# Patient Record
Sex: MALE | Race: WHITE | NOT HISPANIC OR LATINO | Employment: UNEMPLOYED | URBAN - METROPOLITAN AREA
[De-identification: names, ages, dates, MRNs, and addresses within clinical notes are randomized per-mention and may not be internally consistent; named-entity substitution may affect disease eponyms.]

---

## 2017-12-13 ENCOUNTER — HOSPITAL ENCOUNTER (EMERGENCY)
Facility: MEDICAL CENTER | Age: 37
End: 2017-12-14
Attending: EMERGENCY MEDICINE
Payer: COMMERCIAL

## 2017-12-13 DIAGNOSIS — F10.920 ALCOHOLIC INTOXICATION WITHOUT COMPLICATION (HCC): ICD-10-CM

## 2017-12-13 DIAGNOSIS — F43.21 SITUATIONAL DEPRESSION: ICD-10-CM

## 2017-12-13 LAB — POC BREATHALIZER: 0.24 PERCENT (ref 0–0.01)

## 2017-12-13 PROCEDURE — 99284 EMERGENCY DEPT VISIT MOD MDM: CPT

## 2017-12-13 PROCEDURE — 82962 GLUCOSE BLOOD TEST: CPT

## 2017-12-13 PROCEDURE — 302970 POC BREATHALIZER

## 2017-12-13 PROCEDURE — 302970 POC BREATHALIZER: Performed by: EMERGENCY MEDICINE

## 2017-12-13 ASSESSMENT — LIFESTYLE VARIABLES
PAROXYSMAL SWEATS: NO SWEAT VISIBLE
AUDITORY DISTURBANCES: NOT PRESENT
VISUAL DISTURBANCES: NOT PRESENT
ANXIETY: NO ANXIETY (AT EASE)
TREMOR: NO TREMOR
ORIENTATION AND CLOUDING OF SENSORIUM: ORIENTED AND CAN DO SERIAL ADDITIONS
AGITATION: *
NAUSEA AND VOMITING: NO NAUSEA AND NO VOMITING
TOTAL SCORE: 2
HEADACHE, FULLNESS IN HEAD: NOT PRESENT

## 2017-12-13 ASSESSMENT — PAIN SCALES - GENERAL: PAINLEVEL_OUTOF10: 0

## 2017-12-14 VITALS
OXYGEN SATURATION: 95 % | SYSTOLIC BLOOD PRESSURE: 140 MMHG | DIASTOLIC BLOOD PRESSURE: 80 MMHG | BODY MASS INDEX: 31.52 KG/M2 | RESPIRATION RATE: 18 BRPM | WEIGHT: 253.53 LBS | TEMPERATURE: 98 F | HEIGHT: 75 IN | HEART RATE: 92 BPM

## 2017-12-14 LAB
GLUCOSE BLD-MCNC: 82 MG/DL (ref 65–99)
POC BREATHALIZER: 0.08 PERCENT (ref 0–0.01)

## 2017-12-14 PROCEDURE — 302970 POC BREATHALIZER: Performed by: EMERGENCY MEDICINE

## 2017-12-14 ASSESSMENT — PAIN SCALES - GENERAL: PAINLEVEL_OUTOF10: 0

## 2017-12-14 NOTE — ED NOTES
Restraints reduced to two point hard restraints.  Pt is cooperative at this time.  Pt given water, allowed urinal use

## 2017-12-14 NOTE — ED NOTES
Brannon Sharma  37 y.o.  Chief Complaint   Patient presents with   • Alcohol Intoxication     Pt combative, slurring speech, pt reportedly drank a gallon of vodka   • Suicidal Ideation     Pt threatened to drink himself to death     Pt bib ems with LE for above stated complaint.  Pt arriving in four point restraints, verbally threatening EMS and security staff.  EMS states noncompliance with following directions, legal hold placed by [employer].  Pt placed on vs monitor, YAMIL on arrival is 0.24

## 2017-12-14 NOTE — ED PROVIDER NOTES
"ED Provider Note    Scribed for Javed Muniz M.D. by Steven Tarango. 12/13/2017, 11:15 PM.    Primary care provider: Juhi Kerr D.O.  Means of arrival: Ambulance  History obtained from: Patient  History limited by: None    CHIEF COMPLAINT  Chief Complaint   Patient presents with   • Alcohol Intoxication     Pt combative, slurring speech, pt reportedly drank a gallon of vodka   • Suicidal Ideation     Pt threatened to drink himself to death     HPI  Brannon Sharma is a 37 y.o. male who presents to the Emergency Department due to alcohol intoxication and suicidal ideation prior to arrival. The patient states he drank a couple liters of vodka which he endorses drinking on a daily basis. [employer] was called because he threatened to drink himself to death. He became combative and was restrained prior to arrival. Per patient, he said he just wanted someone to talk to and agrees that he is depressed. He \"wanted a way out and a different scenario\". However, he repeatedly denies SI. Denies drug use. Patient is currently living with his girlfriend.    REVIEW OF SYSTEMS  See HPI for further details. All other systems are negative.    C.    PAST MEDICAL HISTORY   has a past medical history of Alcohol-induced psychosis (CMS-HCC); Anxiety; Depression; Paranoid schizophrenia (CMS-HCC); Psychiatric disorder; and Schizophrenia, paranoid (CMS-HCC).    SURGICAL HISTORY   has a past surgical history that includes other.    SOCIAL HISTORY  Social History   Substance Use Topics   • Smoking status: Current Every Day Smoker     Packs/day: 1.00     Years: 12.00     Types: Cigarettes   • Alcohol use Yes      Comment: /3-4 drinks daily      History   Drug Use No     FAMILY HISTORY  None noted    CURRENT MEDICATIONS  Reviewed.  See Encounter Summary.     ALLERGIES  Allergies   Allergen Reactions   • Ceclor [Cefaclor]    • Cephalosporins    • Pcn [Penicillins]      PHYSICAL EXAM  VITAL SIGNS: /77   Pulse 88   Temp 36.7 °C " "(98 °F)   Resp 18   Ht 1.905 m (6' 3\")   Wt 115 kg (253 lb 8.5 oz)   SpO2 98%   BMI 31.69 kg/m²   Constitutional: No apparent distress, intoxicated.  HENT: No signs of trauma, Bilateral external ears normal, Nose normal.   Eyes: Pupils are equal and reactive, Conjunctiva normal, Non-icteric.   Neck: Normal range of motion, No tenderness, Supple, No stridor.   Lymphatic: No lymphadenopathy noted.   Cardiovascular: Regular rate and rhythm, no murmurs.   Thorax & Lungs: Normal breath sounds, No respiratory distress, No wheezing, No chest tenderness.   Abdomen: Bowel sounds normal, Soft, No tenderness, No masses, No pulsatile masses. No peritoneal signs.  Skin: Warm, Dry, No erythema, No rash.   Back: No bony tenderness, No CVA tenderness.   Extremities: Intact distal pulses, No edema, No tenderness, No cyanosis  Musculoskeletal: Good range of motion in all major joints. No tenderness to palpation or major deformities noted.   Neurologic: Normal motor function, Normal sensory function, No focal deficits noted.   Psychiatric: Slurred speech in 4-point restraints. Verbally cooperative at this point. Denies SI or HI.    DIAGNOSTIC STUDIES / PROCEDURES     LABS  Results for orders placed or performed during the hospital encounter of 12/13/17   POC BREATHALIZER   Result Value Ref Range    POC Breathalizer 0.24 (A) 0.00 - 0.01 Percent   ACCU-CHEK GLUCOSE   Result Value Ref Range    Glucose - Accu-Ck 82 65 - 99 mg/dL   POC BREATHALIZER   Result Value Ref Range    POC Breathalizer 0.079 (A) 0.00 - 0.01 Percent     All labs were reviewed by me.    COURSE & MEDICAL DECISION MAKING  Pertinent Labs & Imaging studies reviewed. (See chart for details)    Differential diagnoses include but are not limited to: Alcohol Intoxication,     11:15 PM - Patient seen and examined at bedside. Ordered POC Breathalizer to evaluate his symptoms.    11:55 PM - Ordered Accu-Check Glucose.    12:59 AM - Patient seen at bedside. He states he has " "had 1 suicidal attempt before and went to [facility]. However, he is more conversational and states he did not want to kill himself tonight but he called the hotline and purely wanted to talk to someone else. Patient is on 2 SSRI and 1 SNRI. 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 He takes 2-4mg of Clonazepam daily and Lithium.    Decision Making:  This is a 37 y.o. year old male who presents with alcohol intoxication situational depression. Patient admits that he did call the suicidal hotline to \"talk to somebody \". He does did have a disagreement with a significant other today which had him increase his alcohol intake tonight. He admits he typically drinks 1-2 L of vodka per day. This is a similar amount this evening. Notes patient is clinically sober do not believe that he has any threat to himself or others. He recurrently asked for benzodiazepines and then had a discussion with me at the bedside about how he is \"addicted to benzos\". He notes that if she does end up being admitted to local psychiatric facility he will \"fake withdrawal to get more benzos.\" Again this time I will declining POORNIMA Ramirez do not believe that he has any threat to himself or others. Situational depression with alcohol medication. He understands he should decrease his alcohol intake and seek outpatient help for his depression.    The patient will return for new or worsening symptoms and is stable at the time of discharge.    DISPOSITION:  Patient will be discharged home in stable condition.    FOLLOW UP:  Juhi Kerr D.O.  [id number]  [place] [address]  [phone]    Schedule an appointment as soon as possible for a visit in 1 day      FINAL IMPRESSION  1. Alcoholic intoxication without complication (CMS-HCC)    2. Situational depression          I, Steven Tarango (Scribe), am scribing for, and in the presence of, Javed Muniz, " M.D..    Electronically signed by: Steven Tarango (Scribe), 12/13/2017    IJaved M.D. personally performed the services described in this documentation, as scribed by Steven Tarango in my presence, and it is both accurate and complete.    The note accurately reflects work and decisions made by me.  Javed Muniz  12/14/2017  2:19 AM

## 2017-12-14 NOTE — DISCHARGE INSTRUCTIONS
Alcohol Intoxication  Alcohol intoxication occurs when you drink enough alcohol that it affects your ability to function. It can be mild or very severe. Drinking a lot of alcohol in a short time is called binge drinking. This can be very harmful. Drinking alcohol can also be more dangerous if you are taking medicines or other drugs. Some of the effects caused by alcohol may include:  · Loss of coordination.  · Changes in mood and behavior.  · Unclear thinking.  · Trouble talking (slurred speech).  · Throwing up (vomiting).  · Confusion.  · Slowed breathing.  · Twitching and shaking (seizures).  · Loss of consciousness.  HOME CARE  · Do not drive after drinking alcohol.  · Drink enough water and fluids to keep your pee (urine) clear or pale yellow. Avoid caffeine.  · Only take medicine as told by your doctor.  GET HELP IF:  · You throw up (vomit) many times.  · You do not feel better after a few days.  · You frequently have alcohol intoxication. Your doctor can help decide if you should see a substance use treatment counselor.  GET HELP RIGHT AWAY IF:  · You become shaky when you stop drinking.  · You have twitching and shaking.  · You throw up blood. It may look bright red or like coffee grounds.  · You notice blood in your poop (bowel movements).  · You become lightheaded or pass out (faint).  MAKE SURE YOU:   · Understand these instructions.  · Will watch your condition.  · Will get help right away if you are not doing well or get worse.     This information is not intended to replace advice given to you by your health care provider. Make sure you discuss any questions you have with your health care provider.     Document Released: 06/05/2009 Document Revised: 08/20/2014 Document Reviewed: 05/23/2014  [employer] Interactive Patient Education ©2016 [employer]    Depression, Adult  Depression is feeling sad, low, down in the dumps, blue, gloomy, or empty. In general, there are two kinds of depression:  · Normal  sadness or grief. This can happen after something upsetting. It often goes away on its own within 2 weeks. After losing a loved one (bereavement), normal sadness and grief may last longer than two weeks. It usually gets better with time.  · Clinical depression. This kind lasts longer than normal sadness or grief. It keeps you from doing the things you normally do in life. It is often hard to function at home, work, or at school. It may affect your relationships with others. Treatment is often needed.  GET HELP RIGHT AWAY IF:  · You have thoughts about hurting yourself or others.  · You lose touch with reality (psychotic symptoms). You may:  ¨ See or hear things that are not real.  ¨ Have untrue beliefs about your life or people around you.  · Your medicine is giving you problems.  MAKE SURE YOU:  · Understand these instructions.  · Will watch your condition.  · Will get help right away if you are not doing well or get worse.     This information is not intended to replace advice given to you by your health care provider. Make sure you discuss any questions you have with your health care provider.     Document Released: 01/20/2012 Document Revised: 01/08/2016 Document Reviewed: 04/18/2013  Elsevier Interactive Patient Education ©2016 [employer] Inc.

## 2017-12-14 NOTE — ED NOTES
Pt released from legal hold by ERP.  Close obs discontinued.  Pt speaking full sentences, able to ambulate with a steady gait to the lobby, is a/ox4 at this time.  Pt has discharge instructions in hand.  Pt understands need to stop drinking.  Pt to seek ride home from roommate.  Pt verbalizes understanding of dc instructions provided

## 2018-01-19 ENCOUNTER — HOSPITAL ENCOUNTER (EMERGENCY)
Facility: MEDICAL CENTER | Age: 38
End: 2018-01-20
Attending: EMERGENCY MEDICINE
Payer: COMMERCIAL

## 2018-01-19 ENCOUNTER — APPOINTMENT (OUTPATIENT)
Dept: RADIOLOGY | Facility: MEDICAL CENTER | Age: 38
End: 2018-01-19
Attending: EMERGENCY MEDICINE
Payer: COMMERCIAL

## 2018-01-19 DIAGNOSIS — M54.9 PAIN, UPPER BACK: ICD-10-CM

## 2018-01-19 PROCEDURE — 99284 EMERGENCY DEPT VISIT MOD MDM: CPT

## 2018-01-19 RX ORDER — LORAZEPAM 2 MG/ML
1 INJECTION INTRAMUSCULAR ONCE
Status: COMPLETED | OUTPATIENT
Start: 2018-01-20 | End: 2018-01-20

## 2018-01-19 RX ORDER — MORPHINE SULFATE 4 MG/ML
4 INJECTION, SOLUTION INTRAMUSCULAR; INTRAVENOUS ONCE
Status: COMPLETED | OUTPATIENT
Start: 2018-01-20 | End: 2018-01-20

## 2018-01-19 NOTE — LETTER
"  FORM C-4:  EMPLOYEE’S CLAIM FOR COMPENSATION/ REPORT OF INITIAL TREATMENT  EMPLOYEE’S CLAIM - PROVIDE ALL INFORMATION REQUESTED   First Name  Brannon Last Name  Edith Birthdate             Age  1980 37 y.o. Sex  male Claim Number   Home Employee Address  [address]  [place]                                     Zip  [address] Height  1.905 m (6' 3\") Weight  111 kg (244 lb 11.4 oz) [facility]     Mailing Employee Address                           [address]   [place]               Zip  [address] Telephone  [phone] (home)  Primary Language Spoken  ENGLISH   Insurer  [employer] Third [employer]   JEROD URRUTIA Employee's Occupation (Job Title) When Injury or Occupational Disease Occurred  [place]   Employer's Name  [employer] Telephone   [phone]   Employer Address  [address] [facility] [place]  [address]   Date of Injury  1/19/2018       Hour of Injury  10:00 PM Date Employer Notified  1/19/2018 Last Day of Work after Injury or Occupational Disease  1/19/2018 Supervisor to Whom Injury Reported  [employer]   Address or Location of Accident (if applicable)  [[address]]   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 Be specific and answer in detail. Use additional sheet if necessary)  I was making sandwhiches, I had to sneeze I went to step away from the food line so   I didn't sneeze on the food. The floor was wet, I slipped and fell   If you believe that you have an occupational disease, when did you first have knowledge of the disability and it relationship to your employment?  n/a Witnesses to the Accident  the whole food line, camera     Nature of Injury or Occupational Disease  Strain  Part(s) of Body Injured or Affected  Upper Back Area (Thoracic Area), N/A, N/A    I certify that the above is true and " correct to the best of my knowledge and that I have provided this information in order to obtain the benefits of [place]  I hereby authorize any physician, chiropractor, surgeon, practitioner, or other person, any hospital, including [facility] or [facility], any medical service organization, any insurance company, or other institution or organization to release to each other, any medical or other information, including benefits paid or payable, pertinent to this injury or disease, except information relative to diagnosis, treatment and/or counseling for AIDS, psychological conditions, alcohol or controlled substances, for which I must give specific authorization.  A Photostat of this authorization shall be as valid as the original.   Date01/24/2018 [facility] Employee’s Signature   THIS REPORT MUST BE COMPLETED AND MAILED WITHIN 3 WORKING DAYS OF TREATMENT   Place  [facility], EMERGENCY DEPT  Name of Facility   [facility]   Date  1/19/2018 Diagnosis  (M54.9) Pain, upper back Is there evidence the injured employee was under the influence of alcohol and/or another controlled substance at the time of accident?   Hour  10:41 AM Description of Injury or Disease  Pain, upper back No   Treatment  Rest and analgesics  Have you advised the patient to remain off work five days or more?         No   X-Ray Findings  Negative   If Yes   From Date    To Date      From information given by the employee, together with medical evidence, can you directly connect this injury or occupational disease as job incurred?  Yes If No, is the employee capable of: Full Duty  No Modified Duty  Yes   Is additional medical care by a physician indicated?  Yes If Modified Duty, Specify any Limitations / Restrictions  No lifting for one week or until cleared  "by occupational health     Do you know of any previous injury or disease contributing to this condition or occupational disease?  No   Date  1/23/2018 Print Doctor’s Name  Rubén Castro S ROSALES certify the employer’s copy of this form was mailed on:   Address  [address] [place] [id number] [phone] Insurer’s Use Only   [facility]  [address]    Provider’s Tax ID Number  [id number] Telephone  Dept: [phone]    Doctor’s Signature  e-CARINE Jordan M.D. Degree   MD    Original - TREATING PHYSICIAN OR CHIROPRACTOR   Pg 2-Insurer/TPA   Pg 3-Employer   Pg 4-Employee                                                                                                  Form C-4 (rev01/03)     BRIEF DESCRIPTION OF RIGHTS AND BENEFITS  (Pursuant to [place] 616C.050)    Notice of Injury or Occupational Disease (Incident Report Form C-1): If an injury or occupational disease (OD) arises out of and in the course of employment, you must provide written notice to your employer as soon as practicable, but no later than 7 days after the accident or OD. Your employer shall maintain a sufficient supply of the required forms.    Claim for Compensation (Form C-4): If medical treatment is sought, the form C-4 is available at the place of initial treatment. A completed \"Claim for Compensation\" (Form C-4) must be filed within 90 days after an accident or OD. The treating physician or chiropractor must, within 3 working days after treatment, complete and mail to the employer, the employer's insurer and third-party , the Claim for Compensation.    Medical Treatment: If you require medical treatment for your on-the-job injury or OD, you may be required to select a physician or chiropractor from a list provided by your workers’ compensation insurer, if it has contracted with an Organization for Managed Care (MCO) or Preferred Provider Organization (PPO) or providers of health care. If your employer has " not entered into a contract with an MCO or PPO, you may select a physician or chiropractor from the Panel of Physicians and Chiropractors. Any medical costs related to your industrial injury or OD will be paid by your insurer.    Temporary Total Disability (TTD): If your doctor has certified that you are unable to work for a period of at least 5 consecutive days, or 5 cumulative days in a 20-day period, or places restrictions on you that your employer does not accommodate, you may be entitled to TTD compensation.    Temporary Partial Disability (TPD): If the wage you receive upon reemployment is less than the compensation for TTD to which you are entitled, the insurer may be required to pay you TPD compensation to make up the difference. TPD can only be paid for a maximum of 24 months.    Permanent Partial Disability (PPD): When your medical condition is stable and there is an indication of a PPD as a result of your injury or OD, within 30 days, your insurer must arrange for an evaluation by a rating physician or chiropractor to determine the degree of your PPD. The amount of your PPD award depends on the date of injury, the results of the PPD evaluation and your age and wage.    Permanent Total Disability (PTD): If you are medically certified by a treating physician or chiropractor as permanently and totally disabled and have been granted a PTD status by your insurer, you are entitled to receive monthly benefits not to exceed 66 2/3% of your average monthly wage. The amount of your PTD payments is subject to reduction if you previously received a PPD award.    Vocational Rehabilitation Services: You may be eligible for vocational rehabilitation services if you are unable to return to the job due to a permanent physical impairment or permanent restrictions as a result of your injury or occupational disease.    Transportation and Per Rancho Reimbursement: You may be eligible for travel expenses and per rancho associated  with medical treatment.  Reopening: You may be able to reopen your claim if your condition worsens after claim closure.    Appeal Process: If you disagree with a written determination issued by the insurer or the insurer does not respond to your request, you may appeal to the Department of Administration, , by following the instructions contained in your determination letter. You must appeal the determination within 70 days from the date of the determination letter at [address], [address], [place], [place] [address], or [address], [address], [place], [place] [address]. If you disagree with the  decision, you may appeal to the Department of Administration, . You must file your appeal within 30 days from the date of the  decision letter at [address], [address], [place], [place] [address], or [address], [facility] [address], [place], [place] [address]. If you disagree with a decision of an , you may file a petition for judicial review with the District Court. You must do so within 30 days of the Appeal Officer’s decision. You may be represented by an  at your own expense or you may contact the [facility] for possible representation.    [place]  for Injured Workers (NAIW): If you disagree with a  decision, you may request that NAIW represent you without charge at an  Hearing. For information regarding denial of benefits, you may contact the [facility] at: [address], [address], [place] [address], [phone], or [address], [facility] [address], [place] [address], [phone]    To File a Complaint with the Division: If you wish to file a complaint with the  of the Division of Industrial Relations (DIR), please contact the Workers’ Compensation Section, [address], [address], [place], [place] [address], telephone [phone], or [address], [address], [place], [place] [address], telephone [phone].    For assistance with Workers’ Compensation Issues: you may contact the Office of the Governor Consumer Health Assistance, [address] [place] [address], [address], [place], [place] [address], Toll Free 1-108.651.2472, Web site: http://[email or web address].[email or web address]., E-mail [email or web address].                                                                                                                                                                                __________________________________________________________________                                    ____01/24/2018_____________            Employee Name / Signature                                                                                                                            Date                                       D-2 (rev. 10/07)

## 2018-01-20 ENCOUNTER — HOSPITAL ENCOUNTER (EMERGENCY)
Dept: RADIOLOGY | Facility: MEDICAL CENTER | Age: 38
End: 2018-01-20
Attending: EMERGENCY MEDICINE
Payer: COMMERCIAL

## 2018-01-20 VITALS
RESPIRATION RATE: 18 BRPM | TEMPERATURE: 98.9 F | OXYGEN SATURATION: 94 % | HEART RATE: 72 BPM | BODY MASS INDEX: 30.43 KG/M2 | HEIGHT: 75 IN | WEIGHT: 244.71 LBS | DIASTOLIC BLOOD PRESSURE: 95 MMHG | SYSTOLIC BLOOD PRESSURE: 162 MMHG

## 2018-01-20 PROCEDURE — 96374 THER/PROPH/DIAG INJ IV PUSH: CPT

## 2018-01-20 PROCEDURE — 96375 TX/PRO/DX INJ NEW DRUG ADDON: CPT

## 2018-01-20 PROCEDURE — 700111 HCHG RX REV CODE 636 W/ 250 OVERRIDE (IP): Performed by: EMERGENCY MEDICINE

## 2018-01-20 PROCEDURE — 72131 CT LUMBAR SPINE W/O DYE: CPT

## 2018-01-20 PROCEDURE — 70450 CT HEAD/BRAIN W/O DYE: CPT

## 2018-01-20 PROCEDURE — 72128 CT CHEST SPINE W/O DYE: CPT

## 2018-01-20 PROCEDURE — 72125 CT NECK SPINE W/O DYE: CPT

## 2018-01-20 PROCEDURE — 36415 COLL VENOUS BLD VENIPUNCTURE: CPT

## 2018-01-20 RX ADMIN — LORAZEPAM 1 MG: 2 INJECTION INTRAMUSCULAR; INTRAVENOUS at 00:02

## 2018-01-20 RX ADMIN — MORPHINE SULFATE 4 MG: 4 INJECTION INTRAVENOUS at 00:02

## 2018-01-20 NOTE — ED PROVIDER NOTES
ED Provider Note    CHIEF COMPLAINT  Chief Complaint   Patient presents with   • Back Pain       HPI  Brannon Sharma is a 37 y.o. male who presents with a chief complaint of neck and back pain. The patient reports that he was in his normal, baseline state of health until earlier this evening when he was in a food line and had dyspnea, he stepped quickly away from the food but stepped on the area of the floor that was wet. This caused him to slip and he then fell on his neck and back. He did hit his head but did not lose consciousness. He had immediate pain in his neck and back. He denies any numbness or tingling in his arms or legs. He is not on anticoagulation. Do not take any medication prior to arrival. He reports a history of significant anxiety and has recently been on Klonopin but tapered off approximately 3 weeks ago. He reports high anxiety here today. No fevers or chills. No vision changes. No nausea or vomiting. No chest pain or shortness of breath. No extremity pain. No abdominal pain.    REVIEW OF SYSTEMS  See HPI for further details. Neck pain. Back pain. All other systems are negative.     PAST MEDICAL HISTORY   has a past medical history of Alcohol-induced psychosis (CMS-HCC); Anxiety; Depression; Paranoid schizophrenia (CMS-[facility]); Psychiatric disorder; and Schizophrenia, paranoid (CMS-[facility]).    SOCIAL HISTORY  Social History     Social History Main Topics   • Smoking status: Current Every Day Smoker     Packs/day: 1.00     Years: 12.00     Types: Cigarettes   • Smokeless tobacco: Not on file   • Alcohol use Yes      Comment: /3-4 drinks daily   • Drug use: No   • Sexual activity: Not on file       SURGICAL HISTORY   has a past surgical history that includes other.    CURRENT MEDICATIONS  Home Medications    **Home medications have not yet been reviewed for this encounter**         ALLERGIES  Allergies   Allergen Reactions   • Ceclor [Cefaclor]    • Cephalosporins    • Pcn [Penicillins]   "      PHYSICAL EXAM  VITAL SIGNS: BP (!) 162/95   Pulse 82   Temp 37.2 °C (98.9 °F)   Resp 18   Ht 1.905 m (6' 3\")   Wt 111 kg (244 lb 11.4 oz)   SpO2 94%   BMI 30.59 kg/m²    Pulse ox interpretation: I interpret this pulse ox as normal.  Constitutional: Alert in mild distress. Anxious.  HENT: No signs of trauma, Bilateral external ears normal, Nose normal. No periorbital ecchymoses. No postauricular bruising.  Eyes: Pupils are equal and reactive, Conjunctiva normal, Non-icteric.   Neck: C-collar in place, tenderness to palpation, No stridor.   Lymphatic: No lymphadenopathy noted.   Cardiovascular: Regular rate and rhythm, no murmurs.   Thorax & Lungs: Normal breath sounds, No respiratory distress, No wheezing, No chest tenderness.   Abdomen: Bowel sounds normal, Soft, No tenderness, No masses, No pulsatile masses. No peritoneal signs.  Skin: Warm, Dry, No erythema, No rash.   Back: Diffuse cervical, thoracic, and lumbar spine tenderness to palpation. No CVA tenderness.   Extremities: Intact distal pulses, No edema, No tenderness, No cyanosis.  Musculoskeletal: Good range of motion in all major joints. No tenderness to palpation or major deformities noted.   Neurologic: Alert , Normal motor function, Normal sensory function, No focal deficits noted.   Psychiatric: Affect normal, Judgment normal, Mood normal.       DIAGNOSTIC STUDIES / PROCEDURES    EKG      LABS      RADIOLOGY  CT head  CT cervical spine  CT thoracic spine  CT lumbar spine      COURSE & MEDICAL DECISION MAKING  Pertinent Labs & Imaging studies reviewed. (See chart for details)  This is a 37-year-old male who presents with the chief complaint of diffuse neck and back pain after mechanical ground-level fall earlier today. Differential diagnosis includes, but is not limited to, ICH, fracture, sprain, dislocation, contusion. Patient arrives afebrile with normal vital signs. He appears anxious and in mild distress secondary to his pain. His " physical exam reveals diffuse cervical, thoracic, and lumbar spine tenderness to palpation. There is no extremity weakness or numbness. No complaints of paresthesias. The patient was given 1 dose of Ativan as well as morphine for pain and anxiety control. He was sent to the CT scanner for CT scan of his head, cervical, thoracic, and lumbar spine. On reevaluation, the patient feels significantly improved. His CTs are without acute abnormality.    The patient's cervical collar was removed. He was noted to hop off the bed without difficulty reporting his symptoms were completely resolved. He was also noted to ambulate without difficulty. He was subsequently discharged in improved condition with strict return precautions, instructions to take Tylenol and ibuprofen as directed on the bottle, instructions to use ice or heat as is helpful for his pain, and instructions to follow up with his primary care physician on Monday for recheck. He was instructed to return to the ER with any new or worsening symptoms. The patient understands and will comply.    FINAL IMPRESSION  1. Mechanical ground-level fall  2. Diffuse back pain        Electronically signed by: Rubén Castro, 1/20/2018 12:09 AM

## 2018-01-20 NOTE — DISCHARGE INSTRUCTIONS
You were seen in the ER for neck and back pain after a ground-level fall. CT scans did not reveal an acute fracture and you do not require further workup, consultation, or admission to the hospital. I would like you to take Tylenol and ibuprofen as directed on the bottle for pain control. Additionally, you can use ice or heat as are helpful. Please follow-up with your primary care physician on Monday for recheck. If you develop new or worsening symptoms return to the ER.    Back Pain, Adult  Back pain is very common. The pain often gets better over time. The cause of back pain is usually not dangerous. Most people can learn to manage their back pain on their own.   HOME CARE   Watch your back pain for any changes. The following actions may help to lessen any pain you are feeling:  · Stay active. Start with short walks on flat ground if you can. Try to walk farther each day.  · Exercise regularly as told by your doctor. Exercise helps your back heal faster. It also helps avoid future injury by keeping your muscles strong and flexible.  · Do not sit, drive, or  one place for more than 30 minutes.  · Do not stay in bed. Resting more than 1-2 days can slow down your recovery.  · Be careful when you bend or lift an object. Use good form when lifting:  ¨ Bend at your knees.  ¨ Keep the object close to your body.  ¨ Do not twist.  · Sleep on a firm mattress. Lie on your side, and bend your knees. If you lie on your back, put a pillow under your knees.  · Take medicines only as told by your doctor.  · Put ice on the injured area.  ¨ Put ice in a plastic bag.  ¨ Place a towel between your skin and the bag.  ¨ Leave the ice on for 20 minutes, 2-3 times a day for the first 2-3 days. After that, you can switch between ice and heat packs.  · Avoid feeling anxious or stressed. Find good ways to deal with stress, such as exercise.  · Maintain a healthy weight. Extra weight puts stress on your back.  GET HELP IF:   · You  have pain that does not go away with rest or medicine.  · You have worsening pain that goes down into your legs or buttocks.  · You have pain that does not get better in one week.  · You have pain at night.  · You lose weight.  · You have a fever or chills.  GET HELP RIGHT AWAY IF:   · You cannot control when you poop (bowel movement) or pee (urinate).  · Your arms or legs feel weak.  · Your arms or legs lose feeling (numbness).  · You feel sick to your stomach (nauseous) or throw up (vomit).  · You have belly (abdominal) pain.  · You feel like you may pass out (faint).     This information is not intended to replace advice given to you by your health care provider. Make sure you discuss any questions you have with your health care provider.     Document Released: 06/05/2009 Document Revised: 01/08/2016 Document Reviewed: 04/21/2015  [employer] Interactive Patient Education ©2016 [employer]

## 2018-01-26 ENCOUNTER — OCCUPATIONAL MEDICINE (OUTPATIENT)
Dept: OCCUPATIONAL MEDICINE | Facility: CLINIC | Age: 38
End: 2018-01-26
Payer: COMMERCIAL

## 2018-01-26 VITALS
HEART RATE: 109 BPM | OXYGEN SATURATION: 98 % | WEIGHT: 245 LBS | RESPIRATION RATE: 16 BRPM | BODY MASS INDEX: 30.46 KG/M2 | HEIGHT: 75 IN

## 2018-01-26 DIAGNOSIS — S20.229D CONTUSION OF BACK, UNSPECIFIED LATERALITY, SUBSEQUENT ENCOUNTER: Primary | ICD-10-CM

## 2018-01-26 DIAGNOSIS — Z02.1 PRE-EMPLOYMENT DRUG SCREENING: ICD-10-CM

## 2018-01-26 LAB
AMP AMPHETAMINE: NORMAL
BAR BARBITURATES: NORMAL
BREATH ALCOHOL COMMENT: NORMAL
BZO BENZODIAZEPINES: NORMAL
COC COCAINE: NORMAL
INT CON NEG: NORMAL
INT CON POS: NORMAL
MDMA ECSTASY: NORMAL
MET METHAMPHETAMINES: NORMAL
MTD METHADONE: NORMAL
OPI OPIATES: NORMAL
OXY OXYCODONE: NORMAL
PCP PHENCYCLIDINE: NORMAL
POC BREATHALIZER: 0 PERCENT (ref 0–0.01)
POC URINE DRUG SCREEN OCDRS: NORMAL
THC: NORMAL

## 2018-01-26 PROCEDURE — 80305 DRUG TEST PRSMV DIR OPT OBS: CPT | Performed by: PREVENTIVE MEDICINE

## 2018-01-26 PROCEDURE — 82075 ASSAY OF BREATH ETHANOL: CPT | Performed by: PREVENTIVE MEDICINE

## 2018-01-26 PROCEDURE — 99203 OFFICE O/P NEW LOW 30 MIN: CPT | Performed by: PREVENTIVE MEDICINE

## 2018-01-26 NOTE — PROGRESS NOTES
Subjective:      Brannon Sharma is a 37 y.o. male who presents with Follow-Up ( DOI 1/19/2018 - Back - better - room 3)      Date of injury 1/19/2018. Mechanism of injury-slipped on wet floor and fell on back. 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 He was seen in the emergency department 6 days ago where CT scans of the head, C-spine, T-spine, and L-spine were unremarkable except for mild degenerative changes in the cervical and thoracic areas. Currently, he complains of constant bilateral thoracolumbar pain which is worse with prolonged standing and lifting. No lower extremity or upper extremity symptoms are disclosed. No urinary symptoms. He saw his personal physician yesterday who prescribed ibuprofen and Flexeril.     HPI    ROS  Comprehensive medical history form reviewed. Pertinent positives and negatives included in HPI.    PFSH: reviewed in Epic    PMH:  has a past medical history of Alcohol-induced psychosis (CMS-HCC); Anxiety; Depression; Paranoid schizophrenia (CMS-HCC); Psychiatric disorder; and Schizophrenia, paranoid (CMS-HCC).  MEDS:   Current Outpatient Prescriptions:   •  alprazolam (XANAX) 0.5 MG TABS, Take 0.5 mg by mouth 3 times a day., Disp: , Rfl:   •  olanzapine (ZYPREXA) 5 MG TABS, Take 1 Tab by mouth every evening., Disp: 20 Tab, Rfl: 11  •  omeprazole (PRILOSEC) 20 MG delayed-release capsule, Take 20 mg by mouth every day., Disp: , Rfl:   •  lisinopril (PRINIVIL) 10 MG TABS, Take 10 mg by mouth every day., Disp: , Rfl:   ALLERGIES:   Allergies   Allergen Reactions   • Ceclor [Cefaclor]    • Cephalosporins    • Pcn [Penicillins]      SURGHX:   Past Surgical History:   Procedure Laterality Date   • OTHER      deviated septum     SOCHX:  reports that he has been smoking Cigarettes.  He has a 12.00 pack-year smoking history. He does not have any smokeless tobacco history on file. He reports that he drinks alcohol. He reports that he does not use drugs.  Work  "Status:  for Chart well  FH: No pertinent hereditary disorders.        Objective:     Pulse (!) 109   Resp 16   Ht 1.905 m (6' 3\")   Wt 111.1 kg (245 lb)   SpO2 98%   BMI 30.62 kg/m²      Physical Exam    Appearance: Well-developed, well-nourished.   Mental Status: Mood and Affect normal. Pleasant. Cooperative. Appropriate.   ENT: Oropharynx clear. Moist mucous membranes. Hearing normal.   Eyes: Pupils reactive. Conjunctiva normal. No scleral icterus.   Neck: Trachea Midline. No thyromegaly. No masses.  Cardiovascular: Normal rate. Regular rhythm. Normal heart sounds.   Chest: Effort normal. Breath sounds clear.   Skin: Skin is warm and dry. No rash.   Musculoskeletal: Normal posture. Normal gait. Back exam shows mild tenderness in the thoracic spinal and paraspinal area. No abrasion or ecchymosis is noted. Moderate pain with motion.       Assessment/Plan:     1. Contusion of back, unspecified laterality, subsequent encounter  New to Occupational Health from emergency department urgent care  Condition minimally improved  Ibuprofen and Flexeril have been prescribed  Will add physical therapy  Restricted activity  Recheck in 10 days      "

## 2018-01-26 NOTE — LETTER
[address],   [address] [place] [address]  Phone:  [phone] - Fax:  [phone]   Occupational Health [facility] Progress Report and Disability Certification  Date of Service: 1/26/2018   No Show:  No  Date / Time of Next Visit: 2/8/2018   Claim Information   Patient Name: Brannon Sharma  Claim Number:     Employer:    Date of Injury: 1/19/2018     Insurer / TPA: Holly Rolle  ID / SSN:     Occupation: warehouse  Diagnosis: The encounter diagnosis was Contusion of back, unspecified laterality, subsequent encounter.    Medical Information   Related to Industrial Injury? Yes    Subjective Complaints:  Date of injury 1/19/2018. Mechanism of injury-slipped on wet floor and fell on back. 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 He was seen in the emergency department 6 days ago where CT scans of the head, C-spine, T-spine, and L-spine were unremarkable except for mild degenerative changes in the cervical and thoracic areas. Currently, he complains of constant bilateral thoracolumbar pain which is worse with prolonged standing and lifting. No lower extremity or upper extremity symptoms are disclosed. No urinary symptoms. He saw his personal physician yesterday who prescribed ibuprofen and Flexeril.   Objective Findings: Appearance: Well-developed, well-nourished.   Mental Status: Mood and Affect normal. Pleasant. Cooperative. Appropriate.   ENT: Oropharynx clear. Moist mucous membranes. Hearing normal.   Eyes: Pupils reactive. Conjunctiva normal. No scleral icterus.   Neck: Trachea Midline. No thyromegaly. No masses.  Cardiovascular: Normal rate. Regular rhythm. Normal heart sounds.   Chest: Effort normal. Breath sounds clear.   Skin: Skin is warm and dry. No rash.   Musculoskeletal: Normal posture. Normal gait. Back exam shows mild tenderness in the thoracic spinal and paraspinal area. No abrasion or ecchymosis is noted.  Moderate pain with motion.   Pre-Existing Condition(s):     Assessment:   Condition Improved    Status: Additional Care Required  Permanent Disability:No    Plan: MedicationMedication (NOT at Work)PT    Diagnostics:      Comments:       Disability Information   Status: Released to Restricted Duty    From:  1/26/2018  Through: 2/8/2018 Restrictions are:     Physical Restrictions   Sitting:    Standing:    Stooping:    Bending:  < or = to 1 hr/day   Squatting:    Walking:    Climbing:    Pushing:      Pulling:    Other:    Reaching Above Shoulder (L):   Reaching Above Shoulder (R):       Reaching Below Shoulder (L):    Reaching Below Shoulder (R):      Not to exceed Weight Limits   Carrying(hrs):   Weight Limit(lb):   Lifting(hrs):   Weight  Limit(lb): < or = to 10 pounds   Comments:      Repetitive Actions   Hands: i.e. Fine Manipulations from Grasping:     Feet: i.e. Operating Foot Controls:     Driving / Operate Machinery:     Physician Name: Juancho Flowers M.D. Physician Signature: JUANCHO Barrientos M.D. e-Signature: Dr. Tam Orona, Medical Director   Clinic Name / Location: [address],   [address] [place] [address] Clinic Phone Number: Dept: [phone]   Appointment Time: 2:10 Pm Visit Start Time: 2:08 PM   Check-In Time:  2:03 Pm Visit Discharge Time:     Original-Treating Physician or Chiropractor    Page 2-Insurer/TPA    Page 3-Employer    Page 4-Employee

## 2018-01-29 ENCOUNTER — HOSPITAL ENCOUNTER (EMERGENCY)
Facility: MEDICAL CENTER | Age: 38
End: 2018-01-30
Attending: EMERGENCY MEDICINE
Payer: MEDICAID

## 2018-01-29 DIAGNOSIS — F10.920 ALCOHOLIC INTOXICATION WITHOUT COMPLICATION (HCC): ICD-10-CM

## 2018-01-29 DIAGNOSIS — R46.89 AGGRESSIVE BEHAVIOR: ICD-10-CM

## 2018-01-29 DIAGNOSIS — F10.90 ALCOHOL USE DISORDER: ICD-10-CM

## 2018-01-29 LAB — ETHANOL BLD-MCNC: 0.31 G/DL

## 2018-01-29 PROCEDURE — 80307 DRUG TEST PRSMV CHEM ANLYZR: CPT

## 2018-01-29 PROCEDURE — 99285 EMERGENCY DEPT VISIT HI MDM: CPT

## 2018-01-29 PROCEDURE — 36415 COLL VENOUS BLD VENIPUNCTURE: CPT

## 2018-01-30 ENCOUNTER — HOSPITAL ENCOUNTER (EMERGENCY)
Facility: MEDICAL CENTER | Age: 38
End: 2018-01-31
Attending: EMERGENCY MEDICINE
Payer: MEDICAID

## 2018-01-30 VITALS
DIASTOLIC BLOOD PRESSURE: 89 MMHG | WEIGHT: 240 LBS | BODY MASS INDEX: 29.84 KG/M2 | HEIGHT: 75 IN | HEART RATE: 99 BPM | RESPIRATION RATE: 18 BRPM | SYSTOLIC BLOOD PRESSURE: 146 MMHG | TEMPERATURE: 98.6 F | OXYGEN SATURATION: 92 %

## 2018-01-30 DIAGNOSIS — R45.851 SUICIDAL IDEATION: ICD-10-CM

## 2018-01-30 DIAGNOSIS — F10.920 ALCOHOLIC INTOXICATION WITHOUT COMPLICATION (HCC): ICD-10-CM

## 2018-01-30 LAB
AMPHET UR QL SCN: NEGATIVE
AMPHET UR QL SCN: NEGATIVE
BARBITURATES UR QL SCN: NEGATIVE
BARBITURATES UR QL SCN: NEGATIVE
BENZODIAZ UR QL SCN: NEGATIVE
BENZODIAZ UR QL SCN: POSITIVE
BZE UR QL SCN: NEGATIVE
BZE UR QL SCN: NEGATIVE
CANNABINOIDS UR QL SCN: NEGATIVE
CANNABINOIDS UR QL SCN: NEGATIVE
METHADONE UR QL SCN: NEGATIVE
METHADONE UR QL SCN: NEGATIVE
OPIATES UR QL SCN: NEGATIVE
OPIATES UR QL SCN: NEGATIVE
OXYCODONE UR QL SCN: NEGATIVE
OXYCODONE UR QL SCN: NEGATIVE
PCP UR QL SCN: NEGATIVE
PCP UR QL SCN: NEGATIVE
POC BREATHALIZER: 0.06 PERCENT (ref 0–0.01)
POC BREATHALIZER: 0.11 PERCENT (ref 0–0.01)
POC BREATHALIZER: 0.14 PERCENT (ref 0–0.01)
POC BREATHALIZER: 0.16 PERCENT (ref 0–0.01)
POC BREATHALIZER: 0.17 PERCENT (ref 0–0.01)
POC BREATHALIZER: 0.18 PERCENT (ref 0–0.01)
PROPOXYPH UR QL SCN: NEGATIVE
PROPOXYPH UR QL SCN: NEGATIVE

## 2018-01-30 PROCEDURE — 700111 HCHG RX REV CODE 636 W/ 250 OVERRIDE (IP): Performed by: EMERGENCY MEDICINE

## 2018-01-30 PROCEDURE — 302970 POC BREATHALIZER: Performed by: EMERGENCY MEDICINE

## 2018-01-30 PROCEDURE — 700102 HCHG RX REV CODE 250 W/ 637 OVERRIDE(OP): Performed by: EMERGENCY MEDICINE

## 2018-01-30 PROCEDURE — 80307 DRUG TEST PRSMV CHEM ANLYZR: CPT

## 2018-01-30 PROCEDURE — 99285 EMERGENCY DEPT VISIT HI MDM: CPT

## 2018-01-30 PROCEDURE — A9270 NON-COVERED ITEM OR SERVICE: HCPCS | Performed by: EMERGENCY MEDICINE

## 2018-01-30 PROCEDURE — 96374 THER/PROPH/DIAG INJ IV PUSH: CPT

## 2018-01-30 RX ORDER — ONDANSETRON 2 MG/ML
4 INJECTION INTRAMUSCULAR; INTRAVENOUS ONCE
Status: COMPLETED | OUTPATIENT
Start: 2018-01-30 | End: 2018-01-30

## 2018-01-30 RX ORDER — IBUPROFEN 600 MG/1
600 TABLET ORAL ONCE
Status: DISCONTINUED | OUTPATIENT
Start: 2018-01-30 | End: 2018-01-30 | Stop reason: HOSPADM

## 2018-01-30 RX ORDER — LORAZEPAM 1 MG/1
1 TABLET ORAL ONCE
Status: COMPLETED | OUTPATIENT
Start: 2018-01-30 | End: 2018-01-30

## 2018-01-30 RX ADMIN — LORAZEPAM 1 MG: 1 TABLET ORAL at 05:45

## 2018-01-30 RX ADMIN — ONDANSETRON 4 MG: 2 INJECTION INTRAMUSCULAR; INTRAVENOUS at 00:48

## 2018-01-30 RX ADMIN — LORAZEPAM 1 MG: 1 TABLET ORAL at 03:00

## 2018-01-30 ASSESSMENT — PAIN SCALES - GENERAL
PAINLEVEL_OUTOF10: 0
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
## 2018-01-30 NOTE — ED NOTES
Pt discharged to home. Verbalized understanding of all instructions, paperwork in hand, patient ambulatory out of ED with steady gait.

## 2018-01-30 NOTE — ED PROVIDER NOTES
ED Provider Note    Scribed for Selvin Gallegos M.D. by Steven Tarango. 1/29/2018, 10:24 PM.    Primary care provider: Pcp Pt States None  Means of arrival: Ambulance  History obtained from: Nurse  History limited by: Alcohol Intoxication    CHIEF COMPLAINT  Chief Complaint   Patient presents with   • Psych Eval     HPI  Brannon Sharma is a 37 y.o. male who presents to the Emergency Department for a psychiatric evaluation. Per nurses note, the patient called [employer] for SI today the evening because he was dealing with personal issues. He also ingested a large amount of alcohol prior to arrival. EMS gave him 200mg Ketamine and 2mg Versed because he was combative on scene and arrived here in soft wrist restraints.    Further HPI limited secondary to alcohol intoxication.    REVIEW OF SYSTEMS  Pertinent positives include alcohol intoxication.    Further ROS limited secondary to alcohol intoxication. C.    PAST MEDICAL HISTORY   has a past medical history of Alcohol-induced psychosis (CMS-HCC); Anxiety; Depression; Paranoid schizophrenia (CMS-HCC); Psychiatric disorder; and Schizophrenia, paranoid (CMS-HCC).    SURGICAL HISTORY   has a past surgical history that includes other.    SOCIAL HISTORY  Social History   Substance Use Topics   • Smoking status: Current Every Day Smoker     Packs/day: 1.00     Years: 12.00     Types: Cigarettes   • Smokeless tobacco: Never Used   • Alcohol use Yes      Comment: /3-4 drinks daily      History   Drug Use No     FAMILY HISTORY  History reviewed. No pertinent family history.    CURRENT MEDICATIONS  Home Medications     Reviewed by Magda Moses R.N. (Registered Nurse) on 01/29/18 at 2211  Med List Status: <None>   Medication Last Dose Status   alprazolam (XANAX) 0.5 MG TABS 1/12/2015 Active   lisinopril (PRINIVIL) 10 MG TABS 1/12/2015 Active   olanzapine (ZYPREXA) 5 MG TABS 1/12/2015 Active   omeprazole (PRILOSEC) 20 MG delayed-release capsule 1/12/2015 Active           "    ALLERGIES  Allergies   Allergen Reactions   • Ceclor [Cefaclor]    • Cephalosporins    • Pcn [Penicillins]      PHYSICAL EXAM  VITAL SIGNS: BP (!) 162/116   Pulse (!) 124   Temp 37 °C (98.6 °F)   Resp (!) 24   Ht 1.905 m (6' 3\")   Wt 108.9 kg (240 lb)   BMI 30.00 kg/m²     Constitutional: Well developed, Smells of alcohol.  HENT: Normocephalic, Atraumatic, Protecting airway.  Eyes: Conjunctiva normal, No discharge.   Cardiovascular: Tachycardic heart rate, Normal rhythm, No murmurs, equal pulses.   Pulmonary: Normal breath sounds, No respiratory distress, No wheezing, No rales, No rhonchi.  Chest: No chest wall tenderness or deformity.   Abdomen: Soft, No tenderness.  Musculoskeletal: No major deformities noted, No tenderness.   Skin: Warm, Dry, No erythema, No rash.   Neurologic: Normal motor function,  No focal deficits noted.   Psychiatric: Intoxicated. Unable to answer questions.    LABS  Results for orders placed or performed during the hospital encounter of 01/29/18   Blood Alcohol   Result Value Ref Range    Diagnostic Alcohol 0.31 (H) 0.00 g/dL   URINE DRUG SCREEN (TRIAGE)   Result Value Ref Range    Amphetamines Urine Negative Negative    Barbiturates Negative Negative    Benzodiazepines Positive (A) Negative    Cocaine Metabolite Negative Negative    Methadone Negative Negative    Opiates Negative Negative    Oxycodone Negative Negative    Phencyclidine -Pcp Negative Negative    Propoxyphene Negative Negative    Cannabinoid Metab Negative Negative   POC BREATHALIZER   Result Value Ref Range    POC Breathalizer 0.171 (A) 0.00 - 0.01 Percent     All labs reviewed by me.    COURSE & MEDICAL DECISION MAKING  Pertinent Labs & Imaging studies reviewed. (See chart for details)    Reviewed old medical records that showed the patient has a history of Paranoia, Schizophrenia, Alcohol-induced psychosis, Depression, Anxiety. He was seen here on the 20th for back pain.    10:24 PM - Patient seen and examined " at bedside. Ordered Urine Drug Screen, Blood Alcohol to evaluate his symptoms. The differential diagnoses include but are not limited to: Suicidal Ideation, Alcohol intoxication, Agitation    12:16 AM - Nurse informed me the patient woke up and wants Klonopin.    12:25 AM - Patient seen at bedside and is still requesting Klonopin. He states he was in an industrial accident 1 week ago and fell on his back so he's currently in pain. Patient denies SI and says he just called the police because he was sad since his girlfriend recently left him. Right now, he doesn't want to hurt himself and just wants to go home. He will be removed from soft restraints.    Medical Decision Making: Patient presents clinically intoxicated at this point in time we are waiting for the patient to be clinically sober for evaluation to see if he is still suicidal or if this was simply because he was intoxicated.    DISPOSITION:  Patient will be transferred to an oncoming [facility] partner for further observation pending evaluation by [facility].    FINAL IMPRESSION  1. Alcoholic intoxication without complication (CMS-HCC)    2. Aggressive behavior    3. Suicidal ideation          Steven CABA (Hernánibpalak), am scribing for, and in the presence of, Selvin Gallegos M.D.    Electronically signed by: Steven Tarango (Paul), 1/29/2018    Selvin CABA M.D. personally performed the services described in this documentation, as scribed by Steven Tarango in my presence, and it is both accurate and complete.    The note accurately reflects work and decisions made by me.  Selvin Gallegos  1/30/2018  3:47 AM

## 2018-01-30 NOTE — ED NOTES
"Pt anxiety increasing, \" I don't want to be here, need to get out of here, you might have another issue with me\".  Pt educated on behavior, elopement, and threats..  Verbalized understanding..  MD abel notified of increasing anxiety, order po ativan.. Pt medicated per order.   "

## 2018-01-30 NOTE — ED NOTES
"Pt became a little anxious. Pt sat up in bed. Security at bedside x3. Pt repositioned in bed. Restraints remain in place and secure. Strong radial pulses noted. Pt states he is \"anxious\" and would like to talk to the doctor and \"needs some clonopin\". Erp notified and states he will come to talk with pt.    "

## 2018-01-30 NOTE — ED TRIAGE NOTES
BIBA with EMS and [employer] for Si. Per friends, pt was making threats to end his life, take pills and said final goodbyes to friends. [employer] signed legal for Si. Pt denies SI or Hi. Endorses he is not drunk, smell of etoh present. Placed in 4 points per EMS for not being cooperative and aggressive with EMS.

## 2018-01-30 NOTE — DISCHARGE PLANNING
[facility]  Crisis/Safety Plan    Name:  Brannon Sharma  MRN:  [id number]  Date:  2018    (Pt refused to fill out, so I created a plan for him-C.L.)  Warning signs that a crisis may be developing for me or I may be at risk:  1) Getting violent  2) Ending up in ER   3) Ending up in restraints    Coping strategies I can use on my own (relaxation, physical activity, etc):  1) Reading  2) Physical activity that releases stress: sports, yoga, juan alberto chi  3) Support groups for alcoholics    Ways I can make my environment safe:  1) Try to quit drinking  2) No guns  3) No other weapons    Things I want to tell myself when I feel a crisis developin) I deserve better  2) I am worth living for  3) I am strong enough to beat this    People I can contact for support or distraction (and their phone numbers):  1) Friends  2) Family  3)    If I’m not able to reach my support people, or the above strategies don’t help, I can contact the following professionals, agencies, or hotlines:  1) Crisis Call Center ():  [phone] -OR- [phone]  2) Crisis Text Line ():  Text START to 680717  3)   4)     Mercy Fisher R.N.

## 2018-01-30 NOTE — DISCHARGE INSTRUCTIONS
Alcohol Problems  Most adults who drink alcohol drink in moderation (not a lot) are at low risk for developing problems related to their drinking. However, all drinkers, including low-risk drinkers, should know about the health risks connected with drinking alcohol.  RECOMMENDATIONS FOR LOW-RISK DRINKING   Drink in moderation. Moderate drinking is defined as follows:   · Men - no more than 2 drinks per day.  · Nonpregnant women - no more than 1 drink per day.  · Over age 65 - no more than 1 drink per day.  A standard drink is 12 grams of pure alcohol, which is equal to a 12 ounce bottle of beer or wine cooler, a 5 ounce glass of wine, or 1.5 ounces of distilled spirits (such as whiskey, medardo, vodka, or rum).   ABSTAIN FROM (DO NOT DRINK) ALCOHOL:  · When pregnant or considering pregnancy.  · When taking a medication that interacts with alcohol.  · If you are alcohol dependent.  · A medical condition that prohibits drinking alcohol (such as ulcer, liver disease, or heart disease).  DISCUSS WITH YOUR CAREGIVER:  · If you are at risk for coronary heart disease, discuss the potential benefits and risks of alcohol use: Light to moderate drinking is associated with lower rates of coronary heart disease in certain populations (for example, men over age 45 and postmenopausal women). Infrequent or nondrinkers are advised not to begin light to moderate drinking to reduce the risk of coronary heart disease so as to avoid creating an alcohol-related problem. Similar protective effects can likely be gained through proper diet and exercise.  · Women and the elderly have smaller amounts of body water than men. As a result women and the elderly achieve a higher blood alcohol concentration after drinking the same amount of alcohol.  · Exposing a fetus to alcohol can cause a broad range of birth defects referred to as Fetal Alcohol Syndrome (FAS) or Alcohol-Related Birth Defects (ARBD). Although FAS/ARBD is connected with excessive  alcohol consumption during pregnancy, studies also have reported neurobehavioral problems in infants born to mothers reporting drinking an average of 1 drink per day during pregnancy.  · Heavier drinking (the consumption of more than 4 drinks per occasion by men and more than 3 drinks per occasion by women) impairs learning (cognitive) and psychomotor functions and increases the risk of alcohol-related problems, including accidents and injuries.  CAGE QUESTIONS:   · Have you ever felt that you should Cut down on your drinking?  · Have people Annoyed you by criticizing your drinking?  · Have you ever felt bad or Guilty about your drinking?  · Have you ever had a drink first thing in the morning to steady your nerves or get rid of a hangover (Eye opener)?  If you answered positively to any of these questions: You may be at risk for alcohol-related problems if alcohol consumption is:   · Men: Greater than 14 drinks per week or more than 4 drinks per occasion.  · Women: Greater than 7 drinks per week or more than 3 drinks per occasion.  Do you or your family have a medical history of alcohol-related problems, such as:  · Blackouts.  · Sexual dysfunction.  · Depression.  · Trauma.  · Liver dysfunction.  · Sleep disorders.  · Hypertension.  · Chronic abdominal pain.  · Has your drinking ever caused you problems, such as problems with your family, problems with your work (or school) performance, or accidents/injuries?  · Do you have a compulsion to drink or a preoccupation with drinking?  · Do you have poor control or are you unable to stop drinking once you have started?  · Do you have to drink to avoid withdrawal symptoms?  · Do you have problems with withdrawal such as tremors, nausea, sweats, or mood disturbances?  · Does it take more alcohol than in the past to get you high?  · Do you feel a strong urge to drink?  · Do you change your plans so that you can have a drink?  · Do you ever drink in the morning to relieve  the shakes or a hangover?  If you have answered a number of the previous questions positively, it may be time for you to talk to your caregivers, family, and friends and see if they think you have a problem. Alcoholism is a chemical dependency that keeps getting worse and will eventually destroy your health and relationships. Many alcoholics end up dead, impoverished, or in snf. This is often the end result of all chemical dependency.  · Do not be discouraged if you are not ready to take action immediately.  · Decisions to change behavior often involve up and down desires to change and feeling like you cannot decide.  · Try to think more seriously about your drinking behavior.  · Think of the reasons to quit.  WHERE TO GO FOR ADDITIONAL INFORMATION   · The National [place] on Alcohol Abuse and Alcoholism (NIAAA)  www.niaaa.nih.gov  · National [place] on Alcoholism and Drug Dependence (NCADD)  www.ncadd.org  · American Society of Addiction Medicine (ASAM)  www.asam.org   Document Released: 12/18/2006 Document Revised: 03/11/2013 Document Reviewed: 08/05/2009  ExitCare® Patient Information ©2014 [employer].

## 2018-01-30 NOTE — ED NOTES
Report and care of pt received. Pt pacing in room, asking to be breathalyzed again, pt aware that he had the test at 0630 and he was a 0.14, he is aware that he will not be ready to test again for several hours.

## 2018-01-30 NOTE — ED NOTES
[facility] went in to talk to pt, will not be assessed till next shift.  Pt explained that he is on a legal hold.

## 2018-01-30 NOTE — ED NOTES
"Pt expressing to tech desire to drink, and leave.  Educated on process and behavior.  Pt has had increased anxiety, states when YAMIL drops below .10 goes into \"DT's\".. Pt VSS, denies shakes, hallucinations at this time.   Pt yamil from breath is 0.171.  "

## 2018-01-30 NOTE — ED NOTES
"Pt is more awake and talkative at this time. Pt will say things like \"hey come here; can you call my dad; I am scared; where am I\".   "

## 2018-01-30 NOTE — ED PROVIDER NOTES
"ED Provider Note    9:03 AM life [facility] to see patient    9:23 AM patient evaluated at the present time. He has been seen by the alert team. He is up ambulating in the department. Cooperative. He denies suicidal or homicidal ideations. He states that when he drinks, he \"blurts out things\". He has numerous resources in the community and voices the idea, that he knows where to get help when he needs to. At this time, patient cleared for discharge.  "

## 2018-01-30 NOTE — ED NOTES
Pt had c/o anxiety md called obtained order for ativan po, effective at this time.  Pt requesting food, warm meal provided.

## 2018-01-30 NOTE — ED NOTES
Pt states to call his father Roldan at [phone]. Pt also states he needs to call his . Pt requests restraints removed. CMS is intact.

## 2018-01-31 VITALS
RESPIRATION RATE: 18 BRPM | BODY MASS INDEX: 33.57 KG/M2 | OXYGEN SATURATION: 98 % | SYSTOLIC BLOOD PRESSURE: 125 MMHG | HEIGHT: 75 IN | HEART RATE: 87 BPM | WEIGHT: 270 LBS | DIASTOLIC BLOOD PRESSURE: 84 MMHG | TEMPERATURE: 98.1 F

## 2018-01-31 LAB — POC BREATHALIZER: 0.06 PERCENT (ref 0–0.01)

## 2018-01-31 PROCEDURE — 302970 POC BREATHALIZER: Performed by: EMERGENCY MEDICINE

## 2018-01-31 NOTE — ED NOTES
Pt ambulated to restroom.  One bag of belongings marked and secured by security and placed in secure location.

## 2018-01-31 NOTE — ED NOTES
Pt asking to make phone call. Pt informed that he can make phone call at 2000 as long as he follows policy with no outbursts. Pt verbalizes understanding and returned to sit on bed. Sitter in place.

## 2018-01-31 NOTE — ED PROVIDER NOTES
ED Provider Note    Scribed for Amy Romero M.D. by Jose Manuel Siddiqi. 1/30/2018  4:09 PM    Means of arrival: law Enforcement  History obtained from: EMS, Patient  History limited by: Alcohol intoxication.       CHIEF COMPLAINT  Chief Complaint   Patient presents with   • Psych Eval   • Alcohol Intoxication   • Nasal Laceration     HPI  Brannon Sharma is a 37 y.o. male who presents to the Emergency Department by Law Enforcement and EMS for alcohol intoxication and suicidal ideation. Law Enforcement was called by patient's friend for concern for patient's suicidal ideation. Friend reports he has had prior suicidal attempts in the past. He was placed on a hold and transferred to the ED by Law Enforcement. Friends were also concerned because patient was recently discharged from this ED this morning for similar complaints. The patient currently denies any suicidal ideation or homicidal ideation. He denies current complaints of pain or other medical complaints. He states he fell a couple days ago, sustaining an abrasion to his face.   Patient continues to repeat he wants to be discharged and is not suicidal.     Further history of present illness limited secondary to alcohol intoxication.     REVIEW OF SYSTEMS  Pertinent positive include alcohol intoxication, facial abrasion. Pertinent negative include suicidal ideation. All other systems reviewed and are negative.          PAST MEDICAL HISTORY   has a past medical history of Alcohol-induced psychosis (CMS-HCC); Anxiety; Depression; Paranoid schizophrenia (CMS-HCC); Psychiatric disorder; and Schizophrenia, paranoid (CMS-HCC).    SOCIAL HISTORY  Social History     Social History Main Topics   • Smoking status: Current Every Day Smoker     Packs/day: 1.00     Years: 12.00     Types: Cigarettes   • Smokeless tobacco: Never Used   • Alcohol use Yes      Comment: /3-4 drinks daily   • Drug use: No     SURGICAL HISTORY   has a past surgical history that includes  "other.    CURRENT MEDICATIONS  Home Medications    **Home medications have not yet been reviewed for this encounter**       ALLERGIES  Allergies   Allergen Reactions   • Ceclor [Cefaclor]    • Cephalosporins    • Pcn [Penicillins]      PHYSICAL EXAM   VITAL SIGNS: /100   Pulse (!) 118   Temp 37.3 °C (99.1 °F)   Resp 20   Ht 1.905 m (6' 3\")   Wt 122.5 kg (270 lb)   BMI 33.75 kg/m²    Constitutional: Alert in no apparent distress.  HENT: Superficial abrasion over nasal bridge. Normocephalic. Bilateral external ears normal. Nose normal.  Moist mucous membranes. Oropharynx clear.  Eyes: Pupils are equal and reactive. Conjunctiva are injected bilaterally.   Neck: Supple, full range of motion  Heart: Regular rate and rhythm.  No murmurs.    Lungs: No respiratory distress, normal work of breathing. Lungs clear to auscultation bilaterally.  Abdomen Soft, no distention.  No tenderness to palpation.  Musculoskeletal: Atraumatic. No obvious deformities noted.  No lower extremity edema.  Skin: Warm, Dry.  No erythema, No rash.   Neurologic: Slurred speech, clinically intoxicated. Alert and oriented x3. Moving all extremities spontaneously without focal deficits.   Psychiatric: Intoxicated, currently denies suicidal or homicidal ideation.     DIAGNOSTIC STUDIES    LABS  Personally reviewed by me  Labs Reviewed   POC BREATHALIZER - Abnormal; Notable for the following:        Result Value    POC Breathalizer 0.180 (*)     All other components within normal limits   POC BREATHALIZER - Abnormal; Notable for the following:     POC Breathalizer 0.163 (*)     All other components within normal limits   POC BREATHALIZER - Abnormal; Notable for the following:     POC Breathalizer 0.112 (*)     All other components within normal limits   POC BREATHALIZER - Abnormal; Notable for the following:     POC Breathalizer 0.061 (*)     All other components within normal limits   URINE DRUG SCREEN        ED COURSE  Vitals:    01/30/18 " "1543 01/30/18 1544 01/30/18 1819   BP:  150/100 132/89   Pulse:  (!) 118 92   Resp:  20 20   Temp:  37.3 °C (99.1 °F) 36.7 °C (98.1 °F)   SpO2:   97%   Weight: 122.5 kg (270 lb)     Height: 1.905 m (6' 3\")           Medications administered:  Medications - No data to display      Old records personally reviewed:  Patient was seen here and discharged earlier this morning for similar complaints. He was not deemed to be suicidal or needed a legal hold at time of discharge this morning. Patient has also been seen here multiple times in the past as well for suicidal ideation and alcohol intoxication. He has never required transfer to inpatient facility.    4:09 PM Patient seen and examined at bedside. The patient was brought for concern for suicidal ideation. Currently he is denies any suicidal ideation repeatedly.       MEDICAL DECISION MAKING  Patient with history of paranoid schizophrenia and depression along with alcohol abuse who presents with suicidal ideation while intoxicated. He was discharged this morning from the ER with similar complaints. Patient is mildly tachycardic on arrival with otherwise normal vital signs. He has no current medical complaints at this time. Exam demonstrates a small abrasion to the nasal bridge however no concern for intracranial hemorrhage, facial fracture or C-spine fracture. No other evidence of trauma on exam. Patient monitored in the department until he was verified to be sober.    1:17 AM - Upon reassessment, patient is resting comfortably with normal vital signs.  He is clinically sober and much more cooperative. I was able to have a long conversation with the patient where he stated that this is \"just but he does\" he says he's actually been very happy recently he has a girlfriend a car at a job and demonstrates forward thinking. He does not have access to firearms. He continues to deny suicidal or homicidal ideation and is not meet criteria for legal hold at this time. It was " therefore discontinued by myself. Patient was evaluated by life skills who agreed with my evaluation.  Patient understands plan of care and strict return precautions for new or changing symptoms.     .    IMPRESSION  (F10.920) Alcoholic intoxication without complication (CMS-HCC)  (R45.851) Suicidal ideation     Disposition - Discharge home    Results, diagnoses, and treatment options were discussed with the patient and/or family. Patient verbalized understanding of plan of care.    New Prescriptions    No medications on file            IJose Manuel (Scribe), am scribing for, and in the presence of, Amy Romero M.D..    Electronically signed by: Jose Manuel Siddiqi (Scribe), 1/30/2018    IAmy M.D. personally performed the services described in this documentation, as scribed by Jose Manuel Siddiqi in my presence, and it is both accurate and complete.    The note accurately reflects work and decisions made by me.  Amy Romero  1/31/2018  1:18 AM

## 2018-01-31 NOTE — CONSULTS
"Patient in standing in room.  Bright affect.  Denies suicidal ideation.  Stated, \"I am not suicidal; I am a drunk.\"  Stated he needs help for his ETOH abuse and will address the issue; however, he must first make sure he goes to work.  \"I have to keep my job.  That is the most important thing right now.  I'll call Jose Antonio from [facility] after work.  She said I can probably get a bed there, if not, she said she can get me into [facility] for sure.\"  Patient adamantly denies suicidal ideation.  Stated he loves his daughter too much to ever do anything to hurt her.  This writer offered to provide resources or help with the admission process; however, patient declined, stating, \"thank you but its already all set up.  I just gotta get to work then get a hold of Jose Antonio.\"  Patient educated on risks associated with alcohol detox and patient verbalized understanding.  For further information about past psychiatric history and Current living situation/Social support, see consult done earlier today by Mercy Fisher R.N.    "

## 2018-01-31 NOTE — ED NOTES
Pt calm and resting in gurney with eyes closed.  Called security to change pt to two point restraints.

## 2018-01-31 NOTE — ED NOTES
Pt refusing to take off shorts and shoes.  Pt informed of policy and demanding to talk with ERP proir to taking off shorts and shoes.  ERP informed and will be in to see pt shortly.

## 2018-01-31 NOTE — DISCHARGE INSTRUCTIONS
Alcohol Problems  Most adults who drink alcohol drink in moderation (not a lot) are at low risk for developing problems related to their drinking. However, all drinkers, including low-risk drinkers, should know about the health risks connected with drinking alcohol.  RECOMMENDATIONS FOR LOW-RISK DRINKING   Drink in moderation. Moderate drinking is defined as follows:   · Men - no more than 2 drinks per day.  · Nonpregnant women - no more than 1 drink per day.  · Over age 65 - no more than 1 drink per day.  A standard drink is 12 grams of pure alcohol, which is equal to a 12 ounce bottle of beer or wine cooler, a 5 ounce glass of wine, or 1.5 ounces of distilled spirits (such as whiskey, medardo, vodka, or rum).   ABSTAIN FROM (DO NOT DRINK) ALCOHOL:  · When pregnant or considering pregnancy.  · When taking a medication that interacts with alcohol.  · If you are alcohol dependent.  · A medical condition that prohibits drinking alcohol (such as ulcer, liver disease, or heart disease).  DISCUSS WITH YOUR CAREGIVER:  · If you are at risk for coronary heart disease, discuss the potential benefits and risks of alcohol use: Light to moderate drinking is associated with lower rates of coronary heart disease in certain populations (for example, men over age 45 and postmenopausal women). Infrequent or nondrinkers are advised not to begin light to moderate drinking to reduce the risk of coronary heart disease so as to avoid creating an alcohol-related problem. Similar protective effects can likely be gained through proper diet and exercise.  · Women and the elderly have smaller amounts of body water than men. As a result women and the elderly achieve a higher blood alcohol concentration after drinking the same amount of alcohol.  · Exposing a fetus to alcohol can cause a broad range of birth defects referred to as Fetal Alcohol Syndrome (FAS) or Alcohol-Related Birth Defects (ARBD). Although FAS/ARBD is connected with excessive  alcohol consumption during pregnancy, studies also have reported neurobehavioral problems in infants born to mothers reporting drinking an average of 1 drink per day during pregnancy.  · Heavier drinking (the consumption of more than 4 drinks per occasion by men and more than 3 drinks per occasion by women) impairs learning (cognitive) and psychomotor functions and increases the risk of alcohol-related problems, including accidents and injuries.  CAGE QUESTIONS:   · Have you ever felt that you should Cut down on your drinking?  · Have people Annoyed you by criticizing your drinking?  · Have you ever felt bad or Guilty about your drinking?  · Have you ever had a drink first thing in the morning to steady your nerves or get rid of a hangover (Eye opener)?  If you answered positively to any of these questions: You may be at risk for alcohol-related problems if alcohol consumption is:   · Men: Greater than 14 drinks per week or more than 4 drinks per occasion.  · Women: Greater than 7 drinks per week or more than 3 drinks per occasion.  Do you or your family have a medical history of alcohol-related problems, such as:  · Blackouts.  · Sexual dysfunction.  · Depression.  · Trauma.  · Liver dysfunction.  · Sleep disorders.  · Hypertension.  · Chronic abdominal pain.  · Has your drinking ever caused you problems, such as problems with your family, problems with your work (or school) performance, or accidents/injuries?  · Do you have a compulsion to drink or a preoccupation with drinking?  · Do you have poor control or are you unable to stop drinking once you have started?  · Do you have to drink to avoid withdrawal symptoms?  · Do you have problems with withdrawal such as tremors, nausea, sweats, or mood disturbances?  · Does it take more alcohol than in the past to get you high?  · Do you feel a strong urge to drink?  · Do you change your plans so that you can have a drink?  · Do you ever drink in the morning to relieve  the shakes or a hangover?  If you have answered a number of the previous questions positively, it may be time for you to talk to your caregivers, family, and friends and see if they think you have a problem. Alcoholism is a chemical dependency that keeps getting worse and will eventually destroy your health and relationships. Many alcoholics end up dead, impoverished, or in group home. This is often the end result of all chemical dependency.  · Do not be discouraged if you are not ready to take action immediately.  · Decisions to change behavior often involve up and down desires to change and feeling like you cannot decide.  · Try to think more seriously about your drinking behavior.  · Think of the reasons to quit.  WHERE TO GO FOR ADDITIONAL INFORMATION   · The National [place] on Alcohol Abuse and Alcoholism (NIAAA)  www.niaaa.nih.gov  · National [place] on Alcoholism and Drug Dependence (NCADD)  www.ncadd.org  · American Society of Addiction Medicine (ASAM)  www.asam.org   Document Released: 12/18/2006 Document Revised: 03/11/2013 Document Reviewed: 08/05/2009  ExitCare® Patient Information ©2014 [employer].    Suicidal Feelings: How to Help Yourself  Suicide is the taking of one's own life. If you feel as though life is getting too tough to handle and are thinking about suicide, get help right away. To get help:  · Call your local emergency services (911 in the [place]  · Call a suicide hotline to speak with a trained counselor who understands how you are feeling. The following is a list of suicide hotlines in the [place]. For a list of hotlines in [place], visit www.suicide.org/hotlines/international/jgrwzr-ehvkerf-ntharipy.html.  ¨  1-648-974-TALK (1-943.960.9546).  ¨  6-750-DCWEUVT (1-108.864.4411).  ¨  1-455.689.3139. This is a hotline for Amharic speakers.  ¨  9-057-038-4TTY (1-848.754.6721). This is a hotline for TTY users.  ¨  2-090-8-U-KUSH (1-710.657.6173). This is a hotline for lesbian, robbins,  bisexual, transgender, or questioning youth.  · Contact a crisis center or a local suicide prevention center. To find a crisis center or suicide prevention center:  ¨ Call your local hospital, clinic, community service organization, mental health center, social service provider, or health department. Ask for assistance in connecting to a crisis center.  ¨ Visit www.suicidepreventionlifeline.org/getinvolved/ for a list of crisis centers in the [place], or visit www.suicideprevention.ca/kobrirez-zsimc-pwbjubb/find-a-crisis-centre for a list of centers in [place].  · Visit the following websites:  ¨  National Suicide Prevention Lifeline: www.suicidepreventionlifeline.org  ¨  Hopeline: www.Taplet.ESKY  ¨  American Foundation for Suicide Prevention: www.afsp.org  ¨  The [place] Project (for lesbian, robbins, bisexual, transgender, or questioning youth): www.thetrevorproject.org  HOW CAN I HELP MYSELF FEEL BETTER?  · Promise yourself that you will not do anything drastic when you have suicidal feelings. Remember, there is hope. Many people have gotten through suicidal thoughts and feelings, and you will, too. You may have gotten through them before, and this proves that you can get through them again.  · Let family, friends, teachers, or counselors know how you are feeling. Try not to isolate yourself from those who care about you. Remember, they will want to help you. Talk with someone every day, even if you do not feel sociable. Face-to-face conversation is best.  · Call a mental health professional and see one regularly.  · Visit your primary health care provider every year.  · Eat a well-balanced diet, and space your meals so you eat regularly.  · Get plenty of rest.  · Avoid alcohol and drugs, and remove them from your home. They will only make you feel worse.  · If you are thinking of taking a lot of medicine, give your medicine to someone who can give it to you one day at a time. If you are on  antidepressants and are concerned you will overdose, let your health care provider know so he or she can give you safer medicines. Ask your mental health professional about the possible side effects of any medicines you are taking.  · Remove weapons, poisons, knives, and anything else that could harm you from your home.  · Try to stick to routines. Follow a schedule every day. Put self-care on your schedule.  · Make a list of realistic goals, and cross them off when you achieve them. Accomplishments give a sense of worth.  · Wait until you are feeling better before doing the things you find difficult or unpleasant.  · Exercise if you are able. You will feel better if you exercise for even a half hour each day.  · Go out in the sun or into nature. This will help you recover from depression faster. If you have a favorite place to walk, go there.  · Do the things that have always given you pleasure. Play your favorite music, read a good book, paint a picture, play your favorite instrument, or do anything else that takes your mind off your depression if it is safe to do.  · Keep your living space well lit.  · When you are feeling well, write yourself a letter about tips and support that you can read when you are not feeling well.  · Remember that life's difficulties can be sorted out with help. Conditions can be treated. You can work on thoughts and strategies that serve you well.     This information is not intended to replace advice given to you by your health care provider. Make sure you discuss any questions you have with your health care provider.     Document Released: 06/23/2004 Document Revised: 01/08/2016 Document Reviewed: 04/14/2015  Elsevier Interactive Patient Education ©2016 Elsevier Inc.

## 2018-01-31 NOTE — ED NOTES
Pt resting in bed asking to be breathalyzed again so he can leave. Pt informed of orders. Pt verbalizes understanding.

## 2018-01-31 NOTE — ED NOTES
Pt continues to be calm and cooperative at this time.  Pt informed of appropriate behavior and verbalized understanding.  Security informed and at bedside to remove pt restraints.  Box lunch and water provided.

## 2018-02-02 ENCOUNTER — OCCUPATIONAL MEDICINE (OUTPATIENT)
Dept: OCCUPATIONAL MEDICINE | Facility: CLINIC | Age: 38
End: 2018-02-02
Payer: COMMERCIAL

## 2018-02-02 VITALS
WEIGHT: 245 LBS | SYSTOLIC BLOOD PRESSURE: 130 MMHG | DIASTOLIC BLOOD PRESSURE: 98 MMHG | OXYGEN SATURATION: 98 % | BODY MASS INDEX: 30.46 KG/M2 | HEIGHT: 75 IN | TEMPERATURE: 97.7 F | RESPIRATION RATE: 14 BRPM | HEART RATE: 91 BPM

## 2018-02-02 DIAGNOSIS — S20.229D CONTUSION OF BACK, UNSPECIFIED LATERALITY, SUBSEQUENT ENCOUNTER: ICD-10-CM

## 2018-02-02 PROCEDURE — 99212 OFFICE O/P EST SF 10 MIN: CPT | Performed by: PREVENTIVE MEDICINE

## 2018-02-02 RX ORDER — CLONIDINE HYDROCHLORIDE 0.1 MG/1
0.1 TABLET ORAL 2 TIMES DAILY
COMMUNITY
End: 2019-02-25

## 2018-02-02 ASSESSMENT — PAIN SCALES - GENERAL: PAINLEVEL: 2=MINIMAL-SLIGHT

## 2018-02-02 NOTE — PROGRESS NOTES
"Subjective:      Brannon Sharma is a 37 y.o. male who presents with Follow-Up (WC DOI 1/26/18 mid [facility] feeling better room 2)      Date of injury 1/19/2018. Mechanism of injury-slipped on wet floor and fell on back. 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 Today, he reports he is much improved with minimal 2/10 pain. He would like to resume regular work.     HPI    ROS  PFSH:  WORK STATUS: Restricted activity  PMH:  has a past medical history of Alcohol-induced psychosis (CMS-HCC); Anxiety; Depression; Paranoid schizophrenia (CMS-HCC); Psychiatric disorder; and Schizophrenia, paranoid (CMS-HCC).  MEDS:   Current Outpatient Prescriptions:   •  cloNIDine (CATAPRES) 0.1 MG Tab, Take 0.1 mg by mouth 2 times a day., Disp: , Rfl:   •  omeprazole (PRILOSEC) 20 MG delayed-release capsule, Take 20 mg by mouth every day., Disp: , Rfl:   •  lisinopril (PRINIVIL) 10 MG TABS, Take 10 mg by mouth every day., Disp: , Rfl:   •  alprazolam (XANAX) 0.5 MG TABS, Take 0.5 mg by mouth 3 times a day., Disp: , Rfl:   •  olanzapine (ZYPREXA) 5 MG TABS, Take 1 Tab by mouth every evening., Disp: 20 Tab, Rfl: 11       Objective:     /98   Pulse 91   Temp 36.5 °C (97.7 °F)   Resp 14   Ht 1.905 m (6' 3\")   Wt 111.1 kg (245 lb)   SpO2 98%   BMI 30.62 kg/m²      Physical Exam    Appearance: Well-developed, well-nourished.   Mental Status: Mood and Affect normal. Pleasant. Cooperative. Appropriate.   Musculoskeletal: Back exam shows good range of motion without pain behavior. Normal posture. Normal gait.         Assessment/Plan:     1. Contusion of back, unspecified laterality, subsequent encounter  Condition improved  Regular work  Release from care      "

## 2018-02-02 NOTE — LETTER
[address],   [address] [place] [address]  Phone:  [phone] - Fax:  [phone]   [facility] Progress Report and Disability Certification  Date of Service: 2/2/2018   No Show:  No  Date / Time of Next Visit:  Discharged    Claim Information   Patient Name: Brannon Sharma  Claim Number:     Employer:   [employer]  Date of Injury: 1/19/2018     Insurer / TPA: Holly Rolle ID / SSN:     Occupation: [employer]  Diagnosis: The encounter diagnosis was Contusion of back, unspecified laterality, subsequent encounter.    Medical Information   Related to Industrial Injury? Yes    Subjective Complaints:  Date of injury 1/19/2018. Mechanism of injury-slipped on wet floor and fell on back. 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 Today, he reports he is much improved with minimal 2/10 pain. He would like to resume regular work.   Objective Findings: Appearance: Well-developed, well-nourished.   Mental Status: Mood and Affect normal. Pleasant. Cooperative. Appropriate.   Musculoskeletal: Back exam shows good range of motion without pain behavior. Normal posture. Normal gait.     Pre-Existing Condition(s):     Assessment:   Condition Improved    Status: Discharged /  MMI  Permanent Disability:No    Plan:      Diagnostics:      Comments:       Disability Information   Status: Released to Full Duty    From:  2/2/2018  Through:   Restrictions are:     Physical Restrictions   Sitting:    Standing:    Stooping:    Bending:      Squatting:    Walking:    Climbing:    Pushing:      Pulling:    Other:    Reaching Above Shoulder (L):   Reaching Above Shoulder (R):       Reaching Below Shoulder (L):    Reaching Below Shoulder (R):      Not to exceed Weight Limits   Carrying(hrs):   Weight Limit(lb):   Lifting(hrs):   Weight  Limit(lb):     Comments:      Repetitive Actions   Hands: i.e. Fine Manipulations from  Grasping:     Feet: i.e. Operating Foot Controls:     Driving / Operate Machinery:     Physician Name: Juancho Flowers M.D. Physician Signature: JUANCHO Barrientos M.D. e-Signature: Dr. Tam Orona, Medical Director   Clinic Name / Location: [address],   [address]  [place] [address] Clinic Phone Number: Dept: [phone]   Appointment Time: 11:30 Am Visit Start Time: 11:39 AM   Check-In Time:  11:34 Am Visit Discharge Time:  12:25PM    Original-Treating Physician or Chiropractor    Page 2-Insurer/TPA    Page 3-Employer    Page 4-Employee

## 2018-05-14 ENCOUNTER — NON-PROVIDER VISIT (OUTPATIENT)
Dept: OCCUPATIONAL MEDICINE | Facility: CLINIC | Age: 38
End: 2018-05-14

## 2018-05-14 DIAGNOSIS — Z02.1 PRE-EMPLOYMENT DRUG SCREENING: Primary | ICD-10-CM

## 2018-05-14 LAB
AMP AMPHETAMINE: NORMAL
COC COCAINE: NORMAL
INT CON NEG: NORMAL
INT CON POS: NORMAL
MET METHAMPHETAMINES: NORMAL
OPI OPIATES: NORMAL
PCP PHENCYCLIDINE: NORMAL
POC DRUG COMMENT 753798-OCCUPATIONAL HEALTH: NORMAL
THC: NORMAL

## 2018-05-14 PROCEDURE — 80305 DRUG TEST PRSMV DIR OPT OBS: CPT | Performed by: PREVENTIVE MEDICINE

## 2018-06-19 ENCOUNTER — HOSPITAL ENCOUNTER (EMERGENCY)
Facility: MEDICAL CENTER | Age: 38
End: 2018-06-19
Attending: EMERGENCY MEDICINE
Payer: MEDICAID

## 2018-06-19 VITALS
RESPIRATION RATE: 16 BRPM | DIASTOLIC BLOOD PRESSURE: 85 MMHG | HEART RATE: 96 BPM | HEIGHT: 75 IN | WEIGHT: 225 LBS | OXYGEN SATURATION: 97 % | TEMPERATURE: 98.9 F | BODY MASS INDEX: 27.98 KG/M2 | SYSTOLIC BLOOD PRESSURE: 151 MMHG

## 2018-06-19 DIAGNOSIS — F10.10 ALCOHOL ABUSE: ICD-10-CM

## 2018-06-19 DIAGNOSIS — Z91.148 NONCOMPLIANCE WITH MEDICATION REGIMEN: ICD-10-CM

## 2018-06-19 DIAGNOSIS — F41.9 ANXIETY: ICD-10-CM

## 2018-06-19 PROCEDURE — A9270 NON-COVERED ITEM OR SERVICE: HCPCS | Performed by: EMERGENCY MEDICINE

## 2018-06-19 PROCEDURE — 99284 EMERGENCY DEPT VISIT MOD MDM: CPT

## 2018-06-19 PROCEDURE — 700102 HCHG RX REV CODE 250 W/ 637 OVERRIDE(OP): Performed by: EMERGENCY MEDICINE

## 2018-06-19 RX ORDER — CLONAZEPAM 1 MG/1
0.5 TABLET ORAL 2 TIMES DAILY
COMMUNITY
End: 2018-06-19

## 2018-06-19 RX ORDER — OLANZAPINE 5 MG/1
5 TABLET ORAL ONCE
Status: COMPLETED | OUTPATIENT
Start: 2018-06-19 | End: 2018-06-19

## 2018-06-19 RX ORDER — OLANZAPINE 5 MG/1
5 TABLET ORAL EVERY EVENING
Status: DISCONTINUED | OUTPATIENT
Start: 2018-06-19 | End: 2018-06-19

## 2018-06-19 RX ORDER — OLANZAPINE 5 MG/1
5 TABLET ORAL EVERY EVENING
Qty: 14 TAB | Refills: 0 | Status: SHIPPED | OUTPATIENT
Start: 2018-06-19 | End: 2018-09-10

## 2018-06-19 RX ORDER — CLONAZEPAM 1 MG/1
1 TABLET ORAL 2 TIMES DAILY
Qty: 10 TAB | Refills: 0 | Status: SHIPPED | OUTPATIENT
Start: 2018-06-19 | End: 2018-06-24

## 2018-06-19 RX ORDER — CLONAZEPAM 0.5 MG/1
1 TABLET ORAL 2 TIMES DAILY
Status: DISCONTINUED | OUTPATIENT
Start: 2018-06-19 | End: 2018-06-19 | Stop reason: HOSPADM

## 2018-06-19 RX ADMIN — CLONAZEPAM 1 MG: 0.5 TABLET ORAL at 10:00

## 2018-06-19 RX ADMIN — OLANZAPINE 5 MG: 5 TABLET, FILM COATED ORAL at 10:15

## 2018-06-19 ASSESSMENT — PAIN SCALES - GENERAL: PAINLEVEL_OUTOF10: 0

## 2018-06-19 NOTE — ED NOTES
Pt ambulatory to and from restroom with steady gait. Pt denies needs. Breathing even, equal and unlabored. Denies needs.

## 2018-06-19 NOTE — ED NOTES
Reports he missed his appt with his PCP because he was intoxicated, doesn't have any more rx refills.

## 2018-06-19 NOTE — ED NOTES
Reviewed discharge instructions, pt verbalized understanding of instructions and medications. States he will schedule appt for rx refill. Denies further questions at this time. Pt ambulatory out of ER with stable gait.

## 2018-06-19 NOTE — ED PROVIDER NOTES
ED Provider Note    Scribed for Cayden Romero M.D. by Lizy Garcia. 6/19/2018  9:33 AM    Primary care provider: Pcp Pt States None  Means of arrival: EMS  History obtained from: Patient   History limited by: none    CHIEF COMPLAINT  Chief Complaint   Patient presents with   • Anxiety     x3 days. Hx of same. Reports he has been drinking heavier than normal the last 3 days and not taking his home meds.    • Alcohol Intoxication     last drink yesterday at 2000.        HPI  Brannon Sharma is a 37 y.o. male who presents to the Emergency Department for anxiety over the last three days. Patient reports he has been drinking heavier than normal over the last three days. He has not been taking his home medications. He last drank at 8PM last night and believes he may be going into detox. Patient has no further complaints at this time, he denies suicidal ideations or homicidal ideations.     REVIEW OF SYSTEMS  Pertinent positives include anxiety.   Pertinent negatives include no suicidal ideations, homicidal ideations..    All other systems reviewed and negative.    C.    PAST MEDICAL HISTORY   has a past medical history of Alcohol-induced psychosis (HCC); Anxiety; Depression; Paranoid schizophrenia (HCC); Psychiatric disorder; and Schizophrenia, paranoid (HCC).    SURGICAL HISTORY   has a past surgical history that includes other.    SOCIAL HISTORY  Social History   Substance Use Topics   • Smoking status: Current Every Day Smoker     Packs/day: 1.00     Years: 12.00     Types: Cigarettes   • Smokeless tobacco: Never Used   • Alcohol use Yes      Comment: /3-4 drinks daily      History   Drug Use   • Types: Inhaled     Comment: thc       FAMILY HISTORY  History reviewed. No pertinent family history.    CURRENT MEDICATIONS  Home Medications     Reviewed by Harry Ding R.N. (Registered Nurse) on 06/19/18 at 0916  Med List Status: Partial   Medication Last Dose Status   alprazolam (XANAX) 0.5 MG TABS   "Active   clonazePAM (KLONOPIN) 1 MG Tab >3 days Active   cloNIDine (CATAPRES) 0.1 MG Tab  Active   lisinopril (PRINIVIL) 10 MG TABS  Active   olanzapine (ZYPREXA) 5 MG TABS  Active   omeprazole (PRILOSEC) 20 MG delayed-release capsule  Active                ALLERGIES  Allergies   Allergen Reactions   • Ceclor [Cefaclor]    • Cephalosporins    • Pcn [Penicillins]        PHYSICAL EXAM  VITAL SIGNS: /85   Pulse (!) 101   Temp 37.1 °C (98.8 °F)   Resp (!) 26   Ht 1.905 m (6' 3\")   Wt 102.1 kg (225 lb)   SpO2 99%   BMI 28.12 kg/m²     Nursing note and vitals reviewed.  Constitutional: Well-developed and well-nourished. Anxious, smells of alcohol.   HENT: Head is normocephalic and atraumatic. Oropharynx is clear and moist without exudate or erythema.   Eyes: Pupils are equal, round, and reactive to light. Conjunctiva are normal.   Cardiovascular: Tachycardic rate and regular rhythm. No murmur heard. Normal radial pulses.  Pulmonary/Chest: Breath sounds normal. No wheezes or rales.   Abdominal: Soft and non-tender. No distention    Musculoskeletal: Extremities exhibit normal range of motion without edema or tenderness.   Neurological: Awake, alert and oriented to person, place, and time. No focal deficits noted.  Skin: Skin is warm and dry. No rash.   Psychiatric: Anxious.     COURSE & MEDICAL DECISION MAKING  Nursing notes, VS, PMSFHx reviewed in chart.     Review of past medical records shows the patient was last seen 01/2018 for similar complaints.     9:33 AM - Patient seen and examined at bedside. Discussed with patient that I will order laboratory and radiology tests to further evaluate.  Patient will be treated with 1mg Klonopin and 5mg zyprexa.  The differential diagnoses include but are not limited to: Noncompliance with medications, anxiety, baseline psychiatric disease, alcohol abuse    10:52 AM Recheck: Patient re-evaluated at beside. Patient reports feeling improved and would like to go home. " Patient was counseled to return to ED for any new or worsening symptoms. Patient understood and is in agreement for discharge.     The patient is referred to a primary physician for blood pressure management, diabetic screening, and for all other preventative health concerns.      DISPOSITION:  Patient will be discharged home in stable condition.    FOLLOW UP:  No follow-up provider specified.    OUTPATIENT MEDICATIONS:  Discharge Medication List as of 6/19/2018 11:20 AM      START taking these medications    Details   clonazePAM (KLONOPIN) 1 MG Tab Take 1 Tab by mouth 2 times a day for 5 days., Disp-10 Tab, R-0, Print Rx Paper, For 5 days      !! OLANZapine (ZYPREXA) 5 MG Tab Take 1 Tab by mouth every evening., Disp-14 Tab, R-0, Print Rx Paper       !! - Potential duplicate medications found. Please discuss with provider.            FINAL IMPRESSION  1. Anxiety    2. Noncompliance with medication regimen    3. Alcohol abuse          Lizy CABA (Scribe), am scribing for, and in the presence of, Cayden Romero M.D..    Electronically signed by: Lizy Garcia (Scribe), 6/19/2018    Cayden CABA M.D. personally performed the services described in this documentation, as scribed by Lizy Garcia in my presence, and it is both accurate and complete.    The note accurately reflects work and decisions made by me.  Cayden Romero  6/19/2018  3:13 PM

## 2018-06-19 NOTE — ED TRIAGE NOTES
"Chief Complaint   Patient presents with   • Anxiety     x3 days. Hx of same. Reports he has been drinking heavier than normal the last 3 days and not taking his home meds.    • Alcohol Intoxication     last drink yesterday at 2000.      BIB EMS from home for above. Pt tachpnic, tremors noted. Pt is tearful but cooperative. No interventions from EMS. Chart up for ERP.    /85   Pulse (!) 101   Temp 37.1 °C (98.8 °F)   Resp (!) 26   Ht 1.905 m (6' 3\")   Wt 102.1 kg (225 lb)   SpO2 99%   BMI 28.12 kg/m²     "

## 2018-07-04 ENCOUNTER — HOSPITAL ENCOUNTER (EMERGENCY)
Facility: MEDICAL CENTER | Age: 38
End: 2018-07-05
Attending: EMERGENCY MEDICINE
Payer: MEDICAID

## 2018-07-04 DIAGNOSIS — F10.920 ALCOHOLIC INTOXICATION WITHOUT COMPLICATION (HCC): ICD-10-CM

## 2018-07-04 PROCEDURE — 700102 HCHG RX REV CODE 250 W/ 637 OVERRIDE(OP): Performed by: EMERGENCY MEDICINE

## 2018-07-04 PROCEDURE — A9270 NON-COVERED ITEM OR SERVICE: HCPCS | Performed by: EMERGENCY MEDICINE

## 2018-07-04 PROCEDURE — 99285 EMERGENCY DEPT VISIT HI MDM: CPT

## 2018-07-04 RX ORDER — LORAZEPAM 1 MG/1
1 TABLET ORAL ONCE
Status: COMPLETED | OUTPATIENT
Start: 2018-07-04 | End: 2018-07-04

## 2018-07-04 RX ORDER — HALOPERIDOL 5 MG/1
5 TABLET ORAL ONCE
Status: COMPLETED | OUTPATIENT
Start: 2018-07-04 | End: 2018-07-04

## 2018-07-04 RX ADMIN — HALOPERIDOL 5 MG: 5 TABLET ORAL at 19:45

## 2018-07-04 RX ADMIN — LORAZEPAM 1 MG: 1 TABLET ORAL at 14:15

## 2018-07-04 ASSESSMENT — LIFESTYLE VARIABLES
AVERAGE NUMBER OF DAYS PER WEEK YOU HAVE A DRINK CONTAINING ALCOHOL: 7
TOTAL SCORE: 4
HAVE YOU EVER FELT YOU SHOULD CUT DOWN ON YOUR DRINKING: YES
DOES PATIENT WANT TO STOP DRINKING: NO
CONSUMPTION TOTAL: INCOMPLETE
TOTAL SCORE: 4
DO YOU DRINK ALCOHOL: YES
TOTAL SCORE: 4
EVER HAD A DRINK FIRST THING IN THE MORNING TO STEADY YOUR NERVES TO GET RID OF A HANGOVER: YES
HAVE PEOPLE ANNOYED YOU BY CRITICIZING YOUR DRINKING: YES
EVER FELT BAD OR GUILTY ABOUT YOUR DRINKING: YES

## 2018-07-04 ASSESSMENT — PAIN SCALES - GENERAL: PAINLEVEL_OUTOF10: 0

## 2018-07-04 NOTE — ED PROVIDER NOTES
"ED Provider Note    Scribed for Dr. Gadiel Muniz M.D. by Lizy Garcia. 7/4/2018  1:34 PM    Primary care provider: Pcp Pt States None  Means of arrival: EMS  History obtained from: patient   History limited by: none    CHIEF COMPLAINT  Chief Complaint   Patient presents with   • Hallucinations     auditory   • Alcohol Intoxication       HPI  Brannon Sharma is a 37 y.o. male who presents to the Emergency Department by EMS for alcohol intoxication and hallucinations. He called EMS secondary to hearing voices and \"not doing okay.\" He has been experiencing symptosm for 1-2 days. Patient recently stopped is medications secondary to drinking alcohol. He took Klonopin at 2AM today. He denies any fever or vomiting associated. Complaining mostly of anxiety    REVIEW OF SYSTEMS  Pertinent positives include hallucinations, substance abuse. Pertinent negatives include: fever, vomiting.     E.    PAST MEDICAL HISTORY   has a past medical history of Alcohol-induced psychosis (HCC); Anxiety; Depression; Paranoid schizophrenia (HCC); Psychiatric disorder; and Schizophrenia, paranoid (HCC).    SURGICAL HISTORY   has a past surgical history that includes other.    SOCIAL HISTORY  Social History   Substance Use Topics   • Smoking status: Current Every Day Smoker     Packs/day: 1.00     Years: 12.00     Types: Cigarettes   • Smokeless tobacco: Never Used   • Alcohol use Yes      Comment:  states 40-50 beers/d      History   Drug Use   • Types: Inhaled     Comment: thc       FAMILY HISTORY  History reviewed. No pertinent family history.    CURRENT MEDICATIONS  Home Medications     Reviewed by Vesna Telles R.N. (Registered Nurse) on 07/04/18 at 1328  Med List Status: Not Addressed   Medication Last Dose Status   alprazolam (XANAX) 0.5 MG TABS  Active   cloNIDine (CATAPRES) 0.1 MG Tab  Active   lisinopril (PRINIVIL) 10 MG TABS  Active   OLANZapine (ZYPREXA) 5 MG Tab  Active   olanzapine (ZYPREXA) 5 MG TABS  Active " "  omeprazole (PRILOSEC) 20 MG delayed-release capsule  Active                ALLERGIES  Allergies   Allergen Reactions   • Ceclor [Cefaclor]    • Cephalosporins    • Pcn [Penicillins]        PHYSICAL EXAM  VITAL SIGNS: /94   Pulse 90   Temp 36.8 °C (98.2 °F)   Resp 18   Ht 1.905 m (6' 3\")   Wt 99.8 kg (220 lb)   BMI 27.50 kg/m²   Constitutional: Well developed, Well nourished,no acute distress,  e.   HENT: Normocephalic, Atraumatic.  Oropharynx moist.   Eyes: PERRL, EOMI, Conjunctiva normal, No discharge.   Neck: no anterior cervical lymphadenopathy  CV: Good pulses  Thorax & Lungs: No respiratory distress.   Abdomen:  Soft, non-tender.  No rebound, no peritoneal signs.   Skin: Warm, Dry, No erythema, No rash.    Musculoskeletal: No major deformities noted.   Neurologic: Awake, alert. Moves all extremities spontaneously.  Psychiatric: Auditory hallucinations       COURSE & MEDICAL DECISION MAKING  Pertinent Labs & Imaging studies reviewed. (See chart for details)    1:34 PM - Patient seen and examined at bedside. Discussed that I will have [facility] consult and allow patient to detox.     1:41 PM Consulted with [facility] who agrees to see patient.       Decision Making:  Patient is intoxicated at this point, but has a schizophrenic history, and is hallucinating. I think he would benefit from life skills evaluation but will wait until he is sober care will be passed to a partner pending sobriety    FINAL IMPRESSION  No diagnosis found.      Lizy CABA (Paul), am scribing for, and in the presence of, Gaidel Muniz M.D..    Electronically signed by: Lizy Garcia (Paul), 7/4/2018    Gadiel CABA M.D. personally performed the services described in this documentation, as scribed by Lizy Garcia in my presence, and it is both accurate and complete.    The note accurately reflects work and decisions made by me.  Gadiel Muniz  7/4/2018  7:01 PM    "

## 2018-07-04 NOTE — ED TRIAGE NOTES
DAMON MALDONADO c/o hearing voices, and montserrat has not taken his psychiatric meds x 2 days, but did take klonopin x 8at 0200 this morning, also states he drinks approx 40-50 beers perday, breathalyzer is 0.30, requests to go to [facility], patient denies being SI/HI, is teary and emotional, states he has not eaten in 2 days, given meal

## 2018-07-05 VITALS
DIASTOLIC BLOOD PRESSURE: 69 MMHG | HEIGHT: 75 IN | TEMPERATURE: 98.2 F | WEIGHT: 220 LBS | OXYGEN SATURATION: 96 % | BODY MASS INDEX: 27.35 KG/M2 | RESPIRATION RATE: 16 BRPM | SYSTOLIC BLOOD PRESSURE: 124 MMHG | HEART RATE: 80 BPM

## 2018-07-05 LAB
EKG IMPRESSION: NORMAL
POC BREATHALIZER: 0.04 PERCENT (ref 0–0.01)

## 2018-07-05 PROCEDURE — 90791 PSYCH DIAGNOSTIC EVALUATION: CPT

## 2018-07-05 PROCEDURE — 99285 EMERGENCY DEPT VISIT HI MDM: CPT

## 2018-07-05 PROCEDURE — 302970 POC BREATHALIZER: Performed by: EMERGENCY MEDICINE

## 2018-07-05 PROCEDURE — 700102 HCHG RX REV CODE 250 W/ 637 OVERRIDE(OP): Performed by: EMERGENCY MEDICINE

## 2018-07-05 PROCEDURE — 93005 ELECTROCARDIOGRAM TRACING: CPT | Performed by: EMERGENCY MEDICINE

## 2018-07-05 PROCEDURE — 700111 HCHG RX REV CODE 636 W/ 250 OVERRIDE (IP): Performed by: EMERGENCY MEDICINE

## 2018-07-05 PROCEDURE — A9270 NON-COVERED ITEM OR SERVICE: HCPCS | Performed by: EMERGENCY MEDICINE

## 2018-07-05 RX ORDER — ONDANSETRON 2 MG/ML
4 INJECTION INTRAMUSCULAR; INTRAVENOUS ONCE
Status: COMPLETED | OUTPATIENT
Start: 2018-07-05 | End: 2018-07-05

## 2018-07-05 RX ORDER — LORAZEPAM 1 MG/1
1 TABLET ORAL ONCE
Status: COMPLETED | OUTPATIENT
Start: 2018-07-05 | End: 2018-07-05

## 2018-07-05 RX ORDER — FAMOTIDINE 20 MG/1
20 TABLET, FILM COATED ORAL ONCE
Status: COMPLETED | OUTPATIENT
Start: 2018-07-05 | End: 2018-07-05

## 2018-07-05 RX ADMIN — FAMOTIDINE 20 MG: 20 TABLET ORAL at 02:00

## 2018-07-05 RX ADMIN — LORAZEPAM 1 MG: 1 TABLET ORAL at 04:27

## 2018-07-05 RX ADMIN — ONDANSETRON HYDROCHLORIDE 4 MG: 2 INJECTION, SOLUTION INTRAMUSCULAR; INTRAVENOUS at 02:00

## 2018-07-05 NOTE — ED NOTES
Pt ambulatory  Vital signs stable  Pt handed d/c paperwork with understanding stated  Pt states will follow up with [facility] or ED  Pt states safe way home

## 2018-07-05 NOTE — ED NOTES
"Pt states he is having hallucinations. Pt is very tearful.  Pt states \"Im sorry but there is a man in this room talking to me.\"  Dr. Muniz notified and verbal order for Haldol given to this RN.   "

## 2018-07-05 NOTE — CONSULTS
"[facility]   TRIAGE ASSESSMENT    Name: Brannon Sharma  MRN: [id number]  : 1980  Age: 37 y.o.  Date of assessment: 2018  PCP: Pcp Pt States None  Persons in attendance: Patient    CHIEF COMPLAINT/PRESENTING ISSUE (as stated by patient): Patient laying in bed.  Calm and cooperative.  Denies suicidal/ homicidal ideation.  \"I want to live man; if I didn't.. I wouldn't have come here.\"  Patient has history of schizophrenia.  Denies command auditory hallucinations. \"I hear the chatter.  My voices don't tell me to do things.\"  Patient stated he needs to be discharged from the hospital soon because he has a court date later in the day.  Patient also stated that he has emailed his employer to inform them he has been in the hospital.  Patient complained of auditory hallucinations upon admission and now states \"the voices have quieted down a lot.  Sometimes they get so loud I cant handle it.\"   Educated on risks associated with alcohol detox.  Resources provided for [facility] for medical detox.  Patient stated he will \"consider calling after court today.\"  Declined all other help.  Patient stated he has worked with \"[facility]\" with [facility] in the past.  Requested that this writer not contact [facility] on his behalf.  Stated,  \"I can take care of this.  I'll call again if I need help.\"    Chief Complaint   Patient presents with   • Hallucinations     auditory   • Alcohol Intoxication        CURRENT LIVING SITUATION/SOCIAL SUPPORT: Patient lives alone. Reports a strong social network of friends.    BEHAVIORAL HEALTH TREATMENT HISTORY  Does patient/parent report a history of prior behavioral health treatment for patient?   Yes:  Patient has multiple inpatient psychiatric hospitalizations at [facility].  Reports being hospitalized for suicidal ideation approximately 3 years ago.  Hospitalized for ETOH detox approximately 1 year ago.  Patient also see's Dr. Lucas for his psychiatric medication.  Reports " "being prescribed Seroquel for schizophrenia.       SAFETY ASSESSMENT - SELF  Does patient acknowledge current or past symptoms of dangerousness to self? yes  Does parent/significant other report patient has current or past symptoms of dangerousness to self? yes  Does presenting problem suggest symptoms of dangerousness to self? No    SAFETY ASSESSMENT - OTHERS  Does patient acknowledge current or past symptoms of aggressive behavior or risk to others? no  Does parent/significant other report patient has current or past symptoms of aggressive behavior or risk to others?  N\A  Does presenting problem suggest symptoms of dangerousness to others? No    Crisis Safety Plan completed and copy given to patient? No \"I wouldn't have called to come here if I was a danger to myself.  That is unnecessary.\"    ABUSE/NEGLECT SCREENING  Does patient report feeling “unsafe” in his/her home, or afraid of anyone?  no  Does patient report any history of physical, sexual, or emotional abuse?  no  Does parent or significant other report any of the above? N\A  Is there evidence of neglect by self?  no  Is there evidence of neglect by a caregiver? no  Does the patient/parent report any history of CPS/APS/police involvement related to suspected abuse/neglect or domestic violence? no  Based on the information provided during the current assessment, is a mandated report of suspected abuse/neglect being made?  No    SUBSTANCE USE SCREENING  Yes:  [place] all substances used in the past 30 days:      Last Use Amount   [x]   Alcohol 7/4/18    []   Marijuana     []   Heroin     []   Prescription Opioids  (used without prescription, for    recreation, or in excess of prescribed amount)     []   Other Prescription  (used without prescription, for    recreation, or in excess of prescribed amount)     []   Cocaine      []   Methamphetamine     []   \"\" drugs (ectasy, MDMA)     []   Other substances        UDS results:   Breathalyzer results: " "Legally sober upon evaluation    What consequences does the patient associate with any of the above substance use and or addictive behaviors? Legal: , Work problems or losses:    Risk factors for detox (check all that apply):  []  Seizures   [x]  Diaphoretic (sweating)   [x]  Tremors   []  Hallucinations   [x]  Increased blood pressure   []  Decreased blood pressure   []  Other   []  None      [x] Patient education on risk factors for detoxification and instructed to return to ER as needed.      MENTAL STATUS   Participation: Active verbal participation  Grooming: Neat  Orientation: Alert and Fully Oriented  Behavior: Calm  Eye contact: Good  Mood: Euthymic  Affect: Congruent with content  Thought process: Logical and Goal-directed  Thought content: Within normal limits  Patient reports auditory hallucinations are \"more than manageable\" at  time of evaluation.    Speech: Rate within normal limits and Volume within normal limits  Perception: Within normal limits  Memory:  No gross evidence of memory deficits  Insight: Adequate  Judgment:  Adequate  Other:    Collateral information:   Source:  [] Significant other present in person:   [] Significant other by telephone  [] [facility]   [] [facility] Nursing Staff  [] [facility] Medical Record  [] Other:     [] Unable to complete full assessment due to:  [] Acute intoxication  [] Patient declined to participate/engage  [] Patient verbally unresponsive  [] Significant cognitive deficits  [] Significant perceptual distortions or behavioral disorganization  [] Other:      CLINICAL IMPRESSIONS:  Primary:  Alcohol dependency/schizophrenia  Secondary:         IDENTIFIED NEEDS/PLAN:  [Trigger DISPOSITION list for any items marked]    []  Imminent safety risk - self [] Imminent safety risk - others   []  Acute substance withdrawal []  Psychosis/Impaired reality testing   []  Mood/anxiety [x]  Substance use/Addictive behavior   []  Maladaptive behaviro []  Parent/child conflict "   []  Family/Couples conflict []  Biomedical   []  Housing []  Financial   []   Legal  Occupational/Educational   []  Domestic violence []  Other:     Disposition: Defer- Patient refused all assistance.  Requested this writer take no further action on his behalf.  Patient to return to the hospital if S/S etoh detox risk factor occur.  Patient verbalized understanding and agreed to return.     Does patient express agreement with the above plan? yes    Referral appointment(s) scheduled? no    Alert team only:   I have discussed findings and recommendations with Dr. Castro who is in agreement with these recommendations.       Miguel Sosa R.N.  7/5/2018

## 2018-07-05 NOTE — DISCHARGE INSTRUCTIONS
"You were seen in the ER for alcohol abuse and hearing voices. You are safe to go home. Please follow up with a PCP. Return to the ER with new or worsening symptoms.    How Much is Too Much Alcohol?  Drinking too much alcohol can cause injury, accidents, and health problems. These types of problems can include:   · Car crashes.  · Falls.  · Family fighting (domestic violence).  · Drowning.  · Fights.  · Injuries.  · Burns.  · Damage to certain organs.  · Having a baby with birth defects.  ONE DRINK CAN BE TOO MUCH WHEN YOU ARE:  · Working.  · Pregnant or breastfeeding.  · Taking medicines. Ask your doctor.  · Driving or planning to drive.  WHAT IS A STANDARD DRINK?   · 1 regular beer (12 ounces or 360 milliliters).  · 1 glass of wine (5 ounces or 150 milliliters).  · 1 shot of liquor (1.5 ounces or 45 milliliters).  BLOOD ALCOHOL LEVELS   · .00 A person is sober.  · .03 A person has no trouble keeping balance, talking, or seeing right, but a \"buzz\" may be felt.  · .05 A person feels \"buzzed\" and relaxed.  · .08 or .10  A person is drunk. He or she has trouble talking, seeing right, and keeping his or her balance.  · .15 A person loses body control and may pass out (blackout).  · .20 A person has trouble walking (staggering) and throws up (vomits).  · .30 A person will pass out (unconscious).  · .40+ A person will be in a coma. Death is possible.  If you or someone you know has a drinking problem, get help from a doctor.   Document Released: 10/14/2010 Document Revised: 03/11/2013 Document Reviewed: 10/14/2010  [employer]® Patient Information ©2014 [employer].    "

## 2018-09-10 ENCOUNTER — HOSPITAL ENCOUNTER (EMERGENCY)
Facility: MEDICAL CENTER | Age: 38
End: 2018-09-11
Attending: EMERGENCY MEDICINE
Payer: MEDICAID

## 2018-09-10 DIAGNOSIS — F10.920 ALCOHOLIC INTOXICATION WITHOUT COMPLICATION (HCC): ICD-10-CM

## 2018-09-10 LAB
AMPHET UR QL SCN: NEGATIVE
BARBITURATES UR QL SCN: NEGATIVE
BENZODIAZ UR QL SCN: POSITIVE
BZE UR QL SCN: NEGATIVE
CANNABINOIDS UR QL SCN: NEGATIVE
ETHANOL BLD-MCNC: 0.4 G/DL
METHADONE UR QL SCN: NEGATIVE
OPIATES UR QL SCN: NEGATIVE
OXYCODONE UR QL SCN: NEGATIVE
PCP UR QL SCN: NEGATIVE
POC BREATHALIZER: 0.26 PERCENT (ref 0–0.01)
PROPOXYPH UR QL SCN: NEGATIVE

## 2018-09-10 PROCEDURE — A9270 NON-COVERED ITEM OR SERVICE: HCPCS | Performed by: EMERGENCY MEDICINE

## 2018-09-10 PROCEDURE — 700105 HCHG RX REV CODE 258: Performed by: EMERGENCY MEDICINE

## 2018-09-10 PROCEDURE — 302970 POC BREATHALIZER: Performed by: EMERGENCY MEDICINE

## 2018-09-10 PROCEDURE — 36415 COLL VENOUS BLD VENIPUNCTURE: CPT

## 2018-09-10 PROCEDURE — 700102 HCHG RX REV CODE 250 W/ 637 OVERRIDE(OP): Performed by: EMERGENCY MEDICINE

## 2018-09-10 PROCEDURE — 302970 POC BREATHALIZER

## 2018-09-10 PROCEDURE — 99285 EMERGENCY DEPT VISIT HI MDM: CPT

## 2018-09-10 PROCEDURE — 93005 ELECTROCARDIOGRAM TRACING: CPT | Performed by: EMERGENCY MEDICINE

## 2018-09-10 PROCEDURE — 80307 DRUG TEST PRSMV CHEM ANLYZR: CPT

## 2018-09-10 RX ORDER — LORAZEPAM 1 MG/1
1 TABLET ORAL EVERY 4 HOURS PRN
Status: DISCONTINUED | OUTPATIENT
Start: 2018-09-10 | End: 2018-09-11 | Stop reason: HOSPADM

## 2018-09-10 RX ORDER — LORAZEPAM 2 MG/ML
0.5 INJECTION INTRAMUSCULAR EVERY 4 HOURS PRN
Status: DISCONTINUED | OUTPATIENT
Start: 2018-09-10 | End: 2018-09-11 | Stop reason: HOSPADM

## 2018-09-10 RX ORDER — LORAZEPAM 2 MG/ML
1.5 INJECTION INTRAMUSCULAR
Status: DISCONTINUED | OUTPATIENT
Start: 2018-09-10 | End: 2018-09-11 | Stop reason: HOSPADM

## 2018-09-10 RX ORDER — ONDANSETRON 2 MG/ML
4 INJECTION INTRAMUSCULAR; INTRAVENOUS ONCE
Status: COMPLETED | OUTPATIENT
Start: 2018-09-10 | End: 2018-09-11

## 2018-09-10 RX ORDER — LORAZEPAM 1 MG/1
0.5 TABLET ORAL EVERY 4 HOURS PRN
Status: DISCONTINUED | OUTPATIENT
Start: 2018-09-10 | End: 2018-09-11 | Stop reason: HOSPADM

## 2018-09-10 RX ORDER — LORAZEPAM 2 MG/ML
2 INJECTION INTRAMUSCULAR
Status: DISCONTINUED | OUTPATIENT
Start: 2018-09-10 | End: 2018-09-11 | Stop reason: HOSPADM

## 2018-09-10 RX ORDER — SODIUM CHLORIDE 9 MG/ML
1000 INJECTION, SOLUTION INTRAVENOUS ONCE
Status: COMPLETED | OUTPATIENT
Start: 2018-09-10 | End: 2018-09-10

## 2018-09-10 RX ORDER — LORAZEPAM 1 MG/1
4 TABLET ORAL
Status: DISCONTINUED | OUTPATIENT
Start: 2018-09-10 | End: 2018-09-11 | Stop reason: HOSPADM

## 2018-09-10 RX ORDER — LORAZEPAM 2 MG/ML
1 INJECTION INTRAMUSCULAR
Status: DISCONTINUED | OUTPATIENT
Start: 2018-09-10 | End: 2018-09-11 | Stop reason: HOSPADM

## 2018-09-10 RX ORDER — LORAZEPAM 1 MG/1
2 TABLET ORAL
Status: DISCONTINUED | OUTPATIENT
Start: 2018-09-10 | End: 2018-09-11 | Stop reason: HOSPADM

## 2018-09-10 RX ORDER — LORAZEPAM 1 MG/1
3 TABLET ORAL
Status: DISCONTINUED | OUTPATIENT
Start: 2018-09-10 | End: 2018-09-11 | Stop reason: HOSPADM

## 2018-09-10 RX ADMIN — SODIUM CHLORIDE 1000 ML: 9 INJECTION, SOLUTION INTRAVENOUS at 19:44

## 2018-09-10 RX ADMIN — LORAZEPAM 1 MG: 1 TABLET ORAL at 21:05

## 2018-09-10 ASSESSMENT — LIFESTYLE VARIABLES
CONSUMPTION TOTAL: POSITIVE
TOTAL SCORE: 4
AVERAGE NUMBER OF DAYS PER WEEK YOU HAVE A DRINK CONTAINING ALCOHOL: 7
HOW MANY TIMES IN THE PAST YEAR HAVE YOU HAD 5 OR MORE DRINKS IN A DAY: 5
TOTAL SCORE: 4
DO YOU DRINK ALCOHOL: YES
ON A TYPICAL DAY WHEN YOU DRINK ALCOHOL HOW MANY DRINKS DO YOU HAVE: 30
DOES PATIENT WANT TO STOP DRINKING: NO
TOTAL SCORE: 4
HAVE PEOPLE ANNOYED YOU BY CRITICIZING YOUR DRINKING: YES
EVER FELT BAD OR GUILTY ABOUT YOUR DRINKING: YES
HAVE YOU EVER FELT YOU SHOULD CUT DOWN ON YOUR DRINKING: YES
EVER HAD A DRINK FIRST THING IN THE MORNING TO STEADY YOUR NERVES TO GET RID OF A HANGOVER: YES

## 2018-09-10 ASSESSMENT — PAIN SCALES - GENERAL: PAINLEVEL_OUTOF10: 0

## 2018-09-11 VITALS
HEIGHT: 72 IN | RESPIRATION RATE: 18 BRPM | TEMPERATURE: 99 F | WEIGHT: 220 LBS | BODY MASS INDEX: 29.8 KG/M2 | HEART RATE: 72 BPM | OXYGEN SATURATION: 97 % | SYSTOLIC BLOOD PRESSURE: 135 MMHG | DIASTOLIC BLOOD PRESSURE: 84 MMHG

## 2018-09-11 LAB
POC BREATHALIZER: 0.07 PERCENT (ref 0–0.01)
POC BREATHALIZER: 0.15 PERCENT (ref 0–0.01)

## 2018-09-11 PROCEDURE — A9270 NON-COVERED ITEM OR SERVICE: HCPCS | Performed by: EMERGENCY MEDICINE

## 2018-09-11 PROCEDURE — 700111 HCHG RX REV CODE 636 W/ 250 OVERRIDE (IP): Performed by: EMERGENCY MEDICINE

## 2018-09-11 PROCEDURE — 302970 POC BREATHALIZER: Performed by: EMERGENCY MEDICINE

## 2018-09-11 PROCEDURE — 700102 HCHG RX REV CODE 250 W/ 637 OVERRIDE(OP): Performed by: EMERGENCY MEDICINE

## 2018-09-11 PROCEDURE — 99243 OFF/OP CNSLTJ NEW/EST LOW 30: CPT | Performed by: PSYCHIATRY & NEUROLOGY

## 2018-09-11 PROCEDURE — 96374 THER/PROPH/DIAG INJ IV PUSH: CPT

## 2018-09-11 PROCEDURE — 93005 ELECTROCARDIOGRAM TRACING: CPT | Performed by: EMERGENCY MEDICINE

## 2018-09-11 RX ORDER — NALTREXONE HYDROCHLORIDE 50 MG/1
50 TABLET, FILM COATED ORAL DAILY
Qty: 30 TAB | Refills: 0 | Status: SHIPPED | OUTPATIENT
Start: 2018-09-11 | End: 2019-02-25

## 2018-09-11 RX ORDER — ONDANSETRON 4 MG/1
4 TABLET, ORALLY DISINTEGRATING ORAL ONCE
Status: COMPLETED | OUTPATIENT
Start: 2018-09-11 | End: 2018-09-11

## 2018-09-11 RX ADMIN — ONDANSETRON HYDROCHLORIDE 4 MG: 2 INJECTION, SOLUTION INTRAMUSCULAR; INTRAVENOUS at 00:04

## 2018-09-11 RX ADMIN — LORAZEPAM 0.5 MG: 1 TABLET ORAL at 08:20

## 2018-09-11 RX ADMIN — ONDANSETRON 4 MG: 4 TABLET, ORALLY DISINTEGRATING ORAL at 08:20

## 2018-09-11 ASSESSMENT — LIFESTYLE VARIABLES
VISUAL DISTURBANCES: NOT PRESENT
TREMOR: TREMOR NOT VISIBLE BUT CAN BE FELT, FINGERTIP TO FINGERTIP
HEADACHE, FULLNESS IN HEAD: NOT PRESENT
AUDITORY DISTURBANCES: NOT PRESENT
NAUSEA AND VOMITING: MILD NAUSEA WITH NO VOMITING
ANXIETY: *
AGITATION: NORMAL ACTIVITY
PAROXYSMAL SWEATS: BARELY PERCEPTIBLE SWEATING. PALMS MOIST
TOTAL SCORE: 5
ORIENTATION AND CLOUDING OF SENSORIUM: ORIENTED AND CAN DO SERIAL ADDITIONS

## 2018-09-11 ASSESSMENT — PAIN SCALES - GENERAL: PAINLEVEL_OUTOF10: 0

## 2018-09-11 NOTE — ED NOTES
Pt medicated for agitation, anxiety, placed on cardiac monitor, provided with additional warm blankets, juice.

## 2018-09-11 NOTE — ED NOTES
"Pt to ED for suicidal ideation and alcohol intoxication; pt states he is supposed to go to shelter tomorrow and was \"feeling sad and depressed\" about it. Pt stated that since he was going to shelter tomorrow and felt sad, he wanted to cut himself with knife particularly to his neck and to his wrists. No visible lacerations noted; pt obviously intoxicated and admits to drinking heavily for the past 6 days.   "

## 2018-09-11 NOTE — CONSULTS
"[facility]   TRIAGE ASSESSMENT    Name: Brannon Sharma  MRN: [id number]  : 1980  Age: 37 y.o.  Date of assessment: 2018  PCP: Pcp Pt States None  Persons in attendance: Patient    CHIEF COMPLAINT/PRESENTING ISSUE (as stated by patient): Patient laying in bed.  Calm and cooperative.  Denies suicidal/ homicidal ideation.  Denies current auditory or visual hallucinations.  \"I only hear voices when I drink.\"  Patient reports when he drinks he is unable to control the amount and often times finds himself in positions he would not be in if sober.  Patient has long history of mental illness.  Reports being hospitalized on multiple occasions for suicidal ideation.    Denies remembering any of last nights events or things he said.  Patient does admit that he is impulsive when intoxicated and has many stressors in his life right now.  Patient states he is supposed to go to longterm today and is worried about missing court (Patient did say something similar at his last visit to the ER on 2018).  Patient also reports that he is \"homeless\" after today. Has not been compliant with psychiatric medication.  Patient was placed on legal hold by [facility] and is awaiting evaluation by staff psychiatrist or transfer to psychiatric facility for further evaluation and treatment.   Chief Complaint   Patient presents with   • Suicidal Ideation     Pt to ED for ETOH and suicidal ideation; Pt held a knife to his neck and wrists because he is upset that he is going to longterm tomorrow. No visible cut marks noted. ETOH on board; eyes blood shot and pt admits to drinking for the past 6 days.         CURRENT LIVING SITUATION/SOCIAL SUPPORT: Patient lives alone. Reports a strong social network of friends.    BEHAVIORAL HEALTH TREATMENT HISTORY  Does patient/parent report a history of prior behavioral health treatment for patient?   Yes:  Patient has multiple inpatient psychiatric hospitalizations at [facility] and [facility].  " "Reports being hospitalized for suicidal ideation approximately 3 years ago.  Hospitalized for ETOH detox approximately 1 year ago.  Patient also see's Dr. Lucas for his psychiatric medication.  Reports being prescribed Seroquel for schizophrenia.       SAFETY ASSESSMENT - SELF  Does patient acknowledge current or past symptoms of dangerousness to self? yes  Does parent/significant other report patient has current or past symptoms of dangerousness to self? yes  Does presenting problem suggest symptoms of dangerousness to self? Yes:     Past Current    Suicidal Thoughts: [x]  []    Suicidal Plans: [x]  []    Suicidal Intent: [x]  []    Suicide Attempts: [x]  []    Self-Injury []  []      For any boxes checked above, provide detail: Patient stated he has had only one attempt in the past.  Stated he overdosed on medication and came to the hospital approximately one year ago.  Thinks he was brought to [facility] after the incident.  \"I don't know which hospital I was brought to.  This happens so much and I am so drunk I don't remember the details.\"    History of suicide by family member: yes - Father committed suicide  History of suicide by friend/significant other: no  Recent change in frequency/specificity/intensity of suicidal thoughts or self-harm behavior? no  Current access to firearms, medications, or other identified means of suicide/self-harm? No- Patient is denying suicidal ideation at this time.  If yes, willing to restrict access to means of suicide/self-harm? yes - Patient states he is only suicidal when drinking  Protective factors present:  Future-oriented, Strong socia/community connections and Willing to address in treatment    SAFETY ASSESSMENT - OTHERS  Does patient acknowledge current or past symptoms of aggressive behavior or risk to others? No- however, chart states patient can be aggressive and reports being placed in   Does parent/significant other report patient has current or past symptoms of " "aggressive behavior or risk to others?  N\A  Does presenting problem suggest symptoms of dangerousness to others? No    Crisis Safety Plan completed and copy given to patient? No     ABUSE/NEGLECT SCREENING  Does patient report feeling “unsafe” in his/her home, or afraid of anyone?  no  Does patient report any history of physical, sexual, or emotional abuse?  no  Does parent or significant other report any of the above? N\A  Is there evidence of neglect by self?  no  Is there evidence of neglect by a caregiver? no  Does the patient/parent report any history of CPS/APS/police involvement related to suspected abuse/neglect or domestic violence? no  Based on the information provided during the current assessment, is a mandated report of suspected abuse/neglect being made?  No    SUBSTANCE USE SCREENING  Yes:  [place] all substances used in the past 30 days:      Last Use Amount   [x]   Alcohol 9/10/18 0.4 BAC   []   Marijuana     []   Heroin     []   Prescription Opioids  (used without prescription, for    recreation, or in excess of prescribed amount)     []   Other Prescription  (used without prescription, for    recreation, or in excess of prescribed amount)     []   Cocaine      []   Methamphetamine     []   \"\" drugs (ectasy, MDMA)     []   Other substances        UDS results: Pending  Breathalyzer results: Legally sober upon evaluation 0.07    What consequences does the patient associate with any of the above substance use and or addictive behaviors? Legal: , Work problems or losses:    Risk factors for detox (check all that apply):  []  Seizures   [x]  Diaphoretic (sweating)   [x]  Tremors   []  Hallucinations   [x]  Increased blood pressure   []  Decreased blood pressure   []  Other   []  None      [x] Patient education on risk factors for detoxification and instructed to return to ER as needed.      MENTAL STATUS   Participation: Active verbal participation and Verbally monopolizing  Grooming: " "Neat  Orientation: Alert and Fully Oriented  Behavior: Calm  Eye contact: Good  Mood: Anxious and Manic  Affect: Expansive and Anxious  Thought process: Goal-directed- Patient does not want to be on a legal hold  Thought content: Preoccupation  Patient is worried about court.  States he only has auditory hallucinations when he is drinking  Speech: Loud and Hypertalkative  Perception: Within normal limits  Memory:  Poor memory for chronology of events- Patient has poor memory often related to drinking and self reported \"black outs.\"  Insight: Limited  Judgment:  Poor  Other:    Collateral information:   Source:  [] Significant other present in person:   [] Significant other by telephone  [] [facility]   [x] [facility] Nursing Staff  [x] [facility]  [] Other:     [] Unable to complete full assessment due to:  [] Acute intoxication  [] Patient declined to participate/engage  [] Patient verbally unresponsive  [] Significant cognitive deficits  [] Significant perceptual distortions or behavioral disorganization  [] Other:      CLINICAL IMPRESSIONS:  Primary:  Alcohol dependency/schizophrenia  Secondary:         IDENTIFIED NEEDS/PLAN:  [Trigger DISPOSITION list for any items marked]    [x]  Imminent safety risk - self [] Imminent safety risk - others   []  Acute substance withdrawal []  Psychosis/Impaired reality testing   []  Mood/anxiety [x]  Substance use/Addictive behavior   [x]  Maladaptive behaviro []  Parent/child conflict   []  Family/Couples conflict []  Biomedical   [x]  Housing []  Financial   []   Legal  Occupational/Educational   []  Domestic violence []  Other:     Disposition: Refer to Legal Hold     Does patient express agreement with the above plan? No    Referral appointment(s) scheduled? no    Alert team only:   I have discussed findings and recommendations with Dr. Schmidt who is in agreement with these recommendations.     Legal hold given to Jaki at 0500      Miguel Sosa, " MIKKI  7/5/2018

## 2018-09-11 NOTE — ED PROVIDER NOTES
"ED Provider Note    Scribed for Rubén Castro M.D. by Julee Shelton. 9/10/2018, 7:25 PM.    Primary care provider: Pcp Pt States None  Means of arrival: EMS  History obtained from: patient   History limited by: none    CHIEF COMPLAINT  Chief Complaint   Patient presents with   • Suicidal Ideation     Pt to ED for ETOH and suicidal ideation; Pt held a knife to his neck and wrists because he is upset that he is going to long term tomorrow. No visible cut marks noted. ETOH on board; eyes blood shot and pt admits to drinking for the past 6 days.        HPI  Brannon Sharma is a 38 y.o. male with a history of anxiety, depression, paranoid schizophrenia, alcohol induced psychosis who presents to the Emergency Department via EMS for evaluation of suicidal ideation today. Patient reports \"I just want to die\". He reports associated nausea. Patient is exhibiting paranoid ideas on exam and states \"I'm afraid you guys are going to hurt me\".  He reports recent life stressors and he is going to long term tomorrow which may be exacerbating his symptoms. Patient endorses drinking alcohol for the past 6 days and he is intoxicated on exam although he declines to answer how much he has been drinking. He does get withdrawal seizures. Patient has been noncompliant with his psychiatric medications recently. He endorses Benzodiazepine abuse, but no other drug use. No complaints of HI. Per nursing staff, patient held a knife to his neck and wrists due to upsetment from having to go to long term tomorrow.    REVIEW OF SYSTEMS  Pertinent positives include SI, nausea, paranoia, alcohol intoxication. Pertinent negatives include no HI. As above, all other systems reviewed and are negative. See HPI for further details.     PAST MEDICAL HISTORY   has a past medical history of Alcohol-induced psychosis (HCC); Anxiety; Depression; Paranoid schizophrenia (HCC); Psychiatric disorder; and Schizophrenia, paranoid (HCC).    SURGICAL HISTORY   has a past " surgical history that includes other.    SOCIAL HISTORY  Social History   Substance Use Topics   • Smoking status: Current Every Day Smoker     Packs/day: 1.00     Years: 12.00     Types: Cigarettes   • Smokeless tobacco: Never Used   • Alcohol use Yes      Comment:  states 40-50 beers/d      History   Drug Use   • Types: Inhaled     Comment: thc       FAMILY HISTORY  No family history noted    CURRENT MEDICATIONS  Current medications can be reviewed in the nurse's note.     ALLERGIES  Allergies   Allergen Reactions   • Ceclor [Cefaclor]    • Cephalosporins    • Pcn [Penicillins]        PHYSICAL EXAM  VITAL SIGNS: /98   Pulse (!) 114   Temp 37.2 °C (99 °F)   Resp 18   Ht 1.829 m (6')   Wt 99.8 kg (220 lb)   SpO2 95%   BMI 29.84 kg/m²   Vitals reviewed.  Constitutional: Alert in no apparent distress. Appears intoxicated.   HENT: No signs of trauma, Bilateral external ears normal, Nose normal. Dry mucous membranes.   Eyes: Pupils are equal and reactive, Conjunctiva normal, Non-icteric.   Neck: Normal range of motion, No tenderness, Supple, No stridor.   Lymphatic: No lymphadenopathy noted.   Cardiovascular: Tachycardic with regular rhythm, no murmurs.   Thorax & Lungs: Normal breath sounds, No respiratory distress, No wheezing, No chest tenderness.   Abdomen: Bowel sounds normal, Soft, No tenderness, No peritoneal signs, No masses, No pulsatile masses.   Skin: Warm, Dry, No erythema, No rash.   Back: No bony tenderness, No CVA tenderness.   Extremities: Intact distal pulses, No edema, No tenderness, No cyanosis  Musculoskeletal: Good range of motion in all major joints. No tenderness to palpation or major deformities noted.   Neurologic: Alert , Normal motor function, Normal sensory function, No focal deficits noted.   Psychiatric: Tangential thoughts, interruptive, suicidal ideation, poor insight, requires multiple redirections.     DIAGNOSTIC STUDIES / PROCEDURES    LABS  Labs Reviewed   URINE DRUG  SCREEN - Abnormal; Notable for the following:        Result Value    Benzodiazepines Positive (*)     All other components within normal limits   DIAGNOSTIC ALCOHOL - Abnormal; Notable for the following:     Diagnostic Alcohol 0.40 (*)     All other components within normal limits   POC BREATHALIZER - Abnormal; Notable for the following:     POC Breathalizer 0.257 (*)     All other components within normal limits   POC BREATHALIZER      All labs reviewed by me.    COURSE & MEDICAL DECISION MAKING  Nursing notes, VS, PMSFHx reviewed in chart.    Differential diagnoses include but not limited to: suicidal ideation, alcohol intoxication, drug ingestion, malingering     7:25 PM Patient seen and examined at bedside. Patient arrives tachycardic but afebrile with normal vital signs. Patient appears non-toxic but dehydrated with dry mucous membranes. The physical exam is reveals a middle-aged male who appears to be clinically intoxicated with tangential thoughts, poor insight, and requiring multiple redirections. He has a significant psychiatric history and has been transferred from this facility multiple times to inpatient psychiatric facilities but I am unsure today if he will require the same given that it is possible he is malingering due to the fact that he is scheduled to go to residential tomorrow. Ordered for diagnostic alcohol, POC breathalyzer, urine drug screen, EKG to evaluate. Patient will be treated with Zofran 4 mg for his symptoms.      Urine drug screen is positive for benzodiazepines. Diagnostic alcohol is 0.40.    12:34 AM- Reviewed the patient's lab results as above.     12:43 AM- 2nd POC breathalyzer reveals alcohol level at 0.153. Once he is below 0.08 he will require life skills evaluation.    Patient care turned over to Dr. Schmidt pending Iife skills evaluation. He is medically clear.    FINAL IMPRESSION  1. Alcoholic intoxication without complication (HCC)       Julee CABA (Scribe), am scribing for,  and in the presence of, Rubén Castro M.D..    Electronically signed by: Julee Shelton (Scribe), 9/10/2018    I, Rubén Castro M.D. personally performed the services described in this documentation, as scribed by Julee Shelton in my presence, and it is both accurate and complete. C.     The note accurately reflects work and decisions made by me.  Rubén Castro  9/11/2018  1:12 AM

## 2018-09-11 NOTE — DISCHARGE INSTRUCTIONS
Alcohol Problems  Most adults who drink alcohol drink in moderation (not a lot) are at low risk for developing problems related to their drinking. However, all drinkers, including low-risk drinkers, should know about the health risks connected with drinking alcohol.  RECOMMENDATIONS FOR LOW-RISK DRINKING   Drink in moderation. Moderate drinking is defined as follows:   · Men - no more than 2 drinks per day.  · Nonpregnant women - no more than 1 drink per day.  · Over age 65 - no more than 1 drink per day.  A standard drink is 12 grams of pure alcohol, which is equal to a 12 ounce bottle of beer or wine cooler, a 5 ounce glass of wine, or 1.5 ounces of distilled spirits (such as whiskey, medardo, vodka, or rum).   ABSTAIN FROM (DO NOT DRINK) ALCOHOL:  · When pregnant or considering pregnancy.  · When taking a medication that interacts with alcohol.  · If you are alcohol dependent.  · A medical condition that prohibits drinking alcohol (such as ulcer, liver disease, or heart disease).  DISCUSS WITH YOUR CAREGIVER:  · If you are at risk for coronary heart disease, discuss the potential benefits and risks of alcohol use: Light to moderate drinking is associated with lower rates of coronary heart disease in certain populations (for example, men over age 45 and postmenopausal women). Infrequent or nondrinkers are advised not to begin light to moderate drinking to reduce the risk of coronary heart disease so as to avoid creating an alcohol-related problem. Similar protective effects can likely be gained through proper diet and exercise.  · Women and the elderly have smaller amounts of body water than men. As a result women and the elderly achieve a higher blood alcohol concentration after drinking the same amount of alcohol.  · Exposing a fetus to alcohol can cause a broad range of birth defects referred to as Fetal Alcohol Syndrome (FAS) or Alcohol-Related Birth Defects (ARBD). Although FAS/ARBD is connected with excessive  alcohol consumption during pregnancy, studies also have reported neurobehavioral problems in infants born to mothers reporting drinking an average of 1 drink per day during pregnancy.  · Heavier drinking (the consumption of more than 4 drinks per occasion by men and more than 3 drinks per occasion by women) impairs learning (cognitive) and psychomotor functions and increases the risk of alcohol-related problems, including accidents and injuries.  CAGE QUESTIONS:   · Have you ever felt that you should Cut down on your drinking?  · Have people Annoyed you by criticizing your drinking?  · Have you ever felt bad or Guilty about your drinking?  · Have you ever had a drink first thing in the morning to steady your nerves or get rid of a hangover (Eye opener)?  If you answered positively to any of these questions: You may be at risk for alcohol-related problems if alcohol consumption is:   · Men: Greater than 14 drinks per week or more than 4 drinks per occasion.  · Women: Greater than 7 drinks per week or more than 3 drinks per occasion.  Do you or your family have a medical history of alcohol-related problems, such as:  · Blackouts.  · Sexual dysfunction.  · Depression.  · Trauma.  · Liver dysfunction.  · Sleep disorders.  · Hypertension.  · Chronic abdominal pain.  · Has your drinking ever caused you problems, such as problems with your family, problems with your work (or school) performance, or accidents/injuries?  · Do you have a compulsion to drink or a preoccupation with drinking?  · Do you have poor control or are you unable to stop drinking once you have started?  · Do you have to drink to avoid withdrawal symptoms?  · Do you have problems with withdrawal such as tremors, nausea, sweats, or mood disturbances?  · Does it take more alcohol than in the past to get you high?  · Do you feel a strong urge to drink?  · Do you change your plans so that you can have a drink?  · Do you ever drink in the morning to relieve  the shakes or a hangover?  If you have answered a number of the previous questions positively, it may be time for you to talk to your caregivers, family, and friends and see if they think you have a problem. Alcoholism is a chemical dependency that keeps getting worse and will eventually destroy your health and relationships. Many alcoholics end up dead, impoverished, or in longterm. This is often the end result of all chemical dependency.  · Do not be discouraged if you are not ready to take action immediately.  · Decisions to change behavior often involve up and down desires to change and feeling like you cannot decide.  · Try to think more seriously about your drinking behavior.  · Think of the reasons to quit.  WHERE TO GO FOR ADDITIONAL INFORMATION   · The National [place] on Alcohol Abuse and Alcoholism (NIAAA)  www.niaaa.nih.gov  · National [place] on Alcoholism and Drug Dependence (NCADD)  www.ncadd.org  · American Society of Addiction Medicine (ASAM)  www.asam.org   Document Released: 12/18/2006 Document Revised: 03/11/2013 Document Reviewed: 08/05/2009  ExitCare® Patient Information ©2014 [employer].

## 2018-09-11 NOTE — ED PROVIDER NOTES
ED Provider Note    Patient has been seen by psychiatry, is cleared for discharge for outpatient follow-up.  Patient will resume usual home medication.

## 2018-09-11 NOTE — ED NOTES
Pt awake, walking out of room, talking loudly at the sitter. Pt asked to stay in room, provided with explaination of the need to stay quiet and in room. Pt is polite, verbalized understanding. Went back into room.

## 2018-09-11 NOTE — ED NOTES
Assumed care of pt from BHAVANA Trevino. Pt sleeping in hosp bed. Resp even and unlabored. NAD. Pt in view of sitter. Ongoing monitoring.

## 2018-09-11 NOTE — DISCHARGE PLANNING
Spoke with registration about patient's insurance.  Registration confirmed that patient does not have insurance at this time.

## 2018-09-11 NOTE — DISCHARGE PLANNING
Medical Social Work    Referral: Legal Hold    Intervention: Legal Hold Paperwork given to SW by Life Skills RN: Miguel    Legal Hold Initiated: Date: 09/11/2018  Time: 0205    Legal Hold faxed: Date: 09/11/2018  Time: 2610    Patient’s Insurance Listed on Face Sheet: Medicaid HMO    Referrals sent to: [facility], [facility] and [facility]    Plan: Patient will transfer to mental health facility once acceptance is obtained.     Upon further review pt does not have any insurance as his Medicaid is inactive.  Declined by [facility] and [facility] due to no insurance.  Pending [facility] transffer.

## 2018-09-11 NOTE — PSYCHIATRY
"PSYCHIATRIC CONSULTATION:  *Reason for admission:ETOH and suicidal ideation; Pt held a knife to his neck and wrists because he is upset that he is going to correction tomorrow. No visible cut marks noted.     *Reason for consult:suicidal   *Requesting Physician:Rubén Castro M.D.       Legal status:  +    *Chief Complaint:I was drunk and I made a comment.    *HPI: 39 yo male that reports drinking 30-40 beers and a \"handle\" of alcohol a day for 8 years. He gets the money for the alcohol through his parents. He has tried rehabs and AA,etc but they have not worked. He has never been on treatment without also drinking. He began drinking because it calmed his anxiety and now \"Its become habit\".    He reports having been depressed for many years and says it is at a \"7-8\" (10 worst), however his anxiety is a \"10\" and that predates his use and abuse of alcohol. Only klonopin has worked in the past. Everything else tried has \"failed\"..    He is not SI now nor HI. He denies psychosis and when he has had \"delusions\" or \"hallucinations\" he feels it is related to acute intoxication or active withdrawal.  He has lost \"everything\" because of his alcohol.     Denies hx of deena or PTSD.    *Medical Review of Systems: as reported by pt. All systems reviewed. Only those found to be + are noted below. All others are negative.   Neurological:    TBIs:had a concussion at age 18 but no LOC   SZs: related to alcohol withdrawal.   Strokes:denies  Other medical symptoms:healthy though he is not sure if his liver has been effected. Does report having ascitics once.    *Past Medical Hx: as noted above    *Family Medical/Psychiatric Hx:was adopted. His bio mom spent her life in \"instituitions\" and bio dad SI'd. He doesn't know about any other hx.    *Past Psychiatric Hx:one SA by overdose years ago. Has been hospitalized before. Has had \"delusions\" and hallucinations as noted above. MAOIs worked but \"there were too many restrictions\" . Otherwise " "mood stabilizers, \"SSRIs, SNRIs\" and antipsychotics all failed. He found one doctor willing to give him klonopin 6 mg a day and did drink less on it.  Hx of a gambling disorder but he was able to stop that. No manias, PTSD.     Has been on adderall, topamax, seroquel, and more.   *Social Hx:now on the streets. Has numerous BA's. Has legal charges in [place] and [place] which are alcohol related.     *Drug/Alcohol/Tobacco Hx:   Drugs:none: not on narcotics.   Alcohol:as noted.    *Psychiatric (Physical) Examination: observed phenomenon:  *Vitals:Blood pressure 135/84, pulse 72, temperature 37.2 °C (99 °F), resp. rate 18, height 1.829 m (6'), weight 99.8 kg (220 lb), SpO2 97 %.    *Appearance/Attitude:normal habitus, tall, facial hair, groomed, intermittent eye contact, cooperative.  *Muscle Strength/abnormal movements:wnl  *Tone/Gait/Station:wnl  *Speech:wnl  *Thought Process/Associations: linear  *Abnormal/Psychotic Thoughts (ex): none noted, denies  *Insight/Judgement: good insight, poor judgment  *Orientation:x 4  *Memory:intact  *Attention/Concentration:good  *Language:fluid  *Fund of Knowledge:good  *Mood: depressed and anxious           *Affect: looks euthymic but when confronted about how he presents and what he say, he drops his mask and the sadness is clearly there.   *SI/HI:  denies    *Medical Hx: labs, MARS, medications, etc were reviewed. Only those findings of               potential interest to psychiatry are noted below:   Medical Conditions: none acutely     Allergies: Ceclor [cefaclor]; Cephalosporins; and Pcn [penicillins]     Medications (currently prescribed at [facility]):none  *Labs:Results for KATHERINPRETTYGONZÁLEZ OSITOTATIANA BENSON (MRN [id number]) as of 9/11/2018 17:42   Ref. Range 9/10/2018 20:19 9/10/2018 21:00 9/11/2018 00:46 9/11/2018 04:14 9/11/2018 08:07   POC Breathalizer Latest Ref Range: 0.00 - 0.01 Percent   0.153 (A) 0.072 (A)    Results for OSITO BLACK (MRN [id number]) as of 9/11/2018 17:42   Ref. Range " 9/10/2018 19:41   Diagnostic Alcohol Latest Ref Range: 0.00 g/dL 0.40 (H)   UDS + BZ  *ECG: [id number]    *ASSESSMENT: ( acute, chronic, acute on chronic, complicated, stable, resolved)  Alcohol use disorder: severe  anxiety disorder unsepc: predates alcohol abuse  Depressive disorder unspec    *Plan/Further Workup:   Legal status: dc'd. Given a script for naltrexone, risks/benefits. He wants to get to the shelter, wants to look into [facility].     Signing off   Thank you for the consult.

## 2018-09-11 NOTE — ED NOTES
IV inserted, blood collected, sent, NS infusing. Pt provided with lunch box, water. Lights have been dimmed for relaxation. Pt speaks in fast, constant, run on sentences. Pt is compliant and polite to staff. Pt states he has not slept in 5 days, denies drug use, reports anxiety.

## 2018-09-11 NOTE — ED NOTES
Pt is becoming increasingly anxious. Pt states that he is going to rip his IV out and leave. Education provided regarding legal obligation to keep the pt until clinically sober and further evaluation is provided. Pt verbalized understanding, ERP notified.

## 2018-09-11 NOTE — ED NOTES
Pt given DC instructions with verbalized understanding. 1 bag of belongings returned to pt from locker 1. 1 medication vile retrieved back from pharmacy and given to pt. Pt ambulatory to exit with steady gait.

## 2018-09-11 NOTE — ED NOTES
"Pt placed in hospital gown, room stripped of hazardous materials. Security at bedside for assistance. Pt continually states, \"If they take my phone, I swear to God I will call 911\" Clothes placed in patient belonging bag, along with a bottle of prescription pills.   "

## 2018-09-11 NOTE — DISCHARGE PLANNING
Alert Team  Pt dc to self.  Pt to f/u on court proceedings.  Pt to f/u with his established psychiatrist, Dr. Lucas.

## 2018-09-11 NOTE — ED NOTES
AM EKG completed per ciwa protocol order and to Dr. Mcdonough. Current CIWA score = 5  - pt medicated per orders, see MAR for details. VSS. Ongoing monitoring.

## 2018-09-11 NOTE — ED NOTES
Pt cleared for DC per Dr. Perez. Dr. Dietrich made aware. Awaiting DC papers - pt aware of plan of care.

## 2018-09-11 NOTE — DISCHARGE PLANNING
Referral: Pt has  Court hearing today.    Intervention: CHELY spoke to Pt who states he has a court hearing this morning  at 0830 at the [address] [place]. CHELY was able to reach the court house at [phone] and spoke to Emerita. She did verify Pt had a court hearing at 0830. They asked a letter be faxed  [phone] stating that Pt was here at hospital.  CHELY updated Pt and he is in agreement .     Plan: Letter faxed to [facility] notifying them Pt is currently here in our ED.

## 2018-09-11 NOTE — ED NOTES
Patient's home medications have been reviewed by the pharmacy team.     Past Medical History:   Diagnosis Date   • Alcohol-induced psychosis (HCC)    • Anxiety    • Depression    • Paranoid schizophrenia (HCC)    • Psychiatric disorder     panic attack   • Schizophrenia, paranoid (HCC)        Patient's Medications   New Prescriptions    No medications on file   Previous Medications    ALPRAZOLAM (XANAX) 0.5 MG TABS    Take 0.5 mg by mouth 3 times a day.    CLONIDINE (CATAPRES) 0.1 MG TAB    Take 0.1 mg by mouth 2 times a day.    LISINOPRIL (PRINIVIL) 10 MG TABS    Take 10 mg by mouth every day.    OLANZAPINE (ZYPREXA) 5 MG TABS    Take 1 Tab by mouth every evening.    OMEPRAZOLE (PRILOSEC) 20 MG DELAYED-RELEASE CAPSULE    Take 20 mg by mouth every day.   Modified Medications    No medications on file   Discontinued Medications    OLANZAPINE (ZYPREXA) 5 MG TAB    Take 1 Tab by mouth every evening.          A:  The following pharmacotherapy concerns may be contributing to current complaints: pt non-compliance w/ home medications along with benzodiazepine and alcohol abuse.    P:    Discussed re-initiation of home medications w/ Doctor Dietrich. Home medications have been reordered as appropriate. Doctor Karlo requested to hold clonidine and lisinopril for SBP > 100.    Chris Conley      Discussed case with PharmD candidate, agree with above plan.     José Luis Armijo, HarikaD

## 2018-09-11 NOTE — ED PROVIDER NOTES
ED FOLLOW-UP NOTE  Patient is a 38-year-old male who is signed out to myself to follow-up behavioral health assessment.  Briefly patient is a 30-year-old male who presented to the emergency department complaining of suicidal ideation.  He is intoxicated and we are waiting for him to sober up prior to behavioral health assessment.  After patient is sober he continues to endorse suicidal ideation.  He has risk factors such as being off of his psychiatric medications.  Please see full note by Dr. Castro for further history.  After behavioral health assessment it was agreed that patient was a good candidate for [facility] psychiatric facility and continuation of the legal hold.  Patient is medically cleared and legal hold is continued.  Plan will be for transfer to level 1 psychiatric facility for evaluation by psychiatrist when available.    Alicia Schmidt MD

## 2018-09-26 ENCOUNTER — APPOINTMENT (OUTPATIENT)
Dept: RADIOLOGY | Facility: MEDICAL CENTER | Age: 38
End: 2018-09-26
Attending: EMERGENCY MEDICINE
Payer: MEDICAID

## 2018-09-26 ENCOUNTER — HOSPITAL ENCOUNTER (EMERGENCY)
Facility: MEDICAL CENTER | Age: 38
End: 2018-09-26
Attending: EMERGENCY MEDICINE
Payer: MEDICAID

## 2018-09-26 VITALS
HEIGHT: 71 IN | SYSTOLIC BLOOD PRESSURE: 134 MMHG | TEMPERATURE: 98.1 F | RESPIRATION RATE: 16 BRPM | HEART RATE: 94 BPM | DIASTOLIC BLOOD PRESSURE: 92 MMHG | WEIGHT: 218.26 LBS | BODY MASS INDEX: 30.56 KG/M2
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
## 2018-09-26 DIAGNOSIS — S01.81XA FACIAL LACERATION, INITIAL ENCOUNTER: ICD-10-CM

## 2018-09-26 DIAGNOSIS — S00.83XA CONTUSION OF JAW, INITIAL ENCOUNTER: ICD-10-CM

## 2018-09-26 PROCEDURE — 304217 HCHG IRRIGATION SYSTEM

## 2018-09-26 PROCEDURE — 70486 CT MAXILLOFACIAL W/O DYE: CPT

## 2018-09-26 PROCEDURE — 303353 HCHG DERMABOND SKIN ADHESIVE

## 2018-09-26 PROCEDURE — 70450 CT HEAD/BRAIN W/O DYE: CPT

## 2018-09-26 PROCEDURE — 99283 EMERGENCY DEPT VISIT LOW MDM: CPT

## 2018-09-26 PROCEDURE — 304999 HCHG REPAIR-SIMPLE/INTERMED LEVEL 1

## 2018-09-26 PROCEDURE — A9270 NON-COVERED ITEM OR SERVICE: HCPCS | Performed by: EMERGENCY MEDICINE

## 2018-09-26 PROCEDURE — 700102 HCHG RX REV CODE 250 W/ 637 OVERRIDE(OP): Performed by: EMERGENCY MEDICINE

## 2018-09-26 RX ORDER — ACETAMINOPHEN 325 MG/1
650 TABLET ORAL ONCE
Status: COMPLETED | OUTPATIENT
Start: 2018-09-26 | End: 2018-09-26

## 2018-09-26 RX ORDER — LIDOCAINE HYDROCHLORIDE 10 MG/ML
5 INJECTION, SOLUTION INFILTRATION; PERINEURAL ONCE
Status: DISCONTINUED | OUTPATIENT
Start: 2018-09-26 | End: 2018-09-26 | Stop reason: HOSPADM

## 2018-09-26 RX ORDER — IBUPROFEN 600 MG/1
600 TABLET ORAL ONCE
Status: COMPLETED | OUTPATIENT
Start: 2018-09-26 | End: 2018-09-26

## 2018-09-26 RX ADMIN — IBUPROFEN 600 MG: 600 TABLET, FILM COATED ORAL at 02:54

## 2018-09-26 RX ADMIN — ACETAMINOPHEN 650 MG: 325 TABLET, FILM COATED ORAL at 02:54

## 2018-09-26 ASSESSMENT — PAIN SCALES - WONG BAKER: WONGBAKER_NUMERICALRESPONSE: HURTS JUST A LITTLE BIT

## 2018-09-26 ASSESSMENT — PAIN SCALES - GENERAL: PAINLEVEL_OUTOF10: 3

## 2018-09-26 NOTE — ED TRIAGE NOTES
"[facility]\"  He has R lac below the eye, and pain to face and neck.  States he did have LOC.  Denies any other injuries. AOx4  "
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
## 2018-11-08 ENCOUNTER — OFFICE VISIT (OUTPATIENT)
Dept: MEDICAL GROUP | Facility: MEDICAL CENTER | Age: 38
End: 2018-11-08
Attending: FAMILY MEDICINE
Payer: MEDICAID

## 2018-11-08 VITALS
OXYGEN SATURATION: 98 % | HEIGHT: 76 IN | HEART RATE: 98 BPM | DIASTOLIC BLOOD PRESSURE: 78 MMHG | BODY MASS INDEX: 27.16 KG/M2 | SYSTOLIC BLOOD PRESSURE: 124 MMHG | TEMPERATURE: 98.2 F | RESPIRATION RATE: 20 BRPM | WEIGHT: 223 LBS

## 2018-11-08 DIAGNOSIS — F31.9 BIPOLAR 1 DISORDER (HCC): ICD-10-CM

## 2018-11-08 DIAGNOSIS — Z20.2 VENEREAL DISEASE CONTACT: ICD-10-CM

## 2018-11-08 DIAGNOSIS — Z59.00 HOMELESS SINGLE PERSON: ICD-10-CM

## 2018-11-08 DIAGNOSIS — F19.10 SUBSTANCE ABUSE (HCC): ICD-10-CM

## 2018-11-08 PROCEDURE — 99204 OFFICE O/P NEW MOD 45 MIN: CPT | Performed by: FAMILY MEDICINE

## 2018-11-08 RX ORDER — TOPIRAMATE 25 MG/1
25 TABLET ORAL 2 TIMES DAILY
Qty: 60 TAB | Refills: 3 | Status: SHIPPED | OUTPATIENT
Start: 2018-11-08 | End: 2019-04-28

## 2018-11-08 RX ORDER — PROPRANOLOL HYDROCHLORIDE 10 MG/1
10 TABLET ORAL 2 TIMES DAILY PRN
Qty: 60 TAB | Refills: 6 | Status: SHIPPED | OUTPATIENT
Start: 2018-11-08 | End: 2019-02-25

## 2018-11-08 RX ORDER — QUETIAPINE 150 MG/1
1 TABLET, FILM COATED, EXTENDED RELEASE ORAL DAILY
Qty: 30 TAB | Refills: 2 | Status: SHIPPED | OUTPATIENT
Start: 2018-11-08 | End: 2018-11-08 | Stop reason: SDUPTHER

## 2018-11-08 RX ORDER — NALTREXONE HYDROCHLORIDE 50 MG/1
50 TABLET, FILM COATED ORAL DAILY
Qty: 30 TAB | Refills: 3 | Status: SHIPPED | OUTPATIENT
Start: 2018-11-08 | End: 2019-04-28

## 2018-11-08 RX ORDER — GABAPENTIN 300 MG/1
300 CAPSULE ORAL 3 TIMES DAILY
Qty: 60 CAP | Refills: 3 | Status: SHIPPED | OUTPATIENT
Start: 2018-11-08 | End: 2019-04-08 | Stop reason: SDUPTHER

## 2018-11-08 RX ORDER — QUETIAPINE 150 MG/1
1 TABLET, FILM COATED, EXTENDED RELEASE ORAL DAILY
Qty: 30 TAB | Refills: 2 | Status: SHIPPED | OUTPATIENT
Start: 2018-11-08 | End: 2019-04-28

## 2018-11-08 SDOH — ECONOMIC STABILITY - HOUSING INSECURITY: HOMELESSNESS UNSPECIFIED: Z59.00

## 2018-11-08 ASSESSMENT — PATIENT HEALTH QUESTIONNAIRE - PHQ9
SUM OF ALL RESPONSES TO PHQ QUESTIONS 1-9: 17
5. POOR APPETITE OR OVEREATING: 3 - NEARLY EVERY DAY
CLINICAL INTERPRETATION OF PHQ2 SCORE: 6
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
## 2018-11-08 NOTE — LETTER
November 8, 2018    Re:    Brannon Sharma  [facility] [place] [address]        Dear Sir:    Brannon was seen as a new patient today.  Detailed history and physical shows him to be currently in good health.  No signs of active withdrawal syndromes.  Patient is a strong candidate for residential rehabilitation program.  He has no current evidence of infectious disease or metabolic instability.  Labs have been ordered.            Sincerely,        Gregory Hardin M.D.    Electronically Signed

## 2018-11-08 NOTE — ASSESSMENT & PLAN NOTE
Patient is currently homeless however his parents have said they will let him back into their house 1 more time.  He has been living there for the past 5 days.

## 2018-11-08 NOTE — ASSESSMENT & PLAN NOTE
Patient reports in the distant past he injected amphetamine.  Reports currently he has continued to smoke amphetamine, and abuse benzodiazepines and alcohol.  Last drink was 2   24 ounce beers yesterday and several hits of amphetamine.  Patient is applying to try to get into [facility].  He reports during a 6-day detox hospitalization at [place] 2 weeks ago he was hepatitis C negative.  He denies jaundice or seizures.

## 2018-11-09 ENCOUNTER — TELEPHONE (OUTPATIENT)
Dept: MEDICAL GROUP | Facility: MEDICAL CENTER | Age: 38
End: 2018-11-09

## 2018-11-09 NOTE — TELEPHONE ENCOUNTER
CONTINUE WITH PA OR CHANGE RX?    MEDICATION PRIOR AUTHORIZATION NEEDED:    1. Name of Medication: Quetiapine Fumarate    2. Requested By (Name of Pharmacy): [email or web address]     3. Is insurance on file current? yes    4. What is the name & phone number of the 3rd party payor? Medicaid O

## 2018-11-14 NOTE — TELEPHONE ENCOUNTER
DOCUMENTATION OF PAR STATUS:    1. Name of Medication & Dose: Quetiapine Fumarate   ER Tab     2. Name of Prescription Coverage Company & phone #: Medicaid FFS    3. Date Prior Auth Submitted: 11/14/2018    4. What information was given to obtain insurance decision? Office notes, Med Hx, Dx    5. Prior Auth Status? Pending    6. Patient Notified: yes

## 2018-11-14 NOTE — TELEPHONE ENCOUNTER
FINAL PRIOR AUTHORIZATION STATUS:    1.  Name of Medication & Dose: QUETIAPINE  MG     2. Prior Auth Status: Approved through 12/31/2019     3. Action Taken: Pharmacy Notified: yes Patient Notified: yes

## 2018-12-07 ENCOUNTER — TELEPHONE (OUTPATIENT)
Dept: MEDICAL GROUP | Facility: MEDICAL CENTER | Age: 38
End: 2018-12-07

## 2018-12-07 NOTE — TELEPHONE ENCOUNTER
Left message with patient about no show to appointment today 12/7/18.  Explained that this was his first no show and the no show policy.

## 2019-01-31 LAB
EKG IMPRESSION: NORMAL
EKG IMPRESSION: NORMAL

## 2019-02-25 ENCOUNTER — HOSPITAL ENCOUNTER (EMERGENCY)
Facility: MEDICAL CENTER | Age: 39
End: 2019-02-26
Attending: EMERGENCY MEDICINE
Payer: MEDICAID

## 2019-02-25 DIAGNOSIS — R45.851 SUICIDAL IDEATION: ICD-10-CM

## 2019-02-25 DIAGNOSIS — F10.921 ALCOHOL INTOXICATION WITH DELIRIUM (HCC): ICD-10-CM

## 2019-02-25 LAB
POC BREATHALIZER: 0.11 PERCENT (ref 0–0.01)
POC BREATHALIZER: 0.13 PERCENT (ref 0–0.01)

## 2019-02-25 PROCEDURE — 99285 EMERGENCY DEPT VISIT HI MDM: CPT

## 2019-02-25 PROCEDURE — 302970 POC BREATHALIZER: Performed by: EMERGENCY MEDICINE

## 2019-02-25 PROCEDURE — 700102 HCHG RX REV CODE 250 W/ 637 OVERRIDE(OP): Performed by: EMERGENCY MEDICINE

## 2019-02-25 PROCEDURE — A9270 NON-COVERED ITEM OR SERVICE: HCPCS | Performed by: EMERGENCY MEDICINE

## 2019-02-25 RX ORDER — QUETIAPINE FUMARATE 100 MG/1
200 TABLET, FILM COATED ORAL 3 TIMES DAILY
Status: DISCONTINUED | OUTPATIENT
Start: 2019-02-25 | End: 2019-02-26 | Stop reason: HOSPADM

## 2019-02-25 RX ORDER — PROPRANOLOL HYDROCHLORIDE 10 MG/1
10 TABLET ORAL 2 TIMES DAILY
COMMUNITY
End: 2019-04-28

## 2019-02-25 RX ORDER — LORAZEPAM 1 MG/1
2 TABLET ORAL ONCE
Status: COMPLETED | OUTPATIENT
Start: 2019-02-25 | End: 2019-02-25

## 2019-02-25 RX ADMIN — QUETIAPINE 200 MG: 100 TABLET, FILM COATED ORAL at 19:15

## 2019-02-25 RX ADMIN — LORAZEPAM 2 MG: 1 TABLET ORAL at 19:15

## 2019-02-26 VITALS
DIASTOLIC BLOOD PRESSURE: 80 MMHG | SYSTOLIC BLOOD PRESSURE: 118 MMHG | OXYGEN SATURATION: 96 % | HEIGHT: 74 IN | WEIGHT: 230 LBS | TEMPERATURE: 98.4 F | BODY MASS INDEX: 29.52 KG/M2 | HEART RATE: 88 BPM | RESPIRATION RATE: 14 BRPM

## 2019-02-26 LAB
AMPHET UR QL SCN: NEGATIVE
BARBITURATES UR QL SCN: NEGATIVE
BENZODIAZ UR QL SCN: NEGATIVE
BZE UR QL SCN: NEGATIVE
CANNABINOIDS UR QL SCN: NEGATIVE
METHADONE UR QL SCN: NEGATIVE
OPIATES UR QL SCN: NEGATIVE
OXYCODONE UR QL SCN: NEGATIVE
PCP UR QL SCN: NEGATIVE
POC BREATHALIZER: 0 PERCENT (ref 0–0.01)
POC BREATHALIZER: 0.04 PERCENT (ref 0–0.01)
PROPOXYPH UR QL SCN: NEGATIVE

## 2019-02-26 PROCEDURE — A9270 NON-COVERED ITEM OR SERVICE: HCPCS | Performed by: EMERGENCY MEDICINE

## 2019-02-26 PROCEDURE — 80307 DRUG TEST PRSMV CHEM ANLYZR: CPT

## 2019-02-26 PROCEDURE — 700102 HCHG RX REV CODE 250 W/ 637 OVERRIDE(OP): Performed by: EMERGENCY MEDICINE

## 2019-02-26 PROCEDURE — 90791 PSYCH DIAGNOSTIC EVALUATION: CPT

## 2019-02-26 RX ADMIN — QUETIAPINE 200 MG: 100 TABLET, FILM COATED ORAL at 07:15

## 2019-02-26 ASSESSMENT — LIFESTYLE VARIABLES: SUBSTANCE_ABUSE: 1

## 2019-02-26 NOTE — ED NOTES
Med rec updated and complete.  Allergies reviewed.  Pt has not taken any medications since   02/22/19

## 2019-02-26 NOTE — ED NOTES
Pt resting with 2 violent restraints in place at this time. No distress noted. Respirations easy and unlabored. Pt remains in view of 1:1 sitter. Will continue to monitor.

## 2019-02-26 NOTE — ED NOTES
Pt appears comfortable in room.  Pt is resting in bed.  No visible signs or symptoms of distress noted at this time.  Pt denies needs or concerns at this time.   Will continue to monitor.

## 2019-02-26 NOTE — ED NOTES
Discharge orders received from ERP. Received instruction/resources from [facility] person.  Provided education on discharge instructions and diagnosis.Pt demonstrated understanding of education. Pt verbalized understanding of need for follow up appointment.  Pt ambulatory off unit with all personal belongings.

## 2019-02-26 NOTE — ED NOTES
Pt asleep at this time. No distress noted. Respirations easy and unlabored. Remains in view of 1:1 sitter. Will continue to monitor.
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
## 2019-02-26 NOTE — ED NOTES
Pt asleep at this time. Arouses cooperative, restraints removed per security at this time. Pt respirations easy and unlabored. No distress noted. 1:1 sitter remains in view of patient. Will continue to monitor.

## 2019-02-26 NOTE — ED PROVIDER NOTES
ED Provider Note    Patient now chemically sober.  At this time the patient still endorses suicidal ideation.  I have completed the page 2 of the legal 2000.  Life skills will be seeing the patient and likely have psychiatry see the patient in the morning.

## 2019-02-26 NOTE — ED PROVIDER NOTES
ED Provider Note    Scribed for Luis Manuel Mcgovern M.D. by Jose Manuel Patino. 2/25/2019  6:20 PM    Primary care provider: Gregory Hardin M.D.  Means of arrival: Law Enforcement  History obtained from: Patient  History limited by: None    CHIEF COMPLAINT  Chief Complaint   Patient presents with   • Suicidal Ideation     HPI  Brannon Sharma is a 38 y.o. Male with a history of schizophrenia and bipolar disorder who presents to the Emergency Department with depression and sadness but denies suicidal ideations at this time.  He admits to drinking alcohol today with the last drink at 1430 and has not been on his Naltrexone, Topamax, and Seroquel for two days.  He left the ambulance because he believed they could not do anything for him and was subsequently restrained by the police.  Patient reports he does not want to be treated with Haldol.      REVIEW OF SYSTEMS  Pertinent positives include depression. Pertinent negatives include no current suicidal ideation.  All other systems reviewed and negative.    PAST MEDICAL HISTORY   has a past medical history of Alcohol-induced psychosis (HCC); Anxiety; Depression; Paranoid schizophrenia (HCC); Psychiatric disorder; and Schizophrenia, paranoid (HCC).    SURGICAL HISTORY   has a past surgical history that includes other and nasal septal reconstruction.    SOCIAL HISTORY  Social History   Substance Use Topics   • Smoking status: Current Every Day Smoker     Packs/day: 1.00     Years: 12.00     Types: Cigarettes   • Smokeless tobacco: Never Used   • Alcohol use Yes      Comment:  states 40-50 beers/d      History   Drug Use   • Types: Inhaled     Comment: thc     FAMILY HISTORY  Family History   Problem Relation Age of Onset   • Family history unknown: Yes     CURRENT MEDICATIONS  Home Medications     Reviewed by Sylvia Mitchell (Pharmacy Tech) on 02/25/19 at 1935  Med List Status: Complete   Medication Last Dose Status   gabapentin (NEURONTIN) 300 MG Cap 2/22/2019  "Active   lisinopril (PRINIVIL) 10 MG TABS 2/22/2019 Active   naltrexone (DEPADE) 50 MG Tab 2/22/2019 Active   olanzapine (ZYPREXA) 5 MG TABS 2/22/2019 Active   omeprazole (PRILOSEC) 20 MG delayed-release capsule 2/22/2019 Active   propranolol (INDERAL) 10 MG Tab 2/22/2019 Active   QUEtiapine Fumarate (SEROQUEL XR) 150 MG TABLET SR 24 HR 2/22/2019 Active   topiramate (TOPAMAX) 25 MG Tab 2/22/2019 Active              ALLERGIES  Allergies   Allergen Reactions   • Ceclor [Cefaclor]    • Cephalosporins    • Pcn [Penicillins]        PHYSICAL EXAM  VITAL SIGNS: /76   Pulse (!) 103   Temp 36.8 °C (98.2 °F) (Tympanic)   Resp 20   Ht 1.88 m (6' 2\")   Wt 104.3 kg (230 lb)   BMI 29.53 kg/m²     Constitutional: Well developed, Well nourished, mild distress, Non-toxic appearance.   HENT: Normocephalic, Atraumatic, Bilateral external ears normal, Oropharynx moist, slightly dry mucous membranes, No oral exudates.   Eyes: PERRLA, EOMI, Conjunctiva normal, No discharge.   Neck: No tenderness, Supple, No stridor.   Lymphatic: No lymphadenopathy noted.   Cardiovascular: Normal heart rate, Normal rhythm.   Thorax & Lungs: Clear to auscultation bilaterally, No respiratory distress, No wheezing, No crackles.   Abdomen: Soft, No tenderness, No masses, No pulsatile masses.   Skin: Warm, Dry, No erythema, No rash.   Extremities:, No edema No cyanosis.   Musculoskeletal: No tenderness to palpation or major deformities noted.  Intact distal pulses  Neurologic: Awake, alert. Moves all extremities spontaneously.  Psychiatric: Cooperative, denies suicidal ideation.    LABS  Labs Reviewed   POC BREATHALIZER - Abnormal; Notable for the following:        Result Value    POC Breathalizer 0.106 (*)     All other components within normal limits   POC BREATHALIZER - Abnormal; Notable for the following:     POC Breathalizer 0.130 (*)     All other components within normal limits   URINE DRUG SCREEN   POC BREATHALIZER     All labs reviewed by " me.    COURSE & MEDICAL DECISION MAKING  Pertinent Labs & Imaging studies reviewed. (See chart for details)    I reviewed the patient's medical records which showed the patient has a history of paranoid schizophrenia.      6:20 PM - Patient seen and examined at bedside. Patient will be treated with 200 mg Seroquel and 2 mg Ativan. Ordered Urine drug screen and POC breathalizer to evaluate his symptoms.  We will proceed with a behavioral health evaluation.      Decision Making:  Patient with alcohol intoxication, agitation, suicidal ideation.  The patient denies any suicidal ideation at this peer the patient was aggressive, attempted to leave the ambulance.  Due to that patient was in physical restraints, will give the patient oral medicines once the patient comes down then we will take the patient out of restraints.  The alert team still has to evaluate the patient, believe the patient will likely need to be transferred to a psychiatric facility.  Patient will be followed up by my partner.    FINAL IMPRESSION  1. Suicidal ideation    2. Alcohol intoxication with delirium (HCC)          Jose Manuel CABA (Scribe), am scribing for, and in the presence of, Luis aMnuel Mcgovern M.D..    Electronically signed by: Jose Manuel Patino (Scribe), 2/25/2019    Luis Manuel CABA M.D. personally performed the services described in this documentation, as scribed by Jose Manuel Patino in my presence, and it is both accurate and complete.  E.    The note accurately reflects work and decisions made by me.  Luis Manuel Mcgovern  2/25/2019  10:04 PM

## 2019-02-26 NOTE — PSYCHIATRY
"  PSYCHIATRIC INTAKE EVALUATION     *Reason for admission:      Suicidal ideation while intoxicated ( alcohol)  *Legal Hold Status on Admission:   +legal hold prior to psychiatric evaluation today  Supervising Psychiatrist:          Dr. Terrie Perez  *Requesting Physician/APN:      Luis Manuel Mcgovern MD       Legal Hold on admission:        +legal hold prior to psychiatric evaluation    *Chief Complaint:   [facility], denies suicidal ideation    *HPI:       Says that he relapsed with alcohol yesterday evening- says that he blacked out and does not know how much alcohol he drank but remembers that he had roughly $10 that he borrowed from his house mates to get his alcohol. Says that he intentionally stopped all his psychiatric medications 2 days prior because he knew that he wanted to drink alcohol and did not want his medications to interact with alcohol. Currently says he has a 'hangover' and mostly grogginess due to Seroquel that was administered in the ED. Otherwise says that he is not suicidal at this time and does not plan to hurt himself in any way. Does not remember his suicidal threats due to his intoxication but says that he usually cannot handle his alcohol intake when he does drink. Plans to go back to [facility] for [facility] group therapy and group programs. Plans to return to [facility]- says that if he is not allowed back he plans to go to [facility] street shelter where he has been before. Otherwise no other problems or concerns at this time.           *Medical Review Of Symptoms (not dx conditions):   Review of Systems   Psychiatric/Behavioral: Positive for substance abuse.       All other systems reviewed and are negative.       *Psychiatric Examination:  Vitals: Blood pressure 108/86, pulse 90, temperature 36.8 °C (98.2 °F), temperature source Tympanic, resp. rate 12, height 1.88 m (6' 2\"), weight 104.3 kg (230 lb), SpO2 95 %.    General " Appearance: male, looks stated age, appears mildly discheveled  Behavior:  Able to respond to all questions appropriately. Slurring words, says that it is due to seroquel at high doses.    Abnormal Movements: none observed  Gait and Posture: wnl  Speech: RRR, slurring speech due to seroquel . BAL is negligibly at this time.   Thought processes: linear, organized. -AH/VH. -Paranoia/delusions.  Associations: wnl  Abnormal or Psychotic Thoughts: none observed. -AH/VH. -Disorganized thoughts   Judgement and Insight: poor/good- relapsed with alcohol. Insightful about his alcoholism, says that he should have called his sponsor which he didn't   Orientation:  A&Ox4  Recent and Remote Memory: intact based on conversation. Says that he cannot recall events during his intoxication due to 'blackout'  Attention Span and Concentration: good  Language: wnl based on conversation  Fund of Knowledge:grossly wnl based on conversation  Mood and Affect: 'fine' affect is constricted/restricted- mood congruent  SI/HI: denies/denies  *PAST MEDICAL/PSYCH/FAMILY/SOCIAL(as reported by patient):         *medical hx:        TBI: none reported  SZ: none reported  Stroke: none reported  Past Medical History:   Diagnosis Date   • Alcohol-induced psychosis (HCC)    • Anxiety    • Depression    • Paranoid schizophrenia (HCC)    • Psychiatric disorder     panic attack   • Schizophrenia, paranoid (HCC)      Past Surgical History:   Procedure Laterality Date   • NASAL SEPTAL RECONSTRUCTION     • OTHER      deviated septum        *psychiatric hx:   SAs: denies at this time  -says that he has been diagnosed with 'a lot of things' but says that he is treated with Cymbalta, gabapentin, and Seroquel. Admits having auditory and visual hallucinations in the past, not currently. Hx of alcoholism for last 10 years, longest sobriety is 40 days since his drinking has started. Currently [facility] and [facility] for group therapy/intensive therapy 3-4x/week. Denies any self injurious behaviors.  -hx of prescription abuse with adderall, says that he remains sober now for 70 days.     *family Psych hx:    Says grandfather completed suicide . Describes difficulty with his parents but unable to provide further information at this time.     *social hx:  Alcohol: alcoholism for last 10 years, relapsed yesterday with unknown amount of alcohol- says that he blacked out. Says he was sober roughly 30-40 days prior to this relapse  Drugs: hx of adderall abuse/dependence, sober now for 70 days.       *MEDICAL HX: labs, MARS, medications, etc were reviewed. Only those findings of potential interest to psychiatry are noted below:    *Current Medical issues:        Past Medical History:   Diagnosis Date   • Alcohol-induced psychosis (HCC)    • Anxiety    • Depression    • Paranoid schizophrenia (HCC)    • Psychiatric disorder     panic attack   • Schizophrenia, paranoid (HCC)        *Allergies:  Allergies   Allergen Reactions   • Ceclor [Cefaclor]    • Cephalosporins    • Pcn [Penicillins]      *Current Medications:    Current Facility-Administered Medications:   •  QUEtiapine (SEROQUEL) tablet 200 mg, 200 mg, Oral, TID, Luis Manuel Mcgovern M.D., 200 mg at 02/26/19 0715    Current Outpatient Prescriptions:   •  propranolol (INDERAL) 10 MG Tab, Take 10 mg by mouth 2 times a day., Disp: , Rfl:   •  gabapentin (NEURONTIN) 300 MG Cap, Take 1 Cap by mouth 3 times a day., Disp: 60 Cap, Rfl: 3  •  naltrexone (DEPADE) 50 MG Tab, Take 1 Tab by mouth every day., Disp: 30 Tab, Rfl: 3  •  topiramate (TOPAMAX) 25 MG Tab, Take 1 Tab by mouth 2 times a day., Disp: 60 Tab, Rfl: 3  •  QUEtiapine Fumarate (SEROQUEL XR) 150 MG TABLET SR 24 HR, Take 1 Tab by mouth every day., Disp: 30 Tab, Rfl: 2  •  olanzapine (ZYPREXA) 5 MG TABS, Take 1 Tab by mouth every evening., Disp: 20 Tab, Rfl: 11  •  omeprazole (PRILOSEC) 20 MG delayed-release capsule, Take  20 mg by mouth every day., Disp: , Rfl:   •  lisinopril (PRINIVIL) 10 MG TABS, Take 10 mg by mouth every day., Disp: , Rfl:   *EKG:  None this visit  *Imaging:  None this visit   EEG:   None this visit     *Labs:  No results for input(s): WBC, RBC, HEMOGLOBIN, HEMATOCRIT, MCV, MCH, RDW, PLATELETCT, MPV, NEUTSPOLYS, LYMPHOCYTES, MONOCYTES, EOSINOPHILS, BASOPHILS, RBCMORPHOLO in the last 72 hours.  Lab Results   Component Value Date/Time    SODIUM 135 11/21/2014 01:58 PM    POTASSIUM 4.0 11/21/2014 01:58 PM    CHLORIDE 104 11/21/2014 01:58 PM    CO2 25 11/21/2014 01:58 PM    GLUCOSE 93 11/21/2014 01:58 PM    BUN 13 11/21/2014 01:58 PM    CREATININE 1.01 11/21/2014 01:58 PM           Lab Results  Component Value Date/Time   BREATHALIZER 0.00 02/26/2019 0950     No components found for: BLOODALCOHOL     Lab Results  Component Value Date/Time   AMPHUR Negative 02/26/2019 0838   BARBSURINE Negative 02/26/2019 0838   BENZODIAZU Negative 02/26/2019 0838   COCAINEMET Negative 02/26/2019 0838   METHADONE Negative 02/26/2019 0838   ECSTASY Negative 07/02/2016 2220   OPIATES Negative 02/26/2019 0838   OXYCODN Negative 02/26/2019 0838   PCPURINE Negative 02/26/2019 0838   PROPOXY Negative 02/26/2019 0838   CANNABINOID Negative 02/26/2019 0838     No results found for: TSH, FREET4      *ASSESSMENT/PLAN:  Alcohol use disorder, severe.  -continue outpatient group therapy and sober living-followed by [facility].   -continue outpatient meds: says that [facility] has his list of home meds which includes gabapentin, cymbalta, seroquel. Says that he does have meds at his group home.            *Legal hold:        DISCONTINUE legal hold.  No scripts at this time- says that he has his psychotropic medications at home which include cymbalta, gabapentin, seroquel.    RN contacted [facility], [phone], and verified pt is an established pt, has case mgmt services, last visit 2/25/19, and attends outpt groups. [facility] plans [facility] with [facility] - says that if he is not allowed to return than has plan to go back to [facility] where he previously stayed in the shelter

## 2019-02-26 NOTE — DISCHARGE PLANNING
"Alert team  note:  38 year old female admitted 2/25/19, legal hold, SI, ETOH breathalyzer 0.016; Medicaid FFS insurance; pt drowsy, oriented x 4; states he does not remember how he got here last night d/t ETOH intoxication, states he \"blacked out\"; denies SI, HI, self-harm ideation; ; states he is living at a group home, [facility], x 1 month but does not if he can return there; states he does receive disbaility, does not receive financial assistance; that he was able to afford to buy ETOH yesterday b/c someone gave him $10; states has outpt services with [facility], attends co-occurring disorder groups Wed, Thurs, Sat, [phone]; attends 12 step mtgs, last 2/23/19, has a sponsor; Ineffective individual coping r/t ETOH intoxication, SI, resolving; pt to be evaluated by [facility] ED psychiatry team to determine DC plan    Writer RN contacted [facility], [phone], and verified pt is an established pt, has case mgmt services, last visit 2/25/19, and attends outpt groups; [facility] ED jpsychiatry to [place] legal hold and [place] pt to self today; writer RN reviewed f/u with [facility] with pt who verbalized understanding; [facility], gave pt outpt MH/CD [facility] resources at [place] (Medicaid [place] insurance plan)      "

## 2019-02-27 ENCOUNTER — HOSPITAL ENCOUNTER (EMERGENCY)
Facility: MEDICAL CENTER | Age: 39
End: 2019-02-27
Payer: MEDICAID

## 2019-02-27 VITALS
BODY MASS INDEX: 31.15 KG/M2 | OXYGEN SATURATION: 98 % | RESPIRATION RATE: 16 BRPM | DIASTOLIC BLOOD PRESSURE: 93 MMHG | SYSTOLIC BLOOD PRESSURE: 126 MMHG | HEART RATE: 82 BPM | HEIGHT: 72 IN | WEIGHT: 230 LBS | TEMPERATURE: 97 F

## 2019-02-27 PROCEDURE — 99284 EMERGENCY DEPT VISIT MOD MDM: CPT | Performed by: PSYCHIATRY & NEUROLOGY

## 2019-02-27 PROCEDURE — 302449 STATCHG TRIAGE ONLY (STATISTIC)

## 2019-02-27 NOTE — ED TRIAGE NOTES
Chief Complaint   Patient presents with   • Other     No complaints. No needs     /93   Pulse 82   Temp 36.1 °C (97 °F) (Temporal)   Resp 16   Ht 1.829 m (6')   Wt 104.3 kg (230 lb)   SpO2 98%   BMI 31.19 kg/m²     Pt BIB [facility].    EMS reports that pt went into [facility], asked if he could stay there while it was raining. PT then asked [facility] employees to call him a [facility]. Per [facility] pt was asking for a refill on prescriptions, that he has been out for 2 days.    Upon arrival to ER pt has no medical complaints. Did not fully cooperate with triage questions.     Pt left AMA without signing AMA paperwork or seeing a provider. VSS, A&Ox4.

## 2019-02-28 NOTE — PSYCHIATRY
PSYCHIATRIC INTAKE EVALUATION  This is an attestation to Dr AIDEN Tarango's note on 2/16/2019. Epic will not allow this note to be linked.     I personally performed the service, or was physically present during the key or critical portions of the service when performed by a resident; participated in the management of the patient; and, reviewed /AGUILAR Benton's note.  I agree with all the findings except as noted under assessment/plan.     *ASSESSMENT/PLAN:  1. Alcohol use disorder severe  - referrals    2. Hx of depressive disoder        Legal hold: dc'd. No scripts    *Signing off  *Thank you for the consult

## 2019-04-08 RX ORDER — GABAPENTIN 300 MG/1
300 CAPSULE ORAL 3 TIMES DAILY
Qty: 60 CAP | Refills: 3 | Status: SHIPPED | OUTPATIENT
Start: 2019-04-08 | End: 2021-10-22

## 2019-04-27 ENCOUNTER — HOSPITAL ENCOUNTER (EMERGENCY)
Facility: MEDICAL CENTER | Age: 39
End: 2019-04-28
Attending: EMERGENCY MEDICINE
Payer: MEDICAID

## 2019-04-27 DIAGNOSIS — F10.920 ALCOHOLIC INTOXICATION WITHOUT COMPLICATION (HCC): ICD-10-CM

## 2019-04-27 DIAGNOSIS — R45.851 SUICIDAL IDEATION: ICD-10-CM

## 2019-04-27 DIAGNOSIS — F15.10 METHAMPHETAMINE ABUSE (HCC): ICD-10-CM

## 2019-04-27 LAB
ETHANOL BLD-MCNC: 0.3 G/DL
POC BREATHALIZER: 0.14 PERCENT (ref 0–0.01)

## 2019-04-27 PROCEDURE — 700105 HCHG RX REV CODE 258: Performed by: EMERGENCY MEDICINE

## 2019-04-27 PROCEDURE — 700111 HCHG RX REV CODE 636 W/ 250 OVERRIDE (IP): Performed by: EMERGENCY MEDICINE

## 2019-04-27 PROCEDURE — 99285 EMERGENCY DEPT VISIT HI MDM: CPT

## 2019-04-27 PROCEDURE — 80307 DRUG TEST PRSMV CHEM ANLYZR: CPT

## 2019-04-27 PROCEDURE — 96372 THER/PROPH/DIAG INJ SC/IM: CPT

## 2019-04-27 PROCEDURE — 302970 POC BREATHALIZER: Performed by: EMERGENCY MEDICINE

## 2019-04-27 RX ORDER — SODIUM CHLORIDE 9 MG/ML
2000 INJECTION, SOLUTION INTRAVENOUS ONCE
Status: COMPLETED | OUTPATIENT
Start: 2019-04-27 | End: 2019-04-27

## 2019-04-27 RX ORDER — LORAZEPAM 2 MG/ML
1 INJECTION INTRAMUSCULAR ONCE
Status: COMPLETED | OUTPATIENT
Start: 2019-04-27 | End: 2019-04-27

## 2019-04-27 RX ORDER — HALOPERIDOL 5 MG/ML
5 INJECTION INTRAMUSCULAR ONCE
Status: COMPLETED | OUTPATIENT
Start: 2019-04-27 | End: 2019-04-27

## 2019-04-27 RX ADMIN — LORAZEPAM 1 MG: 2 INJECTION INTRAMUSCULAR; INTRAVENOUS at 16:45

## 2019-04-27 RX ADMIN — SODIUM CHLORIDE 2000 ML: 900 INJECTION, SOLUTION INTRAVENOUS at 16:45

## 2019-04-27 RX ADMIN — HALOPERIDOL LACTATE 5 MG: 5 INJECTION, SOLUTION INTRAMUSCULAR at 16:45

## 2019-04-27 ASSESSMENT — LIFESTYLE VARIABLES: DO YOU DRINK ALCOHOL: NO

## 2019-04-27 NOTE — ED NOTES
Pt BIB EMS from [facility] for medical clearance  Chief Complaint   Patient presents with   • Suicidal Ideation   pt paranoid, speaks about voices in his head and wanting to jump off the [employer] parking garage.  Pt admits to using benzo and meth today  Pt A & 0 x 4, speech clear, ambulates well  Pt on legal hold PTA.

## 2019-04-28 VITALS
HEART RATE: 82 BPM | HEIGHT: 72 IN | TEMPERATURE: 98.5 F | SYSTOLIC BLOOD PRESSURE: 149 MMHG | RESPIRATION RATE: 17 BRPM | BODY MASS INDEX: 31.15 KG/M2 | OXYGEN SATURATION: 99 % | WEIGHT: 230 LBS | DIASTOLIC BLOOD PRESSURE: 109 MMHG

## 2019-04-28 LAB
AMPHETAMINES UR QL SCN: POSITIVE
BARBITURATES UR QL SCN: NEGATIVE
BENZODIAZ UR QL SCN: NEGATIVE
COCAINE UR QL SCN: NEGATIVE
OPIATES UR QL SCN: NEGATIVE
PCP UR QL SCN: NEGATIVE
POC BREATHALIZER: 0.08 PERCENT (ref 0–0.01)
THC UR QL SCN: NEGATIVE

## 2019-04-28 PROCEDURE — 700102 HCHG RX REV CODE 250 W/ 637 OVERRIDE(OP): Performed by: EMERGENCY MEDICINE

## 2019-04-28 PROCEDURE — 80305 DRUG TEST PRSMV DIR OPT OBS: CPT

## 2019-04-28 PROCEDURE — A9270 NON-COVERED ITEM OR SERVICE: HCPCS | Performed by: EMERGENCY MEDICINE

## 2019-04-28 PROCEDURE — 302970 POC BREATHALIZER: Performed by: EMERGENCY MEDICINE

## 2019-04-28 RX ORDER — DIPHENHYDRAMINE HCL 25 MG
50 TABLET ORAL EVERY 6 HOURS PRN
Status: SHIPPED | COMMUNITY
End: 2020-12-11

## 2019-04-28 RX ORDER — HYDROXYZINE HYDROCHLORIDE 25 MG/1
25 TABLET, FILM COATED ORAL ONCE
Status: COMPLETED | OUTPATIENT
Start: 2019-04-28 | End: 2019-04-28

## 2019-04-28 RX ADMIN — HYDROXYZINE HYDROCHLORIDE 25 MG: 25 TABLET ORAL at 05:15

## 2019-04-28 NOTE — ED NOTES
[facility] spoke to Dr. Campos, pt to be discharge, pt will stay in the group home tonight and [facility].

## 2019-04-28 NOTE — DISCHARGE INSTRUCTIONS
Avoid alcohol and drugs.  Follow instructions of [facility].  Return to the emergency department for any new medical problems or complaints or thoughts of hurting herself or others.  Follow-up with primary care.

## 2019-04-28 NOTE — ED NOTES
According to the Maury scale, pt is 1:1 due to high risk, sitter outside the door at this time. Elizabeth Wang RN notified.

## 2019-04-28 NOTE — ED NOTES
The pt is demanding to be tx to the [facility].  Security in to explain to the pt that the pt will be Trespassed from [facility] when sober.

## 2019-04-28 NOTE — ED PROVIDER NOTES
ED Provider Note    EP sign out note:     Patient signed out to me at 11 pm by Dr. Hernadez, reassess when BAL decreases to determine suicidal plans.     Patient re-evaluated by me at 4:40 am. Continues to endorse thoughts of self-harm, states that he did try and hurt himself yesterday at [employer], and had further thoughts of self-harm of jumping from a bridge.  Urine tox screen is positive for methamphetamines.  Given his endorsement of above, plan to complete mental health hold with evaluation by [facility] later this morning.

## 2019-04-28 NOTE — ED NOTES
Med rec updated and complete  Allergies reviewed  Pt reports that he has only been taking GABAPENTIN 300MG, thinks he stop taking it 2 weeks ago, all other medications he has not taken for over 2-3 months.  Pt reports that he has been taking other peoples prescription, not sure what he has been taking off the street.  Pt reports no antibiotics in the last 30 days.  Pt reports no vitamins.

## 2019-04-28 NOTE — ED PROVIDER NOTES
ED Provider Note    The patient reassessed at 12:45 PM.  I was informed by [facility] clinician that the patient was cleared to be discharged and as an outpatient follow-up plan outpatient plan for substance abuse counseling.    I went to reassess the patient and he states he feels fine he would like to go.  He explained that he smokes methamphetamine and then drank much alcohol and got paranoid and psychotic but now that he has had time to clear this he feels much better.    I reviewed the chart from his initial evaluation in the ER physician he was felt to be psychotic likely on drugs but not suicidal at that time.  Throughout the course the night he had some vague comments about being suicidal but nothing specific.  Now he is sober he adamantly denies this.    He states he has had this in the past.  He states discomfort with the plan.  Denies any plan to harm himself or anyone else.    He denies any acute medical problems or complaints.  He appears safe for discharge.  Repeat his vital signs of these are normal he will be discharged home.  He [facility] [place] and he states he will comply.      Gregory Hardin M.D.  [address] [place] [id number]  [phone]          Javed Lopez M.D.

## 2019-04-28 NOTE — DISCHARGE PLANNING
"Alert Team  Notified [facility] ER that there will be no Alert Team coverage overnight.  Next available assessment would be after 7:30am tomorrow.    Of note, from chart review:    THIS PT HAS AGGRESSIVE FLAG on his chart, entailing his involvement with \"abiia,\" another pt who impersonated medical personal at [facility] last year.    PT was to be trespassed upon arrival to ER.  Since pt is already in a bed, I notified our Charge RN, Terry Loo.  He will be getting in contact with [facility] ER, where pt is currently.    Pt currently has FFS Medicaid, per his facesheet; his only transfer options would be [facility], (who rarely takes tranfers anymore,) and [facility], (who does not currently take transfers on the weekend.)  (Unless his insurance is different than FFS, he is not in the ER today just to get \"Medical Clearance for [facility],\" as they do not take his insurance.)    Pt has had 10 Alert Team assessments since 2014.  He has reported multiple past diagnoses, including anxiety, panic, depression, paranoid schizophrenia, alcohol use d/o with psychosis.  Several reports over the years of psych med noncompliance.  Pt has family hx of suicide; father completed suicide.  Pt has varying reports on his own history of attempts, sometimes endorsing, at other times denying.    2014- four visits to this ER in the span of 16 days.  , reported plan to go to [facility] for detox that year but unknown if ever did.   Sent to  for inpt tx after developing SI/HI with [facility] to hurt self and others.  No hx of SA.    2015-one visit.  Continued daily alcohol use d/o, c/o SI,  noncompliance with psychiatric meds; still with [facility] for outpt. Tx.  Once sober, pt denied SI.    2016-two visits.  Continued alcohol use d/o, continued aggressive behaviors with staff while in ER, SI/HI, restraints.    Both visits, pt denied continuing SI once sober.    2017- one visit, SI and etoh, (daily etoh, per pt,) combative and restrained.  Noted to be " "\"living with girlfriend.\"  Reported he had a hx of one previous SA in the past year, (thinks he went to [facility] but unsure d/t intoxication.  He later denied previous SAs to psychiatry.)   Denied current SI once sobered.  Noted to divulge to ERP that if he does end up being admitted to local psychiatric facility he will \"fake withdrawal to get more benzos.\"     2018- Six ER visits r/t alcohol use d/o and SI.  Again reports hx of SA.  September 2018, reported SI d/t slated to go to detention the next day.  Continued notations of medication noncompliance.  Reported he only hears voices when he drinks, and is only suicidal when he drinks.  Reported having a warrant out for him at the time.  Each time, denied SI once sober.    2019-Third ER visit so far.  Seen 2/25/19 for alcohol intoxication w/delirium, SI.   Homeless or living in group homes with minimal social support. Continued medication noncompliance, restrained by [employer] on scene.  Placed on hold, but hold DC'd the next morning by psychiatry as he was denying continued SI once sober.  DC'd to self, but ended up back in the ER four hours later, intoxicated, from [facility] [employer] [facility].  Left AMA and was the last time seen in ER til today.     Today, presents with vague SI, (endorsing to some staff, denying to others,) alcohol and meth intoxication.                "

## 2019-04-28 NOTE — ED NOTES
No alert team last noc, unable to complete Mental Health Assessment.  Will complete today. Breakfast, safe tray, ordered for the pt. 1:1 sitter at the door.

## 2019-04-28 NOTE — ED NOTES
Pt DCd after resources were provided by [facility], pt educated to go to [facility] as indicated by [facility], pt left walking with steady gait, talking in full sentences.
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
## 2019-04-28 NOTE — ED PROVIDER NOTES
ED Provider Note    CHIEF COMPLAINT  Chief Complaint   Patient presents with   • Suicidal Ideation       HPI  Brannon Sharma is a 38 y.o. male who presents to the emergency department brought in by ambulance because of bizarre behavior.  The patient apparently went into a business asking if he could take shelter from the rain but it is actually a bright lorna day it is not raining.  An employee called EMS and the patient was found to behaving in a very bizarre fashion he is asking to refill prescriptions and further questioning reveals that he has been using methamphetamines and says that he is also been abusing baclofen.  The patient tells me he does not wish to harm himself or others.    REVIEW OF SYSTEMS the patient is very confused and agitated at the time of arrival and really cannot provide a reliable review of systems or other history of present illness at this time    PAST MEDICAL HISTORY  Past Medical History:   Diagnosis Date   • Alcohol-induced psychosis (HCC)    • Anxiety    • Depression    • Paranoid schizophrenia (HCC)    • Psychiatric disorder     panic attack   • Schizophrenia, paranoid (HCC)        FAMILY HISTORY  Family History   Problem Relation Age of Onset   • Family history unknown: Yes       SOCIAL HISTORY  Social History     Social History   • Marital status:      Spouse name: N/A   • Number of children: N/A   • Years of education: N/A     Social History Main Topics   • Smoking status: Current Every Day Smoker     Packs/day: 1.00     Years: 12.00     Types: Cigarettes   • Smokeless tobacco: Never Used   • Alcohol use Yes      Comment:  states 40-50 beers/d   • Drug use: Yes     Types: Inhaled      Comment: thc   • Sexual activity: Yes     Partners: Female     Other Topics Concern   • Not on file     Social History Narrative    ** Merged History Encounter **            SURGICAL HISTORY  Past Surgical History:   Procedure Laterality Date   • NASAL SEPTAL RECONSTRUCTION     •  OTHER      deviated septum       CURRENT MEDICATIONS  Home Medications    **Home medications have not yet been reviewed for this encounter**         ALLERGIES  Allergies   Allergen Reactions   • Ceclor [Cefaclor]    • Cephalosporins    • Pcn [Penicillins]        PHYSICAL EXAM  VITAL SIGNS: /97   Pulse (!) 121   Temp 37.3 °C (99.1 °F) (Temporal)   Resp 18   SpO2 94%    Oxygen saturation is interpreted as adequate  Constitutional: Awake very very talkative animated and somewhat agitated but basically cooperative  HENT: No sign of trauma to the head mucous membranes are dry  Eyes: Pupils round extraocular motion present  Neck: Trachea midline no JVD  Cardiovascular: Regular tachycardia  Lungs: Clear and equal  Abdomen/Back: Soft nondistended  Skin: Warm and dry  Musculoskeletal: No acute bony deformity  Neurologic: Awake active moving all extremities    Laboratory  Blood alcohol level is elevated at 0.3 urine toxicology screen is pending    MEDICAL DECISION MAKING and DISPOSITION  In the emergency department an IV was established the patient was given intravenous Haldol and Ativan and intravenous fluids for tachycardia.  At this point in time he is being kept n.p.o. due to altered mental state.  I think the patient is suffering acute psychosis from methamphetamine abuse.  We will monitor the patient and provide supportive care while he leonel up and once she is awake and verbal he will require further evaluation.  The patient's care will be signed out to the oncoming ER doctor    IMPRESSION  1.  Acute psychosis  2.  Methamphetamine abuse  3.  Alcohol abuse      Electronically signed by: Tyrone Lopes, 4/27/2019 5:53 PM

## 2019-04-28 NOTE — ED NOTES
Pt sitting a the edge of the bed, educated that waiting on life skills to get evaluated, pt educated on precess to be transfer to a mental health facility if he stays on a legal hole. Pt verbalized understanding and states no more needs at this time, 1:1 sitter in place.

## 2019-04-28 NOTE — ED NOTES
Dr weller in room assessing pt. Life skills called and waiting on eval time. Pt sleeping at this time, will cont. Monitoring.

## 2019-09-09 ENCOUNTER — HOSPITAL ENCOUNTER (EMERGENCY)
Facility: MEDICAL CENTER | Age: 39
End: 2019-09-09
Attending: EMERGENCY MEDICINE
Payer: MEDICAID

## 2019-09-09 VITALS
TEMPERATURE: 98.6 F | RESPIRATION RATE: 18 BRPM | WEIGHT: 240 LBS | SYSTOLIC BLOOD PRESSURE: 132 MMHG | HEIGHT: 75 IN | HEART RATE: 91 BPM | DIASTOLIC BLOOD PRESSURE: 87 MMHG | BODY MASS INDEX: 29.84 KG/M2

## 2019-09-09 DIAGNOSIS — R45.1 AGITATION: ICD-10-CM

## 2019-09-09 DIAGNOSIS — Z78.9 ALCOHOL USE: ICD-10-CM

## 2019-09-09 LAB — POC BREATHALIZER: 0.01 PERCENT (ref 0–0.01)

## 2019-09-09 PROCEDURE — 90791 PSYCH DIAGNOSTIC EVALUATION: CPT

## 2019-09-09 PROCEDURE — 700102 HCHG RX REV CODE 250 W/ 637 OVERRIDE(OP): Performed by: EMERGENCY MEDICINE

## 2019-09-09 PROCEDURE — 700111 HCHG RX REV CODE 636 W/ 250 OVERRIDE (IP): Performed by: EMERGENCY MEDICINE

## 2019-09-09 PROCEDURE — 96374 THER/PROPH/DIAG INJ IV PUSH: CPT

## 2019-09-09 PROCEDURE — 99285 EMERGENCY DEPT VISIT HI MDM: CPT

## 2019-09-09 PROCEDURE — 96372 THER/PROPH/DIAG INJ SC/IM: CPT

## 2019-09-09 PROCEDURE — 302970 POC BREATHALIZER: Performed by: EMERGENCY MEDICINE

## 2019-09-09 PROCEDURE — A9270 NON-COVERED ITEM OR SERVICE: HCPCS | Performed by: EMERGENCY MEDICINE

## 2019-09-09 RX ORDER — LORAZEPAM 2 MG/ML
2 INJECTION INTRAMUSCULAR ONCE
Status: COMPLETED | OUTPATIENT
Start: 2019-09-09 | End: 2019-09-09

## 2019-09-09 RX ORDER — HALOPERIDOL 5 MG/ML
5 INJECTION INTRAMUSCULAR ONCE
Status: COMPLETED | OUTPATIENT
Start: 2019-09-09 | End: 2019-09-09

## 2019-09-09 RX ORDER — DIPHENHYDRAMINE HYDROCHLORIDE 50 MG/ML
50 INJECTION INTRAMUSCULAR; INTRAVENOUS ONCE
Status: COMPLETED | OUTPATIENT
Start: 2019-09-09 | End: 2019-09-09

## 2019-09-09 RX ORDER — LORAZEPAM 2 MG/1
2 TABLET ORAL ONCE
Status: COMPLETED | OUTPATIENT
Start: 2019-09-09 | End: 2019-09-09

## 2019-09-09 RX ADMIN — LORAZEPAM 2 MG: 2 TABLET ORAL at 17:45

## 2019-09-09 RX ADMIN — LORAZEPAM 2 MG: 2 INJECTION INTRAMUSCULAR; INTRAVENOUS at 17:15

## 2019-09-09 RX ADMIN — DIPHENHYDRAMINE HYDROCHLORIDE 50 MG: 50 INJECTION, SOLUTION INTRAMUSCULAR; INTRAVENOUS at 17:15

## 2019-09-09 RX ADMIN — HALOPERIDOL LACTATE 5 MG: 5 INJECTION, SOLUTION INTRAMUSCULAR at 17:15

## 2019-09-09 NOTE — DISCHARGE PLANNING
"Alert Team  Noted consult ordered.  Pt not currently ready for consult.  Will need:  -below the legal limit breathalyzer in Epic  -medical clearance per the ERP  -ideally, collect UDS prior to consult.     Once pt is ready for consult, bedside RN to call AT.    Of note, I did an extensive chart review on this pt in April and have updated it here.  Of note, from chart review:     THIS PT HAS AGGRESSIVE FLAG on his chart, entailing his involvement with \",\" another pt who impersonated medical personal at [facility] last year.    PT was to be trespassed upon arrival to ER.  (* of note from today, I did inform SILVER Tian of pt being in ER and the past request to trespass him if possible.  Pt already roomed and restrained.)     Pt currently has [place] Medicaid, per his facesheet; his only transfer options would be [facility], (who rarely takes tranfers anymore,) and [facility], (who does not currently take transfers on the weekend.)  (Unless his insurance is different than [place], he is not in the ER today just to get \"Medical Clearance for [facility],\" as they do not take his insurance.)     Pt has had 10 Alert Team assessments since 2014.  He has reported multiple past diagnoses, including anxiety, panic, depression, paranoid schizophrenia, alcohol use d/o with psychosis.  Several reports over the years of psych med noncompliance.  Pt has family hx of suicide; father completed suicide.  Pt has varying reports on his own history of attempts, sometimes endorsing, at other times denying.     2014- four visits to this ER in the span of 16 days.  , reported plan to go to [facility] for detox that year but unknown if ever did.   Sent to  for inpt tx after developing SI/HI with [facility] to hurt self and others.  No hx of SA.     2015-one visit.  Continued daily alcohol use d/o, c/o SI,  noncompliance with psychiatric meds; still with [facility] for outpt. Tx.  Once sober, pt denied SI.     2016-two visits.  Continued alcohol use d/o, " "continued aggressive behaviors with staff while in ER, SI/HI, restraints.    Both visits, pt denied continuing SI once sober.     2017- one visit, SI and etoh, (daily etoh, per pt,) combative and restrained.  Noted to be \"living with girlfriend.\"  Reported he had a hx of one previous SA in the past year, (thinks he went to [place] but unsure d/t intoxication.  He later denied previous SAs to psychiatry.)   Denied current SI once sobered.  Noted to divulge to ERP that if he does end up being admitted to local psychiatric facility he will \"fake withdrawal to get more benzos.\"      2018- Six ER visits r/t alcohol use d/o and SI.  Again reports hx of SA.  September 2018, reported SI d/t slated to go to penitentiary the next day.  Continued notations of medication noncompliance.  Reported he only hears voices when he drinks, and is only suicidal when he drinks.  Reported having a warrant out for him at the time.  Each time, denied SI once sober.     2019-This is his fourth ER visit this year.  Seen 2/25/19 for alcohol intoxication w/delirium, SI.   Homeless or living in group homes with minimal social support. Continued medication noncompliance, restrained by [employer] on scene.  Placed on hold, but hold DC'd the next morning by psychiatry as he was denying continued SI once sober.  DC'd to self, but ended up back in the ER four hours later, intoxicated, from [employer].  Left AMA and was the last time seen in ER til today.      4/27/19-ER for SI, etoh/meth use.    Presented with vague SI, (endorsing to some staff, denying to others,) alcohol and meth intoxication.  He cleared by the morning and was no longer voicing SI; DC'd to self and referred to [facility] for f/u.       "

## 2019-09-09 NOTE — ED TRIAGE NOTES
Brought in by PD on legal hold.  Pt was called by patient's significant other that she was concerned that he was going to hurt self.  The legal hold states that pt was standing on balcony and making threats to jump.

## 2019-09-10 NOTE — ED PROVIDER NOTES
ED Provider Note    Progress note:  I took over for the prior provider.  Awaiting life skills evaluation.  The patient was evaluated by life skills after metabolizing medications.  Does not appear to be actively suicidal at this time.  No thoughts of homicidal ideation.  The patient is alert, awake, oriented.  Answering questions appropriately and clearly.  Stable gait and does not appear to be intoxicated.  The patient is very future oriented and is anxious to be discharged so he can go to work at 4 AM tomorrow morning.  Given the patient's current behavior, I do not believe the patient is an imminent threat to himself or others at this point and I do not believe the patient meets criteria for legal hold at this time.  This is in agreement with the life skills evaluator who assessed the patient earlier.  Legal hold was lifted and the patient was then discharged home.

## 2019-09-10 NOTE — ED PROVIDER NOTES
ED Provider Note    Scribed for Niels Broussard D.O. by Niels Broussard. 9/9/2019  5:53 PM    Primary care provider: Gregory Hardin M.D.  Means of arrival: Private vehicle  History obtained from: Patient  History limited by: None    CHIEF COMPLAINT  Chief Complaint   Patient presents with   • Suicidal Ideation       HPI  Brannon Sharma is a 39 y.o. male who presents to the Emergency Department here for evaluation of suicidal ideation and agitation. It was reported that the pt had a break up with his girlfriend, who then called the police, stating that the pt wanted to kill himself.   He himself denies any suicidal or homicidal ideation, but states that 'I am very agitated.'  He admits to etoh today, but denies any drug use or overdose.  He has no cp, no sob, no vomiting, no abdominal pain.  He arrives in four point restraints, secondary to agitation.      REVIEW OF SYSTEMS  ROS   Depression  Suicidal ideation     PAST MEDICAL HISTORY   has a past medical history of Alcohol-induced psychosis (HCC), Anxiety, Depression, Paranoid schizophrenia (HCC), Psychiatric disorder, and Schizophrenia, paranoid (HCC).    SURGICAL HISTORY   has a past surgical history that includes other and nasal septal reconstruction.    SOCIAL HISTORY  Social History     Tobacco Use   • Smoking status: Current Every Day Smoker     Packs/day: 1.00     Years: 12.00     Pack years: 12.00     Types: Cigarettes   • Smokeless tobacco: Never Used   Substance Use Topics   • Alcohol use: Yes     Comment:  states 40-50 beers/d   • Drug use: Yes     Types: Inhaled     Comment: thc      Social History     Substance and Sexual Activity   Drug Use Yes   • Types: Inhaled    Comment: thc       FAMILY HISTORY  Family History   Family history unknown: Yes       CURRENT MEDICATIONS  Home Medications    **Home medications have not yet been reviewed for this encounter**         ALLERGIES  Allergies   Allergen Reactions   • Ceclor [Cefaclor]  "Anaphylaxis   • Cephalosporins Anaphylaxis   • Pcn [Penicillins] Anaphylaxis       PHYSICAL EXAM  VITAL SIGNS: /96   Pulse (!) 113   Temp 37 °C (98.6 °F) (Temporal)   Resp 18   Ht 1.905 m (6' 3\")   Wt 108.9 kg (240 lb)   BMI 30.00 kg/m²     Constitutional: moderate distress. Well nourished.  HENT: Head is atraumatic. Oropharynx is moist.   Eyes: Conjunctivae are normal. EOMI.   Respiratory: No respiratory distress. Equal chest expansion.   Musculoskeletal: Normal range of motion. No edema.   Neurological: Alert. No focal deficits noted.    Skin: No rash. No Pallor.   Psych: agitated, aggressive.     LABS  Results for orders placed or performed during the hospital encounter of 09/09/19   POC BREATHALIZER   Result Value Ref Range    POC Breathalizer 0.013 (A) 0.00 - 0.01 Percent         COURSE & MEDICAL DECISION MAKING  Nursing notes, VS, PMSFHx reviewed in chart.    5:56 PM  The pt will be signed out to my partner, for further evaluation.  The pt is intoxicated, and will need to be sober prior to evaluation.  He was given ativan po, and agreed to calm down.  He states he would like to be seen, so that he can start his new job in the morning.     6:08 PM  Dr. Sarah will take over the pt from here.       FINAL IMPRESSION  Depression         The note accurately reflects work and decisions made by me.  Niels Broussard  9/9/2019  5:57 PM    "
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
## 2019-09-10 NOTE — ED NOTES
Pt taken out of 4 point restraints and is in two point restraints.  His right hand and left leg are restrained.  Pt has used the breathalyzer, and took ativan PO.

## 2019-12-03 ENCOUNTER — HOSPITAL ENCOUNTER (EMERGENCY)
Facility: MEDICAL CENTER | Age: 39
End: 2019-12-04
Attending: EMERGENCY MEDICINE
Payer: MEDICAID

## 2019-12-03 DIAGNOSIS — F43.21 SITUATIONAL DEPRESSION: ICD-10-CM

## 2019-12-03 DIAGNOSIS — F19.90 DRUG USE: ICD-10-CM

## 2019-12-03 DIAGNOSIS — F10.921 ALCOHOL INTOXICATION WITH DELIRIUM (HCC): ICD-10-CM

## 2019-12-03 LAB — POC BREATHALIZER: 0.16 PERCENT (ref 0–0.01)

## 2019-12-03 PROCEDURE — 80307 DRUG TEST PRSMV CHEM ANLYZR: CPT

## 2019-12-03 PROCEDURE — 302970 POC BREATHALIZER: Performed by: EMERGENCY MEDICINE

## 2019-12-03 PROCEDURE — 99285 EMERGENCY DEPT VISIT HI MDM: CPT

## 2019-12-03 PROCEDURE — 302970 POC BREATHALIZER

## 2019-12-03 SDOH — HEALTH STABILITY: MENTAL HEALTH: HOW MANY STANDARD DRINKS CONTAINING ALCOHOL DO YOU HAVE ON A TYPICAL DAY?: 10 OR MORE

## 2019-12-03 SDOH — HEALTH STABILITY: MENTAL HEALTH: HOW OFTEN DO YOU HAVE A DRINK CONTAINING ALCOHOL?: 4 OR MORE TIMES A WEEK

## 2019-12-03 SDOH — HEALTH STABILITY: MENTAL HEALTH: HOW OFTEN DO YOU HAVE 6 OR MORE DRINKS ON ONE OCCASION?: DAILY OR ALMOST DAILY

## 2019-12-04 VITALS
RESPIRATION RATE: 17 BRPM | HEART RATE: 95 BPM | TEMPERATURE: 98.1 F | DIASTOLIC BLOOD PRESSURE: 72 MMHG | WEIGHT: 245 LBS | SYSTOLIC BLOOD PRESSURE: 119 MMHG | BODY MASS INDEX: 29.83 KG/M2 | HEIGHT: 76 IN

## 2019-12-04 LAB
AMPHET UR QL SCN: NEGATIVE
BARBITURATES UR QL SCN: NEGATIVE
BENZODIAZ UR QL SCN: POSITIVE
BZE UR QL SCN: NEGATIVE
CANNABINOIDS UR QL SCN: NEGATIVE
METHADONE UR QL SCN: NEGATIVE
OPIATES UR QL SCN: NEGATIVE
OXYCODONE UR QL SCN: NEGATIVE
PCP UR QL SCN: NEGATIVE
POC BREATHALIZER: 0.02 PERCENT (ref 0–0.01)
PROPOXYPH UR QL SCN: NEGATIVE

## 2019-12-04 PROCEDURE — 99284 EMERGENCY DEPT VISIT MOD MDM: CPT | Performed by: PSYCHIATRY & NEUROLOGY

## 2019-12-04 PROCEDURE — 302970 POC BREATHALIZER: Performed by: EMERGENCY MEDICINE

## 2019-12-04 NOTE — ED NOTES
Patient discharged with HPN coordinator     Discharge and Follow Up education provided to patient who verbalizes understanding   Patient denies further questions at this time   Wheelchair was offered to the waiting room and refused by patient  Patient ambulated to the ED Lobby with steady gait

## 2019-12-04 NOTE — ED NOTES
RN to bedside for safety and rounding. Patient is asleep. In attempt to keep a calm and healthy environment for patient, RN delayed waking patient for an update at this time.  Noted chest rise and fall with ease of respirations     Sitter remains at bedside for 1:1 continuous observation

## 2019-12-04 NOTE — ED PROVIDER NOTES
Addendum, 11:50 AM  ED Provider Note    The patient was signed out to me this morning by the off going physician as waiting for the patient to sober up for psychiatric evaluation.    The patient finally did sober up and was seen by [facility].  He was cleared for discharge.  Outpatient plan was made for treatment.    I have reassessed the patient.  He denies any suicidal homicidal ideation.  Denies any acute medical problems or complaints.  He is agreeable to plan made by [facility] that he is stable for discharge.  Counseled to avoid alcohol and drugs.  He is counseled to follow shortness of life skills.  Return for any medical problems or complaints.      Javed Lopez M.D.

## 2019-12-04 NOTE — ED PROVIDER NOTES
ED Provider Note    ED Provider Note      Primary care provider: Gregory Hardin M.D.    CHIEF COMPLAINT  Chief Complaint   Patient presents with   • Suicidal Ideation       HPI  Brannon Sharma is a 39 y.o. male who presents to the Emergency Department with chief complaint of suicidal ideation.  Patient was at half-way intake this evening he was taken there after he left the treatment program and reportedly used multiple drugs and drank a large amount of alcohol.  Patient reported officers that he was suicidal and stated that he if he could not get sober he did not want to go on living.  Patient is obviously intoxicated but he tells me the same he feels as though he is given up on life after failing this most recent rehab program states that he just wants to die as he does not believe he will ever be sober.  No plan at this point no homicidal ideation no acute medical complaints history otherwise limited by alcohol intoxication    REVIEW OF SYSTEMS  As per HPI otherwise limited by alcohol intoxication    PAST MEDICAL HISTORY   has a past medical history of Alcohol-induced psychosis (HCC), Anxiety, Depression, Paranoid schizophrenia (HCC), Psychiatric disorder, and Schizophrenia, paranoid (HCC).    SURGICAL HISTORY   has a past surgical history that includes other and nasal septal reconstruction.    SOCIAL HISTORY  Social History     Tobacco Use   • Smoking status: Current Every Day Smoker     Packs/day: 1.00     Years: 12.00     Pack years: 12.00     Types: Cigarettes   • Smokeless tobacco: Never Used   Substance Use Topics   • Alcohol use: Yes     Alcohol/week: 9.6 oz     Types: 16 Cans of beer per week     Frequency: 4 or more times a week     Drinks per session: 10 or more     Binge frequency: Daily or almost daily     Comment:  states 40-50 beers/d   • Drug use: Yes     Types: Inhaled     Comment: thc      Social History     Substance and Sexual Activity   Drug Use Yes   • Types: Inhaled    Comment: thc  "      FAMILY HISTORY  Non-Contributory    CURRENT MEDICATIONS  Home Medications     Reviewed by Earlene Mendiola R.N. (Registered Nurse) on 12/03/19 at 2316  Med List Status: Not Addressed   Medication Last Dose Status   diphenhydrAMINE (BENADRYL) 25 MG Tab  Active   gabapentin (NEURONTIN) 300 MG Cap  Active   Nutritional Supplements (COLD AND FLU PO)  Active                ALLERGIES  Allergies   Allergen Reactions   • Ceclor [Cefaclor] Anaphylaxis   • Cephalosporins Anaphylaxis   • Pcn [Penicillins] Anaphylaxis       PHYSICAL EXAM  VITAL SIGNS: /88   Pulse (!) 107   Temp 36.6 °C (97.8 °F) (Temporal)   Resp 20   Ht 1.93 m (6' 4\")   Wt 111.1 kg (245 lb)   BMI 29.82 kg/m²   Pulse ox interpretation: I interpret this pulse ox as normal.  Constitutional: Alert and oriented x 3, minimal distress  HEENT: Atraumatic normocephalic, pupils are equal round reactive to light extraocular movements are intact. The nares is clear, external ears are normal, mouth shows moist mucous membranes  Neck: Supple, no JVD no tracheal deviation  Cardiovascular: Tachycardic no murmur rub or gallop 2+ pulses peripherally x4  Thorax & Lungs: No respiratory distress, no wheezes rales or rhonchi, No chest tenderness.   GI: Soft nontender nondistended positive bowel sounds, no peritoneal signs  Skin: Warm dry no acute rash or lesion  Musculoskeletal: Moving all extremities with full range and 5 of 5 strength, no acute  deformity  Neurologic: Cranial nerves III through XII are grossly intact, no sensory deficit, no cerebellar dysfunction   Psychiatric: Somewhat intoxicated tearful throughout examination      DIAGNOSTIC STUDIES / PROCEDURES  LABS      Results for orders placed or performed during the hospital encounter of 12/03/19   Urine Drug Screen   Result Value Ref Range    Amphetamines Urine Negative Negative    Barbiturates Negative Negative    Benzodiazepines Positive (A) Negative    Cocaine Metabolite Negative Negative    " "Methadone Negative Negative    Opiates Negative Negative    Oxycodone Negative Negative    Phencyclidine -Pcp Negative Negative    Propoxyphene Negative Negative    Cannabinoid Metab Negative Negative   POC BREATHALIZER   Result Value Ref Range    POC Breathalizer 0.16 (A) 0.00 - 0.01 Percent       All labs reviewed by me.        COURSE & MEDICAL DECISION MAKING  Pertinent Labs & Imaging studies reviewed. (See chart for details)    11:29 PM - Patient seen and examined at bedside.       Patient noted to have slightly elevated blood pressure likely circumstantial secondary to presenting complaint. Referred to primary care physician for further evaluation.      Medical Decision Making: Patient is intoxicated at arrival his heart rate should improve shortly after presentation no indication for laboratory evaluation at this time will be observed until sober which point he will have repeat evaluation.  Behavioral health evaluation if suicidal ideation persists with sobriety.  Pending this work-up at shift change I discussed this with my partner Dr. Lopez who will assume care of the patient and disposition once appropriate  /72   Pulse 95   Temp 36.7 °C (98.1 °F) (Temporal)   Resp 17   Ht 1.93 m (6' 4\")   Wt 111.1 kg (245 lb)   BMI 29.82 kg/m²           FINAL IMPRESSION:  1.  Alcohol intoxication  2.  Polysubstance ingestion  3.  Suicidal ideation      This dictation has been created using voice recognition software and/or scribes. The accuracy of the dictation is limited by the abilities of the software and the expertise of the scribes. I expect there may be some errors of grammar and possibly content. I made every attempt to manually correct the errors within my dictation. However, errors related to voice recognition software and/or scribes may still exist and should be interpreted within the appropriate context.            "

## 2019-12-04 NOTE — PSYCHIATRY
"PSYCHIATRIC CONSULTATION:    Reason for admission: SI in the setting of meth use and alcohol use    Reason for consult: Legal hold  Requesting Physician: Javed Lopez M.D.  Supervising Physician: Dr. Jeet M.D.  Source of Information: Patient report and medical record    Legal status:  D/C'd    Chief Complaint: \"I got drunk last night after leaving [facility].\"    HPI:   Brannon Sharma is a 39 year old male with a PMH of alcohol use disorder, methamphetamine use disorder, bipolar disorder and substance induced psychosis. He presented to [facility] via [facility] with suicidal ideation with plan to overdose or jump off a bridge. Patient left [facility] yesterday after being treated for meth and alcohol for the past 6 days. Since [facility], he went right to  corner store an bought bottles of alcohol and relapsed on methamphetamines (UDS + on admit for benzos only). He blacked out on the casino floor and [employer] was called. Patient was brought to  station for intoxication but, while there he starting to endorse suicidal ideations and was brought to [facility]. Per patient, there was no new charges for last night. [facility] but, he started to have craving for alcohol and he never made it there.     Patient has long standing history of suicidal ideations. He denies any prior suicide attempts. He was last at [facility] on 2/26/2019 for SI. Currently he denies suicide and states it was only because he was intoxicated last night. He says he does not want to kill himself and stated, \"In the next 30 days it is a less than 5% that I hurt myself. It was a lapse in judgement,\". Denies HI. Denies access to guns. Denies stockpiles of medications. But, he does have family history of suicide. Per patient, his biological dad committed suicide.     Patient has history of substance abuse (meth, alcohol, heroin, pills) for many years. He has been to multiple rehabs for substance use. Patient says he now only used meth, " tobacco and alcohol. He last used meth last night which he found 'wondering the streets'. Currently, denies alcohol cravings. Does not seem to be any withdrawals at this time. Denies tremor, anxiety, sweats.     Patient says he has had many 'manic' episodes where he has been up for >7 days, not on any substances with expansive mood, abundance energy and racing thoughts. He says he was treated with lithium, Depakote, quetiapine in the past which helped with his mood stability. He also endorsed times were he is very depressed and can't get out of bed.     Patient says he has had meth induced psychosis but, when he is sober he does not have any AVH or bothered by any voices. Denies PTSD or anxiety issues.     Patient is currently homeless. He says he is newly established with '[facility]' and will follow up with them for RX scripts and appointments.     In the ED, he had slightly elevated blood pressure likely circumstantial secondary to presenting complaint. No behavioral issues. Patient on legal hold with 1:1 sitter. Continues to deny SI/HI.         Review of Systems:  Psychiatric:  Depression: Patient reports low mood, anhedonia, poor sleep, hx of SI     Bambi: Patient says he has had multiple 'manic' episodes with increased energy, impulsivity, irritability  Anxiety/Panic Attacks : Denies  PTSD symptom: Denies  Psychosis: Reports meth induced psychosis. Currently denies AVH  Other: EULA  Constitutional: Alert and oriented x 3  HEENT: pupils are equal round reactive to light   Neck: Supple  Cardiovascular: Tachycardic  Thorax & Lungs: No respiratory distress  GI: Soft nontender nondistended   Skin: Warm dry no acute rash or lesion  Musculoskeletal: Moving all extremities with full range   Neurologic: WNL    All other systems reviewed and are negative.     Psychiatric Examination: observed phenomenon:  Vitals: /72   Pulse 95   Temp 36.7 °C (98.1 °F) (Temporal)   Resp 17   Ht 1.93 m (6'  "4\")   Wt 111.1 kg (245 lb)   BMI 29.82 kg/m²  Body mass index is 29.82 kg/m².    Appearance: 40 y/o white male, poor hygiene, disheveled, good eye contact, shaved head in facility clothing  Muscle Strength/Tone: WNL  Gait/Station: Ambulates w/o assistance  Speech: Reg rate, tone, volume, syntax  Thought Process: Linear, future oriented  Associations: No loose associations  Abnormal/Psychotic Thoughts (ex): Denies AVH, delusions, paranoia  Insight/Judgement: poor/ Poor  Orientation: A&O X3  Memory: Intact  Attention/Concentration: Intact  Language: Fluent  Fund of Knowledge: Appropriate   Mood: \"Depressed\"           Affect: Constricted, dysthymic, congruent with stated mood           SI/HI: Currently denies SI/HI and says it was etOH induced and he does not want to kill himself    Neurological Testing:( ie clock, cube drawing, MMSE, MOCA,etc.) N/A       Past Psychiatric Hx:   Multiple inpatient psychiatric stays for substance use (meth, alcohol) and bipolar  Patient being followed by [facility] outpatient  Prior DX: AUD, meth use, bipolar    Per prior psych note:  SAs: denies at this time  -says that he has been diagnosed with 'a lot of things' but says that he is treated with lithium, Depakote, Quetiapine, SSRIs, MAOi, Cymbalta, gabapentin, and Seroquel. Admits having auditory and visual hallucinations in the past, not currently. Hx of alcoholism for last 10 years, longest sobriety is 40 days since his drinking has started. Currently staying in sober living with life changes and follows with [facility] for group therapy/intensive therapy 3-4x/week. Denies any self injurious behaviors.  -hx of prescription abuse with adderall, says that he remains sober now for 70 days.        Family Psychiatric Hx:  Mother: Schizophrenia  Father: Committed suicide     Social Hx:  Currently homeless. He just left [facility] but, [facility]. He has limited social support in [place]  He is " unemployed.    Legal: Pending charges for trespassing, disturbing the peace, open alcohol, He has court this week    EDU: RENE    Access to firearms: Denies    Substance Hx:  Long history of alcohol use disorder, severe. Last used yesterday  Long history of meth use. Last used yesterday.  Tobacco: 1PPD x many years  Heroin: In past  Rehab: patient reports going to many rehabs in past    Gambling: unknown    Medical Hx: labs, MARS, medications, etc were reviewed. Only those findings of potential interest to psychiatry are noted below:  Neurological:    TBIs: Denies   SZs: Denies   Strokes: Denies   Other: None  Other medical:   Thyroid: Denies   Diabetes: Denies   Cardiovascular disease: Denies  Past Medical History:   Diagnosis Date   • Alcohol-induced psychosis (HCC)    • Anxiety    • Depression    • Paranoid schizophrenia (HCC)    • Psychiatric disorder     panic attack   • Schizophrenia, paranoid (HCC)      Past Surgical History:   Procedure Laterality Date   • NASAL SEPTAL RECONSTRUCTION     • OTHER      deviated septum       Allergies:   Allergies   Allergen Reactions   • Ceclor [Cefaclor] Anaphylaxis   • Cephalosporins Anaphylaxis   • Pcn [Penicillins] Anaphylaxis       Medications:  No current facility-administered medications for this encounter.      Current Outpatient Medications   Medication Sig Dispense Refill   • Nutritional Supplements (COLD AND FLU PO) Take 30 mL by mouth as needed (For cold symptoms).     • diphenhydrAMINE (BENADRYL) 25 MG Tab Take 50 mg by mouth every 6 hours as needed for Sleep.     • gabapentin (NEURONTIN) 300 MG Cap Take 1 Cap by mouth 3 times a day. 60 Cap 3       Labs/ Testing:  Recent Results (from the past 48 hour(s))   Urine Drug Screen    Collection Time: 12/03/19 11:13 PM   Result Value Ref Range    Amphetamines Urine Negative Negative    Barbiturates Negative Negative    Benzodiazepines Positive (A) Negative    Cocaine Metabolite Negative Negative    Methadone Negative  Negative    Opiates Negative Negative    Oxycodone Negative Negative    Phencyclidine -Pcp Negative Negative    Propoxyphene Negative Negative    Cannabinoid Metab Negative Negative   POC BREATHALIZER    Collection Time: 12/03/19 11:18 PM   Result Value Ref Range    POC Breathalizer 0.16 (A) 0.00 - 0.01 Percent   POC BREATHALIZER    Collection Time: 12/04/19  6:45 AM   Result Value Ref Range    POC Breathalizer 0.023 (A) 0.00 - 0.01 Percent       No orders to display       ECG: QTc 453      ASSESSMENT:   Mr. Sharma is a 40 y/o white male with methamphetamine use disorder, alcohol use disorder and bipolar. He is on a legal hold for suicidal ideations with a plan to overdose or jump off a bridge. He recently left [facility] and immediately relapsed on methamphetamines and alcohol. He was found by [place] blacked out on a casino floor. At the police station he started to endorse SI and was brought to [facility] for evaluation. This current SI seems to be more situational and etOH induced. He is denying suicide in the ED and says he does not want to die. He wants to seek help and stay clean. He is low-moderate risk of suicide at this time. Patient does appear to have Bipolar with episodes of deena in the past. He is currently in a depressive episode. He reports meth induced psychosis but, currently denies any AVH.      Patient is usually medication nonadherent and does not follow up.     Psych:  Alcohol use disorder, severe  Stimulant use disorder, severe, amphetamine type  Bipolar I, currently depressed (by hx)    Medical:  No acute issues      PLAN:  Legal status: D/C'd    Meds:  Abilify 10mg PO Qdaily for clearer thinking, mood  Gabapentin 300mg PO TID for etOH cravings, neuropathy   Remeron 7.5mg PO QHS for sleep, mood  Trazodone 50mg PO QHS PRN for insomnia    PRNs: Benadryl 50mg PO Q6hr PRN for anxiety, agitation     Orders: None    DISPO: stabilize on meds, hopefully d/c to a bed with [facility] at '[facility]', with RX scripts.        Will follow.  Thank you for the consult.    Other:  Sitter: In pace  Phone: No  Family: No  Clothes: yes  Belongings: No

## 2019-12-04 NOTE — ED NOTES
Received report, pt resting in rm, easy, equal respirations. Pt remains calm, cooperative, under direct obs of designee.

## 2019-12-04 NOTE — CONSULTS
ALERT team BH note:  39 year old male admitted 12/3/19, legal hold, SI; ETOH 0.15; Medicaid HMO/HPN insurance plan  Psychiatric/BH evaluation completed by [facility] psychiatry team, legal hold DC'd; no SI, HI, or self-harm ideation  Medicaid HMO/HPN  insurance plan; mobile evaluation requested, completed today at 0930 by Dr López from [facility] with recommendation to DC legal hold and refer to community MH/CD resources; pt has following appts scheduled:    Medication mgmt   12/5/19 at 1000  [facility]  [address]  [place] [address]  [phone]    Counseling  12/5/19 at 0800  [facility]  [address]  [place], [place] [address] [phone]    Writer RN spoke with [facility] community care coordinator, Jenni Bishop,  [phone], who will be in to see pt prior to DC from [facility] ED    Writer RN reviewed community MH and CD resources and appts with pt, with written information given; pt verbalized understanding; pt to DC to self today

## 2019-12-04 NOTE — DISCHARGE INSTRUCTIONS
Follow instructions of lifeskills.  Return for any new medical problems or complaints.  Avoid drugs and alcohol.  Return for thoughts of depression or yourself.

## 2019-12-04 NOTE — ED NOTES
Patient awake, speaking with Psychiatric Resident at this time     Sitter remains at bedside for 1:1 continuous observation

## 2019-12-04 NOTE — ED TRIAGE NOTES
Chief Complaint   Patient presents with   • Suicidal Ideation      Pt bib pd from  longterm after pt was intoxicated in public and brought to the longterm to sober up.  Upon arrival to longterm, pt reported SI with plan to take all his pills and jump off a bridge, placed on L2k by PD.  Pt has extensive SI and drug abuse hx.  Pt reports using meth today. Breathalyzer .14.     Belongings removed. ERP to see.

## 2019-12-04 NOTE — ED TRIAGE NOTES
Patient arrives with PD and MOST team form senior care. He was released form [facility] today, he had community court tomorrow and was picked up [facility], but he was too intoxicated to enter their program. He was taken to [facility], but then he started to make claim that he was going to end his life by taking an over dose or jumping off a bridge. Legal hold place by community .

## 2019-12-04 NOTE — ED NOTES
This RN received hand off report on patient from Shailesh BATEMAN   This RN's primary care of patient began at this time
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
## 2020-11-25 ENCOUNTER — HOSPITAL ENCOUNTER (EMERGENCY)
Facility: MEDICAL CENTER | Age: 40
End: 2020-11-25
Attending: EMERGENCY MEDICINE
Payer: MEDICAID

## 2020-11-25 VITALS
TEMPERATURE: 98.6 F | RESPIRATION RATE: 18 BRPM | SYSTOLIC BLOOD PRESSURE: 144 MMHG | OXYGEN SATURATION: 92 % | HEART RATE: 96 BPM | DIASTOLIC BLOOD PRESSURE: 73 MMHG

## 2020-11-25 DIAGNOSIS — F10.920 ALCOHOLIC INTOXICATION WITHOUT COMPLICATION (HCC): ICD-10-CM

## 2020-11-25 LAB
ANION GAP SERPL CALC-SCNC: 10 MMOL/L (ref 7–16)
BASOPHILS # BLD AUTO: 1.2 % (ref 0–1.8)
BASOPHILS # BLD: 0.11 K/UL (ref 0–0.12)
BUN SERPL-MCNC: 11 MG/DL (ref 8–22)
CALCIUM SERPL-MCNC: 8.7 MG/DL (ref 8.5–10.5)
CHLORIDE SERPL-SCNC: 107 MMOL/L (ref 96–112)
CO2 SERPL-SCNC: 24 MMOL/L (ref 20–33)
CREAT SERPL-MCNC: 0.95 MG/DL (ref 0.5–1.4)
EOSINOPHIL # BLD AUTO: 0.53 K/UL (ref 0–0.51)
EOSINOPHIL NFR BLD: 5.7 % (ref 0–6.9)
ERYTHROCYTE [DISTWIDTH] IN BLOOD BY AUTOMATED COUNT: 47.8 FL (ref 35.9–50)
ETHANOL BLD-MCNC: 176.4 MG/DL (ref 0–10)
GLUCOSE SERPL-MCNC: 92 MG/DL (ref 65–99)
HCT VFR BLD AUTO: 51 % (ref 42–52)
HGB BLD-MCNC: 17.5 G/DL (ref 14–18)
IMM GRANULOCYTES # BLD AUTO: 0.02 K/UL (ref 0–0.11)
IMM GRANULOCYTES NFR BLD AUTO: 0.2 % (ref 0–0.9)
LYMPHOCYTES # BLD AUTO: 5.2 K/UL (ref 1–4.8)
LYMPHOCYTES NFR BLD: 56.3 % (ref 22–41)
MCH RBC QN AUTO: 32.2 PG (ref 27–33)
MCHC RBC AUTO-ENTMCNC: 34.3 G/DL (ref 33.7–35.3)
MCV RBC AUTO: 93.9 FL (ref 81.4–97.8)
MONOCYTES # BLD AUTO: 0.76 K/UL (ref 0–0.85)
MONOCYTES NFR BLD AUTO: 8.2 % (ref 0–13.4)
NEUTROPHILS # BLD AUTO: 2.61 K/UL (ref 1.82–7.42)
NEUTROPHILS NFR BLD: 28.4 % (ref 44–72)
NRBC # BLD AUTO: 0 K/UL
NRBC BLD-RTO: 0 /100 WBC
PLATELET # BLD AUTO: 251 K/UL (ref 164–446)
PMV BLD AUTO: 10.2 FL (ref 9–12.9)
POTASSIUM SERPL-SCNC: 4.2 MMOL/L (ref 3.6–5.5)
RBC # BLD AUTO: 5.43 M/UL (ref 4.7–6.1)
SODIUM SERPL-SCNC: 141 MMOL/L (ref 135–145)
WBC # BLD AUTO: 9.2 K/UL (ref 4.8–10.8)

## 2020-11-25 PROCEDURE — 80307 DRUG TEST PRSMV CHEM ANLYZR: CPT

## 2020-11-25 PROCEDURE — 85025 COMPLETE CBC W/AUTO DIFF WBC: CPT

## 2020-11-25 PROCEDURE — 36415 COLL VENOUS BLD VENIPUNCTURE: CPT

## 2020-11-25 PROCEDURE — 80048 BASIC METABOLIC PNL TOTAL CA: CPT

## 2020-11-25 PROCEDURE — 99284 EMERGENCY DEPT VISIT MOD MDM: CPT

## 2020-11-25 NOTE — ED NOTES
Pt left ED a/o x 4 GCS 15 ambulatory without assistance with steady gait. PIV removed with catheter intact. Pt given d/c instructions and verbalized understanding. Pt refused to sign d/c paperwork. Pt given info to call [facility] but pt stated he didn't want this RNs help calling [facility].

## 2020-11-25 NOTE — ED NOTES
Break RN:    Labs sent. Provided patient with urinal, patient unable to void at this time, pending urine sample.

## 2020-11-25 NOTE — ED PROVIDER NOTES
"ED Provider Note    CHIEF COMPLAINT  Chief Complaint   Patient presents with   • Hallucinations     Patient brought in from [facility] from [facility]. Patient reports \"I started hearing voices last Thursday\". Very difficult for patient to stay awake, patient continues to fall asleep when asking questions. Unable to obtain complete story. Per EMS, patient needs medical clearance to be admitted to [facility]. EMS explains he is very sleepy because he had not slept in days. EMS states no other complaint other than audtiory hallucinations.EMS reported patient +etoh a few hours ago.        HPI  Brannon Sharma is a 40 y.o. male who presents to emergency room today brought in by Brandin from [facility] for alcohol intoxication.  Patient found asleep, does admit to using methamphetamine in the last 24 hours alcohol level was high has history of schizophrenia.  He denies any suicidal homicidal ideation.  No chest pain or shortness of breath no other complaints at this time.    REVIEW OF SYSTEMS  See HPI for further details. All other systems are negative.     PAST MEDICAL HISTORY  Past Medical History:   Diagnosis Date   • Alcohol-induced psychosis (HCC)    • Anxiety    • Depression    • Paranoid schizophrenia (HCC)    • Psychiatric disorder     panic attack   • Schizophrenia, paranoid (HCC)        FAMILY HISTORY  Family History   Family history unknown: Yes       SOCIAL HISTORY  Social History     Socioeconomic History   • Marital status:      Spouse name: Not on file   • Number of children: Not on file   • Years of education: Not on file   • Highest education level: Not on file   Occupational History   • Not on file   Social Needs   • Financial resource strain: Not on file   • Food insecurity     Worry: Not on file     Inability: Not on file   • Transportation needs     Medical: Not on file     Non-medical: Not on file   Tobacco Use   • Smoking status: Current Every Day " Smoker     Packs/day: 1.00     Years: 12.00     Pack years: 12.00     Types: Cigarettes   • Smokeless tobacco: Never Used   Substance and Sexual Activity   • Alcohol use: Yes     Alcohol/week: 9.6 oz     Types: 16 Cans of beer per week     Frequency: 4 or more times a week     Drinks per session: 10 or more     Binge frequency: Daily or almost daily     Comment:  states 40-50 beers/d   • Drug use: Yes     Types: Inhaled     Comment: thc   • Sexual activity: Yes     Partners: Female   Lifestyle   • Physical activity     Days per week: Not on file     Minutes per session: Not on file   • Stress: Not on file   Relationships   • Social connections     Talks on phone: Not on file     Gets together: Not on file     Attends Druze service: Not on file     Active member of club or organization: Not on file     Attends meetings of clubs or organizations: Not on file     Relationship status: Not on file   • Intimate partner violence     Fear of current or ex partner: Not on file     Emotionally abused: Not on file     Physically abused: Not on file     Forced sexual activity: Not on file   Other Topics Concern   • Not on file   Social History Narrative    ** Merged History Encounter **            SURGICAL HISTORY  Past Surgical History:   Procedure Laterality Date   • NASAL SEPTAL RECONSTRUCTION     • OTHER      deviated septum       CURRENT MEDICATIONS  Home Medications    **Home medications have not yet been reviewed for this encounter**         ALLERGIES  Allergies   Allergen Reactions   • Ceclor [Cefaclor] Anaphylaxis   • Cephalosporins Anaphylaxis   • Pcn [Penicillins] Anaphylaxis       PHYSICAL EXAM  VITAL SIGNS: /73   Pulse 96   Temp 37 °C (98.6 °F) (Temporal)   Resp 18   SpO2 92%       Constitutional: Well developed, Well nourished, No acute distress, Non-toxic appearance.   HENT: Normocephalic, Atraumatic, Bilateral external ears normal, Oropharynx moist, No oral exudates, Nose normal.   Eyes: PERRLA,  EOMI, Conjunctiva normal, No discharge.   Neck: Normal range of motion, No tenderness, Supple, No stridor.   Cardiovascular: Normal heart rate, Normal rhythm, No murmurs, No rubs, No gallops.   Thorax & Lungs: Normal breath sounds, No respiratory distress, No wheezing, No chest tenderness.  Abdomen: Bowel sounds normal, Soft, No tenderness, No masses, No pulsatile masses.    Skin: Warm, Dry, No erythema, No rash.   Extremities: Intact distal pulses, No edema, No tenderness, No cyanosis, No clubbing.   Neurologic: No neurological deficits  Psychiatric: No suicidal homicidal ideation      COURSE & MEDICAL DECISION MAKING  Pertinent Labs & Imaging studies reviewed. (See chart for details)  Patient will be kept until he is awake alert ambulatory sober given referrals to alcohol treatment follow-up with his primary care physician discussed alcohol sensation.  Patient will follow up as recommended.    FINAL IMPRESSION  1.  Acute alcohol intoxication  2.  History of schizophrenia  3.      Electronically signed by: Roldan Martell D.O., 11/25/2020 5:13 AM

## 2020-11-25 NOTE — ED NOTES
Patient is resting comfortably. Breathing even and non labored, no needs expressed, will continue to monitor.

## 2020-11-25 NOTE — ED TRIAGE NOTES
"Brannon Sharma  40 y.o. male  Chief Complaint   Patient presents with   • Hallucinations     Patient brought in from [facility] from [facility]. Patient reports \"I started hearing voices last Thursday\". Very difficult for patient to stay awake, patient continues to fall asleep when asking questions. Unable to obtain complete story. Per EMS, patient needs medical clearance to be admitted to [facility]. EMS explains he is very sleepy because he had not slept in days. EMS states no other complaint other than audtiory hallucinations.EMS reported patient +etoh a few hours ago.        Vitals:    11/25/20 0103   BP: 114/74   Pulse: 85   Resp: 18   Temp: 37 °C (98.6 °F)   SpO2: 92%       "

## 2020-12-10 ENCOUNTER — HOSPITAL ENCOUNTER (EMERGENCY)
Facility: MEDICAL CENTER | Age: 40
End: 2020-12-11
Attending: EMERGENCY MEDICINE
Payer: MEDICAID

## 2020-12-10 ENCOUNTER — HOSPITAL ENCOUNTER (EMERGENCY)
Facility: MEDICAL CENTER | Age: 40
End: 2020-12-10
Attending: EMERGENCY MEDICINE | Admitting: EMERGENCY MEDICINE
Payer: MEDICAID

## 2020-12-10 VITALS
TEMPERATURE: 98.2 F | WEIGHT: 279.98 LBS | SYSTOLIC BLOOD PRESSURE: 176 MMHG | OXYGEN SATURATION: 100 % | DIASTOLIC BLOOD PRESSURE: 108 MMHG | HEIGHT: 76 IN | BODY MASS INDEX: 34.09 KG/M2 | HEART RATE: 119 BPM | RESPIRATION RATE: 21 BRPM

## 2020-12-10 DIAGNOSIS — R45.851 SUICIDAL IDEATION: ICD-10-CM

## 2020-12-10 DIAGNOSIS — F15.10 METHAMPHETAMINE ABUSE (HCC): ICD-10-CM

## 2020-12-10 DIAGNOSIS — F19.10 POLYSUBSTANCE ABUSE (HCC): ICD-10-CM

## 2020-12-10 DIAGNOSIS — F33.1 MODERATE EPISODE OF RECURRENT MAJOR DEPRESSIVE DISORDER (HCC): ICD-10-CM

## 2020-12-10 LAB
AMPHET UR QL SCN: POSITIVE
BARBITURATES UR QL SCN: NEGATIVE
BENZODIAZ UR QL SCN: NEGATIVE
BZE UR QL SCN: NEGATIVE
CANNABINOIDS UR QL SCN: NEGATIVE
EKG IMPRESSION: NORMAL
METHADONE UR QL SCN: NEGATIVE
OPIATES UR QL SCN: NEGATIVE
OXYCODONE UR QL SCN: NEGATIVE
PCP UR QL SCN: NEGATIVE
POC BREATHALIZER: 0 PERCENT (ref 0–0.01)
PROPOXYPH UR QL SCN: NEGATIVE

## 2020-12-10 PROCEDURE — 80307 DRUG TEST PRSMV CHEM ANLYZR: CPT

## 2020-12-10 PROCEDURE — 700102 HCHG RX REV CODE 250 W/ 637 OVERRIDE(OP): Performed by: EMERGENCY MEDICINE

## 2020-12-10 PROCEDURE — 99285 EMERGENCY DEPT VISIT HI MDM: CPT

## 2020-12-10 PROCEDURE — 93005 ELECTROCARDIOGRAM TRACING: CPT

## 2020-12-10 PROCEDURE — 93005 ELECTROCARDIOGRAM TRACING: CPT | Performed by: EMERGENCY MEDICINE

## 2020-12-10 PROCEDURE — 302970 POC BREATHALIZER

## 2020-12-10 PROCEDURE — 90791 PSYCH DIAGNOSTIC EVALUATION: CPT

## 2020-12-10 PROCEDURE — 302970 POC BREATHALIZER: Performed by: EMERGENCY MEDICINE

## 2020-12-10 PROCEDURE — A9270 NON-COVERED ITEM OR SERVICE: HCPCS | Performed by: EMERGENCY MEDICINE

## 2020-12-10 RX ORDER — OLANZAPINE 5 MG/1
10 TABLET ORAL ONCE
Status: COMPLETED | OUTPATIENT
Start: 2020-12-10 | End: 2020-12-10

## 2020-12-10 RX ORDER — DIAZEPAM 5 MG/1
10 TABLET ORAL ONCE
Status: COMPLETED | OUTPATIENT
Start: 2020-12-10 | End: 2020-12-10

## 2020-12-10 RX ADMIN — OLANZAPINE 10 MG: 5 TABLET, FILM COATED ORAL at 09:27

## 2020-12-10 RX ADMIN — DIAZEPAM 10 MG: 5 TABLET ORAL at 05:55

## 2020-12-10 NOTE — DISCHARGE PLANNING
ALERT team  note:  40 year old male BIB EMS this AM from a casio d/t passive SI, Methamphetamine use; UDS + Amphetamines; voluntary pt; Medicaid HPN insurance plan; pt received Valium 10 mg PO today at 0555, Olanzapine 10 mg PO today at 0927; pt alert, oriented x 4; anxious but cooperative; with organized thoughts and behaviors; insight, judgment intact; currently denies SI, HI, or self-harm ideation and states wants MH, CD, and housing support as is chronically homeless; future-oriented; current substance use includes Methamphetamines daily, smoking with last use 12/9/2020, ETOH daily 3 shots liquor with last use 12/9/2020, and THC monthly with last use 11/2020; denies current outpt MH providers; writer BATEMAN reviewed community MH resources with  pt, with written information given, including [facility]; [facility] ([facility]), Medicaid HPN appt phone #; writer RN spoke with CHELY Alvarado at [facility], [phone], to schedule DC f/u appt for today at [address] [place] [address] office; pt verbalized understanding; writer RN updated [facility] ERP Dr. Romero; pt to DC to self today with f/u appt with service coordination at [facility]; stephanier RN walked pt to appt with [facility] security ([facility] is 1 block up from [facility] ED)    Telephone pdate from CHELY Alvarado at [facility], [phone], pt has been placed in emergency housing today and pt has a scheduled outpt psychiatry appt for tomorrow 12/11/2020

## 2020-12-10 NOTE — ED NOTES
Patient escorted to [facility] by behavioral health screener.

## 2020-12-10 NOTE — ED PROVIDER NOTES
"ED Provider Note    Scribed for Cayden Romero M.D. by Jose Murray. 12/10/2020  5:08 AM    Primary care provider: Gregory Hardin M.D.  Means of arrival: Ambulance  History obtained from: Patient  History limited by: None    CHIEF COMPLAINT  Chief Complaint   Patient presents with   • Drug Abuse   • Psych Eval     HPI  Brannon Sharma is a 40 y.o. male who presents to the Emergency Department by ambulance for evaluation of suicidal ideation onset prior to arrival. He says he has a plan to \"take all of my pills\". When asked why he was having suicidal ideation, he says \"there is a lot going on in my life right now\". He admits to additional symptoms of hallucinations (resolved), but denies any trauma or headache. He denies alleviating factors. He has history of meth abuse.     PPE Note: I personally donned PPE for all patient encounters during this visit, including being clean-shaven with a KN95 mask, gloves, and eye protection.     Scribe remained outside the patient's room and did not have any contact with the patient for the duration of patient encounter.       REVIEW OF SYSTEMS  Pertinent positives include suicidal ideation, hallucinations.   Pertinent negatives include no trauma or headache.    All other systems reviewed and negative. See HPI for further details.     PAST MEDICAL HISTORY   has a past medical history of Alcohol-induced psychosis (HCC), Anxiety, Depression, Paranoid schizophrenia (HCC), Psychiatric disorder, and Schizophrenia, paranoid (HCC).    SURGICAL HISTORY   has a past surgical history that includes other and nasal septal reconstruction.    SOCIAL HISTORY  Social History     Tobacco Use   • Smoking status: Current Every Day Smoker     Packs/day: 1.00     Years: 12.00     Pack years: 12.00     Types: Cigarettes   • Smokeless tobacco: Never Used   Substance Use Topics   • Alcohol use: Yes     Alcohol/week: 9.6 oz     Types: 16 Cans of beer per week     Frequency: 4 or more times " "a week     Drinks per session: 10 or more     Binge frequency: Daily or almost daily     Comment:  states 40-50 beers/d   • Drug use: Yes     Types: Inhaled     Comment: thc, meth      Social History     Substance and Sexual Activity   Drug Use Yes   • Types: Inhaled    Comment: thc, meth       FAMILY HISTORY  Family History   Family history unknown: Yes       CURRENT MEDICATIONS  Current Outpatient Medications   Medication Instructions   • diphenhydrAMINE (BENADRYL) 50 mg, Oral, EVERY 6 HOURS PRN   • gabapentin (NEURONTIN) 300 mg, Oral, 3 TIMES DAILY   • Nutritional Supplements (COLD AND FLU PO) 30 mL, Oral, PRN     ALLERGIES  Allergies   Allergen Reactions   • Ceclor [Cefaclor] Anaphylaxis   • Cephalosporins Anaphylaxis   • Pcn [Penicillins] Anaphylaxis       PHYSICAL EXAM  VITAL SIGNS: /97   Pulse (!) 127   Temp 36.8 °C (98.2 °F)   Resp 18   Ht 1.94 m (6' 4.38\")   Wt (!) 127 kg (279 lb 15.8 oz)   SpO2 97%   BMI 33.74 kg/m²     Nursing note and vitals reviewed.  Constitutional: Well-developed and well-nourished. Mild distress.   HENT: Head is normocephalic and atraumatic. Oropharynx is clear and moist without exudate or erythema.   Eyes: Pupils are equal, round, and reactive to light. Conjunctiva are normal.   Cardiovascular: Tachycardic rate and regular rhythm. No murmur heard. Normal radial pulses.  Pulmonary/Chest: Breath sounds normal. No wheezes or rales.   Abdominal: Soft and non-tender. No distention    Musculoskeletal: Extremities exhibit normal range of motion without edema or tenderness.   Neurological: Awake, alert and oriented to person, place, and time. No focal deficits noted.  Skin: Skin is warm and dry. No rash.   Psychiatric:  Somewhat anxious and agitated. Confirmed SI with plan to overdose on medication.     DIAGNOSTIC STUDIES / PROCEDURES    EKG Interpretation  Interpreted by me as below    LABS  Results for orders placed or performed during the hospital encounter of 12/10/20 "   URINE DRUG SCREEN   Result Value Ref Range    Amphetamines Urine Positive (A) Negative    Barbiturates Negative Negative    Benzodiazepines Negative Negative    Cocaine Metabolite Negative Negative    Methadone Negative Negative    Opiates Negative Negative    Oxycodone Negative Negative    Phencyclidine -Pcp Negative Negative    Propoxyphene Negative Negative    Cannabinoid Metab Negative Negative   POC BREATHALIZER   Result Value Ref Range    POC Breathalizer 0.000 0.00 - 0.01 Percent   EKG   Result Value Ref Range    Report       [facility] Emergency Dept.    Test Date:  2020-12-10  Pt Name:    OSITO BLACK               Department: ER  MRN:        [id number]                      Room:       [facility]  Gender:     Male                         Technician: [id number]  :        1980                   Requested By:ER TRIAGE PROTOCOL  Order #:    [id number]                    Reading MD: MARY JAFFE MD    Measurements  Intervals                                Axis  Rate:       125                          P:          44  MO:         148                          QRS:        181  QRSD:       98                           T:          40  QT:         310  QTc:        447    Interpretive Statements  Sinus tachycardia  Consider right ventricular hypertrophy  Compared to ECG 2018 08:07:57  Sinus rhythm no longer present  Incomplete right bundle-branch block no longer present  Electronically Signed On 12- 5:33:51 PST by MARY JAFFE MD        All labs reviewed by me.    COURSE & MEDICAL DECISION MAKING  Nursing notes, VS, PMSFHx reviewed in chart.     5:08 AM - Patient seen and examined at bedside. Patient will be treated with Valium 10 mg tab. I informed the patient of my plan to run diagnostic studies to evaluate their symptoms including labs and EKG. Behavioral Health will consult. Patient verbalizes understanding and support with my plan of care. Ordered Urine Drug Screen, POC  breathalyzer and EKG to evaluate his symptoms.     9:20 AM - The patient will be medicated with Zyprexa 10 mg tab.     10:54 AM - I reevaluated the patient at bedside. The patient informs me they feel improved following Valium and Zyprexa administration. I discussed plan for discharge to [facility] and follow up as outlined below. The patient is stable for discharge at this time and will return for any new or worsening symptoms. Patient verbalizes understanding and support with my plan for discharge to [facility].      The patient will return for new or worsening symptoms and is stable at the time of discharge.    The patient is referred to a primary physician for blood pressure management, diabetic screening, and for all other preventative health concerns.    DISPOSITION:  Patient will be discharged to [facility] in stable condition.    FOLLOW UP:  Gregory Hardin M.D.  [address] [address]  [phone]    Schedule an appointment as soon as possible for a visit       [facility], Emergency Dept  [address] [place] [id number] [phone]    If symptoms worsen    FINAL IMPRESSION  1. Methamphetamine abuse (HCC)    2. Suicidal ideation        IJose (Paul), am scribing for, and in the presence of, Cayden Romero M.D..    Electronically signed by: Jose Murray (Paul), 12/10/2020    Cayden CABA M.D. personally performed the services described in this documentation, as scribed by Jose Murray in my presence, and it is both accurate and complete.    The note accurately reflects work and decisions made by me.  Cayden Romero M.D.  12/10/2020  11:23 AM

## 2020-12-10 NOTE — ED NOTES
"Pt states \"I tried to kill myself once before I almost jumped off a bridge\" when asked how he he failed, pt visibly thinking about story, states \"well I was hallucinating and I guess I changed my mind\" pt does not recall which bridge he was going to jump off of.  Pt's hands discolored, with black subtance  "

## 2020-12-10 NOTE — ED TRIAGE NOTES
"Pt was found acting erratically at [facility] security called pd who called EMS, pt smoked meth prior to [facility] trip, pt denies SI or HI with EMS, claimed auditory hallucinations, now upon arrival, during trage screening pt states \"I want to be transferred to [facility]\" pt now states he is suicidal and has been thinking about taking his pills, pt denies auditory hallucinations now  "

## 2020-12-10 NOTE — CONSULTS
"[facility]   TRIAGE ASSESSMENT    Name: Brannon Sharma  MRN: [id number]  : 1980  Age: 40 y.o.  Date of assessment: 12/10/2020  PCP: Gregory Hardin M.D.  Persons in attendance: Patient    CHIEF COMPLAINT/PRESENTING ISSUE : 40 year old man sitting up in hospital bed wearing hospital garb with some dark substance discoloring his hands. He states: \" Mamm, I am going to kill myself.\"  The pt is technically sober as regards etoh, yet his BP is 150/ 92 and heart rate up has gone  to 128-- may be due to meth intoxication.  He admits he smokes meth.  He is breathing rapidly and shallowly and interview may be incomplete for these reasons.  He has extensive [facility] visit record since at least  with no consults for Alert  Team. There is a note in 2016 that pt has several psychiatric diagnoses.   The pt. does state now that he feels \"People are following me.\"  Chief Complaint   Patient presents with   • Drug Abuse   • Psych Eval        CURRENT LIVING SITUATION/SOCIAL SUPPORT: \"I live in the streets.\"    BEHAVIORAL HEALTH TREATMENT HISTORY  Does patient/parent report a history of prior behavioral health treatment for patient?   No: The patient does not answer this question.    SAFETY ASSESSMENT - SELF  Does patient acknowledge current or past symptoms of dangerousness to self? no  Does parent/significant other report patient has current or past symptoms of dangerousness to self? N\A  Does presenting problem suggest symptoms of dangerousness to self? Yes:     Past Current    Suicidal Thoughts: []  [x]    Suicidal Plans: []  []    Suicidal Intent: []  []    Suicide Attempts: []  []    Self-Injury []  []      For any boxes checked above, provide detail: see Chief Complaint    History of suicide by family member: no  History of suicide by friend/significant other: yes - \"My friend Endy took too much of his insulin and he .\"  Recent change in frequency/specificity/intensity of suicidal thoughts " or self-harm behavior? yes -   Current access to firearms, medications, or other identified means of suicide/self-harm? no  If yes, willing to restrict access to means of suicide/self-harm? N/A  Protective factors present:  Future-oriented    SAFETY ASSESSMENT - OTHERS  Does patient acknowledge current or past symptoms of aggressive behavior or risk to others? no  Does parent/significant other report patient has current or past symptoms of aggressive behavior or risk to others?  no  Does presenting problem suggest symptoms of dangerousness to others? Yes:    History Current   Thoughts of injuring others? []  []    Threats to injure others? []  []    Plan to injure others? []  []    Intent to injure others? []  []    Has injured others? []  []    Thoughts of killing others? []  []    Threats to kill others? []  []    Plans to kill others? []  []    Intent to kill others? []  []    Has killed others? []  []    Perpetrator of sexual assault? []  []    Family history of homicide? []  []      For any boxes checked above, please provide detail:       Recent change in frequency/specificity/intensity of thoughts or threats to harm others? no  Current access to firearms/other identified means of harm? no  If yes, willing to restrict access to weapons/means of harm? no  Protective factors present: Low rumination/obsession  Based on information provided during the current assessment, is a mandated “duty to warn” being exercised? No    Crisis Safety Plan completed and copy given to patient? N\A    ABUSE/NEGLECT SCREENING  Does patient report feeling “unsafe” in his/her home, or afraid of anyone?  no  Does patient report any history of physical, sexual, or emotional abuse?  no  Does parent or significant other report any of the above? N\A  Is there evidence of neglect by self?  yes  Is there evidence of neglect by a caregiver? no  Does the patient/parent report any history of CPS/APS/police involvement related to suspected  "abuse/neglect or domestic violence? no  Based on the information provided during the current assessment, is a mandated report of suspected abuse/neglect being made?  No    SUBSTANCE USE SCREENING  Yes:  [place]ier all substances used in the past 30 days:      Last Use Amount   [x]   Alcohol 12/10    []   Marijuana     []   Heroin     []   Prescription Opioids  (used without prescription, for    recreation, or in excess of prescribed amount)     []   Other Prescription  (used without prescription, for    recreation, or in excess of prescribed amount)     []   Cocaine      [x]   Methamphetamine 12/10    []   \"\" drugs (ectasy, MDMA)     []   Other substances        UDS results: Meth   Breathalyzer results: 0.03    What consequences does the patient associate with any of the above substance use and or addictive behaviors? None    Risk factors for detox (check all that apply):  []  Seizures   []  Diaphoretic (sweating)   []  Tremors   []  Hallucinations   []  Increased blood pressure   []  Decreased blood pressure   []  Other   []  None      [] Patient education on risk factors for detoxification and instructed to return to ER as needed.      MENTAL STATUS   Participation: Active verbal participation  Grooming: Disheveled  Orientation: Intoxicated  Behavior: Hyperactive  Eye contact: Good  Mood: Anxious  Affect: Constricted  Thought process: Goal-directed  Thought content: Preoccupation  Speech: Rapid  Perception: Focus is on killing himslef  Memory:  No gross evidence of memory deficits  Insight: Adequate  Judgment:  Adequate  Other:    Collateral information:   Source:  [] Significant other present in person:   [] Significant other by telephone  [] [facility]   [] [facility] Nursing Staff  [x] [facility] Medical Record  [] Other:     [] Unable to complete full assessment due to:  [x] Acute intoxication  [] Patient declined to participate/engage  [] Patient verbally unresponsive  [] Significant cognitive deficits  [] " Significant perceptual distortions or behavioral disorganization  [] Other:      CLINICAL IMPRESSIONS:  Primary: ETOH Dependence  Secondary:  Drug Abuse /Meth Intox      IDENTIFIED NEEDS/PLAN:  [Trigger DISPOSITION list for any items marked]    []  Imminent safety risk - self [] Imminent safety risk - others   []  Acute substance withdrawal []  Psychosis/Impaired reality testing   []  Mood/anxiety []  Substance use/Addictive behavior   []  Maladaptive behaviro []  Parent/child conflict   []  Family/Couples conflict []  Biomedical   []  Housing []  Financial   []   Legal  Occupational/Educational   []  Domestic violence [x]  Other:  +SI at this time     Recommended Plan of Care:  Defer  Until patient less stimulated.  Does patient express agreement with the above plan? yes     Referral appointment(s) scheduled? N\A    Alert team only:   I have discussed findings and recommendations with Dr. Romero who agrees that pt should be allowed to calm from po valium.              Rica Corona, R.N.  12/10/2020

## 2020-12-11 VITALS
HEIGHT: 76 IN | WEIGHT: 270 LBS | HEART RATE: 97 BPM | RESPIRATION RATE: 16 BRPM | DIASTOLIC BLOOD PRESSURE: 74 MMHG | OXYGEN SATURATION: 97 % | TEMPERATURE: 97.4 F | SYSTOLIC BLOOD PRESSURE: 139 MMHG | BODY MASS INDEX: 32.88 KG/M2

## 2020-12-11 LAB
ALBUMIN SERPL BCP-MCNC: 4.2 G/DL (ref 3.2–4.9)
ALBUMIN/GLOB SERPL: 1.4 G/DL
ALP SERPL-CCNC: 100 U/L (ref 30–99)
ALT SERPL-CCNC: 76 U/L (ref 2–50)
ANION GAP SERPL CALC-SCNC: 14 MMOL/L (ref 7–16)
AST SERPL-CCNC: 42 U/L (ref 12–45)
BILIRUB SERPL-MCNC: 1.1 MG/DL (ref 0.1–1.5)
BUN SERPL-MCNC: 11 MG/DL (ref 8–22)
CALCIUM SERPL-MCNC: 9.1 MG/DL (ref 8.5–10.5)
CHLORIDE SERPL-SCNC: 105 MMOL/L (ref 96–112)
CO2 SERPL-SCNC: 20 MMOL/L (ref 20–33)
COVID ORDER STATUS COVID19: NORMAL
CREAT SERPL-MCNC: 0.93 MG/DL (ref 0.5–1.4)
ETHANOL BLD-MCNC: 116.6 MG/DL (ref 0–10)
ETHANOL BLD-MCNC: 12.1 MG/DL (ref 0–10)
FLUAV RNA SPEC QL NAA+PROBE: NEGATIVE
FLUBV RNA SPEC QL NAA+PROBE: NEGATIVE
GLOBULIN SER CALC-MCNC: 2.9 G/DL (ref 1.9–3.5)
GLUCOSE BLD-MCNC: 100 MG/DL (ref 65–99)
GLUCOSE SERPL-MCNC: 100 MG/DL (ref 65–99)
POC BREATHALIZER: 0.07 PERCENT (ref 0–0.01)
POTASSIUM SERPL-SCNC: 4 MMOL/L (ref 3.6–5.5)
PROT SERPL-MCNC: 7.1 G/DL (ref 6–8.2)
RSV RNA SPEC QL NAA+PROBE: NEGATIVE
SARS-COV-2 RNA RESP QL NAA+PROBE: NOTDETECTED
SODIUM SERPL-SCNC: 139 MMOL/L (ref 135–145)
SPECIMEN SOURCE: NORMAL

## 2020-12-11 PROCEDURE — 0241U HCHG SARS-COV-2 COVID-19 NFCT DS RESP RNA 4 TRGT MIC: CPT

## 2020-12-11 PROCEDURE — 302970 POC BREATHALIZER

## 2020-12-11 PROCEDURE — 80053 COMPREHEN METABOLIC PANEL: CPT

## 2020-12-11 PROCEDURE — C9803 HOPD COVID-19 SPEC COLLECT: HCPCS | Performed by: EMERGENCY MEDICINE

## 2020-12-11 PROCEDURE — 82962 GLUCOSE BLOOD TEST: CPT

## 2020-12-11 PROCEDURE — 90791 PSYCH DIAGNOSTIC EVALUATION: CPT

## 2020-12-11 PROCEDURE — 80307 DRUG TEST PRSMV CHEM ANLYZR: CPT

## 2020-12-11 PROCEDURE — 302970 POC BREATHALIZER: Performed by: EMERGENCY MEDICINE

## 2020-12-11 ASSESSMENT — LIFESTYLE VARIABLES: DO YOU DRINK ALCOHOL: YES

## 2020-12-11 NOTE — CONSULTS
"[facility]   TRIAGE ASSESSMENT    Name: Brannon Sharma  MRN: [id number]  : 1980  Age: 40 y.o.  Date of assessment: 2020  PCP: Gregory Hardin M.D.  Persons in attendance: Patient    CHIEF COMPLAINT/PRESENTING ISSUE (as stated by patient): 40 neil old male BIB PD this AM, legal hold, SI; pt with [facility] ED visit yesterday, seen by writer RN, with DC to self to [facility]; as noted yesterday \"pt to DC to self today with f/u appt with service coordination at [facility]; writer RN walked pt [facility] ([facility] is 1 block up from [facility] ED); telephone pdate from CHELY Alvarado at [facility], [phone], pt has been placed in emergency housing today and pt has a scheduled outpt psychiatry appt for tomorrow 2020\"; pt with current substance use including Methamphetamines daily, smoking with last use 2020, ETOH daily 6 beers last use 12/10/2020, and THC monthly with last use 2020; pt states he left the housing provided by [facility] last night as he did not feel \"safe\" and wanted to drink ETOH; today,  pt alert, oriented x 4; calm; cooperative; pleasant; with organized thoughts and behaviors; no delusions, hallucinations noted; with chronic, generalized paranoia;  insight, judgment adequate; currently denies SI, HI, or self-harm ideation; states he can f/u with his outpt psychiatry appt today at [facility], can walk there, and can have  CHELY Alvarado at [facility], notify writer RN he showed up for his appt.; pt also states he would like long-term sober living and has talked with Earnest at the [facility] rehab re: admission to this program; pt states the program is requesting a Covid screening for admission; pt is homeless; unemployed; no source of  financial income and states he \"stole\" the beer he drank last night; willing to accept MH and CD support  Noted psychiatric diagnoses include Alcohol use disorder severe, Stimulant use disorder sever amphetamine " "type, Bipolar Type I D/O,  Anxiety, Depression, Paranoid schizophrenia    Writer RN spoke with CHELY Alvarado at [facility], [phone], re: current pt status and DC planning to f/u with [facility] today for scheduled appt; per Gemma, pt has a psychiatry appt at [facility] today at 1300 and transport can be provided; Gemma states her team can contact the [facility] rehab to assist pt with possible admission; Gemma to contact writer RN when pt arrives for his appt today        Chief Complaint   Patient presents with   • Suicidal Ideation     pt stating \"I will jump off a bridge if I leave here\"   • Legal 2000        CURRENT LIVING SITUATION/SOCIAL SUPPORT:  pt is homeless    BEHAVIORAL HEALTH TREATMENT HISTORY  Does patient/parent report a history of prior behavioral health treatment for patient?   Yes:    Dates Level of Care Facilty/Provider Diagnosis/Problem Medications   12/10/2020 outpt   CHELY Alvarado at [facility] Alcohol use disorder severe, Stimulant use disorder sever amphetamine type, Bipolar Type I D/O,  Anxiety, Depression, Paranoid schizophrenia    Olanzapine    12/2019 outpt [facility], psychiatry     12/2019 outpt [facility]        8/2019 inpt [facility] detox Left AMA after 2 days      2016 [facility]pt [facility]     2015 inpt [facility]     2014 inpt  [facility]         SAFETY ASSESSMENT - SELF  Does patient acknowledge current or past symptoms of dangerousness to self? Yes-verbalizing passive SI yesterday and today with Amphetamine and ETOH use  Does parent/significant other report patient has current or past symptoms of dangerousness to self? N\A  Does presenting problem suggest symptoms of dangerousness to self? Yes:     Past Current    Suicidal Thoughts: [x]  []    Suicidal Plans: [x]  []    Suicidal Intent: []  []    Suicide Attempts: []  []    Self-Injury []  []      For any boxes checked above, provide detail: verbalizing passive SI yesterday and today with Amphetamine and " "ETOH use    History of suicide by family member: no  History of suicide by friend/significant other: no  Recent change in frequency/specificity/intensity of suicidal thoughts or self-harm behavior? yes - 2 days with substance use  Current access to firearms, medications, or other identified means of suicide/self-harm? no  If yes, willing to restrict access to means of suicide/self-harm? yes - no guns or weapons  Protective factors present:  Future-oriented, Hopefulness, Optimism, Positive self-efficacy and Willing to address in treatment    SAFETY ASSESSMENT - OTHERS  Does patient acknowledge current or past symptoms of aggressive behavior or risk to others? Yes-as noted in [facility] EMR \"Pt has multiple episodes in his hx of being physically aggressive with staff, requiring restraint\"  Does parent/significant other report patient has current or past symptoms of aggressive behavior or risk to others?  N\A  Does presenting problem suggest symptoms of dangerousness to others? No    Crisis Safety Plan completed and copy given to patient? No-pt refused    ABUSE/NEGLECT SCREENING  Does patient report feeling “unsafe” in his/her home, or afraid of anyone?  Yes-chronic generalized paranoia  Does patient report any history of physical, sexual, or emotional abuse?  no  Does parent or significant other report any of the above? N\A  Is there evidence of neglect by self?  yes  Is there evidence of neglect by a caregiver? no  Does the patient/parent report any history of CPS/APS/police involvement related to suspected abuse/neglect or domestic violence? no  Based on the information provided during the current assessment, is a mandated report of suspected abuse/neglect being made?  No    SUBSTANCE USE SCREENING  Yes:  [place] all substances used in the past 30 days:      Last Use Amount   [x]   Alcohol 12/10/2020 6 beers   [x]   Marijuana 11/2020    []   Heroin     []   Prescription Opioids  (used without prescription, for    recreation, " "or in excess of prescribed amount)     []   Other Prescription  (used without prescription, for    recreation, or in excess of prescribed amount)     []   Cocaine      [x]   Methamphetamine 12/9/2020    []   \"\" drugs (ectasy, MDMA)     []   Other substances        UDS results: + Amphetamines  Breathalyzer results: 0.012    What consequences does the patient associate with any of the above substance use and or addictive behaviors? Health problems:     Risk factors for detox (check all that apply):  []  Seizures   []  Diaphoretic (sweating)   []  Tremors   []  Hallucinations   []  Increased blood pressure   []  Decreased blood pressure   []  Other   [x]  None      [] Patient education on risk factors for detoxification and instructed to return to ER as needed.      MENTAL STATUS   Participation: Active verbal participation, Attentive, Engaged and Open to feedback  Grooming: Casual and Disheveled  Orientation: Alert and Fully Oriented  Behavior: Calm  Eye contact: Good  Mood: Depressed  Affect: Constricted, Blunted and Flat  Thought process: Logical, Goal-directed and Circumstantial  Thought content: Within normal limits  Speech: Rate within normal limits and Volume within normal limits  Perception: Within normal limits  Memory:  No gross evidence of memory deficits  Insight: Adequate  Judgment:  Adequate  Other:    Collateral information:   Source:  [] Significant other present in person:   [] Significant other by telephone  [] [facility]   [x] [facility] Nursing Staff  [x] [facility]  [x] Other:   CHELY Avlarado at [facility], [phone]    [] Unable to complete full assessment due to:  [] Acute intoxication  [] Patient declined to participate/engage  [] Patient verbally unresponsive  [] Significant cognitive deficits  [] Significant perceptual distortions or behavioral disorganization  [] Other:      CLINICAL IMPRESSIONS:  Primary:  Alcohol use d/o by history  Secondary:  Schizophrenia by " history       IDENTIFIED NEEDS/PLAN:  [Trigger DISPOSITION list for any items marked]    []  Imminent safety risk - self [] Imminent safety risk - others   []  Acute substance withdrawal []  Psychosis/Impaired reality testing   [x]  Mood/anxiety [x]  Substance use/Addictive behavior   [x]  Maladaptive behaviro []  Parent/child conflict   []  Family/Couples conflict []  Biomedical   [x]  Housing [x]  Financial   []   Legal  Occupational/Educational   []  Domestic violence []  Other:     Recommended Plan of Care:  Refer to [facility], [facility], 12 Step program: WALTER MCKEON mt and [facility], [facility] rehab; Medicaid HPN insurance plan; writer RN reviewed community  resources with written information given; as noted, write rRN spoke with CHELY Alvarado at [facility], [phone] re DC planning and f/u at [facility] today for scheduled outpt psychiatry appt; writer RN also reviewed [facility] transporation service with pt; pt verbalized understanding; pt to DC to self today to f/u at [facility]  Covid screening requested, completed while pt in the ED; negative Sars-Cov-2 result    Later today, CHELY Alvarado at [facility], notified writer RN pt was on site at their office for his scheduled appt    Does patient express agreement with the above plan? yes    Referral appointment(s) scheduled? Yes-[facility] and [facility] today at 1300    Alert team only:   I have discussed findings and recommendations with Dr. Zhang who is in agreement with these recommendations. Legal hold DC'd    Referral information sent to the following community providers : [facility] fax #[phone]    If applicable : Referred  to : WALTER Morrison R.N.  12/11/2020

## 2020-12-11 NOTE — ED NOTES
Med Rec completed per patient   Allergies reviewed  No ORAL antibiotics in last 14 days    Patient states that he hasn't taken any daily medications in months

## 2020-12-11 NOTE — ED PROVIDER NOTES
"ED Provider Note        Primary care provider: Gregory Hardin M.D.    I verified that the patient was wearing a mask and I was wearing appropriate PPE every time I entered the room. The patient's mask was on the patient at all times during my encounter except for a brief view of the oropharynx.      CHIEF COMPLAINT  Chief Complaint   Patient presents with   • Suicidal Ideation     pt stating \"I will jump off a bridge if I leave here\"   • Legal 2000       HPI  Brannon Sharma is a 40 y.o. male who presents to the Emergency Department with chief complaint of suicidal ideation.  Patient states that he has had several life stresses which he refuses to give details and states of jump off a bridge if he leaves here.  Patient admits to drinking several beers as well as some moderate amount of wine this evening also admits to methamphetamine.  Patient will answer 1-2 questions and then closes his eyes and turns away further history of present illness limited by altered mental status and patient compliance    REVIEW OF SYSTEMS  As per HPI otherwise limited by altered mental status patient compliant    PAST MEDICAL HISTORY   has a past medical history of Alcohol-induced psychosis (HCC), Anxiety, Depression, Paranoid schizophrenia (HCC), Psychiatric disorder, and Schizophrenia, paranoid (HCC).    SURGICAL HISTORY   has a past surgical history that includes other and nasal septal reconstruction.    SOCIAL HISTORY  Social History     Tobacco Use   • Smoking status: Current Every Day Smoker     Packs/day: 1.00     Years: 12.00     Pack years: 12.00     Types: Cigarettes   • Smokeless tobacco: Never Used   Substance Use Topics   • Alcohol use: Yes     Alcohol/week: 9.6 oz     Types: 16 Cans of beer per week     Frequency: 4 or more times a week     Drinks per session: 10 or more     Binge frequency: Daily or almost daily     Comment:  states 40-50 beers/d   • Drug use: Yes     Types: Inhaled     Comment: thc, meth    " "  Social History     Substance and Sexual Activity   Drug Use Yes   • Types: Inhaled    Comment: thc, meth       FAMILY HISTORY  Non-Contributory    CURRENT MEDICATIONS  Home Medications    **Home medications have not yet been reviewed for this encounter**         ALLERGIES  Allergies   Allergen Reactions   • Ceclor [Cefaclor] Anaphylaxis   • Cephalosporins Anaphylaxis   • Pcn [Penicillins] Anaphylaxis       PHYSICAL EXAM  VITAL SIGNS: /81   Pulse 95   Temp 36.1 °C (96.9 °F) (Temporal)   Resp 18   Ht 1.93 m (6' 4\")   Wt 122.5 kg (270 lb)   SpO2 99%   BMI 32.87 kg/m²   Pulse ox interpretation: I interpret this pulse ox as normal.  Constitutional: Alert and oriented x 3, minimal distress  HEENT: Atraumatic normocephalic, pupils are equal round reactive to light extraocular movements are intact. The nares is clear, external ears are normal, mouth shows moist mucous membranes  Neck: Supple, no JVD no tracheal deviation  Cardiovascular: Regular rate and rhythm no murmur rub or gallop 2+ pulses peripherally x4  Thorax & Lungs: No respiratory distress, no wheezes rales or rhonchi, No chest tenderness.   GI: Soft nontender nondistended positive bowel sounds, no peritoneal signs  Skin: Warm dry no acute rash or lesion  Musculoskeletal: Moving all extremities with full range and 5 of 5 strength, no acute  deformity  Neurologic: Cranial nerves III through XII are grossly intact, no sensory deficit, no cerebellar dysfunction   Psychiatric: Withdrawn avoids direct eye contact    DIAGNOSTIC STUDIES / PROCEDURES  LABS      Results for orders placed or performed during the hospital encounter of 12/10/20   DIAGNOSTIC ALCOHOL   Result Value Ref Range    Diagnostic Alcohol 116.6 (H) 0.0 - 10.0 mg/dL   Comp Metabolic Panel   Result Value Ref Range    Sodium 139 135 - 145 mmol/L    Potassium 4.0 3.6 - 5.5 mmol/L    Chloride 105 96 - 112 mmol/L    Co2 20 20 - 33 mmol/L    Anion Gap 14.0 7.0 - 16.0    Glucose 100 (H) 65 - 99 " mg/dL    Bun 11 8 - 22 mg/dL    Creatinine 0.93 0.50 - 1.40 mg/dL    Calcium 9.1 8.5 - 10.5 mg/dL    AST(SGOT) 42 12 - 45 U/L    ALT(SGPT) 76 (H) 2 - 50 U/L    Alkaline Phosphatase 100 (H) 30 - 99 U/L    Total Bilirubin 1.1 0.1 - 1.5 mg/dL    Albumin 4.2 3.2 - 4.9 g/dL    Total Protein 7.1 6.0 - 8.2 g/dL    Globulin 2.9 1.9 - 3.5 g/dL    A-G Ratio 1.4 g/dL   ESTIMATED GFR   Result Value Ref Range    GFR If African American >60 >60 mL/min/1.73 m 2    GFR If Non African American >60 >60 mL/min/1.73 m 2   DIAGNOSTIC ALCOHOL   Result Value Ref Range    Diagnostic Alcohol 12.1 (H) 0.0 - 10.0 mg/dL   COVID/SARS CoV-2 PCR    Specimen: Nasopharyngeal; Respirate   Result Value Ref Range    COVID Order Status Received    POC BREATHALIZER   Result Value Ref Range    POC Breathalizer 0.074 (A) 0.00 - 0.01 Percent   ACCU-CHEK GLUCOSE   Result Value Ref Range    Glucose - Accu-Ck 100 (H) 65 - 99 mg/dL       All labs reviewed by me.      RADIOLOGY  No orders to display     The radiologist's interpretation of all radiological studies have been reviewed by me.    COURSE & MEDICAL DECISION MAKING  Pertinent Labs & Imaging studies reviewed. (See chart for details)    12:12 AM - Patient seen and examined at bedside.         Patient noted to have slightly elevated blood pressure likely circumstantial secondary to presenting complaint. Referred to primary care physician for further evaluation.        Medical Decision Making: Patient's over the legal limit on initial alcohol test his urine pending however admits to amphetamines.  Will be observed till legally sober at which time he will be evaluated by life skills for further recommendations on hold ability/suicidal risk.  Patient is pending this sobriety as well as consultation I discussed this case with my partner Dr. Hall her help with this patient is greatly appreciated.  Care transferred in guarded condition.    /81   Pulse 95   Temp 36.1 °C (96.9 °F) (Temporal)   Resp 18  "  Ht 1.93 m (6' 4\")   Wt 122.5 kg (270 lb)   SpO2 99%   BMI 32.87 kg/m²             FINAL IMPRESSION  1.  Depression  2.  Suicidal ideation  3.  Polysubstance ingestion  4.  Amphetamine abuse  5.  Alcohol abuse    This dictation has been created using voice recognition software and/or scribes. The accuracy of the dictation is limited by the abilities of the software and the expertise of the scribes. I expect there may be some errors of grammar and possibly content. I made every attempt to manually correct the errors within my dictation. However, errors related to voice recognition software and/or scribes may still exist and should be interpreted within the appropriate context.            "

## 2020-12-11 NOTE — ED TRIAGE NOTES
"Chief Complaint   Patient presents with   • Suicidal Ideation     pt stating \"I will jump off a bridge if I leave here\"   • Legal 2000       Pt BIB EMS for above. Pt reports having 7 beers tonight and \"lots of wine\" tonight, amphetamine abuse last night per pt. Pt reports a history of bipolar and depression. VSS. POC breathilzer 0.074. All potentially harmful objects removed from room, all belongings removed and placed in a bag to be searched by security. Placed on Legal Hold by Chema Arce [facility]   "

## 2020-12-11 NOTE — ED PROVIDER NOTES
ED Provider Note Addendum    Scribed for Rin Zhang M.D. by Kristine Luz on 12/11/2020 at 7:01 AM.     This is an addendum to the note on Brannon Sharma.  For further details and full chart information, see patient's initial note.       6:00 AM - I discussed the patient's case with Dr. Martinez ([facility]) who will transfer care of the patient to me at this time.        6:11 AM - Patient evaluated by myself.  Patient is resting comfortably at this time with no complaints.    8:20 AM - Patient has been seen by psychiatry. He can attempt to go back to outpatient psychiatry and follow-up for polysubstance abuse. He is medically cleared off of legal hold.     8:32 AM - COVID screening required prior to discharge. He will be discharged after interventions.       Disposition:  Patient will be transferred to an appropriate psychiatric facility in stable condition for further psychiatric care and evaluation.  Patient will be placed under the care of my partner awaiting transfer.    FINAL IMPRESSION  1. Moderate episode of recurrent major depressive disorder (HCC)    2. Polysubstance abuse (HCC)         Kristine CABA (Scribe), am scribing for, and in the presence of, Rin Zhang M.D..    Electronically signed by: Kristine Luz (Scribe), 12/11/2020    Rin CABA M.D. personally performed the services described in this documentation, as scribed by Kristine Luz in my presence, and it is both accurate and complete.    The note accurately reflects work and decisions made by me.  Rin Zhang M.D.  12/11/2020  9:10 AM

## 2021-07-03 ENCOUNTER — OFFICE VISIT (OUTPATIENT)
Dept: URGENT CARE | Facility: CLINIC | Age: 41
End: 2021-07-03
Payer: MEDICAID

## 2021-07-03 VITALS
TEMPERATURE: 97.3 F | OXYGEN SATURATION: 94 % | HEART RATE: 104 BPM | SYSTOLIC BLOOD PRESSURE: 120 MMHG | DIASTOLIC BLOOD PRESSURE: 90 MMHG | HEIGHT: 76 IN | WEIGHT: 280 LBS | RESPIRATION RATE: 16 BRPM | BODY MASS INDEX: 34.1 KG/M2

## 2021-07-03 DIAGNOSIS — J01.40 SUBACUTE PANSINUSITIS: ICD-10-CM

## 2021-07-03 DIAGNOSIS — H92.02 LEFT EAR PAIN: ICD-10-CM

## 2021-07-03 DIAGNOSIS — H69.92 DYSFUNCTION OF LEFT EUSTACHIAN TUBE: ICD-10-CM

## 2021-07-03 PROCEDURE — 99214 OFFICE O/P EST MOD 30 MIN: CPT | Performed by: NURSE PRACTITIONER

## 2021-07-03 RX ORDER — NAPROXEN 500 MG/1
500 TABLET ORAL 2 TIMES DAILY WITH MEALS
Qty: 40 TABLET | Refills: 0 | Status: SHIPPED | OUTPATIENT
Start: 2021-07-03 | End: 2021-10-22

## 2021-07-03 RX ORDER — CARBAMAZEPINE 200 MG/1
200 TABLET ORAL 3 TIMES DAILY
COMMUNITY
End: 2021-10-22

## 2021-07-03 RX ORDER — FLUTICASONE PROPIONATE 50 MCG
2 SPRAY, SUSPENSION (ML) NASAL DAILY
Qty: 16 G | Refills: 0 | Status: SHIPPED | OUTPATIENT
Start: 2021-07-03 | End: 2021-10-22

## 2021-07-03 RX ORDER — TRAZODONE HYDROCHLORIDE 150 MG/1
150 TABLET ORAL
COMMUNITY
End: 2023-08-10

## 2021-07-03 RX ORDER — CHLORDIAZEPOXIDE HYDROCHLORIDE 5 MG/1
5 CAPSULE, GELATIN COATED ORAL 3 TIMES DAILY PRN
COMMUNITY
End: 2021-10-22

## 2021-07-03 ASSESSMENT — FIBROSIS 4 INDEX: FIB4 SCORE: 0.77

## 2021-07-09 ASSESSMENT — LIFESTYLE VARIABLES: SUBSTANCE_ABUSE: 0

## 2021-07-09 ASSESSMENT — ENCOUNTER SYMPTOMS
HEADACHES: 0
SORE THROAT: 0
NAUSEA: 0
SINUS PAIN: 1
EYE DISCHARGE: 0
TINGLING: 0
DIZZINESS: 0
FEVER: 0
COUGH: 0
NECK PAIN: 0
SENSORY CHANGE: 0
EYE REDNESS: 0
CHILLS: 0

## 2021-07-09 NOTE — PROGRESS NOTES
Brannon Sharma is a 40 y.o. male who presents for Ear Pain (lt ear pain x17 days ) and Sinus Pain      HPI this is a new problem.  Brannon is a 40-year-old male patient presents with the following complaints.    #1) left ear pain for 17 days.  He reports that his ear pain is a pressure sensation.  It is mild.  He rates his pain as 3/10.  He is concerned that it is persistent.  Treatments tried: Occasional Tylenol.   #2) in the past 3 to 4 days he is also developed some sinus discomfort across his face.  He has a clear runny nose.  Denies sore throat, fever, dizziness.  Treatments tried: None.  No other aggravating or alleviating factors.    Review of Systems   Constitutional: Negative for chills, fever and malaise/fatigue.   HENT: Positive for congestion (mild), ear pain and sinus pain. Negative for ear discharge, hearing loss, sore throat and tinnitus.    Eyes: Negative for discharge and redness.   Respiratory: Negative for cough.    Cardiovascular: Negative for chest pain.   Gastrointestinal: Negative for nausea.   Musculoskeletal: Negative for neck pain.   Neurological: Negative for dizziness, tingling, sensory change and headaches.   Psychiatric/Behavioral: Negative for substance abuse.       Allergies:       Allergies   Allergen Reactions   • Ceclor [Cefaclor] Anaphylaxis   • Cephalosporins Anaphylaxis   • Pcn [Penicillins] Anaphylaxis       PMSFS Hx:  Past Medical History:   Diagnosis Date   • Alcohol-induced psychosis (HCC)    • Anxiety    • Depression    • Paranoid schizophrenia (HCC)    • Psychiatric disorder     panic attack   • Schizophrenia, paranoid (HCC)      Past Surgical History:   Procedure Laterality Date   • NASAL SEPTAL RECONSTRUCTION     • OTHER      deviated septum     Family History   Family history unknown: Yes     Social History     Tobacco Use   • Smoking status: Current Every Day Smoker     Packs/day: 1.00     Years: 12.00     Pack years: 12.00     Types: Cigarettes   • Smokeless  "tobacco: Never Used   Substance Use Topics   • Alcohol use: Yes     Alcohol/week: 9.6 oz     Types: 16 Cans of beer per week     Comment:  states 40-50 beers/d       Problems:   Patient Active Problem List   Diagnosis   • Right temporal frontal scalp contusions   • Substance abuse (HCC)   • Venereal disease contact   • Bipolar 1 disorder (HCC)   • Homeless single person       Medications:   Current Outpatient Medications on File Prior to Visit   Medication Sig Dispense Refill   • Divalproex Sodium (DEPAKOTE PO) Take  by mouth.     • carBAMazepine (TEGRETOL) 200 MG Tab Take 200 mg by mouth 3 times a day.     • chlordiazePOXIDE (LIBRIUM) 5 MG Cap Take 5 mg by mouth 3 times a day as needed for Anxiety.     • traZODone (DESYREL) 50 MG Tab Take 50 mg by mouth every evening.     • gabapentin (NEURONTIN) 300 MG Cap Take 1 Cap by mouth 3 times a day. 60 Cap 3     No current facility-administered medications on file prior to visit.          Objective:     /90   Pulse (!) 104   Temp 36.3 °C (97.3 °F) (Temporal)   Resp 16   Ht 1.93 m (6' 4\")   Wt (!) 127 kg (280 lb)   SpO2 94%   BMI 34.08 kg/m²     Physical Exam  Vitals and nursing note reviewed.   Constitutional:       General: He is not in acute distress.     Appearance: He is well-developed. He is not ill-appearing or toxic-appearing.   HENT:      Head: Normocephalic.      Right Ear: Tympanic membrane, ear canal and external ear normal. No middle ear effusion. There is no impacted cerumen.      Left Ear: Ear canal and external ear normal. Tenderness present.  No middle ear effusion. There is no impacted cerumen. Tympanic membrane is bulging. Tympanic membrane is not erythematous.      Nose: Mucosal edema present.      Right Turbinates: Swollen. Not enlarged or pale.      Left Turbinates: Swollen. Not enlarged or pale.      Right Sinus: Frontal sinus tenderness present. No maxillary sinus tenderness.      Left Sinus: Frontal sinus tenderness present. No " maxillary sinus tenderness.      Mouth/Throat:      Mouth: Mucous membranes are moist.   Neck:      Trachea: Trachea normal.   Cardiovascular:      Rate and Rhythm: Normal rate and regular rhythm.      Pulses: Normal pulses.      Heart sounds: Normal heart sounds.   Pulmonary:      Effort: Pulmonary effort is normal.      Breath sounds: Normal breath sounds.   Skin:     General: Skin is warm and dry.      Capillary Refill: Capillary refill takes less than 2 seconds.   Neurological:      Mental Status: He is alert and oriented to person, place, and time.   Psychiatric:         Mood and Affect: Mood normal.         Speech: Speech normal.         Behavior: Behavior normal.         Thought Content: Thought content normal.         Judgment: Judgment normal.         Assessment /Associated Orders:      1. Dysfunction of left eustachian tube  fluticasone (FLONASE) 50 MCG/ACT nasal spray   2. Left ear pain  naproxen (NAPROSYN) 500 MG Tab   3. Subacute pansinusitis         Medical Decision Making:    Pt is clinically stable at today's acute urgent care visit.  No acute distress noted. Appropriate for outpatient management at this time.   Acute problem today with uncertain prognosis.   Educated in proper administration of medication(s) ordered today including safety, possible SE, risks, benefits, rationale and alternatives to therapy.   OTC antihistamine of choice. Follow manufactures dosing and safety guidelines.   Resume all prior  RX medications. Take as prescribed.   Warm compress to ear prn pain    Humidifier at night prn       Advised to follow-up with the primary care provider for recheck, reevaluation, and consideration of further management if necessary.   Discussed management options (risks,benefits, and alternatives to treatment). Expressed understanding and the treatment plan was agreed upon. Questions were encouraged and answered       Return to urgent care prn if new or worsening sx or if there is no improvement  in condition prn.  Educated in Red flags and indications to immediately call 911 or present to the Emergency Department.     I personally reviewed prior external notes and test results pertinent to today's visit.  I have independently reviewed and interpreted all diagnostics ordered during this urgent care acute visit.   -- No recent antibiotics  -- no frequent sinus infections documented.   Time spent evaluating this patient was at least 30 minutes and includes preparing for visit, counseling/education, exam and evaluation, obtaining history, independent interpretation, ordering lab/test/procedures,medication management and documentation.

## 2021-10-21 ENCOUNTER — HOSPITAL ENCOUNTER (EMERGENCY)
Facility: MEDICAL CENTER | Age: 41
End: 2021-10-22
Attending: EMERGENCY MEDICINE
Payer: MEDICAID

## 2021-10-21 DIAGNOSIS — R45.851 SUICIDAL IDEATION: ICD-10-CM

## 2021-10-21 DIAGNOSIS — F10.920 ALCOHOLIC INTOXICATION WITHOUT COMPLICATION (HCC): ICD-10-CM

## 2021-10-21 LAB
ALBUMIN SERPL BCP-MCNC: 4.3 G/DL (ref 3.2–4.9)
ALBUMIN/GLOB SERPL: 1.1 G/DL
ALP SERPL-CCNC: 169 U/L (ref 30–99)
ALT SERPL-CCNC: 122 U/L (ref 2–50)
ANION GAP SERPL CALC-SCNC: 14 MMOL/L (ref 7–16)
APAP SERPL-MCNC: <5 UG/ML (ref 10–30)
AST SERPL-CCNC: 93 U/L (ref 12–45)
BASOPHILS # BLD AUTO: 1.3 % (ref 0–1.8)
BASOPHILS # BLD: 0.11 K/UL (ref 0–0.12)
BILIRUB SERPL-MCNC: 0.4 MG/DL (ref 0.1–1.5)
BUN SERPL-MCNC: 12 MG/DL (ref 8–22)
CALCIUM SERPL-MCNC: 9.5 MG/DL (ref 8.4–10.2)
CHLORIDE SERPL-SCNC: 106 MMOL/L (ref 96–112)
CO2 SERPL-SCNC: 21 MMOL/L (ref 20–33)
CREAT SERPL-MCNC: 0.97 MG/DL (ref 0.5–1.4)
EOSINOPHIL # BLD AUTO: 0.35 K/UL (ref 0–0.51)
EOSINOPHIL NFR BLD: 4.1 % (ref 0–6.9)
ERYTHROCYTE [DISTWIDTH] IN BLOOD BY AUTOMATED COUNT: 45 FL (ref 35.9–50)
ETHANOL BLD-MCNC: 308.1 MG/DL (ref 0–10)
GLOBULIN SER CALC-MCNC: 3.8 G/DL (ref 1.9–3.5)
GLUCOSE SERPL-MCNC: 133 MG/DL (ref 65–99)
HCT VFR BLD AUTO: 49.1 % (ref 42–52)
HGB BLD-MCNC: 16.9 G/DL (ref 14–18)
IMM GRANULOCYTES # BLD AUTO: 0.02 K/UL (ref 0–0.11)
IMM GRANULOCYTES NFR BLD AUTO: 0.2 % (ref 0–0.9)
LYMPHOCYTES # BLD AUTO: 4.56 K/UL (ref 1–4.8)
LYMPHOCYTES NFR BLD: 54 % (ref 22–41)
MCH RBC QN AUTO: 31.6 PG (ref 27–33)
MCHC RBC AUTO-ENTMCNC: 34.4 G/DL (ref 33.7–35.3)
MCV RBC AUTO: 91.9 FL (ref 81.4–97.8)
MONOCYTES # BLD AUTO: 0.75 K/UL (ref 0–0.85)
MONOCYTES NFR BLD AUTO: 8.9 % (ref 0–13.4)
NEUTROPHILS # BLD AUTO: 2.66 K/UL (ref 1.82–7.42)
NEUTROPHILS NFR BLD: 31.5 % (ref 44–72)
NRBC # BLD AUTO: 0 K/UL
NRBC BLD-RTO: 0 /100 WBC
PLATELET # BLD AUTO: 263 K/UL (ref 164–446)
PMV BLD AUTO: 10.4 FL (ref 9–12.9)
POTASSIUM SERPL-SCNC: 4 MMOL/L (ref 3.6–5.5)
PROT SERPL-MCNC: 8.1 G/DL (ref 6–8.2)
RBC # BLD AUTO: 5.34 M/UL (ref 4.7–6.1)
SALICYLATES SERPL-MCNC: <1 MG/DL (ref 15–25)
SODIUM SERPL-SCNC: 141 MMOL/L (ref 135–145)
WBC # BLD AUTO: 8.5 K/UL (ref 4.8–10.8)

## 2021-10-21 PROCEDURE — 96372 THER/PROPH/DIAG INJ SC/IM: CPT

## 2021-10-21 PROCEDURE — 80179 DRUG ASSAY SALICYLATE: CPT

## 2021-10-21 PROCEDURE — 99285 EMERGENCY DEPT VISIT HI MDM: CPT

## 2021-10-21 PROCEDURE — 700111 HCHG RX REV CODE 636 W/ 250 OVERRIDE (IP)

## 2021-10-21 PROCEDURE — 80143 DRUG ASSAY ACETAMINOPHEN: CPT

## 2021-10-21 PROCEDURE — 82077 ASSAY SPEC XCP UR&BREATH IA: CPT

## 2021-10-21 PROCEDURE — 85025 COMPLETE CBC W/AUTO DIFF WBC: CPT

## 2021-10-21 PROCEDURE — 80053 COMPREHEN METABOLIC PANEL: CPT

## 2021-10-21 PROCEDURE — 36415 COLL VENOUS BLD VENIPUNCTURE: CPT

## 2021-10-21 RX ORDER — LORAZEPAM 2 MG/ML
2 INJECTION INTRAMUSCULAR ONCE
Status: COMPLETED | OUTPATIENT
Start: 2021-10-21 | End: 2021-10-21

## 2021-10-21 RX ORDER — HALOPERIDOL 5 MG/ML
INJECTION INTRAMUSCULAR
Status: COMPLETED
Start: 2021-10-21 | End: 2021-10-21

## 2021-10-21 RX ORDER — DIPHENHYDRAMINE HYDROCHLORIDE 50 MG/ML
INJECTION INTRAMUSCULAR; INTRAVENOUS
Status: COMPLETED
Start: 2021-10-21 | End: 2021-10-21

## 2021-10-21 RX ORDER — LORAZEPAM 2 MG/ML
INJECTION INTRAMUSCULAR
Status: COMPLETED
Start: 2021-10-21 | End: 2021-10-21

## 2021-10-21 RX ORDER — DIPHENHYDRAMINE HYDROCHLORIDE 50 MG/ML
50 INJECTION INTRAMUSCULAR; INTRAVENOUS ONCE
Status: COMPLETED | OUTPATIENT
Start: 2021-10-21 | End: 2021-10-21

## 2021-10-21 RX ORDER — HALOPERIDOL 5 MG/ML
5 INJECTION INTRAMUSCULAR ONCE
Status: COMPLETED | OUTPATIENT
Start: 2021-10-21 | End: 2021-10-21

## 2021-10-21 RX ADMIN — LORAZEPAM 2 MG: 2 INJECTION INTRAMUSCULAR; INTRAVENOUS at 20:14

## 2021-10-21 RX ADMIN — HALOPERIDOL 5 MG: 5 INJECTION INTRAMUSCULAR at 20:15

## 2021-10-21 RX ADMIN — HALOPERIDOL LACTATE 5 MG: 5 INJECTION, SOLUTION INTRAMUSCULAR at 20:15

## 2021-10-21 RX ADMIN — DIPHENHYDRAMINE HYDROCHLORIDE 50 MG: 50 INJECTION INTRAMUSCULAR; INTRAVENOUS at 20:15

## 2021-10-21 RX ADMIN — LORAZEPAM 2 MG: 2 INJECTION INTRAMUSCULAR; INTRAVENOUS at 20:23

## 2021-10-21 RX ADMIN — LORAZEPAM 2 MG: 2 INJECTION INTRAMUSCULAR at 20:23

## 2021-10-21 RX ADMIN — LORAZEPAM 2 MG: 2 INJECTION INTRAMUSCULAR at 20:14

## 2021-10-21 ASSESSMENT — FIBROSIS 4 INDEX: FIB4 SCORE: 0.79

## 2021-10-22 VITALS
SYSTOLIC BLOOD PRESSURE: 111 MMHG | HEART RATE: 91 BPM | DIASTOLIC BLOOD PRESSURE: 82 MMHG | TEMPERATURE: 98 F | HEIGHT: 76 IN | WEIGHT: 272 LBS | RESPIRATION RATE: 22 BRPM | BODY MASS INDEX: 33.12 KG/M2 | OXYGEN SATURATION: 95 %

## 2021-10-22 LAB — POC BREATHALIZER: 0.07 PERCENT (ref 0–0.01)

## 2021-10-22 PROCEDURE — 302970 POC BREATHALIZER: Performed by: EMERGENCY MEDICINE

## 2021-10-22 RX ORDER — GABAPENTIN 400 MG/1
400 CAPSULE ORAL 2 TIMES DAILY
Status: SHIPPED | COMMUNITY
End: 2022-09-16

## 2021-10-22 RX ORDER — OXCARBAZEPINE 300 MG/1
300 TABLET, FILM COATED ORAL 2 TIMES DAILY
Status: SHIPPED | COMMUNITY
End: 2022-09-16

## 2021-10-22 RX ORDER — BUSPIRONE HYDROCHLORIDE 7.5 MG/1
7.5 TABLET ORAL 2 TIMES DAILY
Status: SHIPPED | COMMUNITY
End: 2022-09-16

## 2021-10-22 RX ORDER — GABAPENTIN 800 MG/1
800 TABLET ORAL 2 TIMES DAILY
Status: SHIPPED | COMMUNITY
End: 2022-09-16

## 2021-10-22 RX ORDER — RISPERIDONE 1 MG/1
1 TABLET ORAL 2 TIMES DAILY
Status: SHIPPED | COMMUNITY
End: 2022-09-16

## 2021-10-22 RX ORDER — LISINOPRIL 40 MG/1
40 TABLET ORAL DAILY
Status: SHIPPED | COMMUNITY
End: 2023-05-04

## 2021-10-22 RX ORDER — ATOMOXETINE 40 MG/1
40 CAPSULE ORAL 2 TIMES DAILY
Status: SHIPPED | COMMUNITY
End: 2022-09-16

## 2021-10-22 RX ORDER — SERTRALINE HYDROCHLORIDE 100 MG/1
200 TABLET, FILM COATED ORAL DAILY
Status: SHIPPED | COMMUNITY
End: 2022-09-16

## 2021-10-22 NOTE — ED NOTES
Patient resting calmly on gurney with visible chest rise & fall. 1:1 sitter outside of room in direct line of sight of patient.

## 2021-10-22 NOTE — ED NOTES
Report and transfer of care to RN Cherelle. Pt sleeping with regular unlabored respirations. Sitter remains in plain sight.

## 2021-10-22 NOTE — ED NOTES
Patient removed pulse ox sticker from finger. Patient laying R lateral with visible chest rise & fall. 1:1 sitter continues to sit outside of room in direct line of sight of patient.

## 2021-10-22 NOTE — ED NOTES
Patient is awake and alert. Patient removed soft restraints from RUE and LLE and opened door to request water. Patient given 4 cups of water.

## 2021-10-22 NOTE — ED NOTES
Pt evaluated by Peer recovery support team, all recourses given and pt understands f/u.  Pt denies any SI thoughts    D/c pt home, family to take pt home . no  rx given . Pt aware of f/u instructions , aware to return for any changes or concerns. No further questions upon d/c home from ed

## 2021-10-22 NOTE — ED NOTES
Humberto noticed patient moving around on [facility]. Patient stated he needed to urinate. This RN went in to assist patient with urinal. Patient refused to urinate in urinal. Charge RN notified. Patient continued to state he did not want to urinate in urinal. Patient educated that urine sample is needed. Patient refused.

## 2021-10-22 NOTE — ED PROVIDER NOTES
"ED Provider Note  Patient:Brannon Sharma  Patient : 1980  Patient MRN: [id number]  Patient PCP: Gregory Hardin M.D.    Admit Date: 10/21/2021  Discharge Date and Time: 10/22/21 10:09 AM  Discharge Diagnosis: Alcohol abuse  Discharge Attending: Candido Quintero M.D.  Discharge Service: ED Observation    ED Course  Brannon is a 41 y.o. male who was evaluated at [facility] for suicidal ideation on 10/21/2021 around .  The patient was intoxicated when he is holding a bottle of pills threatened to harm himself.  The patient is now clinically sober and alert and appropriate.  He does contract for safety.  We did have life skills evaluate the patient and they feel that he is suitable for outpatient management with referral to substance abuse.  At the time of discharge the patient does not currently have any medical complaints.  Again he contracts for safety and is no longer suicidal.  Of note due to the threat of overdose Tylenol and aspirin levels were checked as well as baseline laboratory studies.  CBC is reviewed and is normal and do not appreciate any evidence of infectious source that would cause his altered state and I suspect it is from alcohol intoxication which is supported by his laboratory analysis.  He does have a mild transaminitis most likely from alcohol abuse.  He is instructed refrain from further alcohol and follow-up with his primary care provider for routine health maintenance.    Discharge Exam:  /82   Pulse 91   Temp 36.7 °C (98 °F) (Temporal)   Resp (!) 22   Ht 1.93 m (6' 4\")   Wt 123 kg (272 lb)   SpO2 95%   BMI 33.11 kg/m² .    Constitutional: Awake and alert. Nontoxic  HENT:  Grossly normal  Eyes: Grossly normal  Neck: Normal range of motion  Cardiovascular: Normal heart rate   Thorax & Lungs: No respiratory distress  Abdomen: Nontender  Skin:  No pathologic rash.   Extremities: Well perfused  Psychiatric: Affect " normal    Labs  Results for orders placed or performed during the hospital encounter of 10/21/21   CBC WITH DIFFERENTIAL   Result Value Ref Range    WBC 8.5 4.8 - 10.8 K/uL    RBC 5.34 4.70 - 6.10 M/uL    Hemoglobin 16.9 14.0 - 18.0 g/dL    Hematocrit 49.1 42.0 - 52.0 %    MCV 91.9 81.4 - 97.8 fL    MCH 31.6 27.0 - 33.0 pg    MCHC 34.4 33.7 - 35.3 g/dL    RDW 45.0 35.9 - 50.0 fL    Platelet Count 263 164 - 446 K/uL    MPV 10.4 9.0 - 12.9 fL    Neutrophils-Polys 31.50 (L) 44.00 - 72.00 %    Lymphocytes 54.00 (H) 22.00 - 41.00 %    Monocytes 8.90 0.00 - 13.40 %    Eosinophils 4.10 0.00 - 6.90 %    Basophils 1.30 0.00 - 1.80 %    Immature Granulocytes 0.20 0.00 - 0.90 %    Nucleated RBC 0.00 /100 WBC    Neutrophils (Absolute) 2.66 1.82 - 7.42 K/uL    Lymphs (Absolute) 4.56 1.00 - 4.80 K/uL    Monos (Absolute) 0.75 0.00 - 0.85 K/uL    Eos (Absolute) 0.35 0.00 - 0.51 K/uL    Baso (Absolute) 0.11 0.00 - 0.12 K/uL    Immature Granulocytes (abs) 0.02 0.00 - 0.11 K/uL    NRBC (Absolute) 0.00 K/uL   Comp Metabolic Panel   Result Value Ref Range    Sodium 141 135 - 145 mmol/L    Potassium 4.0 3.6 - 5.5 mmol/L    Chloride 106 96 - 112 mmol/L    Co2 21 20 - 33 mmol/L    Anion Gap 14.0 7.0 - 16.0    Glucose 133 (H) 65 - 99 mg/dL    Bun 12 8 - 22 mg/dL    Creatinine 0.97 0.50 - 1.40 mg/dL    Calcium 9.5 8.4 - 10.2 mg/dL    AST(SGOT) 93 (H) 12 - 45 U/L    ALT(SGPT) 122 (H) 2 - 50 U/L    Alkaline Phosphatase 169 (H) 30 - 99 U/L    Total Bilirubin 0.4 0.1 - 1.5 mg/dL    Albumin 4.3 3.2 - 4.9 g/dL    Total Protein 8.1 6.0 - 8.2 g/dL    Globulin 3.8 (H) 1.9 - 3.5 g/dL    A-G Ratio 1.1 g/dL   Salicylate   Result Value Ref Range    Salicylates, Quant. <1.0 (L) 15.0 - 25.0 mg/dL   ACETAMINOPHEN   Result Value Ref Range    Acetaminophen -Tylenol <5.0 (L) 10.0 - 30.0 ug/mL   ETHYL ALCOHOL (BLOOD)   Result Value Ref Range    Diagnostic Alcohol 308.1 (H) 0.0 - 10.0 mg/dL   ESTIMATED GFR   Result Value Ref Range    GFR If  >60  >60 mL/min/1.73 m 2    GFR If Non African American >60 >60 mL/min/1.73 m 2   POC BREATHALIZER   Result Value Ref Range    POC Breathalizer 0.067 (A) 0.00 - 0.01 Percent       Radiology  No orders to display       Disposition: Home in stable condition    Follow up: No follow-ups on file.    Medications:   New Prescriptions    No medications on file       Discharge Condition: Stable    Electronically signed by: Candido Quintero M.D., 10/22/2021 10:09 AM

## 2021-10-22 NOTE — ED TRIAGE NOTES
Pt arrives to ED via SO in handcuffs from parents home. SO reports called to scene from pt's parents for pt acting hostile and making threats. Per so, arrive to scene to find pt locked in vehicle acting hosital and making threats to OD on prescription meds. Pt denies taking overdose of prescription meds however is extremely combative verbally and posturing physically on arrival to ED. Per SO, pt is on legal hold.

## 2021-10-22 NOTE — ED NOTES
Patient states he needs to use restroom. Patient educated that urine sample is needed. Patient refusing to provide sample. MD Tavares notified. MD states ok for patient to ambulate to bathroom. This RN escorted patient to bathroom & back. Patient compliant to & from bathroom.

## 2021-10-22 NOTE — ED NOTES
"Med rec complete per pt and [facility]. Per pt, has not taken medications in about \"2-3 weeks.\"  "

## 2021-10-22 NOTE — ED NOTES
Patient given warm spaghetti meal & 2 cups water. Patient notified he is 10th in line for [facility] tele-psych consult.

## 2021-10-22 NOTE — ED NOTES
Patient continues to sleep on gurney. Patient is snoring with visible chest rise & fall. SpO2 92% on 2 LPM O2 via NC. Two-point soft restraints to RUE and LLE.

## 2021-10-22 NOTE — CONSULTS
"[facility]   TRIAGE ASSESSMENT    Name: Brannon Sharma  MRN: [id number]  : 1980  Age: 41 y.o.  Date of assessment: 10/22/2021  PCP: Gregory Hardin M.D.  Persons in attendance: Patient  Patient Location: [facility]    CHIEF COMPLAINT/PRESENTING ISSUE (as stated by patient): 41 year old male admitted 10/20/21 for SI with ETOH intoxication; voluntary pt; [place] PD in handcuffs,  \"combative verbally and posturing physically on arrival to ED\", pt restrained; ETOH on admit 0.308; pt received Haldol 5 mg IM, Benadryl 50 mg IM, Ativan 2 mg IM x 2 last night; later this AM, ETOH level 0.0524;  pt alert, oriented x 4; calm; cooperative; pleasant; with organized thoughts and behaviors; no delusions, paranoia, hallucinations noted; insight, judgment adequate; currently denies SI, HI, or self-harm ideation; future-oriented; denies h/o SA or self-harm; with noted h/o SI with ETOH intoxication; states the last thing he remembers last night was \"being in the ambulance\"; acknowledges he becomes aggressive with ETOH intoxication; states he does not remember verbalizing SI last night while at home; states current substance use includes ETOH 2 x week up to 1/5 liquor with last use 10/20/21; states recent arrest 2021 for open container and has community court ordered to attend a 30 day CD program; states he is on the waiting list at [facility], [facility], and [facility] programs; current outpt MH providers include Medicaid HPN care coordinatorValentin, with last appt 2 weeks ago and [facility] psychiatrist with last appt 2021; states current psych meds include unknown doses of Risperdone, Sertraline, Buspar, Tegretol, Gabapentin, Trazodone, states last taken psych meds 3 weeks ago; noted psych diagnoses include Alcohol use disorder severe, Stimulant use disorder severe amphetamine type, Bipolar Type I D/O,  Anxiety, Depression, Paranoid Schizophrenia; pt is unemployed; lives " with his parents    Chief Complaint   Patient presents with   • Suicidal Ideation     from scene per SO.         CURRENT LIVING SITUATION/SOCIAL SUPPORT/FINANCIAL RESOURCES:  pt is unemployed; lives with his parents    BEHAVIORAL HEALTH/SUBSTANCE USE TREATMENT HISTORY  Does patient/parent report a history of prior behavioral health/substance use treatment for patient?   Dates Level of Care Facilty/Provider Diagnosis/Problem Medications   currently outpt MH [facility] ([facility]) care coordinator, Valentin     8//2021 CD rehab [facility] psychiatrist last appt 9/2021 Alcohol use disorder severe, Stimulant use disorder sever amphetamine type, Bipolar Type I D/O,  Anxiety, Depression, Paranoid schizophrenia  unknown doses of Risperdone, Sertraline, Buspar, Tegretol, Gabapentin, Trazodone,  states last taken psych meds 3 weeks ago     12/10/2020 outpt   CHELY Alvarado at [facility] Alcohol use disorder severe, Stimulant use disorder sever amphetamine type, Bipolar Type I D/O,  Anxiety, Depression, Paranoid schizophrenia     Olanzapine    12/2019 outpt  [facility], psychiatry       12/2019 outpt [facility]           8/2019 inpt [facility] detox Left AMA after 2 days      2016 inpt [facility]     2015 inpt [facility] SI     2014 inpt  SI/HI           SAFETY ASSESSMENT - SELF  Does patient acknowledge current or past symptoms of dangerousness to self or is previous history noted? Yes- 10/20/21 for SI with ETOH intoxication;  with noted h/o SI with ETOH intoxication  Does parent/significant other report patient has current or past symptoms of dangerousness to self? no  Does presenting problem suggest symptoms of dangerousness to self? Yes:     Past Current    Suicidal Thoughts: [x]  []    Suicidal Plans: []  []    Suicidal Intent: []  []    Suicide Attempts: []  []    Self-Injury []  []      For any boxes checked above, provide detail: 10/20/21 for SI with ETOH  "intoxication;  with noted h/o SI with ETOH intoxication    History of suicide by family member: no  History of suicide by friend/significant other: no  Recent change in frequency/specificity/intensity of suicidal thoughts or self-harm behavior? yes - last night with ETOH intoxication  Current access to firearms, medications, or other identified means of suicide/self-harm? no  If yes, willing to restrict access to means of suicide/self-harm? yes - no guns or weapons  Protective factors present:  Future-oriented, Positive coping skills, Positive self-efficacy, Actively engaged in treatment and Willing to address in treatment    SAFETY ASSESSMENT - OTHERS  Does patient acknowledge current or past symptoms of aggressive behavior or risk to others or is previous history noted? Yes- BIB PD in handcuffs,  \"combative verbally and posturing physically on arrival to ED\", pt restrained, ETOH use;   as noted in [facility] EMR \"Pt has multiple episodes in his hx of being physically aggressive with staff, requiring restraint\"  Does parent/significant other report patient has current or past symptoms of aggressive behavior or risk to others?  N\A  Does presenting problem suggest symptoms of dangerousness to others? Yes:    History Current   Thoughts of injuring others? [x]  []    Threats to injure others? []  []    Plan to injure others? []  []    Intent to injure others? []  []    Has injured others? []  []    Thoughts of killing others? []  []    Threats to kill others? []  []    Plans to kill others? []  []    Intent to kill others? []  []    Has killed others? []  []    Perpetrator of sexual assault? []  []    Family history of homicide? []  []      For any boxes checked above, please provide detail: BIB PD in handcuffs,  \"combative verbally and posturing physically on arrival to ED\", pt restrained, ETOH use    Recent change in frequency/specificity/intensity of thoughts or threats to harm others? yes - last night with ETOH " "intoxication  Current access to firearms/other identified means of harm? no  If yes, willing to restrict access to weapons/means of harm? yes - no guns or weapons  Protective factors present: Fear of consequences, Stable relationships, Low rumination/obsession, Actively engaged in treatment and Willing to address in treatment  Based on information provided during the current assessment, is a mandated “duty to warn” being exercised? No    LEGAL HISTORY  Does patient acknowledge history of arrest/half-way/care home or is previous history noted? Yes- states recent arrest 9/2021 for open container and has community court ordered to attend a 30 day CD program    Crisis Safety Plan completed and copy given to patient? no    ABUSE/NEGLECT SCREENING  Does patient report feeling “unsafe” in his/her home, or afraid of anyone?  no  Does patient report any history of physical, sexual, or emotional abuse?  no  Does parent or significant other report any of the above? N\A  Is there evidence of neglect by self?  no  Is there evidence of neglect by a caregiver? no  Does the patient/parent report any history of CPS/APS/police involvement related to suspected abuse/neglect or domestic violence? no  Based on the information provided during the current assessment, is a mandated report of suspected abuse/neglect being made?  No    SUBSTANCE USE SCREENING  Yes:  [place] all substances used in the past 30 days:      Last Use Amount   [x]   Alcohol 10/20/21 1/5 liquor   []   Marijuana     []   Heroin     []   Prescription Opioids  (used without prescription, for    recreation, or in excess of prescribed amount)     []   Other Prescription  (used without prescription, for    recreation, or in excess of prescribed amount)     []   Cocaine      []   Methamphetamine     []   \"\" drugs (ectasy, MDMA)     []   Other substances        UDS results: pending collection  Breathalyzer results: 0.308    What consequences does the patient associate with " any of the above substance use and or addictive behaviors? Relationship problems    Risk factors for detox (check all that apply):  []  Seizures   []  Diaphoretic (sweating)   []  Tremors   []  Hallucinations   []  Increased blood pressure   []  Decreased blood pressure   []  Other   [x]  None      [] Patient education on risk factors for detoxification and instructed to return to ER as needed.      MENTAL STATUS   Participation: Active verbal participation, Attentive, Engaged and Open to feedback  Grooming: Casual and Neat  Orientation: Alert and Fully Oriented  Behavior: Calm  Eye contact: Good  Mood: Euthymic  Affect: Flexible and Full range  Thought process: Logical, Goal-directed and Circumstantial  Thought content: Within normal limits  Speech: Rate within normal limits and Volume within normal limits  Perception: Within normal limits  Memory:  No gross evidence of memory deficits  Insight: Adequate  Judgment:  Adequate  Other:    Collateral information:   Source:  [] Significant other present in person:   [] Significant other by telephone  [] [facility]   [x] [facility] Nursing Staff  [x] [facility]  [] Other:     [] Unable to complete full assessment due to:  [] Acute intoxication  [] Patient declined to participate/engage  [] Patient verbally unresponsive  [] Significant cognitive deficits  [] Significant perceptual distortions or behavioral disorganization  [] Other:      CLINICAL IMPRESSIONS:  Primary:  Alcohol use d/o by history  Secondary:  Legal issues       IDENTIFIED NEEDS/PLAN:  [Trigger DISPOSITION list for any items marked]    []  Imminent safety risk - self [] Imminent safety risk - others   []  Acute substance withdrawal []  Psychosis/Impaired reality testing   [x]  Mood/anxiety [x]  Substance use/Addictive behavior   []  Maladaptive behaviro []  Parent/child conflict   []  Family/Couples conflict []  Biomedical   []  Housing []  Financial   [x]   Legal  Occupational/Educational    []  Domestic violence []  Other:     Recommended Plan of Care:  Refer to [facility], [facility], 12 Step program: AA mtgs and [facility] ([facility]), [facility] Support Team; Medicaid HPN insurance plan; writer RN reviewed community CD and MH resources with  pt, with written information given; with pt verbal consent, writer RN to send pt referral to [facility] ([facility]); pt verbalized understanding; pt referred to [facility] , [facility], [facility] for CD programs; pt to DC to self today      Has the Recommended Plan of Care/Level of Observation been reviewed with the patient's assigned nurse? yes    Does patient/parent or guardian express agreement with the above plan? yes    Referral appointment(s) scheduled? no    Alert team only:   I have discussed findings and recommendations with Dr. Quintero who is in agreement with these recommendations. Pt is not on a legal hold    Referral information sent to the following [facility] providers : [facility] fax #[phone]    If applicable : Referred  to :WALTER Morrison R.N.  10/22/2021

## 2021-10-22 NOTE — ED NOTES
Patient continues to sleep on [facility]. Patient is snoring with visible chest rise & fall. SpO2 92% on 2 LPM O2 via NC. Two-point soft restraints to RUE and LLE.    1:1 sitter outside of room in direct line of sight of patient. Pt sleeping on [facility].

## 2021-10-22 NOTE — ED NOTES
[place]. Violent restraints to LUE and RLE removed. Soft restraints to RUE and LLE placed. Patient continues to sleep with visible chest rise & fall. SpO2 94% on 2 LPM O2 via NC.

## 2021-10-22 NOTE — ED NOTES
Report received from mateo  , assumed care at this time  Pt awake alert , calm  poc explained . understands .  Breakfast pending

## 2021-10-22 NOTE — DISCHARGE INSTRUCTIONS
Refrain from further alcohol abuse and follow-up with your primary care provider for routine health maintenance and repeat exam in 7 to 10 days.

## 2021-10-22 NOTE — ED NOTES
Patient continues to sleep on [facility]. Patient is snoring with visible chest rise & fall. SpO2 94% on 2 LPM O2 via NC. Two-point soft restraints to RUE and LLE.     1:1 sitter outside of room in direct line of sight of patient. Pt sleeping on [facility].

## 2021-10-22 NOTE — ED PROVIDER NOTES
ED Provider Note  Primary care provider: Gregory Hardin M.D.  Means of arrival: police department  History obtained from: police and parent  History limited by: none    CHIEF COMPLAINT  Chief Complaint   Patient presents with   • Suicidal Ideation     from scene per SO.        HPI  Brannon Sharma is a 41 y.o. male who presents to the Emergency Department for evaluation of suicidal ideation.  Per police who brought him in they were called to the scene of patient who was acting belligerent and holding pill bottles threatening to take them to kill himself.  Patient is acutely agitated and unable to provide further history.  I was able to speak with patient's mother Madie who reports that patient was threatening to kill himself via pills.  She reports that he had been drinking tonight.  He was not seen taking any pills by family members.  Further history is limited by patient's agitated intoxicated state.    REVIEW OF SYSTEMS  ROS  Review of systems limited by agitated intoxicated state.      Below history obtained from chart review  PAST MEDICAL HISTORY   has a past medical history of Alcohol-induced psychosis (HCC), Anxiety, Depression, Paranoid schizophrenia (HCC), Psychiatric disorder, and Schizophrenia, paranoid (HCC).    SURGICAL HISTORY   has a past surgical history that includes other and nasal septal reconstruction.    SOCIAL HISTORY  Social History     Tobacco Use   • Smoking status: Current Every Day Smoker     Packs/day: 1.00     Years: 12.00     Pack years: 12.00     Types: Cigarettes   • Smokeless tobacco: Never Used   Substance Use Topics   • Alcohol use: Yes     Alcohol/week: 9.6 oz     Types: 16 Cans of beer per week     Comment:  states 40-50 beers/d   • Drug use: Yes     Types: Inhaled, Marijuana, Methamphetamines     Comment: thc, meth      Social History     Substance and Sexual Activity   Drug Use Yes   • Types: Inhaled, Marijuana, Methamphetamines    Comment: thc, meth       FAMILY  "HISTORY  Unable to obtain family history due to agitated and intoxicated state.      CURRENT MEDICATIONS  \Metformin, atomoxetine, risperidone, naltrexone, ziprasidone, oxcarbazepine, buspirone, gabapentin, trazodone    ALLERGIES  Allergies   Allergen Reactions   • Ceclor [Cefaclor] Anaphylaxis   • Cephalosporins Anaphylaxis   • Pcn [Penicillins] Anaphylaxis       PHYSICAL EXAM  VITAL SIGNS: BP (!) 185/113   Pulse (!) 111   Temp 36.5 °C (97.7 °F) (Temporal)   Resp 20   Ht 1.93 m (6' 4\")   Wt 123 kg (272 lb)   SpO2 95%   BMI 33.11 kg/m²   Vitals reviewed by myself.  Physical Exam  Nursing note and vitals reviewed.  Constitutional: Patient acutely agitated and combative  HENT: Head is normocephalic and atraumatic.  Eyes: extra-ocular movements intact  Cardiovascular: Tachycardic rate and regular rhythm. No murmur heard.  Pulmonary/Chest: Breath sounds normal. No wheezes or rales.   Abdominal: Soft and non-tender. No distention.    Musculoskeletal: Extremities exhibit normal range of motion without edema or tenderness.   Neurological: Awake and alert, slurred speech, no focal neurologic deficits  Skin: Skin is warm and dry. No rash.         DIAGNOSTIC STUDIES /  LABS  Labs Reviewed   CBC WITH DIFFERENTIAL - Abnormal; Notable for the following components:       Result Value    Neutrophils-Polys 31.50 (*)     Lymphocytes 54.00 (*)     All other components within normal limits   COMP METABOLIC PANEL - Abnormal; Notable for the following components:    Glucose 133 (*)     AST(SGOT) 93 (*)     ALT(SGPT) 122 (*)     Alkaline Phosphatase 169 (*)     Globulin 3.8 (*)     All other components within normal limits   SALICYLATE - Abnormal; Notable for the following components:    Salicylates, Quant. <1.0 (*)     All other components within normal limits   ACETAMINOPHEN - Abnormal; Notable for the following components:    Acetaminophen -Tylenol <5.0 (*)     All other components within normal limits   ETHYL ALCOHOL (BLOOD) - " Abnormal; Notable for the following components:    Diagnostic Alcohol 308.1 (*)     All other components within normal limits   ESTIMATED GFR   URINE DRUG SCREEN   DIAGNOSTIC ALCOHOL       All labs reviewed by me.    COURSE & MEDICAL DECISION MAKING  Nursing notes, VS, PMSFHx reviewed in chart.    Patient is a 41-year-old male who comes in for evaluation of suicidal ideation.  Differential diagnosis includes alcohol intoxication, suicidal ideation, acute stress disorder, substance ingestion.  Although no one saw him ingesting any of his pills I will obtain labs to assess for possible toxic overdose including a Tylenol and salicylate level.    Patient's initial vitals notable for tachycardia, patient is acutely agitated on exam.  He is a danger to himself and was placed on legal hold by police department.  He will require behavioral health assessment when he is clinically sober, however given his acute agitation and uncooperativeness he is a danger to staff and was therefore given Benadryl, Haldol and Ativan.  Patient required four-point restraints but has been weaned down to two-point restraints.  Labs returned and Tylenol and salicylate levels are within normal limits.  LFTs are elevated likely secondary to his alcohol abuse.  Alcohol level returns and is 308.  Plan will be for patient to be monitored and reassessed until clinically sober and reassessed by behavioral health team.  He is signed out to oncoming physician to follow-up behavioral health team recommendations and legal hold evaluation.  Patient is in guarded condition at time of signout.    Patient admitted to ED Observations Status at 10:34 PM on 10/21/2021 for evaluation of altered mental status, intoxication and suicidal ideation.        FINAL IMPRESSION  1. Suicidal ideation    2. Alcoholic intoxication without complication (HCC)

## 2021-12-16 ENCOUNTER — HOSPITAL ENCOUNTER (EMERGENCY)
Facility: MEDICAL CENTER | Age: 41
End: 2021-12-17
Attending: EMERGENCY MEDICINE
Payer: MEDICAID

## 2021-12-16 DIAGNOSIS — R46.89 AGGRESSIVE BEHAVIOR: ICD-10-CM

## 2021-12-16 DIAGNOSIS — R45.89 THOUGHTS OF SELF HARM: ICD-10-CM

## 2021-12-16 DIAGNOSIS — F10.920 ALCOHOLIC INTOXICATION WITHOUT COMPLICATION (HCC): Primary | ICD-10-CM

## 2021-12-16 LAB
ALBUMIN SERPL BCP-MCNC: 4.2 G/DL (ref 3.2–4.9)
ALBUMIN/GLOB SERPL: 1.2 G/DL
ALP SERPL-CCNC: 125 U/L (ref 30–99)
ALT SERPL-CCNC: 131 U/L (ref 2–50)
ANION GAP SERPL CALC-SCNC: 17 MMOL/L (ref 7–16)
APAP SERPL-MCNC: <5 UG/ML (ref 10–30)
AST SERPL-CCNC: 73 U/L (ref 12–45)
BASOPHILS # BLD AUTO: 1.2 % (ref 0–1.8)
BASOPHILS # BLD: 0.08 K/UL (ref 0–0.12)
BILIRUB SERPL-MCNC: 0.7 MG/DL (ref 0.1–1.5)
BUN SERPL-MCNC: 10 MG/DL (ref 8–22)
CALCIUM SERPL-MCNC: 8.5 MG/DL (ref 8.5–10.5)
CHLORIDE SERPL-SCNC: 106 MMOL/L (ref 96–112)
CO2 SERPL-SCNC: 19 MMOL/L (ref 20–33)
CREAT SERPL-MCNC: 0.84 MG/DL (ref 0.5–1.4)
EOSINOPHIL # BLD AUTO: 0.19 K/UL (ref 0–0.51)
EOSINOPHIL NFR BLD: 3 % (ref 0–6.9)
ERYTHROCYTE [DISTWIDTH] IN BLOOD BY AUTOMATED COUNT: 44.2 FL (ref 35.9–50)
ETHANOL BLD-MCNC: 295.4 MG/DL (ref 0–10)
GLOBULIN SER CALC-MCNC: 3.4 G/DL (ref 1.9–3.5)
GLUCOSE SERPL-MCNC: 127 MG/DL (ref 65–99)
HCT VFR BLD AUTO: 46.9 % (ref 42–52)
HGB BLD-MCNC: 16.1 G/DL (ref 14–18)
IMM GRANULOCYTES # BLD AUTO: 0.01 K/UL (ref 0–0.11)
IMM GRANULOCYTES NFR BLD AUTO: 0.2 % (ref 0–0.9)
LYMPHOCYTES # BLD AUTO: 2.51 K/UL (ref 1–4.8)
LYMPHOCYTES NFR BLD: 39 % (ref 22–41)
MCH RBC QN AUTO: 31.2 PG (ref 27–33)
MCHC RBC AUTO-ENTMCNC: 34.3 G/DL (ref 33.7–35.3)
MCV RBC AUTO: 90.9 FL (ref 81.4–97.8)
MONOCYTES # BLD AUTO: 0.56 K/UL (ref 0–0.85)
MONOCYTES NFR BLD AUTO: 8.7 % (ref 0–13.4)
NEUTROPHILS # BLD AUTO: 3.08 K/UL (ref 1.82–7.42)
NEUTROPHILS NFR BLD: 47.9 % (ref 44–72)
NRBC # BLD AUTO: 0 K/UL
NRBC BLD-RTO: 0 /100 WBC
PLATELET # BLD AUTO: 265 K/UL (ref 164–446)
PMV BLD AUTO: 10.5 FL (ref 9–12.9)
POTASSIUM SERPL-SCNC: 3.8 MMOL/L (ref 3.6–5.5)
PROT SERPL-MCNC: 7.6 G/DL (ref 6–8.2)
RBC # BLD AUTO: 5.16 M/UL (ref 4.7–6.1)
SALICYLATES SERPL-MCNC: <1 MG/DL (ref 15–25)
SODIUM SERPL-SCNC: 142 MMOL/L (ref 135–145)
WBC # BLD AUTO: 6.4 K/UL (ref 4.8–10.8)

## 2021-12-16 PROCEDURE — 80143 DRUG ASSAY ACETAMINOPHEN: CPT

## 2021-12-16 PROCEDURE — 85025 COMPLETE CBC W/AUTO DIFF WBC: CPT

## 2021-12-16 PROCEDURE — 82077 ASSAY SPEC XCP UR&BREATH IA: CPT

## 2021-12-16 PROCEDURE — 36415 COLL VENOUS BLD VENIPUNCTURE: CPT

## 2021-12-16 PROCEDURE — 700105 HCHG RX REV CODE 258: Performed by: EMERGENCY MEDICINE

## 2021-12-16 PROCEDURE — 80179 DRUG ASSAY SALICYLATE: CPT

## 2021-12-16 PROCEDURE — 99285 EMERGENCY DEPT VISIT HI MDM: CPT

## 2021-12-16 PROCEDURE — 80053 COMPREHEN METABOLIC PANEL: CPT

## 2021-12-16 RX ORDER — SODIUM CHLORIDE 9 MG/ML
1000 INJECTION, SOLUTION INTRAVENOUS ONCE
Status: COMPLETED | OUTPATIENT
Start: 2021-12-16 | End: 2021-12-16

## 2021-12-16 RX ADMIN — SODIUM CHLORIDE 1000 ML: 9 INJECTION, SOLUTION INTRAVENOUS at 21:15

## 2021-12-17 VITALS
TEMPERATURE: 98.5 F | DIASTOLIC BLOOD PRESSURE: 77 MMHG | OXYGEN SATURATION: 95 % | BODY MASS INDEX: 33.57 KG/M2 | SYSTOLIC BLOOD PRESSURE: 143 MMHG | HEIGHT: 75 IN | RESPIRATION RATE: 17 BRPM | HEART RATE: 99 BPM | WEIGHT: 270 LBS

## 2021-12-17 LAB
AMPHET UR QL SCN: POSITIVE
BARBITURATES UR QL SCN: NEGATIVE
BENZODIAZ UR QL SCN: POSITIVE
BZE UR QL SCN: NEGATIVE
CANNABINOIDS UR QL SCN: NEGATIVE
METHADONE UR QL SCN: NEGATIVE
OPIATES UR QL SCN: NEGATIVE
OXYCODONE UR QL SCN: NEGATIVE
PCP UR QL SCN: NEGATIVE
POC BREATHALIZER: 0.08 PERCENT (ref 0–0.01)
POC BREATHALIZER: 0.21 PERCENT (ref 0–0.01)
PROPOXYPH UR QL SCN: NEGATIVE

## 2021-12-17 PROCEDURE — A9270 NON-COVERED ITEM OR SERVICE: HCPCS | Performed by: EMERGENCY MEDICINE

## 2021-12-17 PROCEDURE — 700102 HCHG RX REV CODE 250 W/ 637 OVERRIDE(OP)

## 2021-12-17 PROCEDURE — 302970 POC BREATHALIZER: Performed by: EMERGENCY MEDICINE

## 2021-12-17 PROCEDURE — 90791 PSYCH DIAGNOSTIC EVALUATION: CPT

## 2021-12-17 PROCEDURE — A9270 NON-COVERED ITEM OR SERVICE: HCPCS

## 2021-12-17 PROCEDURE — 700102 HCHG RX REV CODE 250 W/ 637 OVERRIDE(OP): Performed by: EMERGENCY MEDICINE

## 2021-12-17 PROCEDURE — 80307 DRUG TEST PRSMV CHEM ANLYZR: CPT

## 2021-12-17 RX ORDER — HALOPERIDOL 5 MG/1
5 TABLET ORAL ONCE
Status: COMPLETED | OUTPATIENT
Start: 2021-12-17 | End: 2021-12-17

## 2021-12-17 RX ORDER — HYDROXYZINE HYDROCHLORIDE 25 MG/1
25 TABLET, FILM COATED ORAL ONCE
Status: COMPLETED | OUTPATIENT
Start: 2021-12-17 | End: 2021-12-17

## 2021-12-17 RX ADMIN — HYDROXYZINE HYDROCHLORIDE 25 MG: 25 TABLET, FILM COATED ORAL at 05:00

## 2021-12-17 RX ADMIN — HALOPERIDOL 5 MG: 5 TABLET ORAL at 08:28

## 2021-12-17 RX ADMIN — HYDROXYZINE HYDROCHLORIDE 25 MG: 25 TABLET, FILM COATED ORAL at 11:35

## 2021-12-17 NOTE — ED TRIAGE NOTES
[place] [age]  89 y.o.  male  Chief Complaint   Patient presents with   • Suicidal Ideation     brought in by aruna, per report patient tried to run into traffic , paranoid and refused to identify his name. combative on scene. 2 mg versed and 400 mg Ketamine given by EMS. patient arrived on restraints. VSS

## 2021-12-17 NOTE — ED NOTES
Assumed care of patient. 1:1 sitter in place with direct observation of patient at all time. Patient resting in bed at this time.

## 2021-12-17 NOTE — ED PROVIDER NOTES
ED Provider Note    Scribed for MILVIA Becerra II* by Cecy Floyd. 12/16/2021  8:43 PM    Means of Arrival: EMS  History obtained by: EMS  Limitations: ALOC    CHIEF COMPLAINT  Chief Complaint   Patient presents with   • Suicidal Ideation     brought in by aruna, per report patient tried to run into traffic , paranoid and refused to identify his name. combative on scene. 2 mg versed and 400 mg Ketamine given by EMS. patient arrived on restraints. VSS       HPI  Brannon Sharma is a 42 yo male who presents to the Emergency Department via EMS after he was seen running into traffic.  Per EMS, the patient was demonstrating agitation, making suicidal comments, and was paranoid.He was extremely combative on scene. EMS administered 2 mg Versed and 400 mg Ketamine on scene (30+ minutes ago). He is currently in full restraints.     HPI limited due to ALOC secondary to Ketamine administration just prior to arrival. Can not answer questions.     REVIEW OF SYSTEMS  Review of Systems   Psychiatric/Behavioral: Positive for suicidal ideas.   All other systems negative.   See HPI for further details.  ROS limited due to ALOC secondary to Ketamine administration just prior to arrival. Can not answer questions.     PAST MEDICAL HISTORY   None pertinent to the patient's case  Attempted to review family history with him however he refuses to answer    FAMILY HISTORY  Attempted to review family history with him however he refuses to answer    SOCIAL HISTORY  Social History     Tobacco Use   • Smoking status: Not noted   • Smokeless tobacco: Not noted   Substance and Sexual Activity   • Alcohol use: Not noted   • Drug use: Not noted   • Sexual activity:        SURGICAL HISTORY  patient denies any surgical history    CURRENT MEDICATIONS  Home Medications     Reviewed by Jame Blandon R.N. (Registered Nurse) on 12/16/21 at 2038  Med List Status: Partial   Medication Last Dose Status        Patient Daniel Taking any Medications       "                 ALLERGIES  Not pertinent to the case.     PHYSICAL EXAM  VITAL SIGNS: /81   Pulse 101   Temp 36.9 °C (98.4 °F) (Temporal)   Resp 16   Ht 1.905 m (6' 3\")   Wt 122 kg (270 lb)   SpO2 96%   BMI 33.75 kg/m²   Pulse ox interpretation: I interpret this pulse ox as normal.  Constitutional: Patient in 4 point restraints, mildly sedated, awakens to tactile stimulus.  HENT: Dry mucous membranes. No signs of trauma, Bilateral external ears normal, Nose normal.   Eyes: Intermittently opens his eyes. Pupils are equal, Conjunctiva normal, Non-icteric.   Neck: Normal range of motion, No stridor.   Cardiovascular: Mild tachycardia and regular rhythm, no murmurs. Symmetric distal pulses. No cyanosis of extremities. No peripheral edema of extremities.  Thorax & Lungs: Normal breath sounds, No respiratory distress, No wheezing, No chest tenderness.   Abdomen: BSoft, No tenderness, No masses, No pulsatile masses. No peritoneal signs.  Skin: Warm, Dry, No erythema, No rash.   Back: No midline bony tenderness, No CVA tenderness.   Musculoskeletal: Good range of motion in all major joints. No tenderness to palpation or major deformities noted.   Neurologic: Intermittently opening eyes spontaneously, slurred speech, visualized resistance to restraints in all 4 extremities.  Psychiatric: Not talking or answering questions.     DIAGNOSTIC STUDIES / PROCEDURES    LABS  Results for orders placed or performed during the hospital encounter of 12/16/21   Blood Alcohol   Result Value Ref Range    Diagnostic Alcohol 295.4 (H) 0.0 - 10.0 mg/dL   CBC WITH DIFFERENTIAL   Result Value Ref Range    WBC 6.4 4.8 - 10.8 K/uL    RBC 5.16 4.70 - 6.10 M/uL    Hemoglobin 16.1 14.0 - 18.0 g/dL    Hematocrit 46.9 42.0 - 52.0 %    MCV 90.9 81.4 - 97.8 fL    MCH 31.2 27.0 - 33.0 pg    MCHC 34.3 33.7 - 35.3 g/dL    RDW 44.2 35.9 - 50.0 fL    Platelet Count 265 164 - 446 K/uL    MPV 10.5 9.0 - 12.9 fL    Neutrophils-Polys 47.90 44.00 - " 72.00 %    Lymphocytes 39.00 22.00 - 41.00 %    Monocytes 8.70 0.00 - 13.40 %    Eosinophils 3.00 0.00 - 6.90 %    Basophils 1.20 0.00 - 1.80 %    Immature Granulocytes 0.20 0.00 - 0.90 %    Nucleated RBC 0.00 /100 WBC    Neutrophils (Absolute) 3.08 1.82 - 7.42 K/uL    Lymphs (Absolute) 2.51 1.00 - 4.80 K/uL    Monos (Absolute) 0.56 0.00 - 0.85 K/uL    Eos (Absolute) 0.19 0.00 - 0.51 K/uL    Baso (Absolute) 0.08 0.00 - 0.12 K/uL    Immature Granulocytes (abs) 0.01 0.00 - 0.11 K/uL    NRBC (Absolute) 0.00 K/uL   COMP METABOLIC PANEL   Result Value Ref Range    Sodium 142 135 - 145 mmol/L    Potassium 3.8 3.6 - 5.5 mmol/L    Chloride 106 96 - 112 mmol/L    Co2 19 (L) 20 - 33 mmol/L    Anion Gap 17.0 (H) 7.0 - 16.0    Glucose 127 (H) 65 - 99 mg/dL    Bun 10 8 - 22 mg/dL    Creatinine 0.84 0.50 - 1.40 mg/dL    Calcium 8.5 8.5 - 10.5 mg/dL    AST(SGOT) 73 (H) 12 - 45 U/L    ALT(SGPT) 131 (H) 2 - 50 U/L    Alkaline Phosphatase 125 (H) 30 - 99 U/L    Total Bilirubin 0.7 0.1 - 1.5 mg/dL    Albumin 4.2 3.2 - 4.9 g/dL    Total Protein 7.6 6.0 - 8.2 g/dL    Globulin 3.4 1.9 - 3.5 g/dL    A-G Ratio 1.2 g/dL   Acetaminophen Level   Result Value Ref Range    Acetaminophen -Tylenol <5.0 (L) 10.0 - 30.0 ug/mL   SALICYLATE LEVEL   Result Value Ref Range    Salicylates, Quant. <1.0 (L) 15.0 - 25.0 mg/dL   ESTIMATED GFR   Result Value Ref Range    GFR If African American >60 >60 mL/min/1.73 m 2    GFR If Non African American >60 >60 mL/min/1.73 m 2     Pertinent Labs & Imaging studies reviewed. (See chart for details)    COURSE & MEDICAL DECISION MAKING  Pertinent Labs & Imaging studies reviewed. (See chart for details)    8:43 PM This is a 41-year-old male presenting in 4 point restraints and on a legal hold after he was found running into traffic, making suicidal comments, very paranoid.  He required sedative medication in order for him to be transported to our facility.  He is still showing signs of mild sedation but does awaken to  tactile stimulus.  Not answering questions.  Ordered for Urine drug, blood alcohol, CBC, CMP, Acetaminophen level, Salicylate level to evaluate. Patient will be treated with NS infusion for dry mucous membranes, n.p.o.     8:52 PM- Will continue to reevaluate the patient and will remove the restrains as soon as it becomes safe to do so.    PATIENT PLACED INTO ED OBSERVATION AT 10:12 PM FOR INTOXICATION.  Awaiting sobriety.  Pending medical clearance and behavioral health evaluation.    10:13 PM-  Patient states his name is Brannon.     10:30 PM- Still very drowsy but easier to awaken at this time. Alcohol level is 295. Low bicarb likely due to alcohol levels. Negative Tylenol and Aspirin levels.     12:24 AM  Improve mentation.  Still awaiting sobriety.  Dr. Yañez has assumed care.     Working diagnosis  1. Alcoholic intoxication without complication (HCC) Active   2. Aggressive behavior    3. Thoughts of self harm          Cecy CABA (Paul), am scribing for, and in the presence of, CHELA Becerra II.    Electronically signed by: Cecy Floyd (Paul), 12/16/2021    IJose II, M* personally performed the services described in this documentation, as scribed by Cecy Floyd in my presence, and it is both accurate and complete.    The note accurately reflects work and decisions made by me.  Jose Tavares II, M.D.  12/17/2021  12:25 AM

## 2021-12-17 NOTE — DISCHARGE SUMMARY
"  ED Observation Discharge Summary    Patient:Brannon Sharma  Patient : 1980  Patient MRN: [id number]  Patient PCP: No primary care provider on file.    Admit Date: 2021  Discharge Date and Time: 21 9:40 AM  Discharge Diagnosis:   1. Alcoholic intoxication without complication (HCC) Active   2. Aggressive behavior    3. Thoughts of self harm         Discharge Attending: Avtar Argueta M.D.  Discharge Service: ED Observation    ED Course  Brannon is a 41 y.o. male who was evaluated at [facility] for acute alcohol intoxication and methamphetamine intoxication.  Patient was initially agitated and complaining of possible suicidal ideation.  Patient signed out to me awaiting clinical sobriety.  On reevaluation patient is clinically sober, he is very well-appearing, he is apologetic.  He denies any ongoing suicidal homicidal ideation.  Patient will be discharged to [facility] for voluntary admission for possible alcohol rehabilitation.  Patient has no other complaints at this time.    Discharge Exam:  /69   Pulse 94   Temp 36.9 °C (98.4 °F) (Temporal)   Resp 16   Ht 1.905 m (6' 3\")   Wt 122 kg (270 lb)   SpO2 95%   BMI 33.75 kg/m² .    Constitutional: Awake and alert. Nontoxic  HENT:  Grossly normal  Eyes: Grossly normal  Neck: Normal range of motion  Cardiovascular: Normal heart rate   Thorax & Lungs: No respiratory distress  Abdomen: Nontender  Skin:  No pathologic rash.   Extremities: Well perfused  Psychiatric: Affect normal    Labs  Results for orders placed or performed during the hospital encounter of 21   URINE DRUG SCREEN (TRIAGE)   Result Value Ref Range    Amphetamines Urine Positive (A) Negative    Barbiturates Negative Negative    Benzodiazepines Positive (A) Negative    Cocaine Metabolite Negative Negative    Methadone Negative Negative    Opiates Negative Negative    Oxycodone Negative Negative    Phencyclidine -Pcp Negative Negative    " Propoxyphene Negative Negative    Cannabinoid Metab Negative Negative   Blood Alcohol   Result Value Ref Range    Diagnostic Alcohol 295.4 (H) 0.0 - 10.0 mg/dL   CBC WITH DIFFERENTIAL   Result Value Ref Range    WBC 6.4 4.8 - 10.8 K/uL    RBC 5.16 4.70 - 6.10 M/uL    Hemoglobin 16.1 14.0 - 18.0 g/dL    Hematocrit 46.9 42.0 - 52.0 %    MCV 90.9 81.4 - 97.8 fL    MCH 31.2 27.0 - 33.0 pg    MCHC 34.3 33.7 - 35.3 g/dL    RDW 44.2 35.9 - 50.0 fL    Platelet Count 265 164 - 446 K/uL    MPV 10.5 9.0 - 12.9 fL    Neutrophils-Polys 47.90 44.00 - 72.00 %    Lymphocytes 39.00 22.00 - 41.00 %    Monocytes 8.70 0.00 - 13.40 %    Eosinophils 3.00 0.00 - 6.90 %    Basophils 1.20 0.00 - 1.80 %    Immature Granulocytes 0.20 0.00 - 0.90 %    Nucleated RBC 0.00 /100 WBC    Neutrophils (Absolute) 3.08 1.82 - 7.42 K/uL    Lymphs (Absolute) 2.51 1.00 - 4.80 K/uL    Monos (Absolute) 0.56 0.00 - 0.85 K/uL    Eos (Absolute) 0.19 0.00 - 0.51 K/uL    Baso (Absolute) 0.08 0.00 - 0.12 K/uL    Immature Granulocytes (abs) 0.01 0.00 - 0.11 K/uL    NRBC (Absolute) 0.00 K/uL   COMP METABOLIC PANEL   Result Value Ref Range    Sodium 142 135 - 145 mmol/L    Potassium 3.8 3.6 - 5.5 mmol/L    Chloride 106 96 - 112 mmol/L    Co2 19 (L) 20 - 33 mmol/L    Anion Gap 17.0 (H) 7.0 - 16.0    Glucose 127 (H) 65 - 99 mg/dL    Bun 10 8 - 22 mg/dL    Creatinine 0.84 0.50 - 1.40 mg/dL    Calcium 8.5 8.5 - 10.5 mg/dL    AST(SGOT) 73 (H) 12 - 45 U/L    ALT(SGPT) 131 (H) 2 - 50 U/L    Alkaline Phosphatase 125 (H) 30 - 99 U/L    Total Bilirubin 0.7 0.1 - 1.5 mg/dL    Albumin 4.2 3.2 - 4.9 g/dL    Total Protein 7.6 6.0 - 8.2 g/dL    Globulin 3.4 1.9 - 3.5 g/dL    A-G Ratio 1.2 g/dL   Acetaminophen Level   Result Value Ref Range    Acetaminophen -Tylenol <5.0 (L) 10.0 - 30.0 ug/mL   SALICYLATE LEVEL   Result Value Ref Range    Salicylates, Quant. <1.0 (L) 15.0 - 25.0 mg/dL   ESTIMATED GFR   Result Value Ref Range    GFR If African American >60 >60 mL/min/1.73 m 2    GFR  If Non African American >60 >60 mL/min/1.73 m 2   POC BREATHALIZER   Result Value Ref Range    POC Breathalizer 0.213 (A) 0.00 - 0.01 Percent   POC BREATHALIZER   Result Value Ref Range    POC Breathalizer 0.078 (A) 0.00 - 0.01 Percent       Radiology  No orders to display       Disposition: discharged    Follow up: No follow-ups on file.    Medications:   New Prescriptions    No medications on file       Discharge Condition: Stable    Electronically signed by: Avtar Argueta M.D., 12/17/2021 9:40 AM

## 2021-12-17 NOTE — CONSULTS
"[facility]   TRIAGE ASSESSMENT    Name: Brannon Sharma  MRN: [id number]  : 1980  Age: 41 y.o.  Date of assessment: 2021  PCP: No primary care provider on file.  Persons in attendance: Patient  Patient Location: [facility]    CHIEF COMPLAINT/PRESENTING ISSUE (as stated by patient): 41 year old male BIB EMS last night, legal hold, pt in traffic, UDS + Amphetamines, THC, and ETOH on admit 0.078; as noted on admit \"combative on scene. 2 mg versed and 400 mg Ketamine given by EMS\"; pt noted with h/o aggression including 10/22/21-BIB PD in handcuffs,  \"combative verbally and posturing physically on arrival to ED; pt restrained\"; pt with h/o noted chronic ETOH and Methamphetamine use; noted psych diagnoses include Alcohol use disorder severe, Stimulant use disorder severe amphetamine type, Bipolar Type I D/O, Anxiety, Depression, Paranoid Schizophrenia  Later this AM, pt quiet; sleeping; calm behaviors; organized thoughts; cooperative; no delusions, hallucinations noted; generalized paranoia; denies HI, SI, or self-harm ideation; denies f/u with previous outpt MH providers at [facility]care coordinator, Valentin, with last appt 10/2021 and [facility] psychiatrist with last appt 2021; noted h/o psych meds include unknown doses of Risperdone, Sertraline, Buspar, Tegretol, Gabapentin, Trazodone, states last taken approx 11 days ago, \"I stopped taking them\"; current substance use includes ETOH daily up to 18 beers, Methamphetamines 1 gram daily smoking and THC monthly, all with last use 21; unemployed; 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 " "combative on scene. 2 mg versed and 400 mg Ketamine given by EMS. patient arrived on restraints. 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   Dates Level of Care Facilty/Provider Diagnosis/Problem Medications   Last 10/2021 outpt MH [facility] ([facility]) care coordinator, Valentin       9/2021 CD rehab [facility] psychiatrist last appt 9/2021 Alcohol use disorder severe, Stimulant use disorder sever amphetamine type, Bipolar Type I D/O,  Anxiety, Depression, Paranoid schizophrenia  unknown doses of Risperdone, Sertraline, Buspar, Tegretol, Gabapentin, Trazodone,  states last taken psych meds 3 weeks ago      12/10/2020 outpt   CHELY Alvarado at [facility] Alcohol use disorder severe, Stimulant use disorder sever amphetamine type, Bipolar Type I D/O,  Anxiety, Depression, Paranoid schizophrenia     Olanzapine    12/2019 outpt  [facility], psychiatry       12/2019 outpt [facility]           8/2019 inpt [facility] detox Left AMA after 2 days      2016 inpt [facility]     2015 inpt [facility]     2014 inpt  [facility]         SAFETY ASSESSMENT - SELF  Does patient acknowledge current or past symptoms of dangerousness to self or is previous history noted? Yes-last night, in traffic, while intoxicated on ETOH, THC, Methamphetamines; with noted h/o chronic SI with substance intoxication  Does parent/significant other report patient has current or past symptoms of dangerousness to self? N\A  Does presenting problem suggest symptoms of dangerousness to self? Yes:     Past Current    Suicidal Thoughts: [x]  []    Suicidal Plans: []  []    Suicidal Intent: []  []    Suicide Attempts: []  []    Self-Injury []  []  " "    For any boxes checked above, provide detail: last night, in traffic, while intoxicated on ETOH, THC, Methamphetamines; with noted h/o chronic SI with substance intoxication      History of suicide by family member: no  History of suicide by friend/significant other: no  Recent change in frequency/specificity/intensity of suicidal thoughts or self-harm behavior? no  Current access to firearms, medications, or other identified means of suicide/self-harm? no  If yes, willing to restrict access to means of suicide/self-harm? yes - no guns or weapons  Protective factors present:  Future-oriented, Positive self-efficacy and Willing to address in treatment    SAFETY ASSESSMENT - OTHERS  Does patient acknowledge current or past symptoms of aggressive behavior or risk to others or is previous history noted? Yes- as noted on admit \"combative on scene. 2 mg versed and 400 mg Ketamine given by EMS\"; pt noted with h/o aggression including 10/22/21-BIB PD in handcuffs,  \"combative verbally and posturing physically on arrival to ED; pt restrained\"  Does parent/significant other report patient has current or past symptoms of aggressive behavior or risk to others?  N\A  Does presenting problem suggest symptoms of dangerousness to others? Yes:    History Current   Thoughts of injuring others? [x]  []    Threats to injure others? [x]  []    Plan to injure others? []  []    Intent to injure others? []  []    Has injured others? []  []    Thoughts of killing others? []  []    Threats to kill others? []  []    Plans to kill others? []  []    Intent to kill others? []  []    Has killed others? []  []    Perpetrator of sexual assault? []  []    Family history of homicide? []  []      For any boxes checked above, please provide detail:  as noted on admit \"combative on scene. 2 mg versed and 400 mg Ketamine given by EMS\"; pt noted with h/o aggression including 10/22/21-BIB PD in handcuffs,  \"combative verbally and posturing physically on " "arrival to ED; pt restrained\"    Recent change in frequency/specificity/intensity of thoughts or threats to harm others? no  Current access to firearms/other identified means of harm? no  If yes, willing to restrict access to weapons/means of harm? yes - no guns or weapons  Protective factors present: Fear of consequences and Willing to address in treatment  Based on information provided during the current assessment, is a mandated “duty to warn” being exercised? No    LEGAL HISTORY  Does patient acknowledge history of arrest/long term/longterm or is previous history noted? Yes-states recent arrest 9/2021 for open container     Crisis Safety Plan completed and copy given to patient? No-pt refused    ABUSE/NEGLECT SCREENING  Does patient report feeling “unsafe” in his/her home, or afraid of anyone?  no  Does patient report any history of physical, sexual, or emotional abuse?  no  Does parent or significant other report any of the above? N\A  Is there evidence of neglect by self?  no  Is there evidence of neglect by a caregiver? no  Does the patient/parent report any history of CPS/APS/police involvement related to suspected abuse/neglect or domestic violence? no  Based on the information provided during the current assessment, is a mandated report of suspected abuse/neglect being made?  No    SUBSTANCE USE SCREENING  Yes:  [place] all substances used in the past 30 days:      Last Use Amount   [x]   Alcohol 12/16/21 18 beers daily   [x]   Marijuana 12/16/21 monthly use   []   Heroin     []   Prescription Opioids  (used without prescription, for    recreation, or in excess of prescribed amount)     []   Other Prescription  (used without prescription, for    recreation, or in excess of prescribed amount)     []   Cocaine      [x]   Methamphetamine 12/16/21 1 gram daily   []   \"\" drugs (ectasy, MDMA)     []   Other substances        UDS results: + Amphetamine, THC  Breathalyzer results: 0.078    What consequences does the " patient associate with any of the above substance use and or addictive behaviors? Health problems    Risk factors for detox (check all that apply):  [x]  Seizures   [x]  Diaphoretic (sweating)   [x]  Tremors   [x]  Hallucinations   [x]  Increased blood pressure   [x]  Decreased blood pressure   []  Other   []  None      [x] Patient education on risk factors for detoxification and instructed to return to ER as needed.      MENTAL STATUS   Participation: Active verbal participation, Attentive, Engaged and Open to feedback  Grooming: Casual and Neat  Orientation: Alert and Fully Oriented  Behavior: Calm  Eye contact: Limited  Mood: Depressed  Affect: Constricted, Blunted and Flat  Thought process: Logical, Goal-directed and Circumstantial  Thought content: Within normal limits  Speech: Rate within normal limits and Volume within normal limits  Perception: Within normal limits  Memory:  No gross evidence of memory deficits  Insight: Adequate  Judgment:  Adequate  Other:    Collateral information:   Source:  [] Significant other present in person:   [] Significant other by telephone  [] [facility]   [x] [facility]  [x] [facility]  [x] Other: [facility] staff, Lizy    [] Unable to complete full assessment due to:  [] Acute intoxication  [] Patient declined to participate/engage  [] Patient verbally unresponsive  [] Significant cognitive deficits  [] Significant perceptual distortions or behavioral disorganization  [] Other:      CLINICAL IMPRESSIONS:  Primary:  H/o Stimulant use d/o, methamphetamines  Secondary:  homeless       IDENTIFIED NEEDS/PLAN:  [Trigger DISPOSITION list for any items marked]    []  Imminent safety risk - self [] Imminent safety risk - others   []  Acute substance withdrawal []  Psychosis/Impaired reality testing   [x]  Mood/anxiety [x]  Substance use/Addictive behavior   [x]  Maladaptive behaviro []  Parent/child conflict   []  Family/Couples conflict []  Biomedical    [x]  Housing [x]  Financial   []   Legal  Occupational/Educational   []  Domestic violence []  Other:     Recommended Plan of Care:  Refer to [facility], [facility] ([facility]), Peer Recovery Support Team, [facility]; Medicaid HPN insurance plan; Writer RN reviewed community CD and MH resources with pt, with written information given; with pt verbal consent, writer RN notified [facility]/Medicaid HPN insurance plan staff who will meet pt at [facility] ED bedside today at 1100 to provide outpt MH services; pt met with Peer Recovery Support staffKeyanna, as pt willing to attend a CD detox program; per Keyanna, [facility] has a bed available; pt verbalized understanding; pt to DC to self today to a CD detox program with transport by Peer Recovery Support Team    Has the Recommended Plan of Care/Level of Observation been reviewed with the patient's assigned nurse? yes    Does patient/parent or guardian express agreement with the above plan? yes    Referral appointment(s) scheduled? N\A    Alert team only:   I have discussed findings and recommendations with Dr. Argueta who is in agreement with these recommendations. Legal hold DC'd    Referral information sent to the following outpatient community providers : [facility]    Referral information sent to the following inpatient community providers :[facility] for voluntary inpt ETOH detox evaluation    If applicable : Referred  to  Alert Team for legal hold follow up at (time): WALTER Morrison R.N.  12/17/2021

## 2021-12-17 NOTE — ED NOTES
Pt discharged to [facility]. Paperwork with patient. IV removed from dorsal hand.. Pt given four belongings bags and patient changed.

## 2021-12-17 NOTE — ED NOTES
Report to lenny, who is in direct view of pt at all times. Pt sleeping, equal rise and fall of chest observed.

## 2021-12-17 NOTE — ED NOTES
"Pt raising voice at staff saying \"If I'm not going to get what I want then I'm going to start to get loud\". ERP and security at bedside.  "

## 2022-09-16 ENCOUNTER — HOSPITAL ENCOUNTER (EMERGENCY)
Facility: MEDICAL CENTER | Age: 42
End: 2022-09-16
Attending: EMERGENCY MEDICINE
Payer: MEDICAID

## 2022-09-16 VITALS
HEART RATE: 74 BPM | TEMPERATURE: 97.9 F | SYSTOLIC BLOOD PRESSURE: 144 MMHG | BODY MASS INDEX: 35.63 KG/M2 | DIASTOLIC BLOOD PRESSURE: 85 MMHG | HEIGHT: 75 IN | OXYGEN SATURATION: 96 % | RESPIRATION RATE: 18 BRPM | WEIGHT: 286.6 LBS

## 2022-09-16 DIAGNOSIS — R10.9 ABDOMINAL PAIN, UNSPECIFIED ABDOMINAL LOCATION: ICD-10-CM

## 2022-09-16 DIAGNOSIS — R53.83 OTHER FATIGUE: ICD-10-CM

## 2022-09-16 LAB
ALBUMIN SERPL BCP-MCNC: 4.4 G/DL (ref 3.2–4.9)
ALBUMIN/GLOB SERPL: 1.6 G/DL
ALP SERPL-CCNC: 96 U/L (ref 30–99)
ALT SERPL-CCNC: 46 U/L (ref 2–50)
ANION GAP SERPL CALC-SCNC: 10 MMOL/L (ref 7–16)
APPEARANCE UR: CLEAR
AST SERPL-CCNC: 24 U/L (ref 12–45)
BASOPHILS # BLD AUTO: 0.9 % (ref 0–1.8)
BASOPHILS # BLD: 0.08 K/UL (ref 0–0.12)
BILIRUB SERPL-MCNC: 0.8 MG/DL (ref 0.1–1.5)
BILIRUB UR QL STRIP.AUTO: NEGATIVE
BUN SERPL-MCNC: 17 MG/DL (ref 8–22)
CALCIUM SERPL-MCNC: 9.3 MG/DL (ref 8.4–10.2)
CHLORIDE SERPL-SCNC: 104 MMOL/L (ref 96–112)
CO2 SERPL-SCNC: 25 MMOL/L (ref 20–33)
COLOR UR: NORMAL
CREAT SERPL-MCNC: 1.07 MG/DL (ref 0.5–1.4)
EOSINOPHIL # BLD AUTO: 0.4 K/UL (ref 0–0.51)
EOSINOPHIL NFR BLD: 4.5 % (ref 0–6.9)
ERYTHROCYTE [DISTWIDTH] IN BLOOD BY AUTOMATED COUNT: 43.6 FL (ref 35.9–50)
ETHANOL BLD-MCNC: <10.1 MG/DL
GFR SERPLBLD CREATININE-BSD FMLA CKD-EPI: 89 ML/MIN/1.73 M 2
GLOBULIN SER CALC-MCNC: 2.7 G/DL (ref 1.9–3.5)
GLUCOSE SERPL-MCNC: 82 MG/DL (ref 65–99)
GLUCOSE UR STRIP.AUTO-MCNC: NEGATIVE MG/DL
HCT VFR BLD AUTO: 50.1 % (ref 42–52)
HGB BLD-MCNC: 17.1 G/DL (ref 14–18)
IMM GRANULOCYTES # BLD AUTO: 0.02 K/UL (ref 0–0.11)
IMM GRANULOCYTES NFR BLD AUTO: 0.2 % (ref 0–0.9)
KETONES UR STRIP.AUTO-MCNC: NEGATIVE MG/DL
LEUKOCYTE ESTERASE UR QL STRIP.AUTO: NEGATIVE
LIPASE SERPL-CCNC: 31 U/L (ref 7–58)
LYMPHOCYTES # BLD AUTO: 3.63 K/UL (ref 1–4.8)
LYMPHOCYTES NFR BLD: 41 % (ref 22–41)
MCH RBC QN AUTO: 30.8 PG (ref 27–33)
MCHC RBC AUTO-ENTMCNC: 34.1 G/DL (ref 33.7–35.3)
MCV RBC AUTO: 90.3 FL (ref 81.4–97.8)
MICRO URNS: NORMAL
MONOCYTES # BLD AUTO: 0.71 K/UL (ref 0–0.85)
MONOCYTES NFR BLD AUTO: 8 % (ref 0–13.4)
NEUTROPHILS # BLD AUTO: 4.01 K/UL (ref 1.82–7.42)
NEUTROPHILS NFR BLD: 45.4 % (ref 44–72)
NITRITE UR QL STRIP.AUTO: NEGATIVE
NRBC # BLD AUTO: 0 K/UL
NRBC BLD-RTO: 0 /100 WBC
PH UR STRIP.AUTO: 6 [PH] (ref 5–8)
PLATELET # BLD AUTO: 225 K/UL (ref 164–446)
PMV BLD AUTO: 10.5 FL (ref 9–12.9)
POTASSIUM SERPL-SCNC: 4.3 MMOL/L (ref 3.6–5.5)
PROT SERPL-MCNC: 7.1 G/DL (ref 6–8.2)
PROT UR QL STRIP: NEGATIVE MG/DL
RBC # BLD AUTO: 5.55 M/UL (ref 4.7–6.1)
RBC UR QL AUTO: NEGATIVE
SODIUM SERPL-SCNC: 139 MMOL/L (ref 135–145)
SP GR UR STRIP.AUTO: <=1.005
WBC # BLD AUTO: 8.9 K/UL (ref 4.8–10.8)

## 2022-09-16 PROCEDURE — 81003 URINALYSIS AUTO W/O SCOPE: CPT

## 2022-09-16 PROCEDURE — 82077 ASSAY SPEC XCP UR&BREATH IA: CPT

## 2022-09-16 PROCEDURE — 36415 COLL VENOUS BLD VENIPUNCTURE: CPT

## 2022-09-16 PROCEDURE — 80053 COMPREHEN METABOLIC PANEL: CPT

## 2022-09-16 PROCEDURE — 99284 EMERGENCY DEPT VISIT MOD MDM: CPT

## 2022-09-16 PROCEDURE — 83690 ASSAY OF LIPASE: CPT

## 2022-09-16 PROCEDURE — 85025 COMPLETE CBC W/AUTO DIFF WBC: CPT

## 2022-09-16 RX ORDER — SEMAGLUTIDE 1.34 MG/ML
0.5 INJECTION, SOLUTION SUBCUTANEOUS
Status: SHIPPED | COMMUNITY
End: 2023-08-10

## 2022-09-16 RX ORDER — OLANZAPINE 15 MG/1
15 TABLET ORAL NIGHTLY
Status: SHIPPED | COMMUNITY
End: 2023-05-04 | Stop reason: SDUPTHER

## 2022-09-16 RX ORDER — ESZOPICLONE 1 MG/1
1 TABLET, FILM COATED ORAL
Status: SHIPPED | COMMUNITY
End: 2022-12-23

## 2022-09-16 RX ORDER — EMPAGLIFLOZIN 25 MG/1
25 TABLET, FILM COATED ORAL DAILY
Status: SHIPPED | COMMUNITY
End: 2023-05-04 | Stop reason: SDUPTHER

## 2022-09-16 ASSESSMENT — ENCOUNTER SYMPTOMS
FEVER: 0
NAUSEA: 0
DIZZINESS: 0
SHORTNESS OF BREATH: 0
VOMITING: 0
HEADACHES: 0
ABDOMINAL PAIN: 1

## 2022-09-16 ASSESSMENT — FIBROSIS 4 INDEX: FIB4 SCORE: 1.01

## 2022-09-16 NOTE — ED TRIAGE NOTES
Pt presents by self from work for increased fatigue, foamy urine, and abd pain. Symptoms began about 1 month ago and progressively worsening.   Has a hx of meth use and drinks alcohol daily. Hx of enlarged liver and has DM2.A&Ox4 and ambulatory.

## 2022-09-16 NOTE — ED NOTES
Med Rec completed per patient and home pharmacy ([facility])   Allergies reviewed  No ORAL antibiotics in last 30 days

## 2022-09-16 NOTE — ED PROVIDER NOTES
ED Provider Note  Primary care provider: Gregory Hardin M.D.  Means of arrival: private vehicle  History obtained from: patient  History limited by: none    CHIEF COMPLAINT  Chief Complaint   Patient presents with    Fatigue    Abdominal Pain       HPI  Brannon Sharma is a 42 y.o. male who presents to the Emergency Department for evaluation of fatigue.  Patient reports that he has a history of heavy alcohol abuse and quit drinking 30 days ago.  Over the last 30 days he has noticed progressively worsening fatigue to the point that he is sleeping approximately 14 hours a day.  He has follow-up appointment scheduled with his primary care provider in about a month but was concerned and therefore came in for further evaluation.  Aside from fatigue patient reports that his urine seems foamy and he has occasional abdominal pain although he is not having it at this time.  He describes it as generalized abdominal discomfort.  He states that he is most concerned about his renal function.  He is a diabetic and reports that he has been well controlled on Ozempic, Jardiance and metformin.    REVIEW OF SYSTEMS  Review of Systems   Constitutional:  Positive for malaise/fatigue. Negative for fever.   Respiratory:  Negative for shortness of breath.    Cardiovascular:  Negative for chest pain.   Gastrointestinal:  Positive for abdominal pain (occasional). Negative for nausea and vomiting.   Genitourinary:  Negative for dysuria.   Neurological:  Negative for dizziness and headaches.   All other systems reviewed and are negative.      PAST MEDICAL HISTORY   has a past medical history of Alcohol-induced psychosis (HCC), Anxiety, Depression, Paranoid schizophrenia (HCC), Psychiatric disorder, and Schizophrenia, paranoid (HCC).    SURGICAL HISTORY   has a past surgical history that includes other and nasal septal reconstruction.    SOCIAL HISTORY  Social History     Tobacco Use    Smoking status: Former     Packs/day: 1.00      "Years: 12.00     Pack years: 12.00     Types: Cigarettes    Smokeless tobacco: Never   Vaping Use    Vaping Use: Former   Substance Use Topics    Alcohol use: Yes     Alcohol/week: 9.6 oz     Types: 16 Cans of beer per week     Comment:  states 40-50 beers/d    Drug use: Not Currently     Types: Inhaled, Marijuana, Methamphetamines     Comment: thc, meth      Social History     Substance and Sexual Activity   Drug Use Not Currently    Types: Inhaled, Marijuana, Methamphetamines    Comment: thc, meth       FAMILY HISTORY  Family History   Family history unknown: Yes       CURRENT MEDICATIONS  Home Medications       Reviewed by Sylvia Rosas (Pharmacy Tech) on 09/16/22 at 1622  Med List Status: Complete     Medication Last Dose Status   Empagliflozin (JARDIANCE) 25 MG Tab 9/16/2022 Active   eszopiclone (LUNESTA) 1 MG Tab tablet 9/15/2022 Active   lisinopril (PRINIVIL) 40 MG tablet 9/16/2022 Active   metFORMIN (GLUCOPHAGE) 500 MG Tab 9/15/2022 Active   olanzapine (ZYPREXA) 15 MG tablet FEW DAYS AGO Active   Semaglutide,0.25 or 0.5MG/DOS, (OZEMPIC, 0.25 OR 0.5 MG/DOSE,) 2 MG/1.5ML Solution Pen-injector 9/10/2022 Active   SITagliptin (JANUVIA) 100 MG Tab ABOUT 1 WEEK AGO Active   traZODone (DESYREL) 150 MG Tab 9/15/2022 Active                    ALLERGIES  Allergies   Allergen Reactions    Ceclor [Cefaclor] Anaphylaxis    Cephalosporins Anaphylaxis    Pcn [Penicillins] Anaphylaxis       PHYSICAL EXAM  VITAL SIGNS: /89   Pulse 75   Temp 36.6 °C (97.9 °F) (Temporal)   Resp 18   Ht 1.905 m (6' 3\")   Wt (!) 130 kg (286 lb 9.6 oz)   SpO2 97%   BMI 35.82 kg/m²   Vitals reviewed by myself.  Physical Exam  Nursing note and vitals reviewed.  Constitutional: Well-developed and well-nourished. No acute distress.   HENT: Head is normocephalic and atraumatic.  Eyes: extra-ocular movements intact  Cardiovascular: regular rate and  regular rhythm. No murmur heard.  Pulmonary/Chest: Breath sounds normal. No wheezes or " rales.   Abdominal: Soft and non-tender. No distention.    Musculoskeletal: Extremities exhibit normal range of motion without edema or tenderness.   Neurological: Awake and alert  Skin: Skin is warm and dry. No rash.         DIAGNOSTIC STUDIES /  LABS  Labs Reviewed   CBC WITH DIFFERENTIAL   COMP METABOLIC PANEL   LIPASE   URINALYSIS   ETHYL ALCOHOL (BLOOD)   ESTIMATED GFR   DIAGNOSTIC ALCOHOL       All labs reviewed by me.    RADIOLOGY  No orders to display     The radiologist's interpretation of all radiological studies have been reviewed by me.      COURSE & MEDICAL DECISION MAKING  Nursing notes, VS, PMSFHx reviewed in chart.    Patient is a 42-year-old male who comes in for evaluation of fatigue.  Differential diagnosis includes urinary tract infection, electrolyte disturbance, anemia.  Diagnostic work-up includes labs and urinalysis.    Patient's initial vitals are within normal limits and he is well-appearing on exam.  Abdominal exam is benign.  Labs returned and are unremarkable.  Urinalysis demonstrates no signs of infection.  Therefore patient is reassured and advised to follow-up with primary care physician if he has ongoing fatigue.  He does have a sleep study scheduled for evaluation of possible sleep apnea causing his fatigue.  He has follow-up with his primary care doctor scheduled as well.  He is then given strict return precautions and discharged in stable condition.        FINAL IMPRESSION  1. Other fatigue    2. Abdominal pain, unspecified abdominal location

## 2022-09-17 NOTE — ED NOTES
Pt discharged home in stable condition. VSS. No prescriptions given. Follow-up care reviewed, all questions answered. Pt ambulatory out of ER with steady gait. States safe ride home.

## 2022-10-17 ENCOUNTER — NON-PROVIDER VISIT (OUTPATIENT)
Dept: OCCUPATIONAL MEDICINE | Facility: CLINIC | Age: 42
End: 2022-10-17

## 2022-10-17 ENCOUNTER — OFFICE VISIT (OUTPATIENT)
Dept: OCCUPATIONAL MEDICINE | Facility: CLINIC | Age: 42
End: 2022-10-17

## 2022-10-17 DIAGNOSIS — Z02.89 VISIT FOR OCCUPATIONAL HEALTH EXAMINATION: ICD-10-CM

## 2022-10-17 DIAGNOSIS — Z02.1 PRE-EMPLOYMENT DRUG SCREENING: Primary | ICD-10-CM

## 2022-10-17 LAB
AMP AMPHETAMINE: NORMAL
BREATH ALCOHOL COMMENT: NORMAL
COC COCAINE: NORMAL
INT CON NEG: NORMAL
INT CON POS: NORMAL
MET METHAMPHETAMINES: NORMAL
OPI OPIATES: NORMAL
PCP PHENCYCLIDINE: NORMAL
POC BREATHALIZER: 0 PERCENT (ref 0–0.01)
POC DRUG COMMENT 753798-OCCUPATIONAL HEALTH: NORMAL
THC: NORMAL

## 2022-10-17 PROCEDURE — 8915 PR COMPREHENSIVE PHYSICAL: Performed by: PREVENTIVE MEDICINE

## 2022-10-17 PROCEDURE — 82075 ASSAY OF BREATH ETHANOL: CPT | Performed by: PREVENTIVE MEDICINE

## 2022-10-17 PROCEDURE — 80305 DRUG TEST PRSMV DIR OPT OBS: CPT | Performed by: PREVENTIVE MEDICINE

## 2022-10-17 PROCEDURE — 92552 PURE TONE AUDIOMETRY AIR: CPT | Performed by: PREVENTIVE MEDICINE

## 2022-12-23 ENCOUNTER — HOSPITAL ENCOUNTER (EMERGENCY)
Facility: MEDICAL CENTER | Age: 42
End: 2022-12-23
Attending: EMERGENCY MEDICINE
Payer: MEDICAID

## 2022-12-23 VITALS
HEIGHT: 75 IN | DIASTOLIC BLOOD PRESSURE: 89 MMHG | SYSTOLIC BLOOD PRESSURE: 149 MMHG | RESPIRATION RATE: 16 BRPM | BODY MASS INDEX: 35.63 KG/M2 | WEIGHT: 286.6 LBS | OXYGEN SATURATION: 95 % | TEMPERATURE: 97.8 F | HEART RATE: 99 BPM

## 2022-12-23 DIAGNOSIS — F41.9 ANXIETY: ICD-10-CM

## 2022-12-23 DIAGNOSIS — F19.10 SUBSTANCE ABUSE (HCC): ICD-10-CM

## 2022-12-23 DIAGNOSIS — R44.3 HALLUCINATIONS: ICD-10-CM

## 2022-12-23 LAB
AMPHET UR QL SCN: NEGATIVE
BARBITURATES UR QL SCN: NEGATIVE
BENZODIAZ UR QL SCN: NEGATIVE
BZE UR QL SCN: NEGATIVE
CANNABINOIDS UR QL SCN: NEGATIVE
METHADONE UR QL SCN: NEGATIVE
OPIATES UR QL SCN: NEGATIVE
OXYCODONE UR QL SCN: NEGATIVE
PCP UR QL SCN: NEGATIVE
POC BREATHALIZER: 0 PERCENT (ref 0–0.01)
PROPOXYPH UR QL SCN: NEGATIVE

## 2022-12-23 PROCEDURE — 700111 HCHG RX REV CODE 636 W/ 250 OVERRIDE (IP)

## 2022-12-23 PROCEDURE — 700102 HCHG RX REV CODE 250 W/ 637 OVERRIDE(OP): Performed by: EMERGENCY MEDICINE

## 2022-12-23 PROCEDURE — 302970 POC BREATHALIZER: Performed by: EMERGENCY MEDICINE

## 2022-12-23 PROCEDURE — A9270 NON-COVERED ITEM OR SERVICE: HCPCS | Performed by: EMERGENCY MEDICINE

## 2022-12-23 PROCEDURE — 96372 THER/PROPH/DIAG INJ SC/IM: CPT

## 2022-12-23 PROCEDURE — 99285 EMERGENCY DEPT VISIT HI MDM: CPT

## 2022-12-23 PROCEDURE — 80307 DRUG TEST PRSMV CHEM ANLYZR: CPT

## 2022-12-23 RX ORDER — HYDROXYZINE PAMOATE 50 MG/1
50 CAPSULE ORAL 3 TIMES DAILY PRN
Status: SHIPPED | COMMUNITY
Start: 2022-10-04 | End: 2023-05-04 | Stop reason: SDUPTHER

## 2022-12-23 RX ORDER — LORAZEPAM 1 MG/1
1 TABLET ORAL EVERY 8 HOURS PRN
Qty: 9 TABLET | Refills: 0 | Status: SHIPPED | OUTPATIENT
Start: 2022-12-23 | End: 2022-12-26

## 2022-12-23 RX ORDER — LORAZEPAM 1 MG/1
2 TABLET ORAL EVERY 4 HOURS PRN
Status: DISCONTINUED | OUTPATIENT
Start: 2022-12-23 | End: 2022-12-23 | Stop reason: HOSPADM

## 2022-12-23 RX ORDER — BUPROPION HYDROCHLORIDE 200 MG/1
200 TABLET, EXTENDED RELEASE ORAL DAILY
Status: SHIPPED | COMMUNITY
Start: 2022-10-04 | End: 2023-05-04 | Stop reason: SDUPTHER

## 2022-12-23 RX ORDER — HALOPERIDOL 5 MG/ML
5 INJECTION INTRAMUSCULAR EVERY 6 HOURS PRN
Status: DISCONTINUED | OUTPATIENT
Start: 2022-12-23 | End: 2022-12-23 | Stop reason: HOSPADM

## 2022-12-23 RX ORDER — ATOMOXETINE 40 MG/1
40 CAPSULE ORAL DAILY
Status: SHIPPED | COMMUNITY
Start: 2022-10-04 | End: 2023-05-04 | Stop reason: SDUPTHER

## 2022-12-23 RX ORDER — HALOPERIDOL 5 MG/ML
INJECTION INTRAMUSCULAR
Status: COMPLETED
Start: 2022-12-23 | End: 2022-12-23

## 2022-12-23 RX ADMIN — LORAZEPAM 2 MG: 1 TABLET ORAL at 11:39

## 2022-12-23 RX ADMIN — HALOPERIDOL LACTATE 5 MG: 5 INJECTION, SOLUTION INTRAMUSCULAR at 11:40

## 2022-12-23 RX ADMIN — HALOPERIDOL 5 MG: 5 INJECTION INTRAMUSCULAR at 11:40

## 2022-12-23 ASSESSMENT — FIBROSIS 4 INDEX: FIB4 SCORE: 0.66

## 2022-12-23 NOTE — ED NOTES
Patient is stable for discharge at this time, anticipatory guidance provided, close follow-up is encouraged, and ED return instructions have been detailed. Patient is both agreeable to the disposition and plan and discharged home in ambulatory state and in good condition.     Rx education provided, Pt verbalized understanding;     Pt stated that his friend is coming to pick him up;

## 2022-12-23 NOTE — ED NOTES
Romeo lowered, All personal belongings taken out of room, Pt put in gown by Alysa Wilkes;    Pt pacing around room at this time;

## 2022-12-23 NOTE — ED PROVIDER NOTES
"ED Provider Note    CHIEF COMPLAINT  Chief Complaint   Patient presents with    Hallucinations     Stopped taking antipsychotic meds on Monday, also used meth and cocaine on Monday - now hearing voices (\"just chatter, constant noise\"); denies SI/HI       LIMITATION TO HISTORY   Select: : None    HPI  Brannon Sharma is a 42 y.o. male who presents with feelings of racing thoughts and chatter in his head.  Patient reports that he awoke today with his symptoms, seem to be progressively worsening to where he is hearing a constant voices and chatter although he cannot tell exactly what they are saying to him.  He denies any visual hallucinations.  He denies any suicidal thoughts or homicidal thoughts.  No recent illness fevers chills cough or congestion.  No headaches or abdominal pain or chest pain.  He does have a history of schizophrenia, has not taken his medication, olanzapine and 5 days.  Additionally recently used cocaine and methamphetamines on Monday    OUTSIDE HISTORIAN(S):  Select: none    EXTERNAL RECORDS REVIEWED  Select: External ED Note from [facility] May 2022 presenting with similar symptoms although did not appear to be overly agitated at the time, as well as alcohol abuse and suicidal ideation at [facility] in 2021    REVIEW OF SYSTEMS  See HPI for further details. All other systems are negative.     PAST MEDICAL HISTORY   has a past medical history of Alcohol-induced psychosis (HCC), Anxiety, Depression, Paranoid schizophrenia (HCC), Psychiatric disorder, and Schizophrenia, paranoid (HCC).    SURGICAL HISTORY   has a past surgical history that includes other and nasal septal reconstruction.    FAMILY HISTORY  Family History   Family history unknown: Yes       SOCIAL HISTORY  Social History     Tobacco Use    Smoking status: Former     Packs/day: 1.00     Years: 12.00     Pack years: 12.00     Types: Cigarettes    Smokeless tobacco: Never   Vaping Use    Vaping Use: Former   Substance and Sexual " "Activity    Alcohol use: Not Currently     Alcohol/week: 9.6 oz     Types: 16 Cans of beer per week     Comment:  states 40-50 beers/d    Drug use: Yes     Types: Inhaled, Marijuana, Methamphetamines     Comment: thc, meth, cocaine - last used 12/19    Sexual activity: Yes     Partners: Female       CURRENT MEDICATIONS  Home Medications       Reviewed by Sylvia Rosas (Pharmacy Tech) on 12/23/22 at 1148  Med List Status: Complete     Medication Last Dose Status   atomoxetine (STRATTERA) 40 MG capsule UNK Active   buPROPion (WELLBUTRIN SR) 200 MG SR tablet UNK Active   Empagliflozin (JARDIANCE) 25 MG Tab UNK Active   hydrOXYzine pamoate (VISTARIL) 50 MG Cap UNK Active   lisinopril (PRINIVIL) 40 MG tablet UNK Active   metFORMIN (GLUCOPHAGE) 500 MG Tab UNK Active   olanzapine (ZYPREXA) 15 MG tablet UNK Active   Semaglutide,0.25 or 0.5MG/DOS, (OZEMPIC, 0.25 OR 0.5 MG/DOSE,) 2 MG/1.5ML Solution Pen-injector UNK Active   traZODone (DESYREL) 150 MG Tab UNK Active                    ALLERGIES  Allergies   Allergen Reactions    Ceclor [Cefaclor] Anaphylaxis    Cephalosporins Anaphylaxis    Pcn [Penicillins] Anaphylaxis       PHYSICAL EXAM  VITAL SIGNS: BP (!) 149/89   Pulse 99   Temp 36.6 °C (97.8 °F) (Temporal)   Resp 16   Ht 1.905 m (6' 3\")   Wt (!) 130 kg (286 lb 9.6 oz)   SpO2 95%   BMI 35.82 kg/m²      Pulse ox interpretation: I interpret this pulse ox as normal.  Constitutional: Alert agitated, anxious   HENT: No signs of trauma, Bilateral external ears normal, Nose normal.   Eyes: Pupils are equal and reactive, Conjunctiva normal, Non-icteric.   Neck: Normal range of motion, No tenderness, Supple, No stridor.   Cardiovascular: Regular rate and rhythm, no murmurs.   Thorax & Lungs: Normal breath sounds, No respiratory distress, No wheezing, No chest tenderness.   Abdomen: Bowel sounds normal, Soft, No tenderness, No masses, No pulsatile masses. No peritoneal signs.  Skin: Warm, Dry, No erythema, No rash. " "  Back: No bony tenderness, No CVA tenderness.   Extremities: Intact distal pulses, No edema, No tenderness, No cyanosis,  Negative Lee Ann's sign.   Musculoskeletal: Good range of motion in all major joints. No tenderness to palpation or major deformities noted.   Neurologic: Alert , Normal motor function, Normal sensory function, No focal deficits noted.   Psychiatric: Agitated anxious          LABS  Abnormal Labs Reviewed - No abnormal labs to display  PSC breathalyzer 0, urine drug screen, negative    COURSE & MEDICAL DECISION MAKING  Pertinent Labs & Imaging studies reviewed. (See chart for details)  11:43 AM  Patient is evaluated the bedside, plan for IM Haldol, oral Ativan, behavioral health evaluation    12:13 PM  Patient is admitted to ED observation at 12:14 PM on 12/23/2022 for continued treatment of his agitation and hallucinations and evaluation by behavioral health for potential inpatient psychiatric care    2:54 PM  Patient is reevaluated, he is comfortable, resting quietly.  He no longer appears agitated.  He reports he is no longer having racing thoughts or any \"chatter\"  in his brain.  No suicidal thoughts        Differential Diagnosis Considered  Acute intoxication, substance abuse, schizophrenia, manic episode      Escalation of care considered, and ultimately not performed:     Barriers to care at this time, including but not limited to: Select: none .     Diagnostic tests and prescription drugs considered including, but not limited to: Select: Metabolic panel however he has improved with symptomatic care and is no longer agitated with normal mental status so I do not feel this is indicated .    In addition to the chief complaint, the following problems were addressed: Absence abuse    I have discussed management of the patient with the following physicians and TANNER's: Behavioral health    Discussion of management with other HP or appropriate source(s): Select: Behavioral health             This " is a 42-year-old male presenting with episode of agitation and racing thoughts and has had.  He does have a history of schizophrenia as well as substance abuse and recently used both methamphetamines and cocaine.  After treatment here with haloperidol as well as a benzodiazepine his symptoms have improved greatly and on reevaluation he is calm and well-appearing.  He has no suicidal thoughts or thoughts of harming others.  At this point I feel he is appropriate for continued outpatient management and does not appear severely decompensated from a mental health standpoint.  Has no other medical complaint suggestive of medical pathology either.  He is given resources for follow-up behavioral health as well as for his substance abuse counseled on cessation of methamphetamine and cocaine. Rx for ativan for symptoms over the next 3 days    Patient is discharged from ED observation at 3:13 PM on 12/23/2022 in stable condition with a disposition of discharge to home    @HTN/IDDM FOLLOW UP:  The patient is referred to a primary physician for blood pressure management, diabetic screening, and for all other preventive health concerns@    The patient will not drink alcohol nor drive with prescribed medications. The patient will return for worsening symptoms and is stable at the time of discharge. The patient verbalizes understanding and will comply.    FINAL IMPRESSION  1. Hallucinations    2. Substance abuse (HCC)    3. Anxiety           Electronically signed by: Avtar Crowell M.D., 12/23/2022 11:28 AM

## 2022-12-23 NOTE — ED NOTES
Pt taken to restroom c this RN, Pt denied having any needs, laid back down on [facility] when returned to room stating that he is going to try to get some sleep;

## 2022-12-23 NOTE — ED NOTES
Pt given paperwork faxed over from Alert team - Pt verbalized understanding and stated that he appreciated the help;

## 2022-12-23 NOTE — ED TRIAGE NOTES
"Pt arrives via private vehicle from home for hallucinations since last night. Stopped taking antipsychotics on Monday - \"I felt like I beat it.\" Also did cocaine and meth on Monday. Reports \"constant chatter,\" denies command hallucinations. Denies SI/HI. Changed into gown. Roomed immediately. Pacing, restless. Denies pain anywhere.     Pt states he took 90mg olanzapine prior to arrival.  "

## 2022-12-28 ENCOUNTER — HOSPITAL ENCOUNTER (EMERGENCY)
Facility: MEDICAL CENTER | Age: 42
End: 2022-12-28
Attending: EMERGENCY MEDICINE
Payer: MEDICAID

## 2022-12-28 VITALS
OXYGEN SATURATION: 97 % | HEIGHT: 75 IN | BODY MASS INDEX: 34.1 KG/M2 | HEART RATE: 61 BPM | TEMPERATURE: 98.4 F | DIASTOLIC BLOOD PRESSURE: 88 MMHG | RESPIRATION RATE: 18 BRPM | WEIGHT: 274.25 LBS | SYSTOLIC BLOOD PRESSURE: 138 MMHG

## 2022-12-28 DIAGNOSIS — K04.7 DENTAL INFECTION: ICD-10-CM

## 2022-12-28 PROCEDURE — 99282 EMERGENCY DEPT VISIT SF MDM: CPT

## 2022-12-28 RX ORDER — CLINDAMYCIN HYDROCHLORIDE 300 MG/1
300 CAPSULE ORAL 3 TIMES DAILY
Qty: 21 CAPSULE | Refills: 0 | Status: SHIPPED | OUTPATIENT
Start: 2022-12-28 | End: 2023-01-04

## 2022-12-28 ASSESSMENT — FIBROSIS 4 INDEX: FIB4 SCORE: 0.66

## 2022-12-28 NOTE — ED NOTES
Pt D/C to home. D/C instructions and prescriptions given. Pt v/u. Pt leaves ED with no acute changes, complaints or concerns.

## 2022-12-28 NOTE — ED PROVIDER NOTES
"ED Provider Note    CHIEF COMPLAINT  Chief Complaint   Patient presents with    Dental Pain     Bottom Left, states has dental abscess that \"burst\" x 2 days, unable to get into dentist       LIMITATION TO HISTORY   Select: : None    HPI  Brannon Sharma is a 42 y.o. male who presents dental pain.  Patient reports he had some pain to his left lower teeth over the last 3 to 4 days, he did have some swelling that then \"burst\".  The pain is improved although still present he reports the swelling is also improved.  No fevers or chills.  No difficulty breathing or swallowing.  No facial swelling or headaches.    OUTSIDE HISTORIAN(S):  Select: none    EXTERNAL RECORDS REVIEWED  Select: Inpatient Notes from emergency department for primarily psychiatric and substance abuse related concerns    REVIEW OF SYSTEMS  See HPI for further details. All other systems are negative.     PAST MEDICAL HISTORY   has a past medical history of Alcohol-induced psychosis (HCC), Anxiety, Depression, Paranoid schizophrenia (HCC), Psychiatric disorder, and Schizophrenia, paranoid (HCC).    SURGICAL HISTORY   has a past surgical history that includes other and nasal septal reconstruction.    FAMILY HISTORY  Family History   Family history unknown: Yes       SOCIAL HISTORY  Social History     Tobacco Use    Smoking status: Every Day     Packs/day: 1.00     Types: Cigarettes    Smokeless tobacco: Never   Vaping Use    Vaping Use: Former   Substance and Sexual Activity    Alcohol use: Yes     Alcohol/week: 9.6 oz     Types: 16 Cans of beer per week    Drug use: Yes     Types: Inhaled, Marijuana    Sexual activity: Yes     Partners: Female       CURRENT MEDICATIONS  Home Medications    **Home medications have not yet been reviewed for this encounter**         ALLERGIES  Allergies   Allergen Reactions    Ceclor [Cefaclor] Anaphylaxis    Cephalosporins Anaphylaxis    Pcn [Penicillins] Anaphylaxis       PHYSICAL EXAM  VITAL SIGNS: /85   " "Pulse 84   Temp 36.6 °C (97.9 °F) (Temporal)   Resp 15   Ht 1.905 m (6' 3\")   Wt 124 kg (274 lb 4 oz)   SpO2 95%   BMI 34.28 kg/m²      Pulse ox interpretation: I interpret this pulse ox as normal.  Constitutional: Alert in no apparent distress.  HENT: Normocephalic, Atraumatic, Bilateral external ears normal. Nose normal.  There is no trismus, intraorally he does have old fracture to premolar on the bottom left with some associated tenderness to percussion, there is no significant swelling, no purulence, no lingual elevation or pooled secretions, no submandibular fullness or tenderness, no facial tenderness  Eyes: Pupils are equal and reactive. Conjunctiva normal, non-icteric.   Heart: Regular rate and rythm, no murmurs.    Lungs: Clear to auscultation bilaterally.  Abd: soft, NTTP, no mass, no pulsatile mass, non-distended, no rebound or guarding  Skin: Warm, Dry, No erythema, No rash.   Neurologic: Alert, CN grossly intact grossly non-focal.   Psychiatric: Affect normal, Judgment normal, Mood normal, Appears appropriate               COURSE & MEDICAL DECISION MAKING  Pertinent Labs & Imaging studies reviewed. (See chart for details)  9:14 AM  Patient is evaluated bedside, he is comfortable, discussed findings and he is agreeable to discharge    Differential Diagnosis Considered see below  Escalation of care considered, and ultimately not performed: blood analysis patient is well-appearing without any findings of sepsis or systemic involvement.    Barriers to care at this time, including but not limited to: Select: none .     Diagnostic tests and prescription drugs considered including, but not limited to: Select: Antibiotics   .    In addition to the chief complaint, the following problems were addressed: none    I have discussed management of the patient with the following physicians and TANNER's:  none    Discussion of management with other QHP or appropriate source(s): Select: None          This is a " 42-year-old male presenting with dental pain, likely apical section.  We will treat with clindamycin given his allergies, dental follow-up.  No e/o ANUG as no oral lesions/bleeding or gingival inflammation, Doubt large abscess/deep space with no swelling and no pronounced ttp,no facial cellulitis on exam and no systemic sx (f/c/n/v), no sinus ttp to suggest sinus thrombus or CN palsy and no other emergent condition identified.        @HTN/IDDM FOLLOW UP:  The patient is referred to a primary physician for blood pressure management, diabetic screening, and for all other preventive health concerns@     The patient will return for worsening symptoms and is stable at the time of discharge. The patient verbalizes understanding and will comply.    FINAL IMPRESSION  1. Dental infection           Electronically signed by: Avtar Crowell M.D., 12/28/2022 9:14 AM

## 2022-12-28 NOTE — ED TRIAGE NOTES
"Chief Complaint   Patient presents with    Dental Pain     Bottom Left, states has dental abscess that \"burst\" x 2 days, unable to get into dentist     /85   Pulse 84   Temp 36.6 °C (97.9 °F) (Temporal)   Resp 15   Ht 1.905 m (6' 3\")   Wt 124 kg (274 lb 4 oz)   SpO2 95%   BMI 34.28 kg/m²     Pt ambulated to ED by self for c/o dental pain x 2 days.  Pt states has an abscess that \"burst\" and is concerned may need antibx.  "

## 2023-01-11 ENCOUNTER — HOSPITAL ENCOUNTER (EMERGENCY)
Facility: MEDICAL CENTER | Age: 43
End: 2023-01-12
Attending: EMERGENCY MEDICINE
Payer: MEDICAID

## 2023-01-11 DIAGNOSIS — F10.920 ALCOHOLIC INTOXICATION WITHOUT COMPLICATION (HCC): ICD-10-CM

## 2023-01-11 DIAGNOSIS — R45.851 SUICIDAL IDEATION: ICD-10-CM

## 2023-01-11 LAB — POC BREATHALIZER: 0.23 PERCENT (ref 0–0.01)

## 2023-01-11 PROCEDURE — 302970 POC BREATHALIZER: Performed by: EMERGENCY MEDICINE

## 2023-01-11 PROCEDURE — 99285 EMERGENCY DEPT VISIT HI MDM: CPT

## 2023-01-11 ASSESSMENT — FIBROSIS 4 INDEX: FIB4 SCORE: 0.66

## 2023-01-12 VITALS
TEMPERATURE: 98 F | BODY MASS INDEX: 32.88 KG/M2 | SYSTOLIC BLOOD PRESSURE: 130 MMHG | HEART RATE: 90 BPM | RESPIRATION RATE: 16 BRPM | OXYGEN SATURATION: 96 % | DIASTOLIC BLOOD PRESSURE: 75 MMHG | HEIGHT: 76 IN | WEIGHT: 270 LBS

## 2023-01-12 LAB
POC BREATHALIZER: 0.08 PERCENT (ref 0–0.01)
POC BREATHALIZER: 0.11 PERCENT (ref 0–0.01)

## 2023-01-12 PROCEDURE — 302970 POC BREATHALIZER: Performed by: STUDENT IN AN ORGANIZED HEALTH CARE EDUCATION/TRAINING PROGRAM

## 2023-01-12 PROCEDURE — 302970 POC BREATHALIZER: Performed by: EMERGENCY MEDICINE

## 2023-01-12 PROCEDURE — 90791 PSYCH DIAGNOSTIC EVALUATION: CPT

## 2023-01-12 PROCEDURE — 700102 HCHG RX REV CODE 250 W/ 637 OVERRIDE(OP): Performed by: STUDENT IN AN ORGANIZED HEALTH CARE EDUCATION/TRAINING PROGRAM

## 2023-01-12 PROCEDURE — A9270 NON-COVERED ITEM OR SERVICE: HCPCS | Performed by: STUDENT IN AN ORGANIZED HEALTH CARE EDUCATION/TRAINING PROGRAM

## 2023-01-12 RX ORDER — HYDROXYZINE PAMOATE 50 MG/1
50 CAPSULE ORAL 3 TIMES DAILY PRN
Status: DISCONTINUED | OUTPATIENT
Start: 2023-01-12 | End: 2023-01-12

## 2023-01-12 RX ORDER — BUPROPION HYDROCHLORIDE 100 MG/1
200 TABLET, EXTENDED RELEASE ORAL DAILY
Status: DISCONTINUED | OUTPATIENT
Start: 2023-01-12 | End: 2023-01-12 | Stop reason: HOSPADM

## 2023-01-12 RX ORDER — LISINOPRIL 20 MG/1
40 TABLET ORAL
Status: DISCONTINUED | OUTPATIENT
Start: 2023-01-12 | End: 2023-01-12 | Stop reason: HOSPADM

## 2023-01-12 RX ORDER — TRAZODONE HYDROCHLORIDE 50 MG/1
150 TABLET ORAL
Status: DISCONTINUED | OUTPATIENT
Start: 2023-01-12 | End: 2023-01-12 | Stop reason: HOSPADM

## 2023-01-12 RX ORDER — HYDROXYZINE HYDROCHLORIDE 25 MG/1
50 TABLET, FILM COATED ORAL 3 TIMES DAILY PRN
Status: DISCONTINUED | OUTPATIENT
Start: 2023-01-12 | End: 2023-01-12 | Stop reason: HOSPADM

## 2023-01-12 RX ORDER — LORAZEPAM 1 MG/1
1 TABLET ORAL ONCE
Status: COMPLETED | OUTPATIENT
Start: 2023-01-12 | End: 2023-01-12

## 2023-01-12 RX ORDER — OLANZAPINE 5 MG/1
15 TABLET ORAL NIGHTLY
Status: DISCONTINUED | OUTPATIENT
Start: 2023-01-12 | End: 2023-01-12 | Stop reason: HOSPADM

## 2023-01-12 RX ADMIN — LORAZEPAM 1 MG: 1 TABLET ORAL at 04:10

## 2023-01-12 NOTE — CONSULTS
"[facility]   TRIAGE ASSESSMENT    Name: Brannon Sharma  MRN: [id number]  : 1980  Age: 42 y.o.  Date of assessment: 2023  PCP: Gregory Hardin M.D.  Persons in attendance: Patient  Patient Location: [facility]    CHIEF COMPLAINT/PRESENTING ISSUE (as stated by patient): Pt is a 41 y/o male BIB PD and [employer] on a legal hold after telling a  that he wanted to end his life by taking a bottle of seroquel pills. Pt intoxicated with breathalyzer of 0.228 on arrival to ED. When breathalyzer reached 0.077, pt assessed for behavioral health consult and pt now denies SI/HI/hallucinations. Pt with noted chronic ETOH and Methamphetamine use; noted psych diagnoses include Alcohol use disorder severe, Stimulant use disorder severe amphetamine type, Bipolar Type I D/O, Anxiety, Depression, Paranoid Schizophrenia. Pt reports poor follow-up with outpatient psych services. Calm and cooperative during this ED visit; no signs of aggression tonight, however EMR documents episodes of physical aggression with staff in the past while intoxicated. Contracts for safety. Requesting sobriety support/detox for ETOH. Findings discussed with ERP who agrees pt is safe to meet with peer recovery to discuss options in hopes of going to [facility] later this morning; no beds available at [facility] at this time. Facesheet given to peer recovery. [place] Medicaid insurance plan. Outpatient psychiatric referral faxed to [facility].   Chief Complaint   Patient presents with    Suicidal Ideation     Placed on legal hold by [facility]. Pt told  he wanted to end his life by taking a bottle of pills. Pt states he wants to end his life with a bottle of seroquel. Denies HI. Hx of schizophrenia and DM.         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   Yes:    Dates Level of Care Facilty/Provider Diagnosis/Problem Medications   Last 10/2021 outpt [facility] ([facility]) care coordinator, Valentin       9/2021 CD rehab [facility] psychiatrist last appt 9/2021 Alcohol use disorder severe, Stimulant use disorder sever amphetamine type, Bipolar Type I D/O,  Anxiety, Depression,   Paranoid schizophrenia  unknown doses of Risperdone, Sertraline, Buspar, Tegretol, Gabapentin, Trazodone,  states last taken psych meds 3 weeks ago      12/10/2020 outpt   CHELY Alvarado at [facility] Alcohol use disorder severe, Stimulant use disorder sever amphetamine type, Bipolar Type I D/O,  Anxiety, Depression,   Paranoid schizophrenia     Olanzapine    12/2019 outpt  [facility], psychiatry       12/2019 outpt [facility]           8/2019 inpt [facility] detox Left AMA after 2 days      2016 inpt [facility] etoh     2015 inpt [facility] SI     2014 inpt [facility] SI/HI         SAFETY ASSESSMENT - SELF  Does patient acknowledge current or past symptoms of dangerousness to self or is previous history noted? yes  Does parent/significant other report patient has current or past symptoms of dangerousness to self? N\A  Does presenting problem suggest symptoms of dangerousness to self? No; denies SI.     SAFETY ASSESSMENT - OTHERS  Does patient acknowledge current or past symptoms of aggressive behavior or risk to others or is previous history noted? Yes; hx of multiple episodes of being physically aggressive with staff; increased aggressive behavior usually requiring restraints and sedation while intoxicated.   Does parent/significant other report patient has current or past symptoms of aggressive behavior or risk to others?  N\A  Does presenting problem suggest symptoms of dangerousness to others? No; Denies HI.    LEGAL  "HISTORY  Does patient acknowledge history of arrest/senior care/prison or is previous history noted? Yes    Crisis Safety Plan completed and copy given to patient? yes    ABUSE/NEGLECT SCREENING  Does patient report feeling “unsafe” in his/her home, or afraid of anyone?  no  Does patient report any history of physical, sexual, or emotional abuse?  no  Does parent or significant other report any of the above? N\A  Is there evidence of neglect by self?  no  Is there evidence of neglect by a caregiver? N/A  Does the patient/parent report any history of CPS/APS/police involvement related to suspected abuse/neglect or domestic violence? no  Based on the information provided during the current assessment, is a mandated report of suspected abuse/neglect being made?  No    SUBSTANCE USE SCREENING  Yes:  [place] all substances used in the past 30 days:      Last Use Amount   [x]   Alcohol 01/11/2023 Not specified   []   Marijuana     []   Heroin     []   Prescription Opioids  (used without prescription, for    recreation, or in excess of prescribed amount)     []   Other Prescription  (used without prescription, for    recreation, or in excess of prescribed amount)     []   Cocaine      []   Methamphetamine     []   \"\" drugs (ectasy, MDMA)     []   Other substances        UDS results: pending  Breathalyzer results: 0.228 @ 2035; 0.114 @ 0130; 0.077 @ 0327    What consequences does the patient associate with any of the above substance use and or addictive behaviors? Health problems    Risk factors for detox (check all that apply):  [x]  Seizures   [x]  Diaphoretic (sweating)   [x]  Tremors   []  Hallucinations   [x]  Increased blood pressure   []  Decreased blood pressure   []  Other   []  None      [x] Patient education on risk factors for detoxification and instructed to return to ER as needed.      MENTAL STATUS   Participation: Active verbal participation, Attentive, Engaged, and Open to feedback  Grooming: " Casual  Orientation: Alert and Fully Oriented  Behavior: Calm  Eye contact: Good  Mood: Anxious  Affect: Anxious  Thought process: Logical and Goal-directed  Thought content: Within normal limits  Speech: Rate within normal limits and Volume within normal limits  Perception: Within normal limits  Memory:  No gross evidence of memory deficits  Insight: Adequate  Judgment:  Adequate  Other:    Collateral information:   Source:  [] Significant other present in person:   [] Significant other by telephone  [] [facility]   [x] [facility] Nursing Staff  [x] [facility] Medical Record  [x] Other: ERP    [] Unable to complete full assessment due to:  [] Acute intoxication  [] Patient declined to participate/engage  [] Patient verbally unresponsive  [] Significant cognitive deficits  [] Significant perceptual distortions or behavioral disorganization  [x] Other: N/A     CLINICAL IMPRESSIONS:  Primary:  Suicidal Ideation-Resolved  Secondary:  Alcohol Use D/O    IDENTIFIED NEEDS/PLAN:  [Trigger DISPOSITION list for any items marked]    []  Imminent safety risk - self [] Imminent safety risk - others   []  Acute substance withdrawal []  Psychosis/Impaired reality testing   [x]  Mood/anxiety [x]  Substance use/Addictive behavior   [x]  Maladaptive behaviro []  Parent/child conflict   []  Family/Couples conflict []  Biomedical   [x]  Housing []  Financial   []   Legal  Occupational/Educational   []  Domestic violence []  Other:     Recommended Plan of Care:  Actively being addressed by [facility] Emergency Department. When breathalyzer reached 0.077, pt assessed for behavioral health consult and pt now denies SI/HI/hallucinations. Contracts for safety. Requesting sobriety support/detox for ETOH. Findings discussed with ERP who agrees pt is safe to meet with peer recovery to discuss options in hopes of going to [facility] later this morning; no beds available at [facility] at this time. Facesheet given to peer recovery. [place] Medicaid  insurance plan. Outpatient psychiatric referral faxed to [facility].       Has the Recommended Plan of Care/Level of Observation been reviewed with the patient's assigned nurse? Yes; no sitter needed; pt to meet with peer recovery to discuss options for detox.     Does patient/parent or guardian express agreement with the above plan? yes      Referral appointment(s) scheduled? N\A    Alert team only:   I have discussed findings and recommendations with Dr. Bhatti who is in agreement with these recommendations.     Referral information sent to the following outpatient community providers : [facility]    Referral information sent to the following inpatient community providers : N/A    If applicable : Referred  to  Alert Team for legal hold follow up at (time): Discontinued legal hold.       Lexi Fink R.N.  1/12/2023

## 2023-01-12 NOTE — ED PROVIDER NOTES
"Patient:Brannon Sharma  Patient : 1980  Patient MRN: [id number]  Patient PCP: Gregory Hardin M.D.    Admit Date: 2023  Discharge Date and Time: 23 8:25 AM  Discharge Diagnosis:   1. Suicidal ideation        2. Alcoholic intoxication without complication (HCC)            Discharge Attending: Niels Broussard D.O.  Discharge Service: ED Observation    ED Course  Brannon is a 42 y.o. male who was evaluated at [facility] .  The pt is going to [facility] pending placement     Discharge Exam:  /77   Pulse 93   Temp 36.6 °C (97.8 °F) (Temporal)   Resp 18   Ht 1.93 m (6' 4\")   Wt 122 kg (270 lb)   SpO2 97%   BMI 32.87 kg/m² .    Constitutional: Awake and alert. Nontoxic  HENT:  Grossly normal  Eyes: Grossly normal  Neck: Normal range of motion  Cardiovascular: Normal heart rate   Thorax & Lungs: No respiratory distress  Abdomen: Nontender  Skin:  No pathologic rash.   Extremities: Well perfused  Psychiatric: Affect normal    Labs  Results for orders placed or performed during the hospital encounter of 23   POC BREATHALIZER   Result Value Ref Range    POC Breathalizer 0.228 (A) 0.00 - 0.01 Percent   POC BREATHALIZER   Result Value Ref Range    POC Breathalizer 0.114 (A) 0.00 - 0.01 Percent   POC BREATHALIZER   Result Value Ref Range    POC Breathalizer 0.077 (A) 0.00 - 0.01 Percent       Radiology  No orders to display         Medications:   New Prescriptions    No medications on file     8:26 AM  At this time, the pt going to [facility] with peer support taking the pt over.     Discharge Condition: Stable    Electronically signed by: Niels Broussard D.O., 2023 8:25 AM     "

## 2023-01-12 NOTE — ED TRIAGE NOTES
"Chief Complaint   Patient presents with    Suicidal Ideation     Placed on legal hold by [employer]. Pt told  he wanted to end his life by taking a bottle of pills. Pt states he wants to end his life with a bottle of seroquel. Denies HI. Hx of schizophrenia and DM.        BIB PD and [facility] to [facility]. Pt consists of: above complaint, pt A&Ox4, on room air, steady gait. Pt cooperative, changed into gown, room stripped of dangerous items that may harm pt. Pt educated on SI protocol, pt verbalized understanding. Security at bedside during pt assessment.     Medications given en route:n/a     /82   Pulse 95   Temp 36.6 °C (97.8 °F) (Temporal)   Resp 18   Ht 1.93 m (6' 4\")   Wt 122 kg (270 lb)   SpO2 100%   BMI 32.87 kg/m²     "

## 2023-01-12 NOTE — ED NOTES
All discharge instructions given to pt as well as pt's home medication returned to him in sealed pharmacy bag.  Pt advised not to drink or drive.  Pt verbalized understanding of all discharge instructions.  All questions answered.   Pt ambulatory from unit with peer support to go to [facility].

## 2023-01-12 NOTE — ED PROVIDER NOTES
ED Provider Note    CHIEF COMPLAINT  Chief Complaint   Patient presents with    Suicidal Ideation     Placed on legal hold by [employer]. Pt told  he wanted to end his life by taking a bottle of pills. Pt states he wants to end his life with a bottle of seroquel. Denies HI. Hx of schizophrenia and DM.        EXTERNAL RECORDS REVIEWED  Select: Outpatient Notes was here for dental pain 12/28    HPI/ROS  LIMITATION TO HISTORY   Select: : None  OUTSIDE HISTORIAN(S):  Select: Law Enforcement patient was placed on a legal hold by law enforcement because he was in the backseat of his cab and told the  that he wanted to kill himself.  He stated he was going to take all of his medications and overdose.    Brannon Sharam is a 42 y.o. male who presents to the emergency department for chief complaint of suicidal ideation.  Patient states he is an alcoholic he drinks every day he also has a history of depression anxiety.  He states today things just had a bad point and he felt like he wanted to kill himself.  He states that he wanted to take his whole bottle of Seroquel.  All of his medications were recently refilled on 1229.  They all seem to be mostly intact.  He states he took 1 dose of his Seroquel to try and help his thoughts but they did not help which is why he he told the  of his feelings.  He is thirsty denies symptoms of withdrawal at this time but has withdrawn in the past.  He denies hallucinations or homicidal ideation    PAST MEDICAL HISTORY   has a past medical history of Alcohol-induced psychosis (HCC), Anxiety, Depression, Paranoid schizophrenia (HCC), Psychiatric disorder, and Schizophrenia, paranoid (HCC).    SURGICAL HISTORY   has a past surgical history that includes other and nasal septal reconstruction.    FAMILY HISTORY  Family History   Family history unknown: Yes       SOCIAL HISTORY  Social History     Tobacco Use    Smoking status: Every Day     Packs/day: 1.00     Types:  "Cigarettes    Smokeless tobacco: Never   Vaping Use    Vaping Use: Former   Substance and Sexual Activity    Alcohol use: Yes     Alcohol/week: 9.6 oz     Types: 16 Cans of beer per week    Drug use: Yes     Types: Inhaled, Marijuana    Sexual activity: Yes     Partners: Female       CURRENT MEDICATIONS  Home Medications    **Home medications have not yet been reviewed for this encounter**         ALLERGIES  Allergies   Allergen Reactions    Ceclor [Cefaclor] Anaphylaxis    Cephalosporins Anaphylaxis    Pcn [Penicillins] Anaphylaxis       PHYSICAL EXAM  VITAL SIGNS: /82   Pulse 95   Temp 36.6 °C (97.8 °F) (Temporal)   Resp 18   Ht 1.93 m (6' 4\")   Wt 122 kg (270 lb)   SpO2 100%   BMI 32.87 kg/m²    Pulse Ox Interpretation:   Pulse Ox is normal  Constitutional: Alert in no apparent distress.  Smells of alcohol actively intoxicated  HENT: Normocephalic atraumatic, MMM  Eyes: PER, Conjunctiva normal, Non-icteric.   Neck: Normal range of motion, No tenderness, Supple, No stridor.   Cardiovascular: Regular rate and rhythm, no murmurs.   Thorax & Lungs: Normal breath sounds, No respiratory distress, No wheezing, No chest tenderness.   Abdomen: Bowel sounds normal, Soft, No tenderness, No pulsatile masses. No peritoneal signs.  Skin: Warm, Dry, No erythema, No rash.   Back: No bony tenderness, No CVA tenderness.   Extremities/MSK: Intact equal distal pulses, No edema, No tenderness, No cyanosis, no major deformities noted  Neurologic: Alert and oriented x3, No focal deficits noted.   Psychiatric: Poor judgment affect and insight depressed endorsing suicidal ideation      DIAGNOSTIC STUDIES / PROCEDURES    LABS  Labs Reviewed   POC BREATHALIZER - Abnormal; Notable for the following components:       Result Value    POC Breathalizer 0.228 (*)     All other components within normal limits   URINE DRUG SCREEN         RADIOLOGY  I have independently interpreted the diagnostic imaging associated with this visit and " am waiting the final reading from the radiologist.    Not performed     COURSE & MEDICAL DECISION MAKING    ED Observation Status? Yes; I am placing the patient in to an observation status due to a diagnostic uncertainty as well as therapeutic intensity. Patient placed in observation status at 8:31 PM, 1/11/2023.       INITIAL ASSESSMENT AND PLAN  Care Narrative: Patient resents emerge apartment on a legal hold for suicidal ideation.  He is quite intoxicated at this time will need to sober up a bit until he can be evaluated by behavioral health.  But he still endorsing suicidal ideation to me.  He is also quite intoxicated so we will observe him here in the [facility] department for any signs of withdrawal.  Urine drug screen was ordered otherwise beyond intoxication exam is pretty unremarkable.    ADDITIONAL PROBLEM LIST AND DISPOSITION  2:12 AM  Patient's repeat breathalyzer is still showing intoxication.  The patient is not medically cleared yet fortunately he is not showing signs of alcohol withdrawal.  Once he is legally sober then he can be evaluated by behavioral health to determine whether or not he is still suicidal.  He was signed out to my partner for further management and either discharge if he leonel and is no longer suicidal or hospitalization for SI.    Problem #1: SI  Problem #2: alcohol intoxication     I have discussed management of the patient with the following physicians and TANNER's:  Dr Jefferson     Discussion of management with other [facility] or appropriate source(s): Select: None     Escalation of care considered, and ultimately not performed: blood analysis.     Barriers to care at this time, including but not limited to: Select: patient has a hx of drug abuse .     Decision tools and prescription drugs considered including, but not limited to: Select: na .    FINAL DIAGNOSIS  1. Suicidal ideation    2. Alcoholic intoxication without complication (HCC)           Electronically signed by: Kiera STEEL  CALI Ocampo, 1/11/2023 8:22 PM

## 2023-01-13 ENCOUNTER — HOSPITAL ENCOUNTER (EMERGENCY)
Facility: MEDICAL CENTER | Age: 43
End: 2023-01-13
Attending: EMERGENCY MEDICINE
Payer: MEDICAID

## 2023-01-13 ENCOUNTER — APPOINTMENT (OUTPATIENT)
Dept: RADIOLOGY | Facility: MEDICAL CENTER | Age: 43
End: 2023-01-13
Attending: EMERGENCY MEDICINE
Payer: MEDICAID

## 2023-01-13 VITALS
BODY MASS INDEX: 32.88 KG/M2 | WEIGHT: 270 LBS | RESPIRATION RATE: 22 BRPM | HEART RATE: 98 BPM | TEMPERATURE: 98 F | DIASTOLIC BLOOD PRESSURE: 99 MMHG | SYSTOLIC BLOOD PRESSURE: 179 MMHG | HEIGHT: 76 IN | OXYGEN SATURATION: 95 %

## 2023-01-13 VITALS
DIASTOLIC BLOOD PRESSURE: 101 MMHG | BODY MASS INDEX: 32.88 KG/M2 | OXYGEN SATURATION: 97 % | HEIGHT: 76 IN | SYSTOLIC BLOOD PRESSURE: 142 MMHG | WEIGHT: 270 LBS | HEART RATE: 104 BPM | TEMPERATURE: 97.2 F | RESPIRATION RATE: 18 BRPM

## 2023-01-13 DIAGNOSIS — F15.10 METHAMPHETAMINE USE (HCC): ICD-10-CM

## 2023-01-13 DIAGNOSIS — Z91.199 MEDICALLY NONCOMPLIANT: ICD-10-CM

## 2023-01-13 DIAGNOSIS — R07.9 CHEST PAIN, UNSPECIFIED TYPE: ICD-10-CM

## 2023-01-13 DIAGNOSIS — F15.10 METHAMPHETAMINE ABUSE (HCC): ICD-10-CM

## 2023-01-13 DIAGNOSIS — I10 HYPERTENSION, UNSPECIFIED TYPE: ICD-10-CM

## 2023-01-13 LAB
ALBUMIN SERPL BCP-MCNC: 4.3 G/DL (ref 3.2–4.9)
ALBUMIN/GLOB SERPL: 1.3 G/DL
ALP SERPL-CCNC: 183 U/L (ref 30–99)
ALT SERPL-CCNC: 52 U/L (ref 2–50)
ANION GAP SERPL CALC-SCNC: 16 MMOL/L (ref 7–16)
AST SERPL-CCNC: 56 U/L (ref 12–45)
BASOPHILS # BLD AUTO: 0.7 % (ref 0–1.8)
BASOPHILS # BLD: 0.05 K/UL (ref 0–0.12)
BILIRUB SERPL-MCNC: 1.2 MG/DL (ref 0.1–1.5)
BUN SERPL-MCNC: 10 MG/DL (ref 8–22)
CALCIUM ALBUM COR SERPL-MCNC: 9.2 MG/DL (ref 8.5–10.5)
CALCIUM SERPL-MCNC: 9.4 MG/DL (ref 8.5–10.5)
CHLORIDE SERPL-SCNC: 101 MMOL/L (ref 96–112)
CO2 SERPL-SCNC: 22 MMOL/L (ref 20–33)
CREAT SERPL-MCNC: 0.87 MG/DL (ref 0.5–1.4)
EKG IMPRESSION: NORMAL
EKG IMPRESSION: NORMAL
EOSINOPHIL # BLD AUTO: 0.01 K/UL (ref 0–0.51)
EOSINOPHIL NFR BLD: 0.1 % (ref 0–6.9)
ERYTHROCYTE [DISTWIDTH] IN BLOOD BY AUTOMATED COUNT: 45 FL (ref 35.9–50)
ETHANOL BLD-MCNC: <10.1 MG/DL
GFR SERPLBLD CREATININE-BSD FMLA CKD-EPI: 110 ML/MIN/1.73 M 2
GLOBULIN SER CALC-MCNC: 3.3 G/DL (ref 1.9–3.5)
GLUCOSE SERPL-MCNC: 130 MG/DL (ref 65–99)
HCT VFR BLD AUTO: 52.2 % (ref 42–52)
HGB BLD-MCNC: 17.7 G/DL (ref 14–18)
IMM GRANULOCYTES # BLD AUTO: 0.03 K/UL (ref 0–0.11)
IMM GRANULOCYTES NFR BLD AUTO: 0.4 % (ref 0–0.9)
LYMPHOCYTES # BLD AUTO: 1.27 K/UL (ref 1–4.8)
LYMPHOCYTES NFR BLD: 17.4 % (ref 22–41)
MCH RBC QN AUTO: 30.1 PG (ref 27–33)
MCHC RBC AUTO-ENTMCNC: 33.9 G/DL (ref 33.7–35.3)
MCV RBC AUTO: 88.6 FL (ref 81.4–97.8)
MONOCYTES # BLD AUTO: 0.63 K/UL (ref 0–0.85)
MONOCYTES NFR BLD AUTO: 8.6 % (ref 0–13.4)
NEUTROPHILS # BLD AUTO: 5.31 K/UL (ref 1.82–7.42)
NEUTROPHILS NFR BLD: 72.8 % (ref 44–72)
NRBC # BLD AUTO: 0 K/UL
NRBC BLD-RTO: 0 /100 WBC
PLATELET # BLD AUTO: 280 K/UL (ref 164–446)
PMV BLD AUTO: 10.5 FL (ref 9–12.9)
POTASSIUM SERPL-SCNC: 4.1 MMOL/L (ref 3.6–5.5)
PROT SERPL-MCNC: 7.6 G/DL (ref 6–8.2)
RBC # BLD AUTO: 5.89 M/UL (ref 4.7–6.1)
SODIUM SERPL-SCNC: 139 MMOL/L (ref 135–145)
TROPONIN T SERPL-MCNC: 10 NG/L (ref 6–19)
TROPONIN T SERPL-MCNC: 9 NG/L (ref 6–19)
WBC # BLD AUTO: 7.3 K/UL (ref 4.8–10.8)

## 2023-01-13 PROCEDURE — 71045 X-RAY EXAM CHEST 1 VIEW: CPT

## 2023-01-13 PROCEDURE — 96376 TX/PRO/DX INJ SAME DRUG ADON: CPT

## 2023-01-13 PROCEDURE — 700105 HCHG RX REV CODE 258: Performed by: EMERGENCY MEDICINE

## 2023-01-13 PROCEDURE — 80053 COMPREHEN METABOLIC PANEL: CPT

## 2023-01-13 PROCEDURE — 700111 HCHG RX REV CODE 636 W/ 250 OVERRIDE (IP): Performed by: EMERGENCY MEDICINE

## 2023-01-13 PROCEDURE — 700102 HCHG RX REV CODE 250 W/ 637 OVERRIDE(OP): Performed by: EMERGENCY MEDICINE

## 2023-01-13 PROCEDURE — 93005 ELECTROCARDIOGRAM TRACING: CPT

## 2023-01-13 PROCEDURE — 85025 COMPLETE CBC W/AUTO DIFF WBC: CPT

## 2023-01-13 PROCEDURE — 93005 ELECTROCARDIOGRAM TRACING: CPT | Performed by: EMERGENCY MEDICINE

## 2023-01-13 PROCEDURE — 82077 ASSAY SPEC XCP UR&BREATH IA: CPT

## 2023-01-13 PROCEDURE — 99285 EMERGENCY DEPT VISIT HI MDM: CPT

## 2023-01-13 PROCEDURE — 36415 COLL VENOUS BLD VENIPUNCTURE: CPT

## 2023-01-13 PROCEDURE — A9270 NON-COVERED ITEM OR SERVICE: HCPCS | Performed by: EMERGENCY MEDICINE

## 2023-01-13 PROCEDURE — 96374 THER/PROPH/DIAG INJ IV PUSH: CPT

## 2023-01-13 PROCEDURE — 99284 EMERGENCY DEPT VISIT MOD MDM: CPT | Mod: 25

## 2023-01-13 PROCEDURE — 84484 ASSAY OF TROPONIN QUANT: CPT

## 2023-01-13 RX ORDER — LISINOPRIL 20 MG/1
20 TABLET ORAL DAILY
Qty: 30 TABLET | Refills: 0 | Status: SHIPPED | OUTPATIENT
Start: 2023-01-13 | End: 2023-05-04 | Stop reason: SDUPTHER

## 2023-01-13 RX ORDER — LORAZEPAM 2 MG/ML
1 INJECTION INTRAMUSCULAR ONCE
Status: COMPLETED | OUTPATIENT
Start: 2023-01-13 | End: 2023-01-13

## 2023-01-13 RX ORDER — LORAZEPAM 2 MG/ML
2 INJECTION INTRAMUSCULAR ONCE
Status: COMPLETED | OUTPATIENT
Start: 2023-01-13 | End: 2023-01-13

## 2023-01-13 RX ORDER — SODIUM CHLORIDE 9 MG/ML
1000 INJECTION, SOLUTION INTRAVENOUS ONCE
Status: COMPLETED | OUTPATIENT
Start: 2023-01-13 | End: 2023-01-13

## 2023-01-13 RX ORDER — LORAZEPAM 2 MG/1
2 TABLET ORAL ONCE
Status: COMPLETED | OUTPATIENT
Start: 2023-01-13 | End: 2023-01-13

## 2023-01-13 RX ADMIN — LORAZEPAM 2 MG: 2 INJECTION INTRAMUSCULAR; INTRAVENOUS at 05:50

## 2023-01-13 RX ADMIN — LORAZEPAM 2 MG: 2 TABLET ORAL at 17:43

## 2023-01-13 RX ADMIN — SODIUM CHLORIDE 1000 ML: 9 INJECTION, SOLUTION INTRAVENOUS at 05:55

## 2023-01-13 RX ADMIN — LORAZEPAM 1 MG: 2 INJECTION INTRAMUSCULAR; INTRAVENOUS at 06:45

## 2023-01-13 RX ADMIN — LORAZEPAM 2 MG: 2 TABLET ORAL at 19:02

## 2023-01-13 ASSESSMENT — FIBROSIS 4 INDEX
FIB4 SCORE: 1.164870412072981156
FIB4 SCORE: 0.66

## 2023-01-13 NOTE — ED NOTES
"Pt awake, anxious about getting a \"heart attack\", updated poc. Vss.   Blood sent to lab for recheck on troponin.  "

## 2023-01-13 NOTE — ED PROVIDER NOTES
"ED Provider Note    CHIEF COMPLAINT  Chief Complaint   Patient presents with    Drug Abuse     BIB EMS for anxiety secondary to meth abuse, pt states he ate meth about an hour ago and has not been feeling well, appears diaphoretic and states \"it feels like my heart is going to explode.\" Hx anxiety, schizophrenia, depression. Pt noncompliant with meds at this time.       EXTERNAL RECORDS REVIEWED  Select: Other ED discharge summary from yesterday after observation.  Suicidal ideation, to [facility] at 0826 a.m. via peer support.    HPI/ROS  LIMITATION TO HISTORY   Select: : None  OUTSIDE HISTORIAN(S):  Select: None    Brannon Sharma is a 42 y.o. male who presents to the emergency department through triage for chest pain.  Patient states \"I overdosed on meth.\"  Patient states he \"ingested 2 g a couple of hours ago.\"  He is feeling quite anxious, chest pain.  No palpitations, shortness of breath or syncope.  No back pain or abdominal pain.  No nausea or vomiting.  No fever or chills.  No cough or congestion.    History of hypertension, some compliance with lisinopril.  History of psychiatric disorder, noncompliant with medications.  Tobacco, alcohol almost daily.    PAST MEDICAL HISTORY   has a past medical history of Alcohol-induced psychosis (HCC), Anxiety, Depression, Paranoid schizophrenia (HCC), Psychiatric disorder, and Schizophrenia, paranoid (HCC).    SURGICAL HISTORY   has a past surgical history that includes other and nasal septal reconstruction.    FAMILY HISTORY  Family History   Family history unknown: Yes       SOCIAL HISTORY  Social History     Tobacco Use    Smoking status: Every Day     Packs/day: 1.00     Types: Cigarettes    Smokeless tobacco: Never   Vaping Use    Vaping Use: Former   Substance and Sexual Activity    Alcohol use: Yes     Alcohol/week: 9.6 oz     Types: 16 Cans of beer per week    Drug use: Yes     Types: Inhaled, Marijuana     Comment: Meth (1/13/23)    Sexual activity: Yes     " "Partners: Female       CURRENT MEDICATIONS  Home Medications       Reviewed by Dorian Hyde R.N. (Registered Nurse) on 01/13/23 at 0424  Med List Status: <None>     Medication Last Dose Status   atomoxetine (STRATTERA) 40 MG capsule  Active   buPROPion (WELLBUTRIN SR) 200 MG SR tablet  Active   Empagliflozin (JARDIANCE) 25 MG Tab  Active   hydrOXYzine pamoate (VISTARIL) 50 MG Cap  Active   lisinopril (PRINIVIL) 40 MG tablet  Active   metFORMIN (GLUCOPHAGE) 500 MG Tab  Active   olanzapine (ZYPREXA) 15 MG tablet  Active   Semaglutide,0.25 or 0.5MG/DOS, (OZEMPIC, 0.25 OR 0.5 MG/DOSE,) 2 MG/1.5ML Solution Pen-injector  Active   traZODone (DESYREL) 150 MG Tab  Active                    ALLERGIES  Allergies   Allergen Reactions    Ceclor [Cefaclor] Anaphylaxis    Cephalosporins Anaphylaxis    Pcn [Penicillins] Anaphylaxis       PHYSICAL EXAM  VITAL SIGNS: BP (!) 179/99   Pulse 98   Temp 36.7 °C (98 °F) (Temporal)   Resp (!) 22   Ht 1.93 m (6' 4\")   Wt 122 kg (270 lb)   SpO2 95%   BMI 32.87 kg/m²    Pulse ox interpretation: I interpret this pulse ox as normal.  Constitutional: Alert in no apparent distress.  HENT: Normocephalic, atraumatic. Bilateral external ears normal, Nose normal.   Eyes: Dilated but pupils are equal and reactive, Conjunctiva normal.   Neck: Normal range of motion, Supple  Lymphatic: No lymphadenopathy noted.   Cardiovascular: Regular rate and rhythm, no murmurs. Distal pulses intact.  No peripheral edema.  Thorax & Lungs: Normal breath sounds.  No wheezing/rales/ronchi. No increased work of breathing, clipped speech or retractions.   Abdomen: Soft, non-distended, non-tender to palpation. No palpable or pulsatile masses.  Skin: Warm, Dry, No erythema, No rash.   Musculoskeletal: Good range of motion in all major joints.   Neurologic: Alert orient x4 although poor historian.  Most were extremity spontaneously.  Speech is clear and cohesive.  Psychiatric: Anxious but redirectable.  No suicidal " homicidal ideation.  Denies hallucination.    DIAGNOSTIC STUDIES / PROCEDURES  LABS  Results for orders placed or performed during the hospital encounter of 01/13/23   CBC w/ Differential   Result Value Ref Range    WBC 7.3 4.8 - 10.8 K/uL    RBC 5.89 4.70 - 6.10 M/uL    Hemoglobin 17.7 14.0 - 18.0 g/dL    Hematocrit 52.2 (H) 42.0 - 52.0 %    MCV 88.6 81.4 - 97.8 fL    MCH 30.1 27.0 - 33.0 pg    MCHC 33.9 33.7 - 35.3 g/dL    RDW 45.0 35.9 - 50.0 fL    Platelet Count 280 164 - 446 K/uL    MPV 10.5 9.0 - 12.9 fL    Neutrophils-Polys 72.80 (H) 44.00 - 72.00 %    Lymphocytes 17.40 (L) 22.00 - 41.00 %    Monocytes 8.60 0.00 - 13.40 %    Eosinophils 0.10 0.00 - 6.90 %    Basophils 0.70 0.00 - 1.80 %    Immature Granulocytes 0.40 0.00 - 0.90 %    Nucleated RBC 0.00 /100 WBC    Neutrophils (Absolute) 5.31 1.82 - 7.42 K/uL    Lymphs (Absolute) 1.27 1.00 - 4.80 K/uL    Monos (Absolute) 0.63 0.00 - 0.85 K/uL    Eos (Absolute) 0.01 0.00 - 0.51 K/uL    Baso (Absolute) 0.05 0.00 - 0.12 K/uL    Immature Granulocytes (abs) 0.03 0.00 - 0.11 K/uL    NRBC (Absolute) 0.00 K/uL   Complete Metabolic Panel (CMP)   Result Value Ref Range    Sodium 139 135 - 145 mmol/L    Potassium 4.1 3.6 - 5.5 mmol/L    Chloride 101 96 - 112 mmol/L    Co2 22 20 - 33 mmol/L    Anion Gap 16.0 7.0 - 16.0    Glucose 130 (H) 65 - 99 mg/dL    Bun 10 8 - 22 mg/dL    Creatinine 0.87 0.50 - 1.40 mg/dL    Calcium 9.4 8.5 - 10.5 mg/dL    AST(SGOT) 56 (H) 12 - 45 U/L    ALT(SGPT) 52 (H) 2 - 50 U/L    Alkaline Phosphatase 183 (H) 30 - 99 U/L    Total Bilirubin 1.2 0.1 - 1.5 mg/dL    Albumin 4.3 3.2 - 4.9 g/dL    Total Protein 7.6 6.0 - 8.2 g/dL    Globulin 3.3 1.9 - 3.5 g/dL    A-G Ratio 1.3 g/dL   Troponin STAT   Result Value Ref Range    Troponin T 9 6 - 19 ng/L   Troponin in two (2) hours   Result Value Ref Range    Troponin T 10 6 - 19 ng/L   DIAGNOSTIC ALCOHOL   Result Value Ref Range    Diagnostic Alcohol <10.1 <10.1 mg/dL   CORRECTED CALCIUM   Result Value Ref  Range    Correct Calcium 9.2 8.5 - 10.5 mg/dL   ESTIMATED GFR   Result Value Ref Range    GFR (CKD-EPI) 110 >60 mL/min/1.73 m 2   EKG   Result Value Ref Range    Report       [facility] Emergency Dept.    Test Date:  2023  Pt Name:    OSITO BLACK               Department: ER  MRN:        [id number]                      Room:  Gender:     Male                         Technician: [id number]  :        1980                   Requested By:ER TRIAGE PROTOCOL  Order #:    [id number]                    Reading MD: BRIAN GRANT, DO    Measurements  Intervals                                Axis  Rate:       74                           P:          33  MA:         131                          QRS:        -57  QRSD:       114                          T:          58  QT:         406  QTc:        451    Interpretive Statements  Sinus rhythm  Incomplete RBBB and LAFB  Compared to ECG 12/10/2020 04:47:11  Left anterior fascicular block now present  Incomplete right bundle-branch block now present  Right bundle-branch block now present  Sinus tachycardia no longer present  Electronically Signed On 2023 5:39:31 PST by WAYNE WYLIE         RADIOLOGY  I have independently interpreted the diagnostic imaging associated with this visit and am waiting the final reading from the radiologist.   Chest x-ray: Normal lung fields, no effusion.  Small area of opacification left lower lobe.    DX-CHEST-PORTABLE (1 VIEW)   Final Result         1.  Left lower lobe atelectasis or early infiltrate.          COURSE & MEDICAL DECISION MAKING    ED Observation Status? Yes; I am placing the patient in to an observation status due to a diagnostic uncertainty as well as therapeutic intensity. Patient placed in observation status at 5:39 AM, 2023.     INITIAL ASSESSMENT AND PLAN  Care Narrative: Seen evaluated bedside.  History of meth ingestion, alcohol use.  Anxious and hypertensive with chest pain.   History of hypertension but noncompliant with lisinopril.  Exam is quite unremarkable.  Patient is redirectable and cooperative.  Add labs for chest pain protocol, chest x-ray, IV fluid and Ativan.    0640 -patient still somewhat hypertensive, also anxious.  Chest pain has resolved.  Repeat Ativan.  IV fluid infusing.  Chest x-ray within normal limits.  Awaiting labs.    7:19 AM labs are unremarkable, troponin negative.  Will repeat troponin at 2 hours given chest pain onset less than 2 hours prior to arrival.  His chest pain has resolved, he is resting comfortably with Ativan and IV fluid infusing.    Repeat troponin still.  Patient is without chest pain.  Remains hypertensive but history of the same and noncompliant with lisinopril.  Also likely exacerbated by the methamphetamine use.  No telemetry changes.  Will p.o. challenge.    ADDITIONAL PROBLEM LIST AND DISPOSITION  ED evaluation for chest pain likely secondary to methamphetamine ingestion.  His anxiety, chest pain improved with Ativan, IV fluid bolus.  Labs really unremarkable, no leukocytosis or anemia.  No electrolyte derangement.  Normal renal function.  Mild transaminitis with normal total bilirubin, patient does admit to some alcohol use but not intoxicated today.  No evidence for withdrawal.  Troponin negative twice.  EKG without ischemia initially.  Continues telemetry without ectopy or arrhythmia.  No clinical or radiographic evidence for pneumonia, pneumothorax or thoracic arctic dissection.  Pain resolved with Ativan.  Remains hypertensive but has been noncompliant with lisinopril, likely exacerbated by methamphetamine.  He is agreeable to discharge and medication compliance and abstinence from substance use.  Heart score 2.      Problem #1: Uncontrolled hypertension, noncompliance  Pulm #2: Polysubstance use.    I have discussed management of the patient with the following physicians and TANNER's: None    Discussion of management with other QHP or  appropriate source(s): None     Escalation of care considered, and ultimately not performed: acute inpatient care management, however at this time, the patient is most appropriate for outpatient management.  Symptoms resolved, chest pain-free after Ativan, symptomatology likely secondary to methamphetamine use, less likely ACS/CAD.  Outpatient follow-up is appropriate with heart score 2.    HTN/IDDM FOLLOW UP:  The patient has known hypertension and is being followed by their primary care doctor    Patient is stable for discharge home, anticipatory guidance provided, lisinopril has been refilled, encouraged to abstain from substance use, close follow-up is encouraged and strict ED return instructions have been detailed.  Patient is aware of the findings and agreeable to the disposition and plan.      FINAL DIAGNOSIS  1. Chest pain, unspecified type    2. Methamphetamine use (HCC)    3. Hypertension, unspecified type    4. Medically noncompliant           Electronically signed by: Kiera Hinson D.O., 1/13/2023 5:36 AM

## 2023-01-13 NOTE — ED NOTES
"Pt anxious continues to look at monitor. Pt asked \" you think more amphetamine are coming into my heart\".   "

## 2023-01-13 NOTE — ED TRIAGE NOTES
"Chief Complaint   Patient presents with    Anxiety     Pt states feeling anxious and chest tightness   Was seen here earlier today for similar symptoms  States symptoms have not gone away      Denies meth use after discharge     BP (!) 180/126   Pulse (!) 119   Temp 36.7 °C (98.1 °F) (Temporal)   Resp 18   Ht 1.93 m (6' 4\")   Wt 122 kg (270 lb)   SpO2 99%   BMI 32.87 kg/m²     Pt made aware of triage process, placed back into lobby, educated pt to tell staff of any worsening of symptoms     "

## 2023-01-13 NOTE — ED TRIAGE NOTES
"Chief Complaint   Patient presents with    Drug Abuse     BIB EMS for anxiety secondary to meth abuse, pt states he ate meth about an hour ago and has not been feeling well, appears diaphoretic and states \"it feels like my heart is going to explode.\" Hx anxiety, schizophrenia, depression. Pt noncompliant with meds at this time.     Pt brought back to triage via w/c.     Pt is alert/oriented and follows commands. Pt speaking in full sentences and responds appropriately to questions. No acute distress noted in triage and respirations are even and unlabored.     Pt placed in lobby and educated on triage process. Pt encouraged to alert staff for any changes in condition.    "

## 2023-01-13 NOTE — DISCHARGE INSTRUCTIONS
Follow-up with primary care 1 to 2 days for reevaluation, medication management close blood pressure monitoring.    Do not use methamphetamines.  This is dangerous for you and can cause uncontrolled blood pressures, stroke, heart attack or death.    Strongly encourage compliance with lisinopril, refill has been provided for you.    Return to the emergency department for recurrent chest pain, shortness of breath, palpitations, syncope, fever, altered mental status or hallucinations or other new concerns.

## 2023-01-13 NOTE — ED NOTES
Pt resting and on the monitor w/ call light in reach  Updated on POC  Expresses anxiety and worries about his heart  Discussed meds being given and monitoring

## 2023-01-14 NOTE — ED NOTES
Pt states he never called MTM states he was sent to [facility] to call them . Pt states he called and they didn't answer so he decided to check back in not knowing what else to do. Pt resting in bed c/o feeling anxious. This nurse educated pt he needs to stop doing drugs. Educated pt it is so bad for his heart and mind. Educated it can cause halluctinations, pt states he does it because he doesn't want to deal with his feelings

## 2023-01-14 NOTE — ED NOTES
Pt aox4, vss, nad, ambulatory steady  Pt understood all dc info and when to seek medical care, no further questions  Pt left prior to getting d/c paperwork. Did not want to wait

## 2023-01-14 NOTE — ED NOTES
Pt stated he never left [facility] after he was discharged this morning at 1130. When asked why, he stated because MTM wouldn't answer to give him a ride home. Pts solution was checking back into ER.

## 2023-01-14 NOTE — ED PROVIDER NOTES
ED Provider Note    CHIEF COMPLAINT  Chief Complaint   Patient presents with    Anxiety     Pt states feeling anxious and chest tightness   Was seen here earlier today for similar symptoms  States symptoms have not gone away        EXTERNAL RECORDS REVIEWED  Outpatient Notes      HPI/ROS  LIMITATION TO HISTORY   Select: Intoxication  OUTSIDE HISTORIAN(S):       Brannon Sharma is a 42 y.o. male who presents with anxiety.  He was just seen here earlier in the day after eating methamphetamines.  He was discharged in the lobby waiting for [facility] transfer for the past several hours and rechecked in with continued anxiety.  He states that he uses meth chronically almost every day.  Has some palpitations now.  Denies known cardiac disease history.  He was seen earlier in the day and extensive work-up was performed including laboratory studies, troponin and delta troponin, chest x-ray, EKG.  These were all unremarkable at that time.    PAST MEDICAL HISTORY   has a past medical history of Alcohol-induced psychosis (HCC), Anxiety, Depression, Paranoid schizophrenia (HCC), Psychiatric disorder, and Schizophrenia, paranoid (HCC).    SURGICAL HISTORY   has a past surgical history that includes other and nasal septal reconstruction.    FAMILY HISTORY  Family History   Family history unknown: Yes       SOCIAL HISTORY  Social History     Tobacco Use    Smoking status: Every Day     Packs/day: 1.00     Types: Cigarettes    Smokeless tobacco: Never   Vaping Use    Vaping Use: Former   Substance and Sexual Activity    Alcohol use: Yes     Alcohol/week: 9.6 oz     Types: 16 Cans of beer per week    Drug use: Yes     Types: Inhaled, Marijuana     Comment: Meth (1/13/23)    Sexual activity: Yes     Partners: Female       CURRENT MEDICATIONS  Home Medications       Reviewed by Scott Thompson R.N. (Registered Nurse) on 01/13/23 at 1540  Med List Status: Not Addressed     Medication Last Dose Status   atomoxetine (STRATTERA) 40 MG  "capsule  Active   buPROPion (WELLBUTRIN SR) 200 MG SR tablet  Active   Empagliflozin (JARDIANCE) 25 MG Tab  Active   hydrOXYzine pamoate (VISTARIL) 50 MG Cap  Active   lisinopril (PRINIVIL) 20 MG Tab  Active   lisinopril (PRINIVIL) 40 MG tablet  Active   metFORMIN (GLUCOPHAGE) 500 MG Tab  Active   olanzapine (ZYPREXA) 15 MG tablet  Active   Semaglutide,0.25 or 0.5MG/DOS, (OZEMPIC, 0.25 OR 0.5 MG/DOSE,) 2 MG/1.5ML Solution Pen-injector  Active   traZODone (DESYREL) 150 MG Tab  Active                    ALLERGIES  Allergies   Allergen Reactions    Ceclor [Cefaclor] Anaphylaxis    Cephalosporins Anaphylaxis    Pcn [Penicillins] Anaphylaxis       PHYSICAL EXAM  VITAL SIGNS: BP (!) 180/126   Pulse (!) 119   Temp 36.7 °C (98.1 °F) (Temporal)   Resp 18   Ht 1.93 m (6' 4\")   Wt 122 kg (270 lb)   SpO2 99%   BMI 32.87 kg/m²    Pulse ox interpretation: I interpret this pulse ox as normal.  Constitutional: Alert in no apparent distress.  HENT: Normocephalic, Atraumatic, Bilateral external ears normal. Nose normal.   Eyes: Conjunctiva normal, non-icteric.   Heart: Tachycardic rate and regular rhythm.    Lungs: normal breath sounds, tachypnea  Skin: Warm, Dry, No erythema, No rash.   Neurologic: Alert, Grossly non-focal.   Psychiatric: Anxious in appearance      DIAGNOSTIC STUDIES / PROCEDURES  EKG  I have independently interpreted this EKG  Results for orders placed or performed during the hospital encounter of 23   EKG   Result Value Ref Range    Report       [facility] Emergency Dept.    Test Date:  2023  Pt Name:    OSITO BLACK               Department: ER  MRN:        [id number]                      Room:  Gender:     Male                         Technician: [id number]  :        1980                   Requested By:ER TRIAGE PROTOCOL  Order #:    [id number]                    Reading MD: CECILIO ZHOU MD    Measurements  Intervals                                Axis  Rate:       103     "                      P:          25  WA:         146                          QRS:        -82  QRSD:       105                          T:          35  QT:         360  QTc:        471    Interpretive Statements  Sinus tachycardia  Probable left atrial enlargement  Incomplete RBBB and LAFB  Consider right ventricular hypertrophy  Borderline prolonged QT interval  Baseline wander in lead(s) II,III,aVF  Compared to ECG 01/13/2023 04:34:15  Sinus rhythm no longer present  Electronically Signed On 1- 16:23:57 PST by CECILIO ZHOU MD         LABS  Labs Reviewed - No data to display      RADIOLOGY  I have independently interpreted the diagnostic imaging associated with this visit and am waiting the final reading from the radiologist.   No orders to display         COURSE & MEDICAL DECISION MAKING    ED Observation Status? No; Patient does not meet criteria for ED Observation.     INITIAL ASSESSMENT AND PLAN  Care Narrative: 42 y.o. male with a history of chronic methamphetamine abuse presenting to the emergency department earlier today with meth intoxication.  He had extensive work-up including laboratory studies which were unremarkable.  He was discharged and was in the waiting room when he checked it once again.  He appears extremely anxious and is tachycardic and hypertensive.  He does admit to methamphetamines in the past day prior to this arrival.  Has known history of amphetamine abuse.  I did review the patient's prior work-up but is extensive and unremarkable including 2 negative troponins and a chest x-ray which is unremarkable.  Patient appears tachycardic and tachypneic and extremely anxious upon arrival.  This is likely consistent with history of sympathomimetic abuse.  He was given multiple doses of Ativan with improvement in his symptoms.  Tolerating oral intake including fluids and solids.  Steady gait prior to discharge.  No longer appears diaphoretic or anxious.  Tachycardia has improved.  Patient  was stable for discharge.  Symptoms likely related to methamphetamine intoxication.    ADDITIONAL PROBLEM LIST AND DISPOSITION    Problem #1: Methamphetamine abuse    I have discussed management of the patient with the following physicians and TANNER's:      Discussion of management with other QHP or appropriate source(s): None     Escalation of care considered, and ultimately not performed:    Barriers to care at this time, including but not limited to: Patient is homeless, Patient lacks transportation , and Patient lacks financial resources.     Decision tools and prescription drugs considered including, but not limited to:    .    HTN/IDDM FOLLOW UP:  The patient is referred to a primary physician for blood pressure management, diabetic screening, and for all other preventive health concerns        FINAL DIAGNOSIS  1. Methamphetamine abuse (HCC)           Electronically signed by: Gadiel Sarah M.D., 1/13/2023 4:04 PM

## 2023-02-02 ENCOUNTER — HOSPITAL ENCOUNTER (EMERGENCY)
Facility: MEDICAL CENTER | Age: 43
End: 2023-02-03
Attending: EMERGENCY MEDICINE
Payer: MEDICAID

## 2023-02-02 DIAGNOSIS — R45.1 AGITATION: ICD-10-CM

## 2023-02-02 DIAGNOSIS — F29 PSYCHOSIS, UNSPECIFIED PSYCHOSIS TYPE (HCC): ICD-10-CM

## 2023-02-02 DIAGNOSIS — F15.10 METHAMPHETAMINE ABUSE (HCC): ICD-10-CM

## 2023-02-02 PROCEDURE — 99285 EMERGENCY DEPT VISIT HI MDM: CPT

## 2023-02-02 ASSESSMENT — FIBROSIS 4 INDEX: FIB4 SCORE: 1.164870412072981156

## 2023-02-03 VITALS
BODY MASS INDEX: 32.93 KG/M2 | SYSTOLIC BLOOD PRESSURE: 138 MMHG | TEMPERATURE: 98 F | HEIGHT: 74 IN | RESPIRATION RATE: 18 BRPM | DIASTOLIC BLOOD PRESSURE: 71 MMHG | HEART RATE: 92 BPM | OXYGEN SATURATION: 95 % | WEIGHT: 256.62 LBS

## 2023-02-03 LAB
AMPHET UR QL SCN: POSITIVE
BARBITURATES UR QL SCN: NEGATIVE
BENZODIAZ UR QL SCN: NEGATIVE
BZE UR QL SCN: NEGATIVE
CANNABINOIDS UR QL SCN: NEGATIVE
METHADONE UR QL SCN: NEGATIVE
OPIATES UR QL SCN: NEGATIVE
OXYCODONE UR QL SCN: NEGATIVE
PCP UR QL SCN: NEGATIVE
POC BREATHALIZER: 0.02 PERCENT (ref 0–0.01)
PROPOXYPH UR QL SCN: NEGATIVE

## 2023-02-03 PROCEDURE — 96372 THER/PROPH/DIAG INJ SC/IM: CPT

## 2023-02-03 PROCEDURE — 302970 POC BREATHALIZER: Performed by: EMERGENCY MEDICINE

## 2023-02-03 PROCEDURE — 80307 DRUG TEST PRSMV CHEM ANLYZR: CPT

## 2023-02-03 PROCEDURE — 700111 HCHG RX REV CODE 636 W/ 250 OVERRIDE (IP): Performed by: EMERGENCY MEDICINE

## 2023-02-03 RX ORDER — DIPHENHYDRAMINE HYDROCHLORIDE 50 MG/ML
50 INJECTION INTRAMUSCULAR; INTRAVENOUS ONCE
Status: COMPLETED | OUTPATIENT
Start: 2023-02-03 | End: 2023-02-03

## 2023-02-03 RX ORDER — LORAZEPAM 2 MG/ML
2 INJECTION INTRAMUSCULAR ONCE
Status: COMPLETED | OUTPATIENT
Start: 2023-02-03 | End: 2023-02-03

## 2023-02-03 RX ORDER — HALOPERIDOL 5 MG/ML
5 INJECTION INTRAMUSCULAR ONCE
Status: COMPLETED | OUTPATIENT
Start: 2023-02-03 | End: 2023-02-03

## 2023-02-03 RX ADMIN — DIPHENHYDRAMINE HYDROCHLORIDE 50 MG: 50 INJECTION INTRAMUSCULAR; INTRAVENOUS at 00:15

## 2023-02-03 RX ADMIN — HALOPERIDOL LACTATE 5 MG: 5 INJECTION, SOLUTION INTRAMUSCULAR at 00:16

## 2023-02-03 RX ADMIN — LORAZEPAM 2 MG: 2 INJECTION INTRAMUSCULAR; INTRAVENOUS at 00:16

## 2023-02-03 NOTE — CONSULTS
ALERT team  note:   Pt Dc'd to self prior to ED Consult to  completed; pt voluntary status; pt states he relapsed on Methamphetamines 2-3 times weekly with last use 2/2/23 and ETOH 7 tall beers daily with last use 2/2/23 after inpt detox tx at [facility] tx approx 1 week ago; pt currently denies SI, HI, or self-harm ideation;; Writer RN reviewed community CD and MH resources with pt, with written information given, including , [facility],  [facility], and [facility] who met with pt to review community CD resources; pt verbalized understanding    [place] Medicaid insurance plan

## 2023-02-03 NOTE — DISCHARGE SUMMARY
"  ED Observation Discharge Summary    Patient:Brannon Sharma  Patient : 1980  Patient MRN: [id number]  Patient PCP: Gregory Hardin M.D.    Admit Date: 2023  Discharge Date and Time: 23 8:15 AM  Discharge Diagnosis:   1. Methamphetamine abuse (HCC)    2. Psychosis, unspecified psychosis type (HCC)    3. Agitation       Discharge Attending: Cayden Romero M.D.  Discharge Service: ED Observation    ED Course  Brannon is a 42 y.o. male who was evaluated at [facility] emergency department with agitation and methamphetamine intoxication.  The patient was observed in the emergency department.  Frequent reassessment reveals improved vital signs, resolution of intoxication.  Resumption of normal mental status.  Lucid.    Discharge Exam:  /65   Pulse 98   Temp 36.7 °C (98.1 °F) (Temporal)   Resp 18   Ht 1.88 m (6' 2\")   Wt 116 kg (256 lb 9.9 oz)   SpO2 96%   BMI 32.95 kg/m² .    Constitutional: Awake and alert. Nontoxic.  HENT:  Grossly normal  Eyes: Grossly normal  Neck: Normal range of motion  Cardiovascular: Normal heart rate.  Regular rate and rhythm.  Thorax & Lungs: No respiratory distress  Abdomen: Nontender  Skin:  No pathologic rash.   Extremities: Well perfused  Psychiatric: Affect normal    Labs  Results for orders placed or performed during the hospital encounter of 23   POC BREATHALIZER   Result Value Ref Range    POC Breathalizer 0.018 (A) 0.00 - 0.01 Percent       Radiology  No orders to display       Medications:   New Prescriptions    No medications on file       My final assessment includes resolution of drug intoxication.  Upon Reevaluation, the patient's condition has: Improved; and will be discharged.    Patient discharged from ED Observation status at 0815 (Time) 2/3/2023  (Date).     Total time spent on this ED Observation discharge encounter is < 30 Minutes    Electronically signed by: Cayden Romero M.D., 2/3/2023 8:15 AM       "

## 2023-02-03 NOTE — DISCHARGE PLANNING
Alert Team:    Pt unable to wake up long enough to participate in behavioral health consult at this time due to chemical sedation that was given at 0015.

## 2023-02-03 NOTE — ED NOTES
[facility] contacted and full at this time. Other option is to go to [facility] outpatient. Pt expresses concern. Alert team notified.

## 2023-02-03 NOTE — ED PROVIDER NOTES
"ED Provider Note    CHIEF COMPLAINT  Chief Complaint   Patient presents with    Psych Eval    Drug Abuse     EXTERNAL RECORDS REVIEWED  Inpatient Notes ED opts discharge summary noted from 12/17/2021 where the patient was discharged after a prolonged period of observation in the ER for alcohol intoxication, aggressive behavior and thoughts of self-harm.    HPI/ROS  LIMITATION TO HISTORY   Select: Altered mental status / Confusion and Behavior  OUTSIDE HISTORIAN(S):  EMS run sheet - pt took \"massive amount\" of meth    Brannon Sharma is a 42 y.o. male who presents to the emergency room for evaluation of drug abuse and seeking psychological evaluation.  He has a prior history of chronic alcohol abuse, regular intermittent methamphetamine abuse, anxiety/depression, paranoid schizophrenia.  Patient states that he took \"all the meth.\"  And is now saying that he feels extremely paranoid that people are going to kill him and hurt him and he wants to go get help.  He apparently wanted to go to [facility] however he is here on a legal hold brought in by EMS personnel as he was acting in a dangerous manner towards himself.    ED chart from 1/13/2023 was reviewed with the patient was seen for anxiousness, chest tightness prior to being evaluated for methamphetamine abuse.  On this same visit he has been previously seen for palpitations in the setting of recurrent meth use with no known cardiac history.  Earlier on the 13th he had been worked up extensively for possible cardiac abnormalities including laboratory studies, troponin and delta troponin, chest x-ray, EKG that were all unremarkable at that time.    PAST MEDICAL HISTORY   has a past medical history of Alcohol-induced psychosis (HCC), Anxiety, Depression, Paranoid schizophrenia (HCC), Psychiatric disorder, and Schizophrenia, paranoid (HCC).    SURGICAL HISTORY   has a past surgical history that includes other and nasal septal reconstruction.    FAMILY " "HISTORY  Family History   Family history unknown: Yes     SOCIAL HISTORY  Social History     Tobacco Use    Smoking status: Every Day     Packs/day: 1.00     Types: Cigarettes    Smokeless tobacco: Never   Vaping Use    Vaping Use: Former   Substance and Sexual Activity    Alcohol use: Yes     Alcohol/week: 9.6 oz     Types: 16 Cans of beer per week     Comment: Daily    Drug use: Yes     Types: Inhaled, Marijuana     Comment: Meth daily    Sexual activity: Yes     Partners: Female     CURRENT MEDICATIONS  Home Medications       Reviewed by Jaja Liriano R.N. (Registered Nurse) on 02/02/23 at 2324  Med List Status: Not Addressed     Medication Last Dose Status   atomoxetine (STRATTERA) 40 MG capsule  Active   buPROPion (WELLBUTRIN SR) 200 MG SR tablet  Active   Empagliflozin (JARDIANCE) 25 MG Tab  Active   hydrOXYzine pamoate (VISTARIL) 50 MG Cap  Active   lisinopril (PRINIVIL) 20 MG Tab  Active   lisinopril (PRINIVIL) 40 MG tablet  Active   metFORMIN (GLUCOPHAGE) 500 MG Tab  Active   olanzapine (ZYPREXA) 15 MG tablet  Active   Semaglutide,0.25 or 0.5MG/DOS, (OZEMPIC, 0.25 OR 0.5 MG/DOSE,) 2 MG/1.5ML Solution Pen-injector  Active   traZODone (DESYREL) 150 MG Tab  Active                  ALLERGIES  Allergies   Allergen Reactions    Ceclor [Cefaclor] Anaphylaxis    Cephalosporins Anaphylaxis    Pcn [Penicillins] Anaphylaxis     PHYSICAL EXAM  VITAL SIGNS: BP (!) 152/89   Pulse 94   Temp 36.2 °C (97.2 °F) (Temporal)   Resp 18   Ht 1.88 m (6' 2\")   Wt 116 kg (256 lb 9.9 oz)   SpO2 98%   BMI 32.95 kg/m²    Genl: pacing M, appears anxious and intermittently responding to internal stimuli  Head: NC/AT   ENT: Mucous membranes moist, posterior pharynx clear  Neck: Supple, FROM  Pulmonary: Lungs are clear to auscultation bilaterally  CV:  Tachycardia, no murmur appreciated, pulses 2+ in both upper and lower extremities,  Abdomen: soft, NT/ND; no rebound/guarding  Musculoskeletal: Pain free ROM of the neck. Moving " upper and lower extremities and spontaneous in coordinated fashion  Psych: Orientation: person, place and time/date   Appearance: age appropriate  Behavior:   suspicious and aggrivated  Speech: pressured   Mood: anxious   Affect: increased intensity and labile   Thought Process: disorganized   Thought Content: no suicidal thoughts   Delusions: paranoia   Perceptions/Sensorium:   responding to internal stimuli  Cognition: poor concentration   Language: spontaneous   Insight and judgement: poor based on recent behaviors       DIAGNOSTIC STUDIES / PROCEDURES    LABS  Labs Reviewed   POC BREATHALIZER - Abnormal; Notable for the following components:       Result Value    POC Breathalizer 0.018 (*)     All other components within normal limits   URINE DRUG SCREEN     COURSE & MEDICAL DECISION MAKING    ED Observation Status? Yes; I am placing the patient in to an observation status due to a diagnostic uncertainty as well as therapeutic intensity. Patient placed in observation status at 12:26 AM, 2/3/2023.     Observation plan is as follows:  Observation care in the emergency department, medication with antipsychotics and sedatives due to the patient's agitated behavior and concerns for ability to care for self.    INITIAL ASSESSMENT, COURSE AND PLAN  Care Narrative: Patient is seen and evaluated as he reports taking copious amounts of methamphetamine and is feeling extremely anxious, paranoid and is pacing in the room.  He is able to be verbally de-escalated however he continues to have lability in his behavior and has known aggressiveness with polysubstance abuse and underlying psychiatric illness.  He is amenable to getting intramuscular injections of antipsychotic medications as this is worked in the past and is amenable to helping us with further facilitating possible transfer for his substance abuse problem by providing us with a breathalyzer and urine sample for drug use.    Notified by nursing staff that they had  to have security at the bedside for preservation of safety, he remains on a legal hold at this time.  He required sedation with Haldol, Benadryl, Ativan.    Subsequent reassessment at 0400 shows a patient who is sleeping, resting comfortably, has stable vital signs and increase sedation with no acute interval improvement.  He remains on ED observation status at this time and will be signed out to my colleague for evaluation, reassessment and likely transfer for inpatient care.    FINAL DIAGNOSIS  1. Methamphetamine abuse (HCC)    2. Psychosis, unspecified psychosis type (HCC)    3. Agitation      Electronically signed by: Satya Ledesma M.D., 2/2/2023 11:41 PM

## 2023-02-03 NOTE — ED NOTES
MTM called for pt.     Seeking placement at [facility].    Subjective


Progress Note Date: 07/02/22


Principal diagnosis: 





CC: Shortness of breath





Patient seen this morning.  He appears lethargic however following commands.  He

remains stable on 3 L nasal cannula.  We are still waiting on prior 

authorization so that patient can return to the skilled nursing facility.





Objective





- Vital Signs


Vital signs: 


                                   Vital Signs











Temp  98.0 F   07/02/22 07:35


 


Pulse  67   07/02/22 07:35


 


Resp  18   07/02/22 07:35


 


BP  119/72   07/02/22 07:35


 


Pulse Ox  96   07/02/22 07:35


 


FiO2  30   07/02/22 04:19








                                 Intake & Output











 07/01/22 07/02/22 07/02/22





 18:59 06:59 18:59


 


Intake Total 520  


 


Output Total 0 200 


 


Balance 520 -200 


 


Weight  101 kg 


 


Intake:   


 


    


 


    Piperacillin-Tazobactam 3 100  





    .375 gm In Sodium   





    Chloride 0.9% 100 ml @ 25   





    mls/hr IVPB Q8HR JESSICA Rx#   





    :[id number]   


 


    Sodium Chloride 0.9% 1, 70  





    000 ml @ 20 mls/hr IV .   





    Q24H JESSICA Rx#:[id number]   


 


  Oral 350  


 


Output:   


 


  Urine 0 200 


 


Other:   


 


  Voiding Method Incontinent  


 


  # Voids 1  


 


  # Bowel Movements 1  














- Exam





General examination - Alert  and Oriented 3 in NAD, lethargic


Heart - + S1S2  no murmurs


Lungs - diminished breath sounds bilaterally, no wheezing on exam


Abdomen soft NT ND +ve BS


Extremities - trace bilateral pitting edema


CNS - Moving all 4 extremities spontaneously


Psych - Calm and cooperative, mildly confused





- Labs


CBC & Chem 7: 


                                 07/01/22 05:57





                                 07/01/22 05:57


Labs: 


                  Abnormal Lab Results - Last 24 Hours (Table)











  07/01/22 07/01/22 07/01/22 Range/Units





  11:53 16:59 20:28 


 


POC Glucose (mg/dL)  189 H  308 H  306 H  ()  mg/dL














  07/02/22 Range/Units





  07:09 


 


POC Glucose (mg/dL)  184 H  ()  mg/dL








                      Microbiology - Last 24 Hours (Table)











 06/25/22 16:01 Blood Culture - Final





 Blood    No Growth after 144 hours


 


 06/25/22 16:01 Blood Culture - Final





 Blood    No Growth after 144 hours














Assessment and Plan


Assessment: 





Acute hypoxic respiratory failure secondary to aspiration acute pulmonary edema


Acute systolic heart failure with an ejection fraction 25-30%


Aspiration pneumonia


COPD


-Patient off BiPAP and on 3 L nasal cannula


-Patient transitioned to oral Lasix


-Resume Aldactone


-Patient on losartan and metoprolol


-Patient transition to oral antibiotics with Augmentin


-Resume DuoNeb


-Cardiology and pulmonology on board


-Speech eval -> clear patient for dysphagia 2 diet with nectar thick liquid.  

Patient passed barium swallow study








Non-ST segment elevation myocardial infarction


- Resume aspirin metoprolol Plavix and statin


-Patient transition from heparin drip to subcu heparin


-Cardiology discussed the patient who said he did not want any aggressive 

measures.





Hypervolemic Hyponatremia


-Improving on diuretics





History of CVA/TIA


-Resume aspirin and statin





Hypertension


-Resume losartan, metoprolol





Hypothyroidism


-Resume Rocky Thyroid





Benign prostatic hyperplasia


-Resume Flomax





Consult PT OT -> patient to return back to skilled nursing facility





Diabetes mellitus


-Sliding-scale insulin


CODE STATUS: DNR/DNI


Anticipated discharge: Patient medically stable for discharge.  Awaiting for 

prior authorization.

## 2023-02-03 NOTE — ED TRIAGE NOTES
"  Chief Complaint   Patient presents with    Psych Eval    Drug Abuse     Pt presents to triage for paranoid delusions. Pt states \"I'm scared people are trying to kill.\" Pt endorses meth and ETOH use tonight. Pt denies SI/HI. Pt presenting very agitated in triage. Hx paranoid schizophrenia, non-compliant with medications.     Pt is alert/oriented, following commands, and answering questions appropriately. Pt placed in lobby and educated on triage process. Pt encouraged to alert staff for any changes in condition.    .BP (!) 146/108   Pulse (!) 134   Temp 36.2 °C (97.2 °F) (Temporal)   Resp 18   Ht 1.88 m (6' 2\")   Wt 116 kg (256 lb 9.9 oz)   SpO2 98%   BMI 32.95 kg/m²     "

## 2023-02-03 NOTE — ED NOTES
Pt is resting on gurney. No signs/symptoms of distress noted, respirations even, equal and non-labored.     Pt not on L2K per MD. Pt awake, alert, calm, cooperative. Alert team notified of need for eval. Breakfast tray provided.

## 2023-02-03 NOTE — ED NOTES
Reviewed discharge instructions, pt verbalized understanding of instructions. Denies further questions at this time. Pt ambulatory out of ER with steady gait.

## 2023-04-02 ENCOUNTER — HOSPITAL ENCOUNTER (EMERGENCY)
Facility: MEDICAL CENTER | Age: 43
End: 2023-04-02
Attending: STUDENT IN AN ORGANIZED HEALTH CARE EDUCATION/TRAINING PROGRAM
Payer: MEDICAID

## 2023-04-02 VITALS
HEART RATE: 61 BPM | SYSTOLIC BLOOD PRESSURE: 120 MMHG | BODY MASS INDEX: 36.06 KG/M2 | OXYGEN SATURATION: 95 % | RESPIRATION RATE: 16 BRPM | TEMPERATURE: 98.1 F | WEIGHT: 290 LBS | DIASTOLIC BLOOD PRESSURE: 78 MMHG | HEIGHT: 75 IN

## 2023-04-02 DIAGNOSIS — R45.851 SUICIDAL IDEATION: ICD-10-CM

## 2023-04-02 DIAGNOSIS — T50.902A INTENTIONAL DRUG OVERDOSE, INITIAL ENCOUNTER (HCC): ICD-10-CM

## 2023-04-02 LAB
ALBUMIN SERPL BCP-MCNC: 4 G/DL (ref 3.2–4.9)
ALBUMIN/GLOB SERPL: 1.5 G/DL
ALP SERPL-CCNC: 149 U/L (ref 30–99)
ALT SERPL-CCNC: 23 U/L (ref 2–50)
AMPHET UR QL SCN: NEGATIVE
ANION GAP SERPL CALC-SCNC: 10 MMOL/L (ref 7–16)
APAP SERPL-MCNC: <5 UG/ML (ref 10–30)
AST SERPL-CCNC: 16 U/L (ref 12–45)
BARBITURATES UR QL SCN: NEGATIVE
BASOPHILS # BLD AUTO: 0.9 % (ref 0–1.8)
BASOPHILS # BLD: 0.08 K/UL (ref 0–0.12)
BENZODIAZ UR QL SCN: NEGATIVE
BILIRUB SERPL-MCNC: 0.5 MG/DL (ref 0.1–1.5)
BUN SERPL-MCNC: 11 MG/DL (ref 8–22)
BZE UR QL SCN: NEGATIVE
CALCIUM ALBUM COR SERPL-MCNC: 9.1 MG/DL (ref 8.5–10.5)
CALCIUM SERPL-MCNC: 9.1 MG/DL (ref 8.4–10.2)
CANNABINOIDS UR QL SCN: NEGATIVE
CHLORIDE SERPL-SCNC: 110 MMOL/L (ref 96–112)
CO2 SERPL-SCNC: 20 MMOL/L (ref 20–33)
CREAT SERPL-MCNC: 0.95 MG/DL (ref 0.5–1.4)
EKG IMPRESSION: NORMAL
EKG IMPRESSION: NORMAL
EOSINOPHIL # BLD AUTO: 0.1 K/UL (ref 0–0.51)
EOSINOPHIL NFR BLD: 1.1 % (ref 0–6.9)
ERYTHROCYTE [DISTWIDTH] IN BLOOD BY AUTOMATED COUNT: 43.3 FL (ref 35.9–50)
ETHANOL BLD-MCNC: <10.1 MG/DL
GFR SERPLBLD CREATININE-BSD FMLA CKD-EPI: 102 ML/MIN/1.73 M 2
GLOBULIN SER CALC-MCNC: 2.7 G/DL (ref 1.9–3.5)
GLUCOSE SERPL-MCNC: 130 MG/DL (ref 65–99)
HCT VFR BLD AUTO: 47.9 % (ref 42–52)
HGB BLD-MCNC: 16.7 G/DL (ref 14–18)
IMM GRANULOCYTES # BLD AUTO: 0.02 K/UL (ref 0–0.11)
IMM GRANULOCYTES NFR BLD AUTO: 0.2 % (ref 0–0.9)
LYMPHOCYTES # BLD AUTO: 1.86 K/UL (ref 1–4.8)
LYMPHOCYTES NFR BLD: 20.5 % (ref 22–41)
MCH RBC QN AUTO: 30 PG (ref 27–33)
MCHC RBC AUTO-ENTMCNC: 34.9 G/DL (ref 33.7–35.3)
MCV RBC AUTO: 86 FL (ref 81.4–97.8)
METHADONE UR QL SCN: NEGATIVE
MONOCYTES # BLD AUTO: 0.46 K/UL (ref 0–0.85)
MONOCYTES NFR BLD AUTO: 5.1 % (ref 0–13.4)
NEUTROPHILS # BLD AUTO: 6.54 K/UL (ref 1.82–7.42)
NEUTROPHILS NFR BLD: 72.2 % (ref 44–72)
NRBC # BLD AUTO: 0 K/UL
NRBC BLD-RTO: 0 /100 WBC
OPIATES UR QL SCN: NEGATIVE
OXYCODONE UR QL SCN: NEGATIVE
PCP UR QL SCN: NEGATIVE
PLATELET # BLD AUTO: 264 K/UL (ref 164–446)
PMV BLD AUTO: 10.4 FL (ref 9–12.9)
POC BREATHALIZER: 0 PERCENT (ref 0–0.01)
POTASSIUM SERPL-SCNC: 4.1 MMOL/L (ref 3.6–5.5)
PROPOXYPH UR QL SCN: NEGATIVE
PROT SERPL-MCNC: 6.7 G/DL (ref 6–8.2)
RBC # BLD AUTO: 5.57 M/UL (ref 4.7–6.1)
SALICYLATES SERPL-MCNC: <1 MG/DL (ref 15–25)
SODIUM SERPL-SCNC: 140 MMOL/L (ref 135–145)
WBC # BLD AUTO: 9.1 K/UL (ref 4.8–10.8)

## 2023-04-02 PROCEDURE — 36415 COLL VENOUS BLD VENIPUNCTURE: CPT

## 2023-04-02 PROCEDURE — 80143 DRUG ASSAY ACETAMINOPHEN: CPT

## 2023-04-02 PROCEDURE — A9270 NON-COVERED ITEM OR SERVICE: HCPCS | Performed by: EMERGENCY MEDICINE

## 2023-04-02 PROCEDURE — 302970 POC BREATHALIZER: Performed by: STUDENT IN AN ORGANIZED HEALTH CARE EDUCATION/TRAINING PROGRAM

## 2023-04-02 PROCEDURE — 700102 HCHG RX REV CODE 250 W/ 637 OVERRIDE(OP): Performed by: EMERGENCY MEDICINE

## 2023-04-02 PROCEDURE — 82077 ASSAY SPEC XCP UR&BREATH IA: CPT

## 2023-04-02 PROCEDURE — 80179 DRUG ASSAY SALICYLATE: CPT

## 2023-04-02 PROCEDURE — 85025 COMPLETE CBC W/AUTO DIFF WBC: CPT

## 2023-04-02 PROCEDURE — 700102 HCHG RX REV CODE 250 W/ 637 OVERRIDE(OP): Performed by: STUDENT IN AN ORGANIZED HEALTH CARE EDUCATION/TRAINING PROGRAM

## 2023-04-02 PROCEDURE — 99285 EMERGENCY DEPT VISIT HI MDM: CPT

## 2023-04-02 PROCEDURE — 80307 DRUG TEST PRSMV CHEM ANLYZR: CPT

## 2023-04-02 PROCEDURE — A9270 NON-COVERED ITEM OR SERVICE: HCPCS | Performed by: STUDENT IN AN ORGANIZED HEALTH CARE EDUCATION/TRAINING PROGRAM

## 2023-04-02 PROCEDURE — 93005 ELECTROCARDIOGRAM TRACING: CPT | Mod: XE,76 | Performed by: STUDENT IN AN ORGANIZED HEALTH CARE EDUCATION/TRAINING PROGRAM

## 2023-04-02 PROCEDURE — 80053 COMPREHEN METABOLIC PANEL: CPT

## 2023-04-02 RX ORDER — LORAZEPAM 1 MG/1
2 TABLET ORAL ONCE
Status: COMPLETED | OUTPATIENT
Start: 2023-04-02 | End: 2023-04-02

## 2023-04-02 RX ORDER — LORAZEPAM 1 MG/1
1 TABLET ORAL ONCE
Status: COMPLETED | OUTPATIENT
Start: 2023-04-02 | End: 2023-04-02

## 2023-04-02 RX ORDER — OLANZAPINE 5 MG/1
10 TABLET, ORALLY DISINTEGRATING ORAL ONCE
Status: COMPLETED | OUTPATIENT
Start: 2023-04-02 | End: 2023-04-02

## 2023-04-02 RX ORDER — OMEPRAZOLE 20 MG/1
20 CAPSULE, DELAYED RELEASE ORAL ONCE
Status: COMPLETED | OUTPATIENT
Start: 2023-04-02 | End: 2023-04-02

## 2023-04-02 RX ORDER — MIDAZOLAM HYDROCHLORIDE 2 MG/ML
2 SYRUP ORAL ONCE
Status: DISCONTINUED | OUTPATIENT
Start: 2023-04-02 | End: 2023-04-02

## 2023-04-02 RX ADMIN — LORAZEPAM 2 MG: 1 TABLET ORAL at 04:00

## 2023-04-02 RX ADMIN — LORAZEPAM 2 MG: 1 TABLET ORAL at 03:41

## 2023-04-02 RX ADMIN — OLANZAPINE 10 MG: 5 TABLET, ORALLY DISINTEGRATING ORAL at 03:51

## 2023-04-02 RX ADMIN — OMEPRAZOLE 20 MG: 20 CAPSULE, DELAYED RELEASE ORAL at 08:55

## 2023-04-02 RX ADMIN — LORAZEPAM 1 MG: 1 TABLET ORAL at 08:38

## 2023-04-02 ASSESSMENT — FIBROSIS 4 INDEX: FIB4 SCORE: 0.67

## 2023-04-02 NOTE — ED NOTES
Pt discharged, called father for ride, all belongings returned to patient, pt reports he feels safe going home, reviewed all discharge instructions including follow up, pt verbalizes understanding, and denies questions.   Escorted to lobby. No belongings left in room.

## 2023-04-02 NOTE — CONSULTS
"[facility]   TRIAGE ASSESSMENT    Name: Brannon Sharma  MRN: [id number]  : 1980  Age: 42 y.o.  Date of assessment: 2023  PCP: Gregory Hardin M.D.  Persons in attendance: Patient who consented to seen over TeleHealth from [facility].      CHIEF COMPLAINT/PRESENTING ISSUE (as stated by pt): \"I don't think I was really trying to die.  My roommates don't have a full deck. There was arguing and fighting when I got out of the hospital. It was loud. I don't like loud noises. I got anxious and I just wanted to not be so anxious. That's why I took the hydroxyzine. I have an appt tomorrow at 10am at [facility]\".  EMS brought him to ER after roommate called 911 because pt took 500 mg hydroxyzine.     HPI:   41 yo W male just released from 4 day stay in[facility]. Pt reports he has dx of SAD / CPS /EULA: meth & etoh. He has been off  meth/etoh since 2/3/23.  He left [facility] yesterday on Invega (missed his dose yesterday) Hydroxyzine/trazodone/antabuse/doxepin.  He describes his \"biggest problem\" as anxiety.  \"I can't stand loud noises & one roommate was banging on the walls.\"  Currently the pt denies SI/AH.  Historically there was one OD on meds .->in pt.   Pt has an RENE but started to experience AH around ten years ago. He states he has had multiple hospitalizations \"When I was doing meth & drinking & I heard voices\" at [facility]. The longest he has abstained from etoh / meth is 3 month and right now he has 2 months.      CURRENT LIVING SITUATION/SOCIAL SUPPORT: Currently pt lives in shared housing for  patients in [place]. He lives in one bedroom in a 2 bedroom apt with 3 roommates in that bedroom. He has [place] Medicaid and is connected to [facility].    BEHAVIORAL HEALTH TREATMENT HISTORY  Does patient/parent report a history of prior behavioral health treatment for patient?   Yes:        Dates Level of Care Facilty/Provider Diagnosis/Problem Medications   2023 In [facility]  Decompensation " " AH Invega po /Hydroxyzine /trazodone/antabuse/doxepin   2023 Out pt [facility] Paranoid Schizophrenia vs SAD    Last 10/2021 outpt [facility] ([facility]) care coordinator, Valentin       9/2021 CD rehab [facility] psychiatrist last appt 9/2021 Alcohol use disorder severe, Stimulant use disorder sever amphetamine type, Bipolar Type I D/O,  Anxiety, Depression,   Paranoid schizophrenia  unknown doses of Risperdone, Sertraline, Buspar, Tegretol, Gabapentin, Trazodone,  states last taken psych meds 3 weeks ago      12/10/2020 outpt   CHE[facility] Alvarado at [facility] Alcohol use disorder severe, Stimulant use disorder sever amphetamine type, Bipolar Type I D/O,  Anxiety, Depression,   Paranoid schizophrenia     Olanzapine    12/2019 outpt  [facility], psychiatry       12/2019 outpt [facility]           8/2019 inpt [facility] detox Left AMA after 2 days      2016 inpt [facility] etoh     2015 inpt [facility] SI     2014 inpt [facility] SI/HI         SAFETY ASSESSMENT - SELF  Does patient acknowledge current or past symptoms of dangerousness to self? yes  Does parent/significant other report patient has current or past symptoms of dangerousness to self? No see above  Does presenting problem suggest symptoms of dangerousness to self?  Not currently    SAFETY ASSESSMENT - OTHER  Does patient acknowledge current or past symptoms of aggressive behavior or risk to others? no  Does parent/significant other report patient has current or past symptoms of aggressive behavior or risk to others?  N\A  Does presenting problem suggest symptoms of dangerousness to others? No    Crisis Safety Plan completed and copy given to patient? yes    ABUSE/NEGLECT SCREENING  Does patient report feeling “unsafe” in his/her home, or afraid of anyone?  yes  Does patient report any history of physical, sexual, or emotional abuse?  Yes childhood \"mental abuse --both my parents were mental " "patients\"  Does parent or significant other report any of the above? N\A  Is there evidence of neglect by self?  no  Is there evidence of neglect by a caregiver? no  Does the patient/parent report any history of CPS/APS/police involvement related to suspected abuse/neglect or domestic violence? no  Based on the information provided during the current assessment, is a mandated report of suspected abuse/neglect being made?  N/A    SUBSTANCE USE SCREENING  Yes:  [place] all substances used in the past 30 days:      Last Use Amount   [x]   Alcohol 2/23 copious   []   Marijuana     []   Heroin     []   Prescription Opioids  (used without prescription, for    recreation, or in excess of prescribed amount)     []   Other Prescription  (used without prescription, for    recreation, or in excess of prescribed amount)     []   Cocaine      [x]   Methamphetamine 2/23 regular   []   \"\" drugs (ectasy, MDMA)     []   Other substances        UDS results: pending  Breathalyzer results: negative    What consequences does the patient associate with any of the above substance use and or addictive behaviors? Health problems:     Risk factors for detox (check all that apply):  []  Seizures   []  Diaphoretic (sweating)   [x]  Tremors   [x]  Hallucinations   [x]  Increased blood pressure   []  Decreased blood pressure   []  Other   []  None      [x] Patient education on risk factors for detoxification and instructed to return to ER as needed.      MENTAL STATUS   Participation: Active verbal participation  Grooming: Casual  Orientation: Alert and Fully Oriented  Behavior: Calm  Eye contact: Good  Mood: Anxious and c/o being worried  Affect: Congruent with content  Thought process: Goal-directed  Thought content: Paranoia complains of chromic paranoia  Speech: Rate within normal limits and Volume within normal limits  Perception:  denies AH \"When I get them they say You hurt people. You committed a crime\"  Denies current AH/VH \"I only " "get VH when I do meth.\"  Memory:  No gross evidence of memory deficits  Insight: Adequate at this time to understand he has triggers that agitate him  Judgment:  Adequate  Other:    Collateral information:   Source:  [] Significant other present in person:   [] Significant other by telephone  [] [facility]   [x] [facility] Nursing Staff  [x] [facility]  [] Other:     [] Unable to complete full assessment due to:  [] Acute intoxication  [] Patient declined to participate/engage  [] Patient verbally unresponsive  [] Significant cognitive deficits  [] Significant perceptual distortions or behavioral disorganization  [] Other:      CLINICAL IMPRESSIONS:  Primary:  Destabilization due to pressures of  Thought disorder vs Mood & Thought disorder  Secondary:  EULA currently abstinent       IDENTIFIED NEEDS/PLAN:  [Trigger DISPOSITION list for any items marked]    []  Imminent safety risk - self [] Imminent safety risk - others   []  Acute substance withdrawal [x]  Psychosis/Impaired reality testing   [x]  Mood/anxiety [x]  Substance use/Addictive behavior   [x]  Maladaptive behaviro []  Parent/child conflict   []  Family/Couples conflict []  Biomedical   [x]  Housing []  Financial   [x]   Legal  Occupational/Educational   []  Domestic violence [x]  Other: conflict at group home involving others not pt     Recommended Plan of Care: Pt held in [facility]  ER on LH with full observation & dangerous items removed from room until evaluation after being brought in for psych eval after ingestion of 500 mg Hydroxyzine.  After Alert Team evaluation pt agreed to return to his group home  with care plan    Has the recommended plan of care and disposition been communicated with pt's assigned nurse? Lacey     Does patient express agreement with the above plan? Yes  .c    Referral appointment(s) scheduled? Pt has scheduled appt tomorrow at [facility] at 10 AM    Alert team only:   I have discussed findings and recommendations " with Dr. Pritchett  who is in agreement with these recommendations.     Referral information sent to the following community out pt providers :[facility]    Referral information sent to the following community in pt providers:          Rica Corona R.N.  4/2/2023

## 2023-04-02 NOTE — ED NOTES
Pt calmed down; resting quietly. States he feels better.  Sitter outside room providing direct 1:1 observation- [place], soda and jello given.

## 2023-04-02 NOTE — DISCHARGE SUMMARY
"  ED Observation Discharge Summary    Patient:Brannon Sharma  Patient : 1980  Patient MRN: [id number]  Patient PCP: Gregory Hardin M.D.    Admit Date: 2023  Discharge Date and Time: 23 8:42 AM  Discharge Diagnosis: Schizophrenia, methamphetamine abuse  Discharge Attending: Carroll Pritchett M.D.  Discharge Service: ED Observation    ED Course  Brannon is a 42 y.o. male who was evaluated at [facility] for psychosis apparently with a history of recent admission to [facility] and is only episodic and use of his at home antipsychotic medications.  Continues to episodically use methamphetamine as well and only minimal use of alcohol.  Lives in a home with 3 other men 1 of which has psychiatric illness as well.  Denies any current HI or SI    Discharge Exam:  /81   Pulse (!) 108   Temp 36.7 °C (98.1 °F) (Temporal)   Resp 14   Ht 1.905 m (6' 3\")   Wt (!) 132 kg (290 lb)   SpO2 93%   BMI 36.25 kg/m² .    Constitutional: Awake and alert. Nontoxic  HENT:  Grossly normal  Eyes: Grossly normal  Neck: Normal range of motion  Cardiovascular: Normal heart rate   Thorax & Lungs: No respiratory distress  Abdomen: Nontender  Skin:  No pathologic rash.   Extremities: Well perfused  Psychiatric: Affect calm, no HI or SI, no significant visual or auditory hallucinations after initial medication    Labs  Results for orders placed or performed during the hospital encounter of 23   URINE DRUG SCREEN (TRIAGE)   Result Value Ref Range    Amphetamines Urine Negative Negative    Barbiturates Negative Negative    Benzodiazepines Negative Negative    Cocaine Metabolite Negative Negative    Methadone Negative Negative    Opiates Negative Negative    Oxycodone Negative Negative    Phencyclidine -Pcp Negative Negative    Propoxyphene Negative Negative    Cannabinoid Metab Negative Negative   CBC WITH DIFFERENTIAL   Result Value Ref Range    WBC 9.1 4.8 - 10.8 K/uL "    RBC 5.57 4.70 - 6.10 M/uL    Hemoglobin 16.7 14.0 - 18.0 g/dL    Hematocrit 47.9 42.0 - 52.0 %    MCV 86.0 81.4 - 97.8 fL    MCH 30.0 27.0 - 33.0 pg    MCHC 34.9 33.7 - 35.3 g/dL    RDW 43.3 35.9 - 50.0 fL    Platelet Count 264 164 - 446 K/uL    MPV 10.4 9.0 - 12.9 fL    Neutrophils-Polys 72.20 (H) 44.00 - 72.00 %    Lymphocytes 20.50 (L) 22.00 - 41.00 %    Monocytes 5.10 0.00 - 13.40 %    Eosinophils 1.10 0.00 - 6.90 %    Basophils 0.90 0.00 - 1.80 %    Immature Granulocytes 0.20 0.00 - 0.90 %    Nucleated RBC 0.00 /100 WBC    Neutrophils (Absolute) 6.54 1.82 - 7.42 K/uL    Lymphs (Absolute) 1.86 1.00 - 4.80 K/uL    Monos (Absolute) 0.46 0.00 - 0.85 K/uL    Eos (Absolute) 0.10 0.00 - 0.51 K/uL    Baso (Absolute) 0.08 0.00 - 0.12 K/uL    Immature Granulocytes (abs) 0.02 0.00 - 0.11 K/uL    NRBC (Absolute) 0.00 K/uL   COMP METABOLIC PANEL   Result Value Ref Range    Sodium 140 135 - 145 mmol/L    Potassium 4.1 3.6 - 5.5 mmol/L    Chloride 110 96 - 112 mmol/L    Co2 20 20 - 33 mmol/L    Anion Gap 10.0 7.0 - 16.0    Glucose 130 (H) 65 - 99 mg/dL    Bun 11 8 - 22 mg/dL    Creatinine 0.95 0.50 - 1.40 mg/dL    Calcium 9.1 8.4 - 10.2 mg/dL    AST(SGOT) 16 12 - 45 U/L    ALT(SGPT) 23 2 - 50 U/L    Alkaline Phosphatase 149 (H) 30 - 99 U/L    Total Bilirubin 0.5 0.1 - 1.5 mg/dL    Albumin 4.0 3.2 - 4.9 g/dL    Total Protein 6.7 6.0 - 8.2 g/dL    Globulin 2.7 1.9 - 3.5 g/dL    A-G Ratio 1.5 g/dL   Acetaminophen Level   Result Value Ref Range    Acetaminophen -Tylenol <5.0 (L) 10.0 - 30.0 ug/mL   SALICYLATE LEVEL   Result Value Ref Range    Salicylates, Quant. <1.0 (L) 15.0 - 25.0 mg/dL   ETHYL ALCOHOL (BLOOD)   Result Value Ref Range    Diagnostic Alcohol <10.1 <10.1 mg/dL   CORRECTED CALCIUM   Result Value Ref Range    Correct Calcium 9.1 8.5 - 10.5 mg/dL   ESTIMATED GFR   Result Value Ref Range    GFR (CKD-EPI) 102 >60 mL/min/1.73 m 2   POC BREATHALIZER   Result Value Ref Range    POC Breathalizer 0.000 0.00 - 0.01  Percent   EKG (NOW)   Result Value Ref Range    Report       [facility] Emergency Dept.    Test Date:  2023  Pt Name:    OSITO BLACK               Department: [facility]  MRN:        [id number]                      Room:       [facility] 10  Gender:     Male                         Technician: [id number]  :        1980                   Requested By:RAYA LEVY  Order #:    [id number]                    Reading MD: Raya Levy    Measurements  Intervals                                Axis  Rate:       117                          P:          22  ND:         152                          QRS:        135  QRSD:       97                           T:          14  QT:         338  QTc:        472    Interpretive Statements  Sinus tachycardia rate of 117, normal axis, QRS is normal, QTc is prolonged  472  compared to his prior EKG 2023, no ST elevations, depressions, or T wave  inversions  Electronically Signed On 2023 3:40:43 PDT by Raya Levy     EKG   Result Value Ref Range    Report       [facility] Emergency Dept.    Test Date:  2023  Pt Name:    OSITO BLACK               Department: [facility]  MRN:        [id number]                      Room:       [facility] 10  Gender:     Male                         Technician: [id number]  :        1980                   Requested By:RAYA LEVY  Order #:    [id number]                    Reading MD: Raya Levy    Measurements  Intervals                                Axis  Rate:       100                          P:          14  ND:         153                          QRS:        115  QRSD:       104                          T:          14  QT:         372  QTc:        480    Interpretive Statements  Sinus tachycardia rate of 100, slight rightward axis, QTc remains prolonged  but  is not significantly longer at 480, no significant QRS widening, no ST  elevations, depressions, or T wave  inversions  Electronically Signed On 4-2-2023 5:44:27 PDT by Raya Webster         Although the patient episodically abuses methamphetamine in the urine talk screen is negative for meth today.  He received some additional medications    I spoke with [facility] about this patient and after reassessing him myself I feel he is stable at this time and not a risk to himself or others and is able to care for himself.  I am taken him off his legal hold and given him a dose of Ativan 1 mg.  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    Patient discharged from ED Observation status at 8:45 AM (Time) April 2, 2023 (Date).     Total time spent on this ED Observation discharge encounter is > 30 Minutes    Electronically signed by: Carroll Pritchett M.D., 4/2/2023 8:42 AM

## 2023-04-02 NOTE — ED PROVIDER NOTES
"ED Provider Note    CHIEF COMPLAINT  Chief Complaint   Patient presents with    Suicidal Ideation     Pt just released from [facility] for suicidal ideation.  Pt states he just wants to kill himself by taking all his pills    Drug Overdose     Pt intentionally overdosed on Hydroxyzine 500 mg and trazadone 600 mg         EXTERNAL RECORDS REVIEWED  Inpatient Notes patient has a recent admission 3/27/2023 History of schizoaffective, suicidal ideation, prior suicide attempts, last seen for psychotic episodes and hallucinations, hearing voices    HPI/ROS  LIMITATION TO HISTORY   Select: Behavior  OUTSIDE HISTORIAN(S):  EMS      Brannon Sharma is a 42 y.o. male who presents via EMS with reported overdose and attempt at self-harm.  Patient states \"I just wanted to go to sleep and I still does want to go to sleep\".  He has a history of paranoid schizophrenia with multiple suicide attempts in the past.  He reportedly per EMS took 500 mg of hydroxyzine and 600 mg of trazodone.  He denies any other ingestions.  Does endorse auditory hallucinations that tell him to kill himself.  He was recently admitted at [facility] for suicidal ideation, he states he has not taken his prescribed medications since he left.  Estimated time of ingestion 1:30 AM.    PAST MEDICAL HISTORY  Past Medical History:   Diagnosis Date    Alcohol-induced psychosis (HCC)     Anxiety     Depression     Paranoid schizophrenia (HCC)     Psychiatric disorder     panic attack    Schizophrenia, paranoid (HCC)     Suicidal ideations         SURGICAL HISTORY  Past Surgical History:   Procedure Laterality Date    NASAL SEPTAL RECONSTRUCTION      OTHER      deviated septum        FAMILY HISTORY  Family History   Family history unknown: Yes       SOCIAL HISTORY       CURRENT MEDICATIONS  Home Medications    Medication Sig Taking? Last Dose Authorizing Provider   lisinopril (PRINIVIL) 20 MG Tab Take 1 Tablet by mouth every " "day.   Kiera Hinson D.O.   atomoxetine (STRATTERA) 40 MG capsule Take 40 mg by mouth every day.   Physician Outpatient   buPROPion (WELLBUTRIN SR) 200 MG SR tablet Take 200 mg by mouth every day.   Physician Outpatient   hydrOXYzine pamoate (VISTARIL) 50 MG Cap Take 50 mg by mouth 3 times a day as needed for Anxiety.   Physician Outpatient   Semaglutide,0.25 or 0.5MG/DOS, (OZEMPIC, 0.25 OR 0.5 MG/DOSE,) 2 MG/1.5ML Solution Pen-injector Inject 0.5 mg under the skin every 7 days.   Physician Outpatient   olanzapine (ZYPREXA) 15 MG tablet Take 15 mg by mouth every evening.   Physician Outpatient   Empagliflozin (JARDIANCE) 25 MG Tab Take 25 mg by mouth every day.   Physician Outpatient   lisinopril (PRINIVIL) 40 MG tablet Take 40 mg by mouth every day.   Physician Outpatient   metFORMIN (GLUCOPHAGE) 500 MG Tab Take 1,000 mg by mouth 2 times a day.   Physician Outpatient   traZODone (DESYREL) 150 MG Tab Take 150 mg by mouth at bedtime.   Physician Outpatient       ALLERGIES  No Known Allergies    PHYSICAL EXAM  /81   Pulse (!) 108   Temp 36.7 °C (98.1 °F) (Temporal)   Resp 14   Ht 1.905 m (6' 3\")   Wt (!) 132 kg (290 lb)   SpO2 93%   Constitutional: Alert, slightly agitated and anxious  HENT: No signs of trauma, Bilateral external ears normal, Nose normal.   Eyes: Pupils are equal and reactive 5 mm bilaterally, Conjunctiva normal, Non-icteric.   Neck: Normal range of motion, No tenderness, Supple, No stridor.   Cardiovascular: Tachycardic, regular rhythm, no murmurs.   Thorax & Lungs: Normal breath sounds, No respiratory distress, No wheezing  Abdomen: Soft, No tenderness, No peritoneal signs, No masses, No pulsatile masses.   Skin: Warm, Dry, No erythema, No rash.   Extremities: Intact distal pulses, No edema, No tenderness, No cyanosis  Musculoskeletal: Good range of motion in all major joints. No  major deformities noted.   Neurologic: Alert , Normal motor function, pressured speech speech, No focal " deficits noted.  No clonus  Psychiatric: Judgment poor, slightly anxious and agitated, responding to auditory hallucinations and external stimuli      DIAGNOSTIC STUDIES / PROCEDURES    LABS & EKG  I have independently interpreted this EKG     Results for orders placed or performed during the hospital encounter of 04/02/23   URINE DRUG SCREEN (TRIAGE)   Result Value Ref Range    Amphetamines Urine Negative Negative    Barbiturates Negative Negative    Benzodiazepines Negative Negative    Cocaine Metabolite Negative Negative    Methadone Negative Negative    Opiates Negative Negative    Oxycodone Negative Negative    Phencyclidine -Pcp Negative Negative    Propoxyphene Negative Negative    Cannabinoid Metab Negative Negative   CBC WITH DIFFERENTIAL   Result Value Ref Range    WBC 9.1 4.8 - 10.8 K/uL    RBC 5.57 4.70 - 6.10 M/uL    Hemoglobin 16.7 14.0 - 18.0 g/dL    Hematocrit 47.9 42.0 - 52.0 %    MCV 86.0 81.4 - 97.8 fL    MCH 30.0 27.0 - 33.0 pg    MCHC 34.9 33.7 - 35.3 g/dL    RDW 43.3 35.9 - 50.0 fL    Platelet Count 264 164 - 446 K/uL    MPV 10.4 9.0 - 12.9 fL    Neutrophils-Polys 72.20 (H) 44.00 - 72.00 %    Lymphocytes 20.50 (L) 22.00 - 41.00 %    Monocytes 5.10 0.00 - 13.40 %    Eosinophils 1.10 0.00 - 6.90 %    Basophils 0.90 0.00 - 1.80 %    Immature Granulocytes 0.20 0.00 - 0.90 %    Nucleated RBC 0.00 /100 WBC    Neutrophils (Absolute) 6.54 1.82 - 7.42 K/uL    Lymphs (Absolute) 1.86 1.00 - 4.80 K/uL    Monos (Absolute) 0.46 0.00 - 0.85 K/uL    Eos (Absolute) 0.10 0.00 - 0.51 K/uL    Baso (Absolute) 0.08 0.00 - 0.12 K/uL    Immature Granulocytes (abs) 0.02 0.00 - 0.11 K/uL    NRBC (Absolute) 0.00 K/uL   COMP METABOLIC PANEL   Result Value Ref Range    Sodium 140 135 - 145 mmol/L    Potassium 4.1 3.6 - 5.5 mmol/L    Chloride 110 96 - 112 mmol/L    Co2 20 20 - 33 mmol/L    Anion Gap 10.0 7.0 - 16.0    Glucose 130 (H) 65 - 99 mg/dL    Bun 11 8 - 22 mg/dL    Creatinine 0.95 0.50 - 1.40 mg/dL    Calcium 9.1  8.4 - 10.2 mg/dL    AST(SGOT) 16 12 - 45 U/L    ALT(SGPT) 23 2 - 50 U/L    Alkaline Phosphatase 149 (H) 30 - 99 U/L    Total Bilirubin 0.5 0.1 - 1.5 mg/dL    Albumin 4.0 3.2 - 4.9 g/dL    Total Protein 6.7 6.0 - 8.2 g/dL    Globulin 2.7 1.9 - 3.5 g/dL    A-G Ratio 1.5 g/dL   ETHYL ALCOHOL (BLOOD)   Result Value Ref Range    Diagnostic Alcohol <10.1 <10.1 mg/dL   CORRECTED CALCIUM   Result Value Ref Range    Correct Calcium 9.1 8.5 - 10.5 mg/dL   ESTIMATED GFR   Result Value Ref Range    GFR (CKD-EPI) 102 >60 mL/min/1.73 m 2   POC BREATHALIZER   Result Value Ref Range    POC Breathalizer 0.000 0.00 - 0.01 Percent   EKG (NOW)   Result Value Ref Range    Report       [facility] Emergency Dept.    Test Date:  2023  Pt Name:    OSITO BLACK               Department: EDS  MRN:        [id number]                      Room:       [place] [address]  Gender:     Male                         Technician: [id number]  :        1980                   Requested By:RAYA LEVY  Order #:    [id number]                    Reading MD: Raya Levy    Measurements  Intervals                                Axis  Rate:       117                          P:          22  NM:         152                          QRS:        135  QRSD:       97                           T:          14  QT:         338  QTc:        472    Interpretive Statements  Sinus tachycardia rate of 117, normal axis, QRS is normal, QTc is prolonged  472  compared to his prior EKG 2023, no ST elevations, depressions, or T wave  inversions  Electronically Signed On 2023 3:40:43 PDT by Raya Levy     EKG   Result Value Ref Range    Report       [facility] Emergency Dept.    Test Date:  2023  Pt Name:    OSITO BLACK               Department: [facility]  MRN:        [id number]                      Room:       [facility] 10  Gender:     Male                         Technician: [id number]  :        1980                    Requested By:RAYA LEVY  Order #:    [id number]                    Reading MD: Raya Levy    Measurements  Intervals                                Axis  Rate:       100                          P:          14  UT:         153                          QRS:        115  QRSD:       104                          T:          14  QT:         372  QTc:        480    Interpretive Statements  Sinus tachycardia rate of 100, slight rightward axis, QTc remains prolonged  but  is not significantly longer at 480, no significant QRS widening, no ST  elevations, depressions, or T wave inversions  Electronically Signed On 4-2-2023 5:44:27 PDT by Raya Levy         COURSE & MEDICAL DECISION MAKING    ED Observation Status? Yes; I am placing the patient in to an observation status due to a diagnostic uncertainty as well as therapeutic intensity. Patient placed in observation status at 3:41 AM, 4/2/2023.     Observation plan is as follows: Patient will require minimum of 6 hours and monitoring as well as lab work, serial EKGs for overdose, then psychiatric evaluation and likely inpatient admission      INITIAL ASSESSMENT, COURSE AND PLAN  Care Narrative: 42-year-old male with known history of schizophrenia presenting with suicide attempt with ingestion.  He is tachycardic and agitated on arrival, but does not appear toxic.  His mental status is adequate at this time.  We will plan to proceed with toxicology work-up including labs, EKG, UDS.  Discussed with poison control.  Anticipate supportive care for ingestions.  Once patient is medically cleared from the ingestion, will require psychiatric consult for likely admission.  Will place on legal hold at this time due to ingestion and suicidal ideation.    3:23 AM  Spoke with Poison Control   Hydroxyzine-- anticholinergic syndrome, QRS widening in severe  Trazodone-- bradycardia, QT prolongation, somnolence  6hrs post ingestion (7:30 AM)    Case #  [id number]    3:45 AM  Patient is becoming increasingly agitatedhas been given 2 mg of Ativan orally, will also give 10 mg of olanzapine, another 2 mg of Ativan if he continues to become more agitated.    4:41 AM  Patient is calmer, will obtain repeat EKG, pulse ox to monitor given possibility of respiratory depression due to sedation medications.    5:49 AM  Repeat EKG shows only slight prolongation of the QTc and QRS compared to the prior.  Patient continues to have no clonus.  He is much calmer after his medications but continues to wake up appropriately.      ADDITIONAL PROBLEM LIST    Intentional drug overdose  Suicidal ideation  Schizophrenia    DISPOSITION AND DISCUSSIONS  I have discussed management of the patient with the following physicians and TANNER's: Dr. Linton-- ERP    Discussion of management with other [facility] or appropriate source(s):  Poison Control      Barriers to care at this time, including but not limited to:  Patient's underlying psychiatric problems prevent him from seeking appropriate treatment for his disease .       Pending medical clearance from ingestion, anticipate psychiatric admission       FINAL DIAGNOSIS  1. Suicidal ideation        2. Intentional drug overdose, initial encounter ([facility])            The total critical care time on this patient is 34 minutes, managing agitation, resuscitating patient, speaking with admitting physician, and deciphering test results. This 34 minutes is exclusive of separately billable procedures.      Electronically signed by: Raya Webster M.D., 04/02/23 3:23 AM

## 2023-04-02 NOTE — ED TRIAGE NOTES
"Pt bib EMS with c/o    Chief Complaint   Patient presents with    Suicidal Ideation     Pt just released from [facility] for suicidal ideation.  Pt states he just wants to kill himself by taking all his pills    Drug Overdose     Pt intentionally overdosed on Hydroxyzine 500 mg and trazadone 600 mg         Ht 1.905 m (6' 3\")   Wt (!) 132 kg (290 lb)   BMI 36.25 kg/m²    "

## 2023-04-02 NOTE — ED NOTES
Pt still very agitated; says this is the worst his anxiety has ever been.  Lorazepam 2 mg repeated.  Sitter outside room providing direct 1:1 observation.

## 2023-04-02 NOTE — ED NOTES
Pt resting on gurney, pt in no acute distress, instructed to call if needing any assistance, instructed not to get up by self, philiprprasad in lowest position.   1-1 sitter observing patient

## 2023-04-03 NOTE — ED TRIAGE NOTES
"Pt presents to ED via EMS, accompanied by Pd, for c/o SI with alcohol on board. Per reports pt had called PD this evening with Si. Pt had reported he had been dealing \"with issues in his personal life\" he didn't think he could handle. Pt ingested a large amount of alcohol prior to arrival. Pt received 200mg of Ketamine and 2mg of Versed for EMS due to being combative on scene when he was told by PD that he needed to come to the hospital. Pt arrived in soft wrist restrains. Soft wrist restraints continued here in the ER and secure to bed frame. Pt is able to open eyes but is drowsy.   " Additional Prescription Justification Text: If there is any interruption in treatment exceeding 5 days please see Decay and Dose Adjustment Calculation and complete treatment under Prescription 2, 3 or 4 as appropriate.

## 2023-04-26 ENCOUNTER — HOSPITAL ENCOUNTER (EMERGENCY)
Facility: MEDICAL CENTER | Age: 43
End: 2023-04-26
Attending: EMERGENCY MEDICINE
Payer: MEDICAID

## 2023-04-26 VITALS
HEIGHT: 75 IN | SYSTOLIC BLOOD PRESSURE: 148 MMHG | BODY MASS INDEX: 36.06 KG/M2 | WEIGHT: 290 LBS | TEMPERATURE: 98 F | OXYGEN SATURATION: 98 % | DIASTOLIC BLOOD PRESSURE: 89 MMHG | HEART RATE: 110 BPM | RESPIRATION RATE: 18 BRPM

## 2023-04-26 DIAGNOSIS — F13.939 BENZODIAZEPINE WITHDRAWAL WITH COMPLICATION (HCC): ICD-10-CM

## 2023-04-26 LAB
ALBUMIN SERPL BCP-MCNC: 3.9 G/DL (ref 3.2–4.9)
ALBUMIN/GLOB SERPL: 1.5 G/DL
ALP SERPL-CCNC: 105 U/L (ref 30–99)
ALT SERPL-CCNC: 26 U/L (ref 2–50)
ANION GAP SERPL CALC-SCNC: 9 MMOL/L (ref 7–16)
AST SERPL-CCNC: 17 U/L (ref 12–45)
BASOPHILS # BLD AUTO: 1 % (ref 0–1.8)
BASOPHILS # BLD: 0.07 K/UL (ref 0–0.12)
BILIRUB SERPL-MCNC: 0.6 MG/DL (ref 0.1–1.5)
BUN SERPL-MCNC: 12 MG/DL (ref 8–22)
CALCIUM ALBUM COR SERPL-MCNC: 8.7 MG/DL (ref 8.5–10.5)
CALCIUM SERPL-MCNC: 8.6 MG/DL (ref 8.4–10.2)
CHLORIDE SERPL-SCNC: 108 MMOL/L (ref 96–112)
CO2 SERPL-SCNC: 21 MMOL/L (ref 20–33)
CREAT SERPL-MCNC: 0.86 MG/DL (ref 0.5–1.4)
EOSINOPHIL # BLD AUTO: 0.19 K/UL (ref 0–0.51)
EOSINOPHIL NFR BLD: 2.7 % (ref 0–6.9)
ERYTHROCYTE [DISTWIDTH] IN BLOOD BY AUTOMATED COUNT: 43.9 FL (ref 35.9–50)
GFR SERPLBLD CREATININE-BSD FMLA CKD-EPI: 110 ML/MIN/1.73 M 2
GLOBULIN SER CALC-MCNC: 2.6 G/DL (ref 1.9–3.5)
GLUCOSE SERPL-MCNC: 130 MG/DL (ref 65–99)
HCT VFR BLD AUTO: 45.6 % (ref 42–52)
HGB BLD-MCNC: 15.9 G/DL (ref 14–18)
IMM GRANULOCYTES # BLD AUTO: 0.02 K/UL (ref 0–0.11)
IMM GRANULOCYTES NFR BLD AUTO: 0.3 % (ref 0–0.9)
LYMPHOCYTES # BLD AUTO: 1.9 K/UL (ref 1–4.8)
LYMPHOCYTES NFR BLD: 27.1 % (ref 22–41)
MCH RBC QN AUTO: 30.3 PG (ref 27–33)
MCHC RBC AUTO-ENTMCNC: 34.9 G/DL (ref 33.7–35.3)
MCV RBC AUTO: 86.9 FL (ref 81.4–97.8)
MONOCYTES # BLD AUTO: 0.36 K/UL (ref 0–0.85)
MONOCYTES NFR BLD AUTO: 5.1 % (ref 0–13.4)
NEUTROPHILS # BLD AUTO: 4.46 K/UL (ref 1.82–7.42)
NEUTROPHILS NFR BLD: 63.8 % (ref 44–72)
NRBC # BLD AUTO: 0 K/UL
NRBC BLD-RTO: 0 /100 WBC
PLATELET # BLD AUTO: 238 K/UL (ref 164–446)
PMV BLD AUTO: 9.9 FL (ref 9–12.9)
POTASSIUM SERPL-SCNC: 4 MMOL/L (ref 3.6–5.5)
PROT SERPL-MCNC: 6.5 G/DL (ref 6–8.2)
RBC # BLD AUTO: 5.25 M/UL (ref 4.7–6.1)
SODIUM SERPL-SCNC: 138 MMOL/L (ref 135–145)
WBC # BLD AUTO: 7 K/UL (ref 4.8–10.8)

## 2023-04-26 PROCEDURE — 700111 HCHG RX REV CODE 636 W/ 250 OVERRIDE (IP): Performed by: EMERGENCY MEDICINE

## 2023-04-26 PROCEDURE — 96374 THER/PROPH/DIAG INJ IV PUSH: CPT

## 2023-04-26 PROCEDURE — 96375 TX/PRO/DX INJ NEW DRUG ADDON: CPT

## 2023-04-26 PROCEDURE — 36415 COLL VENOUS BLD VENIPUNCTURE: CPT

## 2023-04-26 PROCEDURE — 85025 COMPLETE CBC W/AUTO DIFF WBC: CPT

## 2023-04-26 PROCEDURE — 99284 EMERGENCY DEPT VISIT MOD MDM: CPT

## 2023-04-26 PROCEDURE — 80053 COMPREHEN METABOLIC PANEL: CPT

## 2023-04-26 RX ORDER — HALOPERIDOL 5 MG/ML
5 INJECTION INTRAMUSCULAR ONCE
Status: COMPLETED | OUTPATIENT
Start: 2023-04-26 | End: 2023-04-26

## 2023-04-26 RX ORDER — LORAZEPAM 2 MG/ML
3 INJECTION INTRAMUSCULAR ONCE
Status: COMPLETED | OUTPATIENT
Start: 2023-04-26 | End: 2023-04-26

## 2023-04-26 RX ADMIN — HALOPERIDOL LACTATE 5 MG: 5 INJECTION, SOLUTION INTRAMUSCULAR at 10:01

## 2023-04-26 RX ADMIN — LORAZEPAM 3 MG: 2 INJECTION INTRAMUSCULAR; INTRAVENOUS at 10:00

## 2023-04-26 ASSESSMENT — FIBROSIS 4 INDEX: FIB4 SCORE: 0.53

## 2023-04-26 NOTE — ED TRIAGE NOTES
"Pt bib EMS with concerns for withdrawals from benzodiazepines. He states it's been 2 days since he last took his medication because he \"went through them too fast.\" He currently denies SI and HI.   "

## 2023-04-26 NOTE — ED PROVIDER NOTES
ED Provider Note    CHIEF COMPLAINT  No chief complaint on file.      EXTERNAL RECORDS REVIEWED  Patient's multiple psychiatric admissions, most recently 3/27/2023 for schizoaffective disorder at [facility]    HPI/ROS  LIMITATION TO HISTORY   History of psychiatric illness      Brannon Sharma is a 42 y.o. male who presents with chief complaint of paranoia, and tachycardia.  Patient reports that he has a longstanding history of depression, schizophrenia, and severe anxiety with panic attacks.  Patient is prescribed clonazepam but was taking these far too frequently, around 3 times to 4 times daily and therefore ran out.  He has not taken any benzodiazepines now for the last 2-1/2 days.  Patient reports he has progressively developed worsening anxiety and a tremor.  He also reports his auditory hallucinations are worsening, he reports there are voices in his head telling him that people are coming after him.  Patient denies any homicidal or suicidal ideation.  Patient denies any neck or back pain or abdominal pain.  He denies any vomiting, he reports mild nausea.  Patient denies any associated chest pain.  Patient denies any black or bloody stool.  Patient reports a history of alcohol abuse but has been sober for the last 3 months.    PAST MEDICAL HISTORY   has a past medical history of Alcohol-induced psychosis (HCC), Anxiety, Depression, Paranoid schizophrenia (HCC), Psychiatric disorder, Schizophrenia, paranoid (HCC), and Suicidal ideations.    SURGICAL HISTORY   has a past surgical history that includes other and nasal septal reconstruction.    FAMILY HISTORY  Family History   Family history unknown: Yes       SOCIAL HISTORY  Social History     Tobacco Use    Smoking status: Every Day     Packs/day: 1.00     Types: Cigarettes    Smokeless tobacco: Never   Vaping Use    Vaping Use: Every day    Substances: Nicotine    Devices: Pre-filled or refillable cartridge   Substance and Sexual  Activity    Alcohol use: Yes     Alcohol/week: 9.6 oz     Types: 16 Cans of beer per week     Comment: Daily    Drug use: Yes     Types: Inhaled, Marijuana     Comment: Meth daily    Sexual activity: Yes     Partners: Female       CURRENT MEDICATIONS  Home Medications    **Home medications have not yet been reviewed for this encounter**         ALLERGIES  Allergies   Allergen Reactions    Ceclor [Cefaclor] Anaphylaxis    Cephalosporins Anaphylaxis    Pcn [Penicillins] Anaphylaxis       PHYSICAL EXAM  VITAL SIGNS: There were no vitals taken for this visit.   Physical Exam  Constitutional:       Appearance: Normal appearance.   HENT:      Head: Normocephalic.      Right Ear: Tympanic membrane normal.      Left Ear: Tympanic membrane normal.      Nose: Nose normal.      Mouth/Throat:      Mouth: Mucous membranes are moist.   Eyes:      Extraocular Movements: Extraocular movements intact.      Pupils: Pupils are equal, round, and reactive to light.   Cardiovascular:      Rate and Rhythm: Regular rhythm. Tachycardia present.   Pulmonary:      Effort: Pulmonary effort is normal. No respiratory distress.      Breath sounds: Normal breath sounds. No stridor. No wheezing or rales.   Chest:      Chest wall: No tenderness.   Abdominal:      General: Abdomen is flat. There is no distension.      Palpations: Abdomen is soft. There is no mass.      Tenderness: There is no abdominal tenderness.   Musculoskeletal:      Cervical back: Normal range of motion.   Skin:     General: Skin is warm.      Capillary Refill: Capillary refill takes less than 2 seconds.   Neurological:      General: No focal deficit present.      Mental Status: He is alert and oriented to person, place, and time.   Psychiatric:      Comments: Mildly agitated, responding to internal stimuli, denies SI or HI         DIAGNOSTIC STUDIES / PROCEDURES    LABS  Results for orders placed or performed during the hospital encounter of 04/26/23   CBC WITH DIFFERENTIAL    Result Value Ref Range    WBC 7.0 4.8 - 10.8 K/uL    RBC 5.25 4.70 - 6.10 M/uL    Hemoglobin 15.9 14.0 - 18.0 g/dL    Hematocrit 45.6 42.0 - 52.0 %    MCV 86.9 81.4 - 97.8 fL    MCH 30.3 27.0 - 33.0 pg    MCHC 34.9 33.7 - 35.3 g/dL    RDW 43.9 35.9 - 50.0 fL    Platelet Count 238 164 - 446 K/uL    MPV 9.9 9.0 - 12.9 fL    Neutrophils-Polys 63.80 44.00 - 72.00 %    Lymphocytes 27.10 22.00 - 41.00 %    Monocytes 5.10 0.00 - 13.40 %    Eosinophils 2.70 0.00 - 6.90 %    Basophils 1.00 0.00 - 1.80 %    Immature Granulocytes 0.30 0.00 - 0.90 %    Nucleated RBC 0.00 /100 WBC    Neutrophils (Absolute) 4.46 1.82 - 7.42 K/uL    Lymphs (Absolute) 1.90 1.00 - 4.80 K/uL    Monos (Absolute) 0.36 0.00 - 0.85 K/uL    Eos (Absolute) 0.19 0.00 - 0.51 K/uL    Baso (Absolute) 0.07 0.00 - 0.12 K/uL    Immature Granulocytes (abs) 0.02 0.00 - 0.11 K/uL    NRBC (Absolute) 0.00 K/uL   CMP   Result Value Ref Range    Sodium 138 135 - 145 mmol/L    Potassium 4.0 3.6 - 5.5 mmol/L    Chloride 108 96 - 112 mmol/L    Co2 21 20 - 33 mmol/L    Anion Gap 9.0 7.0 - 16.0    Glucose 130 (H) 65 - 99 mg/dL    Bun 12 8 - 22 mg/dL    Creatinine 0.86 0.50 - 1.40 mg/dL    Calcium 8.6 8.4 - 10.2 mg/dL    AST(SGOT) 17 12 - 45 U/L    ALT(SGPT) 26 2 - 50 U/L    Alkaline Phosphatase 105 (H) 30 - 99 U/L    Total Bilirubin 0.6 0.1 - 1.5 mg/dL    Albumin 3.9 3.2 - 4.9 g/dL    Total Protein 6.5 6.0 - 8.2 g/dL    Globulin 2.6 1.9 - 3.5 g/dL    A-G Ratio 1.5 g/dL   CORRECTED CALCIUM   Result Value Ref Range    Correct Calcium 8.7 8.5 - 10.5 mg/dL   ESTIMATED GFR   Result Value Ref Range    GFR (CKD-EPI) 110 >60 mL/min/1.73 m 2         COURSE & MEDICAL DECISION MAKING    ED Observation Status? Yes; I am placing the patient in to an observation status due to a diagnostic uncertainty as well as therapeutic intensity. Patient placed in observation status at 9:45 AM, 4/26/2023.     Observation plan is as follows: observe patient for response to therapy, ensure the patient  has available bed at psychiatric facility, ensure patient does not develop any worsening psychiatric symptoms    Upon Reevaluation, the patient's condition has: Improved; and will be discharged.    Patient discharged from ED Observation status at 11:46 AM (Time) 04/26/23 (Date).     INITIAL ASSESSMENT, COURSE AND PLAN  Care Narrative: Patient with history of psychiatric illness.  He was inappropriately taking his benzodiazepine and is now in mild withdrawal with some worsening of his chronic psychosis.  I have given 3 mg of Ativan and Haldol for treatment.  Patient will have basic labs to evaluate for severe associated electrolyte abnormality.  Patient sleeping very comfortably following treatment.  Patient does not appear to be a risk to himself or others at this point, I believe outpatient management is appropriate but will also see if patient could qualify for ongoing withdrawal management and benzodiazepine abuse therapy at [facility].  Patient is amenable to this, I am working with case management to see if this is possible.  We have confirmed that a bed is available at [facility], patient provided ride to facility.  Patient remains amenable to going to [facility] as a voluntary admission.  Return precautions discussed.  Patient's tachycardia significantly improved.  Tremor has resolved.  Patient remains without any SI or HI      ADDITIONAL PROBLEM LIST  History of schizophrenia, history of psychiatric illness, history of depression and anxiety  DISPOSITION AND DISCUSSIONS  I    Discussion of management with other [facility] or appropriate source(s): Case management    Escalation of care considered, and ultimately not performed: Given patient's response to therapy, very reassuring labs, and lack of any SI or HI, hospitalization was deferred    Barriers to care at this time, including but not limited to: Patient with longstanding history of mental illness      FINAL DIAGNOSIS  1.  Benzodiazepine withdrawal with complication (HCC)

## 2023-04-26 NOTE — ED NOTES
"ERP and RN in to discuss treatment plan with pt following discharge. He was agreeable to go to [facility]. A cab voucher was requested and received by case management. When in the room to discuss discharge information with the pt he refused to go to [facility] because he \"didn't get along with one of the physician's there.\" ERP notified of pt decision and went in to discuss with the pt. Despite educated of the risks, benefits and recommendations the pt continues to refuse to go to [facility].   "

## 2023-04-26 NOTE — DISCHARGE PLANNING
Case discussed with Dr. Argueta, patient is requesting to discharge to a detox center.  RN CM checked with [facility], they have detox beds available.  Patient will need assistance with a ride, cab voucher given to bedside BHAVANA Bowers.

## 2023-05-01 ENCOUNTER — HOSPITAL ENCOUNTER (EMERGENCY)
Facility: MEDICAL CENTER | Age: 43
End: 2023-05-01
Attending: EMERGENCY MEDICINE
Payer: MEDICAID

## 2023-05-01 VITALS
DIASTOLIC BLOOD PRESSURE: 68 MMHG | HEART RATE: 90 BPM | WEIGHT: 290 LBS | OXYGEN SATURATION: 95 % | SYSTOLIC BLOOD PRESSURE: 128 MMHG | BODY MASS INDEX: 36.06 KG/M2 | RESPIRATION RATE: 18 BRPM | TEMPERATURE: 97.2 F | HEIGHT: 75 IN

## 2023-05-01 DIAGNOSIS — F41.0 PANIC ATTACK: ICD-10-CM

## 2023-05-01 PROCEDURE — 99283 EMERGENCY DEPT VISIT LOW MDM: CPT

## 2023-05-01 PROCEDURE — A9270 NON-COVERED ITEM OR SERVICE: HCPCS | Performed by: EMERGENCY MEDICINE

## 2023-05-01 PROCEDURE — 700102 HCHG RX REV CODE 250 W/ 637 OVERRIDE(OP): Performed by: EMERGENCY MEDICINE

## 2023-05-01 RX ORDER — CLONAZEPAM 0.5 MG/1
1 TABLET ORAL ONCE
Status: COMPLETED | OUTPATIENT
Start: 2023-05-01 | End: 2023-05-01

## 2023-05-01 RX ADMIN — CLONAZEPAM 1 MG: 0.5 TABLET ORAL at 09:35

## 2023-05-01 ASSESSMENT — FIBROSIS 4 INDEX: FIB4 SCORE: 0.59

## 2023-05-01 NOTE — ED PROVIDER NOTES
ED Provider Note    CHIEF COMPLAINT  Chief Complaint   Patient presents with    Anxiety     Pt states had extreme anxiety this am    Hallucinations     Auditory hallucinations, denied SI/HI       EXTERNAL RECORDS REVIEWED  Outpatient Notes full psychiatric admissions, last admission was March 27 for schizoaffective disorder at [facility].    HPI/ROS  LIMITATION TO HISTORY   Select: Psychiatric illness      Brannon Sharma is a 42 y.o. male who presents by ambulance for medication refill.  Patient states he was post to go to [facility] today to get his medications refilled but had a panic attack and therefore missed his appointment.  He has been prescribed clonazepam in the past and has been taking them frequently.  He is now currently out and it is causing him to have increasing panic attacks.  He does admit to occasional hallucinations.  He denies any suicidal or homicidal ideation.  He denies any current medical problems.  He does have a history of alcohol abuse but has been sober for 3 months.  He denies current drug use.    PAST MEDICAL HISTORY   has a past medical history of Alcohol-induced psychosis (HCC), Anxiety, Depression, Paranoid schizophrenia (HCC), Psychiatric disorder, Schizophrenia, paranoid (HCC), and Suicidal ideations.    SURGICAL HISTORY   has a past surgical history that includes other and nasal septal reconstruction.    FAMILY HISTORY  Family History   Family history unknown: Yes       SOCIAL HISTORY  Social History     Tobacco Use    Smoking status: Every Day     Packs/day: 1.00     Types: Cigarettes    Smokeless tobacco: Never   Vaping Use    Vaping Use: Every day    Substances: Nicotine    Devices: Pre-filled or refillable cartridge   Substance and Sexual Activity    Alcohol use: Yes     Alcohol/week: 9.6 oz     Types: 16 Cans of beer per week    Drug use: Yes     Types: Inhaled, Marijuana     Comment: Meth daily    Sexual activity: Yes     Partners: Female       CURRENT  "MEDICATIONS  Home Medications    **Home medications have not yet been reviewed for this encounter**         ALLERGIES  Allergies   Allergen Reactions    Ceclor [Cefaclor] Anaphylaxis    Cephalosporins Anaphylaxis    Pcn [Penicillins] Anaphylaxis       PHYSICAL EXAM  VITAL SIGNS: /87   Pulse 98   Temp 37 °C (98.6 °F) (Temporal)   Resp 19   Ht 1.905 m (6' 3\")   Wt (!) 132 kg (290 lb)   SpO2 94%   BMI 36.25 kg/m²      Constitutional: Alert in no apparent distress.  HENT: Normocephalic, Bilateral external ears normal. Nose normal.   Eyes: Pupils are equal and reactive. Conjunctiva normal, non-icteric.   Thorax & Lungs: Easy unlabored respirations, clear to auscultation throughout  Abdomen:  No gross signs of peritonitis, no pain with movement   Skin: Visualized skin is  Dry, No erythema, No rash.   Extremities:   No edema, No asymmetry  Neurologic: Alert, Grossly non-focal.   Psychiatric: No suicidal or homicidal ideations.         DIAGNOSTIC STUDIES / PROCEDURES      COURSE & MEDICAL DECISION MAKING    ED Observation Status? No; Patient does not meet criteria for ED Observation.     INITIAL ASSESSMENT, COURSE AND PLAN  Care Narrative: Patient presents to the emergency department for refill of his Klonopin.  Patient was suppose to see his doctor at [facility] today but thinks he missed his appointment.  However he tells me that his appointment was at 1045.  That is in 1 hour.  He was given Versed in transport by ambulance from his group home.  He is much more calm.  My plan is to give him a Klonopin and then he will be discharged.  He states he can get a ride to [facility] to see his doctor.  I think this is the most reasonable approach as he may need some medication adjustments.  Patient is not suicidal or homicidal.  He appears to be able to take care of himself.  I do not feel he meets criteria for legal hold.  He has no current medical complaints.  There is no signs of benzodiazepine withdrawal.  Vital " signs are stable.  He is afebrile.  Patient understands his follow-up plan.        DISPOSITION AND DISCUSSIONS    Do feel patient is stable for discharge and transfer to [facility] for medication refill and evaluation.  He is not suicidal or homicidal.  He appears to be able to take care of himself.  He is having occasional hallucinations but I do not feel these are affecting his judgment or ability to care for himself.  He does live in a group home.  He has stable vital signs.  Overall he appears well and stable for discharge.    Barriers to care at this time, including but not limited to:  mental health .       FINAL DIAGNOSIS  1. Panic attack           Electronically signed by: Maria D Camp M.D., 5/1/2023 9:37 AM

## 2023-05-01 NOTE — ED TRIAGE NOTES
"Chief Complaint   Patient presents with    Anxiety     Pt states had extreme anxiety this am    Hallucinations     Auditory hallucinations, denied SI/HI     Pt BIB [facility] for c/o anxiety and increased auditory hallucinations this am.  Pt denies SI/HI.  Pt missed appointment w/ Dr. Chan this am r/t anxiety and is requesting six Klonopin \"until I can see him tomorrow.\"  "

## 2023-05-01 NOTE — DISCHARGE INSTRUCTIONS
Go directly to [facility] so that you can get your medications refilled.

## 2023-05-03 ENCOUNTER — HOSPITAL ENCOUNTER (EMERGENCY)
Facility: MEDICAL CENTER | Age: 43
End: 2023-05-04
Attending: EMERGENCY MEDICINE
Payer: MEDICAID

## 2023-05-03 DIAGNOSIS — R41.82 ALTERED MENTAL STATUS, UNSPECIFIED ALTERED MENTAL STATUS TYPE: ICD-10-CM

## 2023-05-03 DIAGNOSIS — Z86.39 HISTORY OF DIABETES MELLITUS: ICD-10-CM

## 2023-05-03 DIAGNOSIS — R45.1 AGITATION: ICD-10-CM

## 2023-05-03 DIAGNOSIS — F31.9 BIPOLAR 1 DISORDER (HCC): ICD-10-CM

## 2023-05-03 DIAGNOSIS — I10 HYPERTENSION, UNSPECIFIED TYPE: ICD-10-CM

## 2023-05-03 PROCEDURE — 80307 DRUG TEST PRSMV CHEM ANLYZR: CPT

## 2023-05-03 PROCEDURE — A9270 NON-COVERED ITEM OR SERVICE: HCPCS | Mod: UD | Performed by: EMERGENCY MEDICINE

## 2023-05-03 PROCEDURE — 99284 EMERGENCY DEPT VISIT MOD MDM: CPT

## 2023-05-03 PROCEDURE — 700102 HCHG RX REV CODE 250 W/ 637 OVERRIDE(OP): Mod: UD | Performed by: EMERGENCY MEDICINE

## 2023-05-03 RX ORDER — DIPHENHYDRAMINE HCL 25 MG
50 TABLET ORAL ONCE
Status: COMPLETED | OUTPATIENT
Start: 2023-05-03 | End: 2023-05-03

## 2023-05-03 RX ORDER — LORAZEPAM 2 MG/1
2 TABLET ORAL ONCE
Status: COMPLETED | OUTPATIENT
Start: 2023-05-03 | End: 2023-05-03

## 2023-05-03 RX ORDER — HALOPERIDOL 5 MG/1
5 TABLET ORAL ONCE
Status: COMPLETED | OUTPATIENT
Start: 2023-05-03 | End: 2023-05-03

## 2023-05-03 RX ADMIN — LORAZEPAM 2 MG: 2 TABLET ORAL at 23:45

## 2023-05-03 RX ADMIN — DIPHENHYDRAMINE HYDROCHLORIDE 50 MG: 25 TABLET ORAL at 23:44

## 2023-05-03 RX ADMIN — HALOPERIDOL 5 MG: 5 TABLET ORAL at 23:45

## 2023-05-03 ASSESSMENT — FIBROSIS 4 INDEX: FIB4 SCORE: 0.59

## 2023-05-04 VITALS
RESPIRATION RATE: 16 BRPM | TEMPERATURE: 99.3 F | HEART RATE: 95 BPM | SYSTOLIC BLOOD PRESSURE: 136 MMHG | HEIGHT: 75 IN | BODY MASS INDEX: 36.06 KG/M2 | WEIGHT: 290 LBS | DIASTOLIC BLOOD PRESSURE: 79 MMHG | OXYGEN SATURATION: 95 %

## 2023-05-04 LAB
AMPHET UR QL SCN: POSITIVE
BARBITURATES UR QL SCN: NEGATIVE
BENZODIAZ UR QL SCN: POSITIVE
BZE UR QL SCN: NEGATIVE
CANNABINOIDS UR QL SCN: NEGATIVE
FENTANYL UR QL: NEGATIVE
METHADONE UR QL SCN: NEGATIVE
OPIATES UR QL SCN: NEGATIVE
OXYCODONE UR QL SCN: NEGATIVE
PCP UR QL SCN: NEGATIVE
POC BREATHALIZER: 0.04 PERCENT (ref 0–0.01)
PROPOXYPH UR QL SCN: NEGATIVE

## 2023-05-04 PROCEDURE — 302970 POC BREATHALIZER: Performed by: EMERGENCY MEDICINE

## 2023-05-04 RX ORDER — ATOMOXETINE 40 MG/1
40 CAPSULE ORAL DAILY
Qty: 30 CAPSULE | Refills: 0 | Status: SHIPPED | OUTPATIENT
Start: 2023-05-04 | End: 2023-08-10

## 2023-05-04 RX ORDER — EMPAGLIFLOZIN 25 MG/1
25 TABLET, FILM COATED ORAL DAILY
Qty: 30 TABLET | Refills: 0 | Status: SHIPPED | OUTPATIENT
Start: 2023-05-04 | End: 2023-08-10

## 2023-05-04 RX ORDER — BUPROPION HYDROCHLORIDE 200 MG/1
200 TABLET, EXTENDED RELEASE ORAL DAILY
Qty: 60 TABLET | Refills: 0 | Status: SHIPPED | OUTPATIENT
Start: 2023-05-04 | End: 2023-08-10

## 2023-05-04 RX ORDER — LISINOPRIL 20 MG/1
20 TABLET ORAL DAILY
Qty: 30 TABLET | Refills: 0 | Status: SHIPPED | OUTPATIENT
Start: 2023-05-04 | End: 2023-08-10

## 2023-05-04 RX ORDER — OLANZAPINE 15 MG/1
15 TABLET ORAL NIGHTLY
Qty: 30 TABLET | Refills: 0 | Status: SHIPPED | OUTPATIENT
Start: 2023-05-04 | End: 2023-08-10

## 2023-05-04 RX ORDER — HYDROXYZINE PAMOATE 50 MG/1
50 CAPSULE ORAL 3 TIMES DAILY PRN
Qty: 90 CAPSULE | Refills: 0 | Status: SHIPPED | OUTPATIENT
Start: 2023-05-04 | End: 2023-08-10

## 2023-05-04 NOTE — DISCHARGE SUMMARY
"  ED Observation Discharge Summary    Patient:Brannon Sharma  Patient : 1980  Patient MRN: [id number]  Patient PCP: Gregory Hardin M.D.    Admit Date: 5/3/2023  Discharge Date and Time: 23 4:40 AM  Discharge Diagnosis:   1. Altered mental status, unspecified altered mental status type Active   2. Agitation Active     Discharge Attending: Avtar Argueta M.D.  Discharge Service: ED Observation    ED Course  Brannon is a 42 y.o. male who was evaluated at [facility] for bizarre behavior in the setting of recent methamphetamine use.  Patient with a longstanding history of schizophrenia as well.  Patient was evaluated for delusions and psychosis, he continues to deny any suicidal homicidal ideation  Patient on reassessment is no longer delusional.  He has no other complaints.  Patient offered meds to beds for his home medications, he does not want to wait for these.  We will send his medications to his pharmacy.    Discharge Exam:  /68   Pulse 100   Temp 36.3 °C (97.3 °F) (Temporal)   Resp 18   Ht 1.905 m (6' 3\")   Wt (!) 132 kg (290 lb)   SpO2 93%   BMI 36.25 kg/m² .    Constitutional: Awake and alert. Nontoxic  HENT:  Grossly normal  Eyes: Grossly normal  Neck: Normal range of motion  Cardiovascular: Normal heart rate   Thorax & Lungs: No respiratory distress  Abdomen: Nontender  Skin:  No pathologic rash.   Extremities: Well perfused  Psychiatric: Affect normal    Labs  Results for orders placed or performed during the hospital encounter of 23   URINE DRUG SCREEN   Result Value Ref Range    Amphetamines Urine Positive (A) Negative    Barbiturates Negative Negative    Benzodiazepines Positive (A) Negative    Cocaine Metabolite Negative Negative    Fentanyl, Urine Negative Negative    Methadone Negative Negative    Opiates Negative Negative    Oxycodone Negative Negative    Phencyclidine -Pcp Negative Negative    Propoxyphene Negative Negative    Cannabinoid Metab Negative Negative "       Radiology  No orders to display       Medications:   New Prescriptions    No medications on file       My final assessment includes   1. Altered mental status, unspecified altered mental status type Active   2. Agitation Active   3. Hypertension, unspecified type    4. Bipolar 1 disorder (HCC)    5. History of diabetes mellitus        Upon Reevaluation, the patient's condition has: Improved; and will be discharged.    Patient discharged from ED Observation status at 9AM(Time) 05/04/23  (Date).     Total time spent on this ED Observation discharge encounter is > 30 Minutes    Electronically signed by: Avtar Argueta M.D., 5/4/2023 4:40 AM

## 2023-05-04 NOTE — ED NOTES
Pt calm and cooperative.  Pt requesting to talk with ERP, ERP informed.  Breakfast tray provided.

## 2023-05-04 NOTE — ED TRIAGE NOTES
"Chief Complaint   Patient presents with    ALOC     Pt BIB EMS. Pt was kicked out of [facility] for substance/ETOH use and aggressive behavior. On arrival to ED pt cooperative and calm, told triage staff he was hearing voices but denies SI/HI. While waiting in the lobby to be triaged, pt became aggressive and yelling. Pt walked to [place] [id number] with security. Pt continually yelling numbers and when staff attempts to connect monitoring equipment pt swings arms and yells. Pt not cooperative with questioning at this time.     Hx of paranoid schizophrenia and meth use, not compliant with medications per EMS.    FSBG 107.     /86   Pulse (!) 120   Temp 36.4 °C (97.5 °F) (Temporal)   Resp 14   Ht 1.905 m (6' 3\")   Wt (!) 132 kg (290 lb)   SpO2 95%   BMI 36.25 kg/m²     "

## 2023-05-04 NOTE — ED NOTES
Bedside report rec'd from BHAVANA [place].  RA.  Pt resting with eye closed, even rise and fall of chest.    Call light in reach.

## 2023-05-04 NOTE — ED NOTES
Brought in from triage waiting room, escorted by security, ambulatory with steady gait, alert but is aggressive and agitated, refused to cooperate taking vital signs    Verified patient identification.  Assumed patient care. Placed on patient room. Bed on lowest position, side rails up, breaks locked. Will continue to monitor for any complications   Pt refused to changed clothes with gown

## 2023-05-04 NOTE — ED NOTES
Checked on bed, connected to monitor,  with unlabored respirations. Vital signs is stable. Sleeping. [place] in low position, side rail up for pt safety. Call light within reach. Will continue to monitor for any complications.

## 2023-05-04 NOTE — ED NOTES
Checked on bed,  with unlabored respirations. Sleeping. [place] in low position, side rail up for pt safety. Will continue to monitor for any complications.

## 2023-05-04 NOTE — ED PROVIDER NOTES
ED Provider Note    CHIEF COMPLAINT  Chief Complaint   Patient presents with    ALOC       EXTERNAL RECORDS REVIEWED  Inpatient Notes   and External ED Note      HPI/ROS  LIMITATION TO HISTORY   Select: Altered mental status / Confusion    OUTSIDE HISTORIAN(S):      Brannon Sharma is a 42 y.o. male who presents to the emergency department with chief complaint of altered mental status and agitation.  Patient with previous history of schizophrenia he reports the voices in his head, carlos a, it has been speaking to him more often and has been telling him numbers that he needs to transmit.  Patient is constantly rambling different numbers.  He denies suicidal ideation denies homicidal ideation.  He reports concerns that we are going to hurt him.  He denies any homicidal or suicidal ideation.  He does report history of methamphetamine but claims that his last use was 90 days prior.  Further history of present illness limited by acute altered mental status and agitation.    PAST MEDICAL HISTORY   has a past medical history of Alcohol-induced psychosis (HCC), Anxiety, Depression, Paranoid schizophrenia (HCC), Psychiatric disorder, Schizophrenia, paranoid (HCC), and Suicidal ideations.    SURGICAL HISTORY   has a past surgical history that includes other and nasal septal reconstruction.    FAMILY HISTORY  Family History   Family history unknown: Yes       SOCIAL HISTORY  Social History     Tobacco Use    Smoking status: Every Day     Packs/day: 1.00     Types: Cigarettes    Smokeless tobacco: Never   Vaping Use    Vaping Use: Every day    Substances: Nicotine    Devices: Pre-filled or refillable cartridge   Substance and Sexual Activity    Alcohol use: Yes     Alcohol/week: 9.6 oz     Types: 16 Cans of beer per week    Drug use: Yes     Types: Inhaled, Marijuana     Comment: Meth daily    Sexual activity: Yes     Partners: Female       CURRENT MEDICATIONS  Home Medications    **Home medications have not yet been  "reviewed for this encounter**         ALLERGIES  Allergies   Allergen Reactions    Ceclor [Cefaclor] Anaphylaxis    Cephalosporins Anaphylaxis    Pcn [Penicillins] Anaphylaxis       PHYSICAL EXAM  VITAL SIGNS: /86   Pulse (!) 120   Temp 36.4 °C (97.5 °F) (Temporal)   Resp 14   Ht 1.905 m (6' 3\")   Wt (!) 132 kg (290 lb)   SpO2 95%   BMI 36.25 kg/m²    Pulse ox interpretation: I interpret this pulse ox as normal.  Constitutional: Alert and oriented x 3, minimal distress  HEENT: Atraumatic normocephalic, pupils are equal round reactive to light extraocular movements are intact. The nares is clear, external ears are normal, mouth shows moist mucous membranes normal dentition for age  Neck: Supple, no JVD no tracheal deviation  Cardiovascular: Tachycardic no murmur rub or gallop 2+ pulses peripherally x4  Thorax & Lungs: No respiratory distress, no wheezes rales or rhonchi, No chest tenderness.   GI: Soft nontender nondistended positive bowel sounds, no peritoneal signs  Skin: Warm dry no acute rash or lesion  Musculoskeletal: Moving all extremities with full range and 5 of 5 strength no acute  deformity  Neurologic: Cranial nerves III through XII are grossly intact no sensory deficit no cerebellar dysfunction   Psychiatric: Intermittently agitated responding to internal stimuli        DIAGNOSTIC STUDIES / PROCEDURES      Results for orders placed or performed during the hospital encounter of 05/03/23   URINE DRUG SCREEN   Result Value Ref Range    Fentanyl, Urine Negative Negative               COURSE & MEDICAL DECISION MAKING    ED Observation Status? Yes; I am placing the patient in to an observation status due to a diagnostic uncertainty as well as therapeutic intensity. Patient placed in observation status at 1:01 AM, 5/4/2023.     Observation plan is as follows: Patient has recurrent visits here and at neighboring emergency departments for acute hallucinations.  Patient appears to usually respond well " to therapy.  Also suspect of benzodiazepine withdrawal previously.  Patient is given 2 mg of Ativan and 50 mg of Benadryl and 5 mg of Haldol orally upon arrival.  Patient will be observed for improvement.  Slightly tachycardic at arrival but will observe for improvement prior to further invasive evaluation.  Should patient improve with therapy likely appropriate for outpatient management should he have any worsening agitation or other decompensation we will further consider inpatient management.    3:59 AM  Patient continues to be very somnolent after calming medications that were provided.  Patient will require ongoing evaluation and treatment for serial examinations and possibility of evaluation of psychiatric care if he is still having acute altered mental status/acute hallucinations upon awakening.  I discussed this up to this point with oncoming ER practitioner and patient's transferred in guarded condition.    INITIAL ASSESSMENT, COURSE AND PLAN    Care Narrative: Patient with history of previous amphetamine use as well as previous schizophrenia presents with acute altered mental status and psychosis.  Patient is worried that people are going to hurt him but never voiced any suicidal ideation any homicidal ideation therefore legal hold was not completed.  He was given calming medications of Haldol Ativan and Benadryl orally.  He slept very comfortably after the administration of these.  Patient remains very somnolent for these he will require ongoing evaluation therefore he is maintained on ED observation status for reexamination once clinically appropriate should he have have ongoing psychosis agitation any other acute concerns behavioral health can get involved at that point otherwise vital signs are stable he is transferred oncoming ER physician in guarded condition.        ADDITIONAL PROBLEM LIST    DISPOSITION AND DISCUSSIONS    I have discussed management of the patient with the following physicians and  "TANNER's:  Carmen LEIVA     Discussion of management with other QHP or appropriate source(s):      Escalation of care considered, and ultimately not performed:blood analysis and diagnostic imaging    Barriers to care at this time, including but not limited to: Patient does not have established PCP and Patient is homeless.     Decision tools and prescription drugs considered including, but not limited to: .  /62   Pulse (!) 103   Temp 36.3 °C (97.3 °F) (Temporal)   Resp 19   Ht 1.905 m (6' 3\")   Wt (!) 132 kg (290 lb)   SpO2 90%   BMI 36.25 kg/m²           FINAL DIAGNOSIS  1. Altered mental status, unspecified altered mental status type Active   2. Agitation Active           "

## 2023-05-04 NOTE — ED NOTES
Pt is awake, alert and oriented, calm  Assisted pt to the restroom  Pt asked for foods  Give PO fluids and snacks

## 2023-05-04 NOTE — ED NOTES
Notified to the provider that the pt is already awake and requesting to speak to an ERP so that he can be helped

## 2023-05-04 NOTE — ED NOTES
Bedside report given to the next shift RN Sarah for continuity of care and management.  Provided opportunity to asks questions.  Pt connected to monitor  All pt belongings at bedside

## 2023-05-16 ENCOUNTER — HOSPITAL ENCOUNTER (EMERGENCY)
Facility: MEDICAL CENTER | Age: 43
End: 2023-05-16
Attending: EMERGENCY MEDICINE
Payer: MEDICAID

## 2023-05-16 ENCOUNTER — HOSPITAL ENCOUNTER (EMERGENCY)
Facility: MEDICAL CENTER | Age: 43
End: 2023-05-17
Attending: EMERGENCY MEDICINE
Payer: MEDICAID

## 2023-05-16 VITALS
BODY MASS INDEX: 36.06 KG/M2 | TEMPERATURE: 97.3 F | HEART RATE: 98 BPM | OXYGEN SATURATION: 96 % | RESPIRATION RATE: 17 BRPM | HEIGHT: 75 IN | WEIGHT: 290 LBS | DIASTOLIC BLOOD PRESSURE: 74 MMHG | SYSTOLIC BLOOD PRESSURE: 144 MMHG

## 2023-05-16 DIAGNOSIS — F20.9 SCHIZOPHRENIA, UNSPECIFIED TYPE (HCC): ICD-10-CM

## 2023-05-16 DIAGNOSIS — F41.9 ANXIETY: ICD-10-CM

## 2023-05-16 DIAGNOSIS — F10.10 ALCOHOL ABUSE: ICD-10-CM

## 2023-05-16 DIAGNOSIS — R45.851 SUICIDAL THOUGHTS: ICD-10-CM

## 2023-05-16 DIAGNOSIS — F10.920 ALCOHOLIC INTOXICATION WITHOUT COMPLICATION (HCC): ICD-10-CM

## 2023-05-16 LAB
POC BREATHALIZER: 0.15 PERCENT (ref 0–0.01)
POC BREATHALIZER: 0.19 PERCENT (ref 0–0.01)

## 2023-05-16 PROCEDURE — 99285 EMERGENCY DEPT VISIT HI MDM: CPT

## 2023-05-16 PROCEDURE — 700102 HCHG RX REV CODE 250 W/ 637 OVERRIDE(OP): Mod: UD | Performed by: EMERGENCY MEDICINE

## 2023-05-16 PROCEDURE — 302970 POC BREATHALIZER: Performed by: EMERGENCY MEDICINE

## 2023-05-16 PROCEDURE — 80307 DRUG TEST PRSMV CHEM ANLYZR: CPT

## 2023-05-16 PROCEDURE — A9270 NON-COVERED ITEM OR SERVICE: HCPCS | Mod: UD | Performed by: EMERGENCY MEDICINE

## 2023-05-16 RX ORDER — OLANZAPINE 5 MG/1
10 TABLET ORAL ONCE
Status: COMPLETED | OUTPATIENT
Start: 2023-05-16 | End: 2023-05-16

## 2023-05-16 RX ADMIN — OLANZAPINE 10 MG: 5 TABLET, FILM COATED ORAL at 20:45

## 2023-05-16 ASSESSMENT — FIBROSIS 4 INDEX
FIB4 SCORE: 0.59
FIB4 SCORE: 0.59

## 2023-05-17 VITALS
HEIGHT: 75 IN | OXYGEN SATURATION: 98 % | WEIGHT: 290 LBS | RESPIRATION RATE: 18 BRPM | HEART RATE: 87 BPM | SYSTOLIC BLOOD PRESSURE: 132 MMHG | DIASTOLIC BLOOD PRESSURE: 66 MMHG | BODY MASS INDEX: 36.06 KG/M2 | TEMPERATURE: 98.4 F

## 2023-05-17 LAB
AMPHET UR QL SCN: POSITIVE
BARBITURATES UR QL SCN: NEGATIVE
BENZODIAZ UR QL SCN: NEGATIVE
BZE UR QL SCN: NEGATIVE
CANNABINOIDS UR QL SCN: NEGATIVE
FENTANYL UR QL: NEGATIVE
METHADONE UR QL SCN: NEGATIVE
OPIATES UR QL SCN: NEGATIVE
OXYCODONE UR QL SCN: NEGATIVE
PCP UR QL SCN: NEGATIVE
PROPOXYPH UR QL SCN: NEGATIVE

## 2023-05-17 PROCEDURE — 90791 PSYCH DIAGNOSTIC EVALUATION: CPT

## 2023-05-17 ASSESSMENT — LIFESTYLE VARIABLES
CONSUMPTION TOTAL: INCOMPLETE
HAVE YOU EVER FELT YOU SHOULD CUT DOWN ON YOUR DRINKING: NO
EVER HAD A DRINK FIRST THING IN THE MORNING TO STEADY YOUR NERVES TO GET RID OF A HANGOVER: NO
EVER FELT BAD OR GUILTY ABOUT YOUR DRINKING: NO
HAVE PEOPLE ANNOYED YOU BY CRITICIZING YOUR DRINKING: NO
TOTAL SCORE: 0
DO YOU DRINK ALCOHOL: YES

## 2023-05-17 NOTE — ED NOTES
Pt resting comfortably, respirations are regular and unlabored. 1:1 sitter remains at bedside in direct view of pt for close observation

## 2023-05-17 NOTE — DISCHARGE INSTRUCTIONS
Go to [facility] today for further follow-up and prescription medication management   How Severe Is Your Skin Lesion?: mild Have Your Skin Lesions Been Treated?: not been treated Is This A New Presentation, Or A Follow-Up?: Growth

## 2023-05-17 NOTE — DISCHARGE SUMMARY
"  ED Observation Discharge Summary    Patient:Brannon Sharma  Patient : 1980  Patient MRN: [id number]  Patient PCP: Gregory Hardin M.D.    Admit Date: 2023  Discharge Date and Time: 23 5:55 AM  Discharge Diagnosis: Situational depression, substance abuse  Discharge Attending: Javed Muniz M.D.  Discharge Service: ED Observation    ED Course  Brannon is a 42 y.o. male who was evaluated at [facility] patient presented twice under my colleague's care yesterday.  Patient was signed out to me with ongoing need for sobriety and further behavioral health service consultation.  At this point the patient has improved from a sobriety standpoint.  With further consultation again from behavioral health the patient is now medically cleared.  No suicidal homicidal ideations.  We will follow-up with [facility] for routine medication review, adjustments and injections as currently scheduled.    Discharge Exam:  BP (!) 145/78   Pulse 98   Temp 36.9 °C (98.4 °F)   Resp 18   Ht 1.905 m (6' 3\")   Wt (!) 132 kg (290 lb)   SpO2 98%   BMI 36.25 kg/m² .    Constitutional: Awake and alert. Nontoxic  HENT:  Grossly normal  Eyes: Grossly normal  Neck: Normal range of motion  Cardiovascular: Normal heart rate   Thorax & Lungs: No respiratory distress  Abdomen: Nontender  Skin:  No pathologic rash.   Extremities: Well perfused  Psychiatric: Affect normal    Labs  Results for orders placed or performed during the hospital encounter of 23   Urine Drug Screen   Result Value Ref Range    Amphetamines Urine Positive (A) Negative    Barbiturates Negative Negative    Benzodiazepines Negative Negative    Cocaine Metabolite Negative Negative    Fentanyl, Urine Negative Negative    Methadone Negative Negative    Opiates Negative Negative    Oxycodone Negative Negative    Phencyclidine -Pcp Negative Negative    Propoxyphene Negative Negative    Cannabinoid Metab Negative Negative   POC BREATHALIZER   Result Value Ref " Range    POC Breathalizer 0.190 (A) 0.00 - 0.01 Percent       Radiology  No orders to display       Medications:   New Prescriptions    No medications on file       My final assessment includes alcohol abuse and paranoid schizophrenia decompensation  Upon Reevaluation, the patient's condition has: Improved; and will be discharged.    Patient discharged from ED Observation status at 0600 (Time) 5/17/2023 (Date).     Total time spent on this ED Observation discharge encounter is < 30 Minutes    Electronically signed by: Javed Muniz M.D., 5/17/2023 5:55 AM

## 2023-05-17 NOTE — ED NOTES
Bedside report received from previous shift. BHAVANA Randolph.  Assumed patient care. Verified patient identification. Checked on bed, sleeping with unlabored respirations. Denied any new complaints. Gurney in low position, side rail up for pt safety. With sitter @ bedside.  Waiting for Alert team to evaluate.  Will continue to monitor for any complications.
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
## 2023-05-17 NOTE — ED NOTES
Pt left and refused to stay  Convinced to stay, explained the risk of going home against medical advise  Notified to the provider, Dr Anusha JOSEPH Charge RN awared

## 2023-05-17 NOTE — ED TRIAGE NOTES
"Chief Complaint   Patient presents with    Suicidal Ideation     X few hours, my states he would OD on his medications if he had them with him.        41 yo M to triage for above complaint. Pt was seen here earlier this evening for ETOH intoxication and left AMA. Pt states he left because he was \"scared\". Pt also reports being off his psych meds x 6 days. Denies HI.     Charge RN notified. Pt to 37H with RN. SI protocol ordered. Pt changed into gown. Urine sample collected.     BP (!) 160/108   Pulse 99   Temp 36.5 °C (97.7 °F) (Temporal)   Resp 16   Ht 1.905 m (6' 3\")   Wt (!) 132 kg (290 lb)   SpO2 100%   BMI 36.25 kg/m²     "

## 2023-05-17 NOTE — ED NOTES
Discharge paper given to patient; verbalized understanding on d/c instructions; left ambulatory;food given to patient before leaving as requested

## 2023-05-17 NOTE — ED NOTES
Bedside report received from Nikki BATEMAN, patient asleep; with even respirations noted; sitter outside his room for 1:1 observation til alert team evaluates him once he is sober up

## 2023-05-17 NOTE — DISCHARGE PLANNING
Alert Team:    Awaiting clinical sobriety to initiate ED Consult; Alert Team will continue to monitor.
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
## 2023-05-17 NOTE — ED PROVIDER NOTES
ER Provider Note    Scribed for Jose Tavares Ii, M.d. by Avtar Nieves. 5/16/2023  11:03 PM    Primary Care Provider: Gregory Hardin M.D.    CHIEF COMPLAINT  Chief Complaint   Patient presents with    Suicidal Ideation     X few hours, my states he would OD on his medications if he had them with him.      EXTERNAL RECORDS REVIEWED  Other I saw this patient earlier today in this ED. He eloped after he was given his Zyprexa.     HPI/ROS  LIMITATION TO HISTORY   Behavior - Patient is not participating in interview.  OUTSIDE HISTORIAN(S):  None    Brannon Sharma is a 42 y.o. male who presents to the ED complaining of suicidal ideation which onset over the past few hours. He eloped from this ED a few hours ago. Upon exam during this encounter, he does not participate in the interview, and purposefully turns away from me in the hallway bed while attempting to ask him questions.     PAST MEDICAL HISTORY  Past Medical History:   Diagnosis Date    Alcohol-induced psychosis (HCC)     Anxiety     Depression     Paranoid schizophrenia (HCC)     Psychiatric disorder     panic attack    Schizophrenia, paranoid (HCC)     Suicidal ideations      SURGICAL HISTORY  Past Surgical History:   Procedure Laterality Date    NASAL SEPTAL RECONSTRUCTION      OTHER      deviated septum     FAMILY HISTORY  Family History   Family history unknown: Yes     SOCIAL HISTORY   reports that he has been smoking cigarettes. He has been smoking an average of 1 pack per day. He has never used smokeless tobacco. He reports current alcohol use of about 9.6 oz of alcohol per week. He reports current drug use. Drugs: Inhaled and Marijuana.    CURRENT MEDICATIONS  Previous Medications    ATOMOXETINE (STRATTERA) 40 MG CAPSULE    Take 1 Capsule by mouth every day.    BUPROPION (WELLBUTRIN SR) 200 MG SR TABLET    Take 1 Tablet by mouth every day.    EMPAGLIFLOZIN (JARDIANCE) 25 MG TAB    Take 25 mg by mouth every day.    HYDROXYZINE PAMOATE  "(VISTARIL) 50 MG CAP    Take 1 Capsule by mouth 3 times a day as needed for Anxiety.    LISINOPRIL (PRINIVIL) 20 MG TAB    Take 1 Tablet by mouth every day.    METFORMIN (GLUCOPHAGE) 500 MG TAB    Take 1,000 mg by mouth 2 times a day.    OLANZAPINE (ZYPREXA) 15 MG TABLET    Take 1 Tablet by mouth every evening.    SEMAGLUTIDE,0.25 OR 0.5MG/DOS, (OZEMPIC, 0.25 OR 0.5 MG/DOSE,) 2 MG/1.5ML SOLUTION PEN-INJECTOR    Inject 0.5 mg under the skin every 7 days.    TRAZODONE (DESYREL) 150 MG TAB    Take 150 mg by mouth at bedtime.     ALLERGIES  Ceclor [cefaclor], Cephalosporins, and Pcn [penicillins]    PHYSICAL EXAM  BP (!) 160/108   Pulse 99   Temp 36.5 °C (97.7 °F) (Temporal)   Resp 16   Ht 1.905 m (6' 3\")   Wt (!) 132 kg (290 lb)   SpO2 100%   BMI 36.25 kg/m²   Physical Exam  Vitals and nursing note reviewed.   Constitutional:       Comments: Sleeping in hallway bed   HENT:      Head: Normocephalic and atraumatic.      Nose: Nose normal.      Mouth/Throat:      Mouth: Mucous membranes are moist.   Eyes:      Extraocular Movements: Extraocular movements intact.      Pupils: Pupils are equal, round, and reactive to light.   Cardiovascular:      Rate and Rhythm: Normal rate and regular rhythm.   Pulmonary:      Effort: Pulmonary effort is normal.      Breath sounds: Normal breath sounds.   Abdominal:      General: There is no distension.      Tenderness: There is no abdominal tenderness.   Neurological:      Comments: Sleeping, when trying to wake up he rolls over and his avoidant. Prior to my exam was walking around.    Psychiatric:      Comments: Flat affect     DIAGNOSTIC STUDIES      Urine drug screen positive for amphetamines.  Breathalyzer 0.19    COURSE & MEDICAL DECISION MAKING     ED Observation Status? Yes; I am placing the patient in to an observation status due to a diagnostic uncertainty as well as therapeutic intensity. Patient placed in observation status at 00:00    Observation plan is as follows: " "Monitor for sobriety, behavioral health evaluation  INITIAL ASSESSMENT, COURSE AND PLAN  Care Narrative:   11:05 PM  This is an emergent evaluation of a 42 year old man who eloped from this ED earlier tonight who complains of suicidal ideation,  but does not participate in my evaluation interview at this time.  He is intoxicated.  He eloped earlier after he was treated with Zyprexa.  His nurse informed me that he was found in the unit bathroom \"shotgunning\" a beer prior to my evaluation.     Plan to continue monitoring until he has reached sobriety and he will be evaluated by behavioral health.  I am not starting an involuntary hold at this time because he is intoxicated and he has not mentioned being suicidal to me.  My colleague, Dr. Muniz will assume care in reevaluate once he is sober and been evaluated by behavioral health.    DISPOSITION AND DISCUSSIONS  I have discussed management of the patient with the following physicians and TANNER's:      Discussion of management with other [facility] or appropriate source(s): Behavioral Health [facility] behavioral health nurse        FINAL DIAGNOSIS  1. Alcoholic intoxication without complication (HCC)    2. Suicidal thoughts    3. Schizophrenia, unspecified type (HCC)       Avtar CABA (Paul), am scribing for, and in the presence of, CHELA Becerra II.    Electronically signed by: Avtar Nieves (Paul), 5/16/2023    Jose CABA II, M* personally performed the services described in this documentation, as scribed by Avtar Nieves in my presence, and it is both accurate and complete.    The note accurately reflects work and decisions made by me.  Jose Tavares II, M.D.  5/17/2023  4:16 AM  "

## 2023-05-17 NOTE — ED TRIAGE NOTES
Chief Complaint   Patient presents with    Psych Eval    Alcohol Intoxication     Brought by EMS, taken in front of [facility] in [facility]  Pt is alert but is having auditory hallucination     Pt is saying I need to see a doctor, I am anxious and I need help  No medications given enroute    Vitals:    05/16/23 1921   BP: (!) 152/84   Pulse: (!) 101   Resp: 18   Temp: 36.8 °C (98.2 °F)   SpO2: 95%

## 2023-05-17 NOTE — ED PROVIDER NOTES
ER Provider Note    Scribed for Jose Tavares Ii, M.d. by Avtar Nieves. 5/16/2023  8:28 PM    Primary Care Provider: Gregory Hardin M.D.    CHIEF COMPLAINT  Chief Complaint   Patient presents with    Psych Eval    Alcohol Intoxication     Brought by EMS, taken in front of [facility] in [facility]  Pt is alert but is having auditory hallucination     EXTERNAL RECORDS REVIEWED  Other None pertinent to today's visit.     HPI/ROS  LIMITATION TO HISTORY   Select: : None  OUTSIDE HISTORIAN(S):  none    Brannon Sharma is a 42 y.o. male who presents to the ED complaining of having a panic attack which onset prior to arrival. He states he takes Klonopin, Geodon, Seroquel, and Invega, but says he hasn't had hs medications since he was in a psych hospital 4-5 days ago. He says that he feels the need to be medically evaluated, and EMS reported that he was having auditory hallucinations, prompting them to pick him up and bring him in for evaluation.    PAST MEDICAL HISTORY  Past Medical History:   Diagnosis Date    Alcohol-induced psychosis (HCC)     Anxiety     Depression     Paranoid schizophrenia (HCC)     Psychiatric disorder     panic attack    Schizophrenia, paranoid (HCC)     Suicidal ideations      SURGICAL HISTORY  Past Surgical History:   Procedure Laterality Date    NASAL SEPTAL RECONSTRUCTION      OTHER      deviated septum     FAMILY HISTORY  Family History   Family history unknown: Yes     SOCIAL HISTORY   reports that he has been smoking cigarettes. He has been smoking an average of 1 pack per day. He has never used smokeless tobacco. He reports current alcohol use of about 9.6 oz of alcohol per week. He reports current drug use. Drugs: Inhaled and Marijuana.    CURRENT MEDICATIONS  Discharge Medication List as of 5/16/2023  9:00 PM        CONTINUE these medications which have NOT CHANGED    Details   atomoxetine (STRATTERA) 40 MG capsule Take 1 Capsule by mouth every day., Disp-30 Capsule, R-0,  "Normal      buPROPion (WELLBUTRIN SR) 200 MG SR tablet Take 1 Tablet by mouth every day., Disp-60 Tablet, R-0, Normal      hydrOXYzine pamoate (VISTARIL) 50 MG Cap Take 1 Capsule by mouth 3 times a day as needed for Anxiety., Disp-90 Capsule, R-0, Normal      Empagliflozin (JARDIANCE) 25 MG Tab Take 25 mg by mouth every day., Disp-30 Tablet, R-0, Normal      lisinopril (PRINIVIL) 20 MG Tab Take 1 Tablet by mouth every day., Disp-30 Tablet, R-0, Normal      olanzapine (ZYPREXA) 15 MG tablet Take 1 Tablet by mouth every evening., Disp-30 Tablet, R-0, Normal      Semaglutide,0.25 or 0.5MG/DOS, (OZEMPIC, 0.25 OR 0.5 MG/DOSE,) 2 MG/1.5ML Solution Pen-injector Inject 0.5 mg under the skin every 7 days., Historical Med      metFORMIN (GLUCOPHAGE) 500 MG Tab Take 1,000 mg by mouth 2 times a day., Historical Med      traZODone (DESYREL) 150 MG Tab Take 150 mg by mouth at bedtime., Historical Med           ALLERGIES  Ceclor [cefaclor], Cephalosporins, and Pcn [penicillins]    PHYSICAL EXAM  BP (!) 152/84   Pulse (!) 101   Temp 36.8 °C (98.2 °F) (Temporal)   Resp 18   Ht 1.905 m (6' 3\")   Wt (!) 132 kg (290 lb)   SpO2 95%   BMI 36.25 kg/m²   Physical Exam  Vitals and nursing note reviewed.   Constitutional:       Comments: 42-year-old man, sitting on the ED hallway bed smelling his own shoe   HENT:      Head: Normocephalic and atraumatic.      Mouth/Throat:      Mouth: Mucous membranes are moist.   Eyes:      Pupils: Pupils are equal, round, and reactive to light.   Cardiovascular:      Rate and Rhythm: Normal rate and regular rhythm.   Pulmonary:      Effort: Pulmonary effort is normal.   Abdominal:      Tenderness: There is no abdominal tenderness.   Musculoskeletal:         General: Normal range of motion.   Neurological:      General: No focal deficit present.   Psychiatric:      Comments: Paranoid delusions, calm         DIAGNOSTIC STUDIES    Labs:  Results for orders placed or performed during the hospital " encounter of 05/16/23   POC BREATHALIZER   Result Value Ref Range    POC Breathalizer 0.150 (A) 0.00 - 0.01 Percent     COURSE & MEDICAL DECISION MAKING     ED Observation Status? No; Patient does not meet criteria for ED Observation.     INITIAL ASSESSMENT, COURSE AND PLAN  Care Narrative:     8:35 PM - Patient seen and examined at bedside.  This is a 42-year-old man who has problems with substance abuse, paranoid schizophrenia now presenting with anxiety.  He is not taking prescribed medications.  He mentions about 5 different psychiatric medications that he has taken in the past.  He has some mild alcohol intoxication right now.  I am agreeable to treat him with previously prescribed Zyprexa to help with his anxiety and paranoia.  Discussed plan of care, including medication use, and observation. Patient agrees to the plan of care. The patient will be medicated with 10 mg of Zyprexa due to intoxication.    8:57 PM - The patient's nurse alerted me that the patient eloped from the Emergency Department shortly after receiving Zyprexa.    ADDITIONAL PROBLEM LIST  #Psych Eval    - Patient eloped prior to being f reevaluated.  My plan was to see if his anxiety and paranoia had improved after Zyprexa.  If so, had anticipated him being discharged.  #ETOH intoxication   - Patient eloped prior to being fully evaluated.    DISPOSITION AND DISCUSSIONS  I have discussed management of the patient with the following physicians and TANNER's:  None    Discussion of management with other [facility] or appropriate source(s): None     Barriers to care at this time, including but not limited to: Patient is homeless, Patient had difficult affording medications, and Patient lacks financial resources.     Decision tools and prescription drugs considered including, but not limited to:  Zyprexa .    The patient eloped from the Emergency Department after receiving his oral Zyprexa.    FINAL DIAGNOSIS  1. Anxiety    2. Alcohol abuse          Avtar CABA  Ezequiel (Scribe), am scribing for, and in the presence of, Jose Tavares Ii, M.d.    Electronically signed by: Avtar Nieves (Paul), 5/16/2023    IJose Ii, M.d. personally performed the services described in this documentation, as scribed by Avtar Nieves in my presence, and it is both accurate and complete.    The note accurately reflects work and decisions made by me.  Jose Tavares II, M.D.  5/17/2023  4:12 AM

## 2023-07-26 ENCOUNTER — HOSPITAL ENCOUNTER (EMERGENCY)
Facility: MEDICAL CENTER | Age: 43
End: 2023-07-26
Attending: EMERGENCY MEDICINE
Payer: MEDICAID

## 2023-07-26 VITALS
RESPIRATION RATE: 20 BRPM | TEMPERATURE: 98.1 F | DIASTOLIC BLOOD PRESSURE: 86 MMHG | HEART RATE: 88 BPM | HEIGHT: 75 IN | OXYGEN SATURATION: 98 % | WEIGHT: 286 LBS | SYSTOLIC BLOOD PRESSURE: 131 MMHG | BODY MASS INDEX: 35.56 KG/M2

## 2023-07-26 DIAGNOSIS — F15.10 METHAMPHETAMINE ABUSE (HCC): ICD-10-CM

## 2023-07-26 PROCEDURE — 700111 HCHG RX REV CODE 636 W/ 250 OVERRIDE (IP): Mod: JZ | Performed by: EMERGENCY MEDICINE

## 2023-07-26 PROCEDURE — 80307 DRUG TEST PRSMV CHEM ANLYZR: CPT

## 2023-07-26 PROCEDURE — 302970 POC BREATHALIZER: Performed by: EMERGENCY MEDICINE

## 2023-07-26 PROCEDURE — 96372 THER/PROPH/DIAG INJ SC/IM: CPT

## 2023-07-26 PROCEDURE — 99284 EMERGENCY DEPT VISIT MOD MDM: CPT

## 2023-07-26 RX ORDER — HALOPERIDOL 5 MG/ML
5 INJECTION INTRAMUSCULAR ONCE
Status: COMPLETED | OUTPATIENT
Start: 2023-07-26 | End: 2023-07-26

## 2023-07-26 RX ORDER — LORAZEPAM 2 MG/ML
2 INJECTION INTRAMUSCULAR ONCE
Status: DISCONTINUED | OUTPATIENT
Start: 2023-07-26 | End: 2023-07-26

## 2023-07-26 RX ORDER — LORAZEPAM 2 MG/ML
2 INJECTION INTRAMUSCULAR ONCE
Status: COMPLETED | OUTPATIENT
Start: 2023-07-26 | End: 2023-07-26

## 2023-07-26 RX ADMIN — LORAZEPAM 2 MG: 2 INJECTION INTRAMUSCULAR; INTRAVENOUS at 11:02

## 2023-07-26 RX ADMIN — HALOPERIDOL LACTATE 5 MG: 5 INJECTION, SOLUTION INTRAMUSCULAR at 11:03

## 2023-07-26 ASSESSMENT — FIBROSIS 4 INDEX: FIB4 SCORE: 1

## 2023-07-26 NOTE — DISCHARGE PLANNING
Anticipated Discharge Disposition: [facility]    Action: Uber set [employer] [facility] [id number] blue?  Eta 9 min  RN aware and will advise pt    Barriers to Discharge: Uber    Plan: None

## 2023-07-26 NOTE — ED NOTES
MD spoke w/ pt  he is wanting to go to [facility]   aware of POC to have pt go there for evaluation  lunch tray given

## 2023-07-26 NOTE — DISCHARGE PLANNING
Anticipated Discharge Disposition: Psych    Action: [facility] . Wants to go to [facility] eval for voluntary detox.  [facility] medicaid [facility]fs[facility] pt will [facility] for eval for voluntary assessment/ medically cleared    Barriers to Discharge: Transportation  to [facility] will need a ride back if they do not keep has [facility] will provide with a flyer    Plan:  Will cont to follow

## 2023-07-26 NOTE — ED NOTES
Pt cleared for d/c  dischg instructions given to pt  verbally understands  d/c'ed using Uber and taken to [facility] per pt's request

## 2023-07-26 NOTE — ED PROVIDER NOTES
ED Provider Note    CHIEF COMPLAINT  Chief Complaint   Patient presents with    Anxiety     Pt BIB EMS c/o anxiety secondary to meth use. Last use was last night.   Pt is hyperventilating and appears very anxiety.   Pt want to go to [facility] for evaluation and treatment.        EXTERNAL RECORDS REVIEWED  Other reviewed a behavioral health assessment the patient had performed on May 17.  The patient was intoxicated that time and also admitted to amphetamine abuse.  He does have a history of schizophrenia supposed to be on risperidone     HPI/ROS  LIMITATION TO HISTORY   Select: Altered mental status / Confusion    Brannon Sharma is a 43 y.o. male who presents extremely agitated and anxious.  The patient states he is having hard time seeing.  He states he has been abusing amphetamines and alcohol and states he would like to get some help for sobriety.  He states he does have underlying schizophrenia and has been noncompliant with his Risperdal.  He does not have any suicidal or homicidal ideation.  He is hyperventilating when I entered the room and sometimes is difficult to keep on task.    PAST MEDICAL HISTORY   has a past medical history of Alcohol-induced psychosis (HCC), Anxiety, Depression, Paranoid schizophrenia (HCC), Psychiatric disorder, Schizophrenia, paranoid (HCC), and Suicidal ideations.    SURGICAL HISTORY   has a past surgical history that includes other and nasal septal reconstruction.    FAMILY HISTORY  Family History   Family history unknown: Yes       SOCIAL HISTORY  Social History     Tobacco Use    Smoking status: Every Day     Packs/day: 1.00     Types: Cigarettes    Smokeless tobacco: Never   Vaping Use    Vaping Use: Every day    Substances: Nicotine    Devices: Pre-filled or refillable cartridge   Substance and Sexual Activity    Alcohol use: Yes     Alcohol/week: 9.6 oz     Types: 16 Cans of beer per week    Drug use: Yes     Types: Inhaled, Marijuana     Comment: Meth daily  "   Sexual activity: Yes     Partners: Female       CURRENT MEDICATIONS  Home Medications    **Home medications have not yet been reviewed for this encounter**         ALLERGIES  Allergies   Allergen Reactions    Ceclor [Cefaclor] Anaphylaxis    Cephalosporins Anaphylaxis    Pcn [Penicillins] Anaphylaxis       PHYSICAL EXAM  VITAL SIGNS: BP (!) 157/108   Pulse (!) 101   Temp 36.7 °C (98 °F) (Temporal)   Resp (!) 24   Ht 1.905 m (6' 3\")   Wt (!) 130 kg (286 lb)   SpO2 99%   BMI 35.75 kg/m²    In general the patient is agitated and anxious    HEENT unremarkable except for some conjunctival injection    Pulmonary the patient's lungs are clear to auscultation bilaterally    Cardiovascular S1-S2 with a tachycardic rate    GI abdomen soft    Skin no lesions present    Extremities no edema    Neurologic examination is grossly intact    Psych exam the patient is anxious and slightly agitated.  He does have difficulty staying on task at times.  He also appears to be responding to some internal stimuli when I walked into the room.  Patient does deny suicidal homicidal ideation      COURSE & MEDICAL DECISION MAKING    ED Observation Status? Yes; I am placing the patient in to an observation status due to a diagnostic uncertainty as well as therapeutic intensity. Patient placed in observation status at 10:53 AM, 7/26/2023.     Observation plan is as follows: The patient presents with agitation and anxiety.  I suspect this is secondary to methamphetamine abuse.  The patient will receive Haldol and Ativan intramuscularly.  He will be admitted for ED observation pending response to the medication.  He does not have any suicidal or homicidal ideation at this time.  He may require hospitalization for psychosis if he does not clear.    1230 the patient is reexamined has had significant improvement with the IM Haldol and Ativan.  His agitation has resolved.  He still subjectively feels anxious.  He does not have any suicidal or " homicidal ideation.  His mentation has improved and he is tolerated a meal as well as oral fluids.  The patient states he like to go to [facility] for treatment of alcohol and methamphetamine abuse and addiction.  Case management will be notified and will attempt to get the patient transferred for further care.  The patient has stabilized at this time.    1310 the patient continues to improve and therefore he will be medically cleared and case management will arrange transfer to [facility] for further treatment of his polysubstance abuse.    Upon Reevaluation, the patient's condition has: Improved; and will be discharged.    Patient discharged from ED Observation status at 1310 (Time) 7/26/23 (Date).     CRITICAL CARE  The very real possibilty of a deterioration of this patient's condition required the highest level of my preparedness for sudden, emergent intervention.  I provided critical care services, which included medication orders, frequent reevaluations of the patient's condition and response to treatment, ordering and reviewing test results, and discussing the case with various consultants.  The critical care time associated with the care of the patient was 35 minutes. Review chart for interventions. This time is exclusive of any other billable procedures.     FINAL DIAGNOSIS  1.  Methamphetamine abuse  2.  Alcohol abuse  3.  Agitation requiring chemical sedation  4.  Critical care time 35 minutes       Electronically signed by: Candido Quintero M.D., 7/26/2023 10:50 AM

## 2023-07-26 NOTE — ED NOTES
Pt cleared for d/c   MT was called for transport to [facility]   pt remains calm and cooperative and is aware of POC

## 2023-07-26 NOTE — DISCHARGE PLANNING
Anticipated Discharge Disposition: [facility]    Action: [facility] transport set with Radha and Flyer given to pt with [facility]. [facility]TM now requires even tho no legal has to go stretcher. ER CM advised can go by Cab but [facility] requires [facility] to psych even voluntary now. NEW protocol per Radha. All info provided. Going for voluntary evaluation for alcohol drug evaluation NO LEGAL HOLD [facility] done as requested for [facility] by [facility] to secure transport but no medical needs but protocol for [facility] now.ETA 1515 set  per Gisela but ER CM unable to put a medical need such as bed confined on there so reviewed with supervisor Keshawn and will send by cab , unable do to be greater than 100 dollars [facility]    Barriers to Discharge: Transport pending  will set uber.    Plan:   No further needs.

## 2023-08-10 ENCOUNTER — APPOINTMENT (OUTPATIENT)
Dept: RADIOLOGY | Facility: MEDICAL CENTER | Age: 43
End: 2023-08-10
Attending: EMERGENCY MEDICINE
Payer: MEDICAID

## 2023-08-10 ENCOUNTER — HOSPITAL ENCOUNTER (EMERGENCY)
Facility: MEDICAL CENTER | Age: 43
End: 2023-08-10
Attending: EMERGENCY MEDICINE | Admitting: HOSPITALIST
Payer: MEDICAID

## 2023-08-10 VITALS
SYSTOLIC BLOOD PRESSURE: 139 MMHG | HEIGHT: 75 IN | HEART RATE: 105 BPM | WEIGHT: 286 LBS | DIASTOLIC BLOOD PRESSURE: 86 MMHG | OXYGEN SATURATION: 98 % | BODY MASS INDEX: 35.56 KG/M2 | TEMPERATURE: 98.2 F | RESPIRATION RATE: 18 BRPM

## 2023-08-10 DIAGNOSIS — R07.9 ACUTE CHEST PAIN: ICD-10-CM

## 2023-08-10 DIAGNOSIS — F15.10 METHAMPHETAMINE ABUSE (HCC): ICD-10-CM

## 2023-08-10 DIAGNOSIS — J18.9 COMMUNITY ACQUIRED PNEUMONIA OF LEFT LOWER LOBE OF LUNG: ICD-10-CM

## 2023-08-10 PROBLEM — I16.0 HYPERTENSIVE URGENCY: Status: ACTIVE | Noted: 2023-08-10

## 2023-08-10 PROBLEM — E87.6 HYPOKALEMIA: Status: ACTIVE | Noted: 2023-08-10

## 2023-08-10 PROBLEM — J18.1 LOBAR PNEUMONIA (HCC): Status: ACTIVE | Noted: 2023-08-10

## 2023-08-10 PROBLEM — E11.9 TYPE 2 DIABETES MELLITUS WITHOUT COMPLICATION, WITHOUT LONG-TERM CURRENT USE OF INSULIN (HCC): Status: ACTIVE | Noted: 2023-08-10

## 2023-08-10 LAB
ALBUMIN SERPL BCP-MCNC: 4.8 G/DL (ref 3.2–4.9)
ALBUMIN/GLOB SERPL: 1.7 G/DL
ALP SERPL-CCNC: 123 U/L (ref 30–99)
ALT SERPL-CCNC: 58 U/L (ref 2–50)
ANION GAP SERPL CALC-SCNC: 19 MMOL/L (ref 7–16)
AST SERPL-CCNC: 35 U/L (ref 12–45)
BASOPHILS # BLD AUTO: 1.3 % (ref 0–1.8)
BASOPHILS # BLD: 0.11 K/UL (ref 0–0.12)
BILIRUB SERPL-MCNC: 0.9 MG/DL (ref 0.1–1.5)
BUN SERPL-MCNC: 14 MG/DL (ref 8–22)
CALCIUM ALBUM COR SERPL-MCNC: 8.9 MG/DL (ref 8.5–10.5)
CALCIUM SERPL-MCNC: 9.5 MG/DL (ref 8.5–10.5)
CHLORIDE SERPL-SCNC: 100 MMOL/L (ref 96–112)
CO2 SERPL-SCNC: 18 MMOL/L (ref 20–33)
CREAT SERPL-MCNC: 0.98 MG/DL (ref 0.5–1.4)
EKG IMPRESSION: NORMAL
EOSINOPHIL # BLD AUTO: 0.23 K/UL (ref 0–0.51)
EOSINOPHIL NFR BLD: 2.6 % (ref 0–6.9)
ERYTHROCYTE [DISTWIDTH] IN BLOOD BY AUTOMATED COUNT: 45.3 FL (ref 35.9–50)
GFR SERPLBLD CREATININE-BSD FMLA CKD-EPI: 98 ML/MIN/1.73 M 2
GLOBULIN SER CALC-MCNC: 2.9 G/DL (ref 1.9–3.5)
GLUCOSE SERPL-MCNC: 134 MG/DL (ref 65–99)
HCT VFR BLD AUTO: 48.2 % (ref 42–52)
HGB BLD-MCNC: 17.2 G/DL (ref 14–18)
IMM GRANULOCYTES # BLD AUTO: 0.03 K/UL (ref 0–0.11)
IMM GRANULOCYTES NFR BLD AUTO: 0.3 % (ref 0–0.9)
LACTATE SERPL-SCNC: 3.9 MMOL/L (ref 0.5–2)
LACTATE SERPL-SCNC: 5.5 MMOL/L (ref 0.5–2)
LIPASE SERPL-CCNC: 15 U/L (ref 11–82)
LYMPHOCYTES # BLD AUTO: 4.35 K/UL (ref 1–4.8)
LYMPHOCYTES NFR BLD: 49.9 % (ref 22–41)
MCH RBC QN AUTO: 31.2 PG (ref 27–33)
MCHC RBC AUTO-ENTMCNC: 35.7 G/DL (ref 32.3–36.5)
MCV RBC AUTO: 87.3 FL (ref 81.4–97.8)
MONOCYTES # BLD AUTO: 1.09 K/UL (ref 0–0.85)
MONOCYTES NFR BLD AUTO: 12.5 % (ref 0–13.4)
NEUTROPHILS # BLD AUTO: 2.91 K/UL (ref 1.82–7.42)
NEUTROPHILS NFR BLD: 33.4 % (ref 44–72)
NRBC # BLD AUTO: 0 K/UL
NRBC BLD-RTO: 0 /100 WBC (ref 0–0.2)
PLATELET # BLD AUTO: 292 K/UL (ref 164–446)
PMV BLD AUTO: 9.9 FL (ref 9–12.9)
POTASSIUM SERPL-SCNC: 3.5 MMOL/L (ref 3.6–5.5)
PROT SERPL-MCNC: 7.7 G/DL (ref 6–8.2)
RBC # BLD AUTO: 5.52 M/UL (ref 4.7–6.1)
SODIUM SERPL-SCNC: 137 MMOL/L (ref 135–145)
TROPONIN T SERPL-MCNC: 9 NG/L (ref 6–19)
TROPONIN T SERPL-MCNC: 9 NG/L (ref 6–19)
WBC # BLD AUTO: 8.7 K/UL (ref 4.8–10.8)

## 2023-08-10 PROCEDURE — 700105 HCHG RX REV CODE 258: Mod: JZ,UD | Performed by: EMERGENCY MEDICINE

## 2023-08-10 PROCEDURE — 71045 X-RAY EXAM CHEST 1 VIEW: CPT

## 2023-08-10 PROCEDURE — 700111 HCHG RX REV CODE 636 W/ 250 OVERRIDE (IP): Mod: UD | Performed by: EMERGENCY MEDICINE

## 2023-08-10 PROCEDURE — 84484 ASSAY OF TROPONIN QUANT: CPT

## 2023-08-10 PROCEDURE — 96365 THER/PROPH/DIAG IV INF INIT: CPT

## 2023-08-10 PROCEDURE — 83605 ASSAY OF LACTIC ACID: CPT

## 2023-08-10 PROCEDURE — 87040 BLOOD CULTURE FOR BACTERIA: CPT | Mod: 91

## 2023-08-10 PROCEDURE — 36415 COLL VENOUS BLD VENIPUNCTURE: CPT

## 2023-08-10 PROCEDURE — 96375 TX/PRO/DX INJ NEW DRUG ADDON: CPT

## 2023-08-10 PROCEDURE — 83690 ASSAY OF LIPASE: CPT

## 2023-08-10 PROCEDURE — 99285 EMERGENCY DEPT VISIT HI MDM: CPT | Performed by: HOSPITALIST

## 2023-08-10 PROCEDURE — 80053 COMPREHEN METABOLIC PANEL: CPT

## 2023-08-10 PROCEDURE — 85025 COMPLETE CBC W/AUTO DIFF WBC: CPT

## 2023-08-10 PROCEDURE — 93005 ELECTROCARDIOGRAM TRACING: CPT | Performed by: EMERGENCY MEDICINE

## 2023-08-10 PROCEDURE — 99285 EMERGENCY DEPT VISIT HI MDM: CPT

## 2023-08-10 PROCEDURE — 93005 ELECTROCARDIOGRAM TRACING: CPT

## 2023-08-10 RX ORDER — GABAPENTIN 100 MG/1
100 CAPSULE ORAL 3 TIMES DAILY
Status: SHIPPED | COMMUNITY
Start: 2023-07-27 | End: 2023-08-27

## 2023-08-10 RX ORDER — LEVOFLOXACIN 750 MG/1
750 TABLET, FILM COATED ORAL
Status: DISCONTINUED | OUTPATIENT
Start: 2023-08-11 | End: 2023-08-10 | Stop reason: HOSPADM

## 2023-08-10 RX ORDER — GUAIFENESIN/DEXTROMETHORPHAN 100-10MG/5
10 SYRUP ORAL EVERY 6 HOURS PRN
Status: DISCONTINUED | OUTPATIENT
Start: 2023-08-10 | End: 2023-08-10 | Stop reason: HOSPADM

## 2023-08-10 RX ORDER — PROMETHAZINE HYDROCHLORIDE 25 MG/1
12.5-25 SUPPOSITORY RECTAL EVERY 4 HOURS PRN
Status: DISCONTINUED | OUTPATIENT
Start: 2023-08-10 | End: 2023-08-10 | Stop reason: HOSPADM

## 2023-08-10 RX ORDER — SODIUM CHLORIDE, SODIUM LACTATE, POTASSIUM CHLORIDE, AND CALCIUM CHLORIDE .6; .31; .03; .02 G/100ML; G/100ML; G/100ML; G/100ML
30 INJECTION, SOLUTION INTRAVENOUS ONCE
Status: COMPLETED | OUTPATIENT
Start: 2023-08-10 | End: 2023-08-10

## 2023-08-10 RX ORDER — DEXTROSE MONOHYDRATE 25 G/50ML
25 INJECTION, SOLUTION INTRAVENOUS
Status: DISCONTINUED | OUTPATIENT
Start: 2023-08-10 | End: 2023-08-10 | Stop reason: HOSPADM

## 2023-08-10 RX ORDER — SODIUM CHLORIDE, SODIUM LACTATE, POTASSIUM CHLORIDE, CALCIUM CHLORIDE 600; 310; 30; 20 MG/100ML; MG/100ML; MG/100ML; MG/100ML
INJECTION, SOLUTION INTRAVENOUS CONTINUOUS
Status: DISCONTINUED | OUTPATIENT
Start: 2023-08-10 | End: 2023-08-10

## 2023-08-10 RX ORDER — ONDANSETRON 4 MG/1
4 TABLET, ORALLY DISINTEGRATING ORAL EVERY 4 HOURS PRN
Status: DISCONTINUED | OUTPATIENT
Start: 2023-08-10 | End: 2023-08-10 | Stop reason: HOSPADM

## 2023-08-10 RX ORDER — SERTRALINE HYDROCHLORIDE 100 MG/1
150 TABLET, FILM COATED ORAL DAILY
Status: DISCONTINUED | OUTPATIENT
Start: 2023-08-10 | End: 2023-08-10 | Stop reason: HOSPADM

## 2023-08-10 RX ORDER — TRAZODONE HYDROCHLORIDE 50 MG/1
150 TABLET ORAL
Status: DISCONTINUED | OUTPATIENT
Start: 2023-08-10 | End: 2023-08-10 | Stop reason: HOSPADM

## 2023-08-10 RX ORDER — SERTRALINE HYDROCHLORIDE 100 MG/1
150 TABLET, FILM COATED ORAL DAILY
COMMUNITY
Start: 2023-08-07 | End: 2023-09-06

## 2023-08-10 RX ORDER — OXYCODONE HYDROCHLORIDE 5 MG/1
5 TABLET ORAL
Status: DISCONTINUED | OUTPATIENT
Start: 2023-08-10 | End: 2023-08-10 | Stop reason: HOSPADM

## 2023-08-10 RX ORDER — LABETALOL HYDROCHLORIDE 5 MG/ML
10 INJECTION, SOLUTION INTRAVENOUS EVERY 4 HOURS PRN
Status: DISCONTINUED | OUTPATIENT
Start: 2023-08-10 | End: 2023-08-10 | Stop reason: HOSPADM

## 2023-08-10 RX ORDER — PROCHLORPERAZINE EDISYLATE 5 MG/ML
5-10 INJECTION INTRAMUSCULAR; INTRAVENOUS EVERY 4 HOURS PRN
Status: DISCONTINUED | OUTPATIENT
Start: 2023-08-10 | End: 2023-08-10 | Stop reason: HOSPADM

## 2023-08-10 RX ORDER — LORAZEPAM 2 MG/ML
1 INJECTION INTRAMUSCULAR ONCE
Status: COMPLETED | OUTPATIENT
Start: 2023-08-10 | End: 2023-08-10

## 2023-08-10 RX ORDER — CARBAMAZEPINE 200 MG/1
200 TABLET ORAL 2 TIMES DAILY
COMMUNITY
Start: 2023-07-17 | End: 2023-09-07

## 2023-08-10 RX ORDER — ENOXAPARIN SODIUM 100 MG/ML
40 INJECTION SUBCUTANEOUS DAILY
Status: DISCONTINUED | OUTPATIENT
Start: 2023-08-10 | End: 2023-08-10 | Stop reason: HOSPADM

## 2023-08-10 RX ORDER — BISACODYL 10 MG
10 SUPPOSITORY, RECTAL RECTAL
Status: DISCONTINUED | OUTPATIENT
Start: 2023-08-10 | End: 2023-08-10 | Stop reason: HOSPADM

## 2023-08-10 RX ORDER — LEVOFLOXACIN 5 MG/ML
750 INJECTION, SOLUTION INTRAVENOUS ONCE
Status: COMPLETED | OUTPATIENT
Start: 2023-08-10 | End: 2023-08-10

## 2023-08-10 RX ORDER — RISPERIDONE 2 MG/1
2 TABLET ORAL
Status: SHIPPED | COMMUNITY
Start: 2023-07-27 | End: 2023-08-27

## 2023-08-10 RX ORDER — GABAPENTIN 100 MG/1
100 CAPSULE ORAL 3 TIMES DAILY
Status: DISCONTINUED | OUTPATIENT
Start: 2023-08-10 | End: 2023-08-10 | Stop reason: HOSPADM

## 2023-08-10 RX ORDER — AMOXICILLIN 250 MG
2 CAPSULE ORAL 2 TIMES DAILY
Status: DISCONTINUED | OUTPATIENT
Start: 2023-08-11 | End: 2023-08-10 | Stop reason: HOSPADM

## 2023-08-10 RX ORDER — POLYETHYLENE GLYCOL 3350 17 G/17G
1 POWDER, FOR SOLUTION ORAL
Status: DISCONTINUED | OUTPATIENT
Start: 2023-08-10 | End: 2023-08-10 | Stop reason: HOSPADM

## 2023-08-10 RX ORDER — CARBAMAZEPINE 200 MG/1
200 TABLET ORAL DAILY
Status: DISCONTINUED | OUTPATIENT
Start: 2023-08-10 | End: 2023-08-10 | Stop reason: HOSPADM

## 2023-08-10 RX ORDER — HYDROMORPHONE HYDROCHLORIDE 1 MG/ML
0.5 INJECTION, SOLUTION INTRAMUSCULAR; INTRAVENOUS; SUBCUTANEOUS
Status: DISCONTINUED | OUTPATIENT
Start: 2023-08-10 | End: 2023-08-10 | Stop reason: HOSPADM

## 2023-08-10 RX ORDER — SODIUM CHLORIDE, SODIUM LACTATE, POTASSIUM CHLORIDE, AND CALCIUM CHLORIDE .6; .31; .03; .02 G/100ML; G/100ML; G/100ML; G/100ML
500 INJECTION, SOLUTION INTRAVENOUS
Status: DISCONTINUED | OUTPATIENT
Start: 2023-08-10 | End: 2023-08-10 | Stop reason: HOSPADM

## 2023-08-10 RX ORDER — RISPERIDONE 2 MG/1
2 TABLET ORAL
Status: DISCONTINUED | OUTPATIENT
Start: 2023-08-10 | End: 2023-08-10 | Stop reason: HOSPADM

## 2023-08-10 RX ORDER — OXYCODONE HYDROCHLORIDE 5 MG/1
10 TABLET ORAL
Status: DISCONTINUED | OUTPATIENT
Start: 2023-08-10 | End: 2023-08-10 | Stop reason: HOSPADM

## 2023-08-10 RX ORDER — LEVOFLOXACIN 750 MG/1
750 TABLET, FILM COATED ORAL DAILY
Qty: 5 TABLET | Refills: 0 | Status: ACTIVE | OUTPATIENT
Start: 2023-08-10 | End: 2023-08-15

## 2023-08-10 RX ORDER — SODIUM CHLORIDE AND POTASSIUM CHLORIDE 300; 900 MG/100ML; MG/100ML
INJECTION, SOLUTION INTRAVENOUS CONTINUOUS
Status: DISCONTINUED | OUTPATIENT
Start: 2023-08-10 | End: 2023-08-10 | Stop reason: HOSPADM

## 2023-08-10 RX ORDER — LEVOFLOXACIN 5 MG/ML
750 INJECTION, SOLUTION INTRAVENOUS EVERY 24 HOURS
Status: DISCONTINUED | OUTPATIENT
Start: 2023-08-11 | End: 2023-08-10

## 2023-08-10 RX ORDER — TRAZODONE HYDROCHLORIDE 150 MG/1
150 TABLET ORAL
Status: SHIPPED | COMMUNITY
Start: 2023-07-27 | End: 2023-08-27

## 2023-08-10 RX ORDER — ACETAMINOPHEN 325 MG/1
650 TABLET ORAL EVERY 6 HOURS PRN
Status: DISCONTINUED | OUTPATIENT
Start: 2023-08-10 | End: 2023-08-10 | Stop reason: HOSPADM

## 2023-08-10 RX ORDER — ONDANSETRON 2 MG/ML
4 INJECTION INTRAMUSCULAR; INTRAVENOUS EVERY 4 HOURS PRN
Status: DISCONTINUED | OUTPATIENT
Start: 2023-08-10 | End: 2023-08-10 | Stop reason: HOSPADM

## 2023-08-10 RX ORDER — PROMETHAZINE HYDROCHLORIDE 25 MG/1
12.5-25 TABLET ORAL EVERY 4 HOURS PRN
Status: DISCONTINUED | OUTPATIENT
Start: 2023-08-10 | End: 2023-08-10 | Stop reason: HOSPADM

## 2023-08-10 RX ADMIN — SODIUM CHLORIDE, POTASSIUM CHLORIDE, SODIUM LACTATE AND CALCIUM CHLORIDE 2535 ML: 600; 310; 30; 20 INJECTION, SOLUTION INTRAVENOUS at 13:22

## 2023-08-10 RX ADMIN — LORAZEPAM 1 MG: 2 INJECTION INTRAMUSCULAR; INTRAVENOUS at 12:56

## 2023-08-10 RX ADMIN — LORAZEPAM 1 MG: 2 INJECTION INTRAMUSCULAR; INTRAVENOUS at 13:21

## 2023-08-10 RX ADMIN — LEVOFLOXACIN 750 MG: 5 INJECTION, SOLUTION INTRAVENOUS at 13:36

## 2023-08-10 ASSESSMENT — ENCOUNTER SYMPTOMS
SHORTNESS OF BREATH: 1
MYALGIAS: 0
EYE REDNESS: 0
COUGH: 1
BRUISES/BLEEDS EASILY: 0
FLANK PAIN: 0
ABDOMINAL PAIN: 0
EYE DISCHARGE: 0
CHILLS: 1
STRIDOR: 0
VOMITING: 0
FEVER: 0
NAUSEA: 1
PALPITATIONS: 1
NERVOUS/ANXIOUS: 0
FOCAL WEAKNESS: 0

## 2023-08-10 ASSESSMENT — FIBROSIS 4 INDEX: FIB4 SCORE: 1.02

## 2023-08-10 NOTE — ED TRIAGE NOTES
"Chief Complaint   Patient presents with    Chest Pain     BIBA for chest pain and anxiety   Pt states smoking more meth than usual   Began to have CP and palpitations      Pulse (!) 141   Temp 36.8 °C (98.3 °F)   Resp (!) 22   Ht 1.905 m (6' 3\")   Wt (!) 130 kg (286 lb)   SpO2 96%   BMI 35.75 kg/m²     "

## 2023-08-10 NOTE — ASSESSMENT & PLAN NOTE
I personally reviewed patient chest x-ray, it is concerning for left lower lobe pneumonia.  I will start intravenous antibiotics with Levaquin, follow on cultures and sensitivities.

## 2023-08-10 NOTE — ASSESSMENT & PLAN NOTE
Likely secondary to methamphetamine use.  I will start labetalol as needed for extreme hypertension.

## 2023-08-10 NOTE — ED NOTES
Lactic and 1st set of cultures sent   Speech Therapy      Visit Type: Treatment  -  Clinical swallow      Current Diet: puree/dysphagia 1 and thin liquids      - Dentition: intact    - Feeding: does not feed self    SUBJECTIVE  Pt was seen at mid day meal- RN called with concerns of poor intake and coughing. Pt was alert to tactile and auditory stim. No appetite. Clear vocal quality.    OBJECTIVE       Swallowing   Patient positioning: upright in bed  Pretrial vocal quality: normal    Consistencies:  Thin Liquid (teaspoon):    - Oral: impaired, suspect premature spillage, slow oral transit and anterior loss   - Pharyngeal: impaired  , suspect delayed swallow response and immediate cough  Nectar Thick Liquid (cup):     - Oral:  Intact   - Pharyngeal: impaired, suspect delayed swallow response  Dysphagia 1/Puree:     - Oral: intact   - Pharyngeal: intact            ASSESSMENT                                                                        • Impairments: swallowing  • Functional Limitations: eating/drinking  • Skilled therapy is required to establish safe means of nutrition, hydration and medication administration  • Due to Aerosol Generating Procedure (AGP) and unknown or positive COVID status for the pt, the following PPE was worn by clinician for this session: gown, gloves, face shield, and N95    Pt presents with suspected mild oral and mild moderate pharyngeal phase dysphagia exacerbated by current medical condition. Suspect decreased bolus control with thin liquids and suspect a delayed swallow response via palpatation. Immediate throat clear with thin liquids- coughing was consistent throughout evaluation and could not necessarily be related to food presentations, although pt is at risk for aspiration.  • Rehab Potential: good  • Potential barriers to progress:  Current medical conditions    Patient at end of session:    - location: in bed    - safety measures: bed rails x2    - hand off to: nurse    PLAN  Frequency: 5/19 MD possible VFSS as  medically appropriate    Interventions:  Assess tolerance of least restrictive oral diet    Plan/Goal Agreement:  Patient unable to agree with goals and treatment plan    RECOMMENDATIONS     -Diet:          *nectar-thick liquids and puree/dysphagia 1    -Medication Administration:         *with puree and crushed    -Feeding Guidelines:          *feed patient/total feeding assistance, eat slowly only when alert, must be fed by nursing staff, sit fully upright for all po intake, stop feeding if coughing observed and small bites/sips    -Additional Swallow Recommendations:         *frequent oral care    GOALS    Short Term Goals:   Re-evaluation as medically appropriate      Documented in the chart in the following areas: Assessment.      Therapy procedure time and total treatment time can be found documented on the Time Entry flowsheet

## 2023-08-10 NOTE — CONSULTS
[facility] Medicine Consultation    Date of Service  8/10/2023    Referring Physician  Fredy Bear M.D.    Consulting Physician  Fredy Bear M.D.    Reason for Consultation  Evaluate the patient for admission    History of Presenting Illness  Brannon Sharma is a 43 y.o. male with a past medical history of primary hypertension, diabetes, methamphetamine use who presented 8/10/2023 with anxiety, palpitations, generalized weakness shortness of breath and chest pain after using methamphetamine about an hour prior to arrival.  In the emergency room he was found to be having a lactic acid elevation of 5.5, with SIRS criteria and hypertensive urgency.    Review of Systems  Review of Systems   Constitutional:  Positive for chills and malaise/fatigue. Negative for fever.   Eyes:  Negative for discharge and redness.   Respiratory:  Positive for cough and shortness of breath. Negative for stridor.   Cardiovascular:  Positive for chest pain and palpitations. Negative for leg swelling.   Gastrointestinal:  Positive for nausea. Negative for abdominal pain and vomiting.   Genitourinary:  Negative for flank pain.   Musculoskeletal:  Negative for myalgias.   Skin: Negative.    Neurological:  Negative for focal weakness.   Endo/Heme/Allergies:  Does not bruise/bleed easily.   Psychiatric/Behavioral:  The patient is not nervous/anxious.      Past Medical History   has a past medical history of Alcohol-induced psychosis (HCC), Anxiety, Depression, Paranoid schizophrenia (HCC), Psychiatric disorder, Schizophrenia, paranoid (HCC), and Suicidal ideations.    Surgical History   has a past surgical history that includes other and nasal septal reconstruction.    Family History  Family history is unknown by patient.    Social History   reports that he has been smoking cigarettes. He has been smoking an average of 1 pack per day. He has never used smokeless tobacco. He reports current alcohol use of about 9.6 oz of alcohol per  week. He reports current drug use. Drugs: Inhaled and Marijuana.    Medications  Prior to Admission Medications   Prescriptions Last Dose Informant Patient Reported? Taking?   carBAMazepine (TEGRETOL) 200 MG Tab 8/9/2023 at AM Patient Yes Yes   Sig: Take 200 mg by mouth every day.   gabapentin (NEURONTIN) 100 MG Cap 8/9/2023 at PM Patient Yes Yes   Sig: Take 100 mg by mouth in the morning, at noon, and at bedtime.   risperiDONE (RISPERDAL) 2 MG Tab 8/9/2023 at PM Patient Yes Yes   Sig: Take 2 mg by mouth every evening.   sertraline (ZOLOFT) 100 MG Tab 8/9/2023 at AM Patient Yes Yes   Sig: Take 150 mg by mouth every day. 1.5 tablet = 150 mg   traZODone (DESYREL) 150 MG Tab 8/9/2023 at PM Patient Yes Yes   Sig: Take 150 mg by mouth at bedtime.      Facility-Administered Medications: None     Allergies  Allergies   Allergen Reactions    Ceclor [Cefaclor] Anaphylaxis    Cephalosporins Anaphylaxis    Pcn [Penicillins] Anaphylaxis     Physical Exam  Temp:  [36.8 °C (98.3 °F)] 36.8 °C (98.3 °F)  Pulse:  [108-141] 108  Resp:  [18-22] 18  BP: (140-173)/(91-96) 144/92  SpO2:  [96 %-98 %] 97 %    Physical semination  Constitutional:       General: He is not in acute distress.     Appearance: He is not ill-appearing or diaphoretic.   HENT:      Head: Atraumatic.      Right Ear: External ear normal.      Left Ear: External ear normal.      Nose: No congestion or rhinorrhea.      Mouth/Throat:      Mouth: Mucous membranes are dry.   Eyes:      General: No scleral icterus.        Right eye: No discharge.         Left eye: No discharge.      Pupils: Pupils are equal, round, and reactive to light.   Cardiovascular:      Rate and Rhythm: Regular rhythm. Tachycardia present.   Pulmonary:      Comments: Tachypnea.  Saturating well on room air.  Abdominal:      General: There is no distension.   Musculoskeletal:      Cervical back: Neck supple. No rigidity. No muscular tenderness.      Right lower leg: No edema.      Left lower leg: No  edema.   Skin:     General: Skin is dry.      Capillary Refill: Capillary refill takes 2 to 3 seconds.      Coloration: Skin is not jaundiced or pale.   Neurological:      Mental Status: He is alert and oriented to person, place, and time.      Coordination: Coordination normal.   Psychiatric:         Mood and Affect: Mood normal.         Behavior: Behavior normal.     Fluids  Date 08/10/23 0700 - 08/11/23 0659   Shift 9423-5460 3450-5497 6497-2078 24 Hour Total   INTAKE   IV Piggyback  150  150   Shift Total  150  150   OUTPUT   Shift Total       Weight (kg) 129.7 129.7 129.7 129.7     Laboratory  Recent Labs     08/10/23  1240   WBC 8.7   RBC 5.52   HEMOGLOBIN 17.2   HEMATOCRIT 48.2   MCV 87.3   MCH 31.2   MCHC 35.7   RDW 45.3   PLATELETCT 292   MPV 9.9     Recent Labs     08/10/23  1240   SODIUM 137   POTASSIUM 3.5*   CHLORIDE 100   CO2 18*   GLUCOSE 134*   BUN 14   CREATININE 0.98   CALCIUM 9.5     Imaging  DX-CHEST-PORTABLE (1 VIEW)   Final Result      No acute cardiopulmonary disease evident.        Assessment/Plan  * Lobar pneumonia (HCC)- (present on admission)  Assessment & Plan  I personally reviewed patient chest x-ray, it is concerning for left lower lobe pneumonia.  I will start intravenous antibiotics with Levaquin, follow on cultures and sensitivities.    Pain in the chest- (present on admission)  Assessment & Plan  Chest pain is likely due to methamphetamine use  Counseling.  Multimodal pain control.  Labetalol as needed.  EKG shows sinus tachycardia with a rate of 143, there is no ST elevation, there is abnormal repolarization pattern with flattening of T waves and T wave inversions in lead II, aVL, lead III, aVF, QTc is 497.  Initial troponin is within normal limits, I will trend  I ordered Stat EKG and troponin for recurrence of chest pain.   I will place on continuous cardiac monitoring.    Hypertensive urgency- (present on admission)  Assessment & Plan  Likely secondary to methamphetamine  use.  I will start labetalol as needed for extreme hypertension.    Hypokalemia- (present on admission)  Assessment & Plan  Replacing, checking magnesium    Continue to monitor replace as needed     Type 2 diabetes mellitus without complication, without long-term current use of insulin (HCC)- (present on admission)  Assessment & Plan  With no hyperglycemia  I will start short acting insulin for now  I will order Accu-Checks, hypoglycemia protocol  Adjust according to blood sugars trend       VTE prophylaxis: SCDs/TEDs and enoxaparin ppx    Addendum, despite extensive counseling the patient decided to leave AGAINST MEDICAL ADVICE.  He is alert and oriented x 4.  He understands and accept the risk of his decision.  He said he will call 911 or come back to the emergency room if he started to feel worse.

## 2023-08-10 NOTE — ED NOTES
Pt aox4, vss, nad, ambulatory steady   Pt leaving AMA  Admitting md made aware  ERP Elisa made aware, came to talk to pt  Pt understands all risks of leaving AMA  Pt understands benefits of staying admitted but wants to leave AMA  AMA paperwork signed by pt and ERP  20G AIMEE iv taken out, tip intact  All pt belongings with pt

## 2023-08-10 NOTE — DISCHARGE INSTRUCTIONS
"Please discuss the following findings with your regular doctor:  DX-CHEST-PORTABLE (1 VIEW)   Final Result      No acute cardiopulmonary disease evident.        Labs Reviewed   CBC WITH DIFFERENTIAL - Abnormal; Notable for the following components:       Result Value    Neutrophils-Polys 33.40 (*)     Lymphocytes 49.90 (*)     Monos (Absolute) 1.09 (*)     All other components within normal limits    Narrative:     Biotin intake of greater than 5 mg per day may interfere with  troponin levels, causing false low values.   COMP METABOLIC PANEL - Abnormal; Notable for the following components:    Potassium 3.5 (*)     Co2 18 (*)     Anion Gap 19.0 (*)     Glucose 134 (*)     ALT(SGPT) 58 (*)     Alkaline Phosphatase 123 (*)     All other components within normal limits    Narrative:     Biotin intake of greater than 5 mg per day may interfere with  troponin levels, causing false low values.   LACTIC ACID - Abnormal; Notable for the following components:    Lactic Acid 5.5 (*)     All other components within normal limits   LACTIC ACID - Abnormal; Notable for the following components:    Lactic Acid 3.9 (*)     All other components within normal limits   TROPONIN    Narrative:     Biotin intake of greater than 5 mg per day may interfere with  troponin levels, causing false low values.   TROPONIN    Narrative:     Biotin intake of greater than 5 mg per day may interfere with  troponin levels, causing false low values.   LIPASE    Narrative:     Biotin intake of greater than 5 mg per day may interfere with  troponin levels, causing false low values.   ESTIMATED GFR    Narrative:     Biotin intake of greater than 5 mg per day may interfere with  troponin levels, causing false low values.   BLOOD CULTURE    Narrative:     1 of 2 for Blood Culture x 2 sites order. Per Hospital  Policy: Only change Specimen Src: to \"Line\" if specified by  physician order.   BLOOD CULTURE    Narrative:     2 of 2 blood culture x2  Sites order. " "Per Hospital Policy:  Only change Specimen Src: to \"Line\" if specified by physician  order.   LACTIC ACID   LACTIC ACID   TROPONIN   PROCALCITONIN       "

## 2023-08-10 NOTE — ED NOTES
Med rec completed per patient and home pharmacy  Allergies reviewed  No PO Antibiotics in the last 30 days

## 2023-08-10 NOTE — ED PROVIDER NOTES
ER Provider Note    Scribed for Hang Pérez M.d. by Madelyn Allen. 8/10/2023  12:59 PM    Primary Care Provider: Gregory Hardin M.D.    CHIEF COMPLAINT  Chief Complaint   Patient presents with    Chest Pain     BIBA for chest pain and anxiety   Pt states smoking more meth than usual   Began to have CP and palpitations        HPI/ROS    Brannon Sharma is a 43 y.o. male who presents to the ED complaining of shortness of breath and chest pain after using meth 1 hour prior to arrival.  Patient reports that he has had a cough for several days.  Patient reports that he feels profoundly short of breath at this time.  Patient denies any mopped assist.  Patient denies any leg swelling.  Patient denies any fever.  Patient denies any IV drug use.  Patient denies prior MI.    PAST MEDICAL HISTORY  Past Medical History:   Diagnosis Date    Alcohol-induced psychosis (HCC)     Anxiety     Depression     Paranoid schizophrenia (HCC)     Psychiatric disorder     panic attack    Schizophrenia, paranoid (HCC)     Suicidal ideations        SURGICAL HISTORY  Past Surgical History:   Procedure Laterality Date    NASAL SEPTAL RECONSTRUCTION      OTHER      deviated septum       FAMILY HISTORY  Family History   Family history unknown: Yes       SOCIAL HISTORY   reports that he has been smoking cigarettes. He has been smoking an average of 1 pack per day. He has never used smokeless tobacco. He reports current alcohol use of about 9.6 oz of alcohol per week. He reports current drug use. Drugs: Inhaled and Marijuana.    CURRENT MEDICATIONS  Discharge Medication List as of 8/10/2023  4:28 PM        CONTINUE these medications which have NOT CHANGED    Details   carBAMazepine (TEGRETOL) 200 MG Tab Take 200 mg by mouth every day., Historical Med      gabapentin (NEURONTIN) 100 MG Cap Take 100 mg by mouth in the morning, at noon, and at bedtime., Historical Med      risperiDONE (RISPERDAL) 2 MG Tab Take 2 mg by mouth every  "evening., Historical Med      sertraline (ZOLOFT) 100 MG Tab Take 150 mg by mouth every day. 1.5 tablet = 150 mg, Historical Med      traZODone (DESYREL) 150 MG Tab Take 150 mg by mouth at bedtime., Historical Med             ALLERGIES  Ceclor [cefaclor], Cephalosporins, and Pcn [penicillins]    PHYSICAL EXAM  BP (!) 173/96   Pulse (!) 141   Temp 36.8 °C (98.3 °F)   Resp (!) 22   Ht 1.905 m (6' 3\")   Wt (!) 130 kg (286 lb)   SpO2 96%   BMI 35.75 kg/m²   Constitutional: Alert in no apparent distress.  HENT: No signs of trauma, Bilateral external ears normal, Nose normal. Uvula midline.   Eyes: Pupils are equal and reactive, Conjunctiva normal, Non-icteric.   Neck: Normal range of motion, No tenderness, Supple, No stridor.   Lymphatic: No lymphadenopathy noted.   Cardiovascular: Tachycardic rate and rhythm, no murmurs.   Thorax & Lungs: Normal breath sounds, No respiratory distress, No wheezing, No chest tenderness.   Abdomen: Bowel sounds normal, Soft, No tenderness, No peritoneal signs, No masses, No pulsatile masses.   Skin: Warm, Dry, No erythema, No rash.   Back: No bony tenderness, No CVA tenderness.   Extremities: Intact distal pulses, No edema, No tenderness, No cyanosis.  Musculoskeletal: Good range of motion in all major joints. No tenderness to palpation or major deformities noted.   Neurologic: Alert , Normal motor function, Normal sensory function, No focal deficits noted.   Psychiatric: Affect normal, Judgment normal, Mood normal. no SI or HI    DIAGNOSTIC STUDIES    Labs:   Results for orders placed or performed during the hospital encounter of 08/10/23   CBC with Differential   Result Value Ref Range    WBC 8.7 4.8 - 10.8 K/uL    RBC 5.52 4.70 - 6.10 M/uL    Hemoglobin 17.2 14.0 - 18.0 g/dL    Hematocrit 48.2 42.0 - 52.0 %    MCV 87.3 81.4 - 97.8 fL    MCH 31.2 27.0 - 33.0 pg    MCHC 35.7 32.3 - 36.5 g/dL    RDW 45.3 35.9 - 50.0 fL    Platelet Count 292 164 - 446 K/uL    MPV 9.9 9.0 - 12.9 fL    " Neutrophils-Polys 33.40 (L) 44.00 - 72.00 %    Lymphocytes 49.90 (H) 22.00 - 41.00 %    Monocytes 12.50 0.00 - 13.40 %    Eosinophils 2.60 0.00 - 6.90 %    Basophils 1.30 0.00 - 1.80 %    Immature Granulocytes 0.30 0.00 - 0.90 %    Nucleated RBC 0.00 0.00 - 0.20 /100 WBC    Neutrophils (Absolute) 2.91 1.82 - 7.42 K/uL    Lymphs (Absolute) 4.35 1.00 - 4.80 K/uL    Monos (Absolute) 1.09 (H) 0.00 - 0.85 K/uL    Eos (Absolute) 0.23 0.00 - 0.51 K/uL    Baso (Absolute) 0.11 0.00 - 0.12 K/uL    Immature Granulocytes (abs) 0.03 0.00 - 0.11 K/uL    NRBC (Absolute) 0.00 K/uL   Complete Metabolic Panel (CMP)   Result Value Ref Range    Sodium 137 135 - 145 mmol/L    Potassium 3.5 (L) 3.6 - 5.5 mmol/L    Chloride 100 96 - 112 mmol/L    Co2 18 (L) 20 - 33 mmol/L    Anion Gap 19.0 (H) 7.0 - 16.0    Glucose 134 (H) 65 - 99 mg/dL    Bun 14 8 - 22 mg/dL    Creatinine 0.98 0.50 - 1.40 mg/dL    Calcium 9.5 8.5 - 10.5 mg/dL    Correct Calcium 8.9 8.5 - 10.5 mg/dL    AST(SGOT) 35 12 - 45 U/L    ALT(SGPT) 58 (H) 2 - 50 U/L    Alkaline Phosphatase 123 (H) 30 - 99 U/L    Total Bilirubin 0.9 0.1 - 1.5 mg/dL    Albumin 4.8 3.2 - 4.9 g/dL    Total Protein 7.7 6.0 - 8.2 g/dL    Globulin 2.9 1.9 - 3.5 g/dL    A-G Ratio 1.7 g/dL   Troponins NOW   Result Value Ref Range    Troponin T 9 6 - 19 ng/L   Troponins in two (2) hours   Result Value Ref Range    Troponin T 9 6 - 19 ng/L   Lactic Acid   Result Value Ref Range    Lactic Acid 5.5 (HH) 0.5 - 2.0 mmol/L   Lactic Acid   Result Value Ref Range    Lactic Acid 3.9 (H) 0.5 - 2.0 mmol/L   LIPASE   Result Value Ref Range    Lipase 15 11 - 82 U/L   ESTIMATED GFR   Result Value Ref Range    GFR (CKD-EPI) 98 >60 mL/min/1.73 m 2   EKG   Result Value Ref Range    Report       [facility] Emergency Dept.    Test Date:  2023-08-10  Pt Name:    OSITO KATHERINPRETTYGONZÁLEZ               Department: ER  MRN:        [id number]                      Room:       [facility]  Gender:     Male                          Technician: [id number]  :        [id number]                   Requested By:ER TRIAGE PROTOCOL  Order #:    [id number]                    Reading MD: Hang Pérez MD    Measurements  Intervals                                Axis  Rate:       143                          P:          45  MT:         119                          QRS:        200  QRSD:       106                          T:          2  QT:         322  QTc:        497    Interpretive Statements  Sinus tachycardia  Ventricular premature complex  Aberrant complex  Right axis deviation  Borderline low voltage, extremity leads  Abnormal R-wave progression, late transition  Borderline prolonged QT interval  Compared to ECG 2023 04:41:37  Ventricular premature complex(es) now present  Aberrant condu ction of supraventricular beat(s) now present  Electronically Signed On 08- 14:08:43 PDT by Hang Pérez MD         EKG:   I have independently interpreted this EKG     Radiology:   The attending emergency physician has independently interpreted the diagnostic imaging associated with this visit and am waiting the final reading from the radiologist.   Preliminary interpretation is a follows: Left lower PNA  Radiologist interpretation:     DX-CHEST-PORTABLE (1 VIEW)   Final Result      No acute cardiopulmonary disease evident.        COURSE & MEDICAL DECISION MAKING     ED Observation Status? No; Patient does not meet criteria for ED Observation.     INITIAL ASSESSMENT, COURSE AND PLAN  Care Narrative: 43 y.o. M p/w CC of chest pain and cough after meth use    1:18 PM - Patient seen and examined at bedside. Discussed plan of care, including labs and imaging. Patient agrees to the plan of care. The patient will be medicated with Ativan 1 mg. Ordered for Dx-chest, Lactic Acid, Troponin, Blood Culture, Lactic Acid, CBC with differential, CMP, Troponins, Lipase and EKg to evaluate his symptoms. Chest X ray looked like pneumonia. Will start him on antibiotics.      1:19 PM - The patient will be given Ativan for toxication of meth. Levaquin for new chest X ray finding.     Given chest x-ray findings concerning for lower lobe pneumonia and blood work with significant lactic acid elevation my plan was for patient to be admitted to the hospital.  I discussed the patient's case with hospitalist Dr. Diane.    Patient given 30 cc/kg bolus along with Levaquin    Patient now requesting to leave AGAINST MEDICAL ADVICE as he feels much better.  Heart score 3.  Patient normoxic at time of discharge with persistent tachycardia.  I repeatedly encouraged patient to state despite this they still wish to leave.  I discussed patient's life-threatening current lactic acid level that portends with chance of death in the next few days and despite this patient still wishes to be discharged at this time.  I did prescribe patient Levaquin and patient agrees to pick it up from pharmacy  I counseled patient upon meth cessation      5:25 PM I discussed with the patient the risks of their decision to leave without receiving the appropriate medical care. I discussed with the patient the risks of their decision to refuse or withhold consent to receive appropriate medical care. The patient has the capacity to understand the risks and benefits described above.     The patient is not intoxicated clinically after 4 hours of sobering in the ED, the patient's is alert and oriented and able to make a good decision in my opinion. I discussed alternative treatments with the patient. The patient was given discharge instructions and a followup plan as documented in the medical record. I have asked the patient to return at any time to the emergency department for any reason.     DISPOSITION AND DISCUSSIONS  I have discussed management of the patient with the following physicians and TANNER's:  Dr. Bear, hospitalist.    DISPOSITION:  Patient will leave AMA    FINAL DIANGOSIS  1. Acute chest pain    2.  Methamphetamine abuse (HCC)    3. Community acquired pneumonia of left lower lobe of lung          The note accurately reflects work and decisions made by me.  Hang Pérez M.D.  8/10/2023  5:27 PM

## 2023-08-10 NOTE — ASSESSMENT & PLAN NOTE
With no hyperglycemia  I will start short acting insulin for now  I will order Accu-Checks, hypoglycemia protocol  Adjust according to blood sugars trend

## 2023-08-10 NOTE — ASSESSMENT & PLAN NOTE
Chest pain is likely due to methamphetamine use  Counseling.  Multimodal pain control.  Labetalol as needed.  EKG shows sinus tachycardia with a rate of 143, there is no ST elevation, there is abnormal repolarization pattern with flattening of T waves and T wave inversions in lead II, aVL, lead III, aVF, QTc is 497.  Initial troponin is within normal limits, I will trend  I ordered Stat EKG and troponin for recurrence of chest pain.   I will place on continuous cardiac monitoring.

## 2023-08-10 NOTE — H&P
Hospital Medicine History & Physical Note    Date of Service  8/10/2023    Primary Care Physician  Gregory Hardin M.D.     Consultants  None     Code Status  Full Code    Chief Complaint  Chief Complaint   Patient presents with    Chest Pain     BIBA for chest pain and anxiety   Pt states smoking more meth than usual   Began to have CP and palpitations      History of Presenting Illness  Brannon Sharma is a 43 y.o. male with a past medical history of primary hypertension, diabetes, methamphetamine use who presented 8/10/2023 with anxiety, palpitations, generalized weakness shortness of breath and chest pain after using methamphetamine about an hour prior to arrival.  In the emergency room he was found to be having a lactic acid elevation of 5.5, with SIRS criteria and hypertensive urgency.      I discussed the plan of care with emergency department physician, and the patient.    Review of Systems  Review of Systems   Constitutional:  Positive for chills and malaise/fatigue. Negative for fever.   Eyes:  Negative for discharge and redness.   Respiratory:  Positive for cough and shortness of breath. Negative for stridor.    Cardiovascular:  Positive for chest pain and palpitations. Negative for leg swelling.   Gastrointestinal:  Positive for nausea. Negative for abdominal pain and vomiting.   Genitourinary:  Negative for flank pain.   Musculoskeletal:  Negative for myalgias.   Skin: Negative.    Neurological:  Negative for focal weakness.   Endo/Heme/Allergies:  Does not bruise/bleed easily.   Psychiatric/Behavioral:  The patient is not nervous/anxious.      Past Medical History   has a past medical history of Alcohol-induced psychosis (HCC), Anxiety, Depression, Paranoid schizophrenia (HCC), Psychiatric disorder, Schizophrenia, paranoid (HCC), and Suicidal ideations.    Surgical History   has a past surgical history that includes other and nasal septal reconstruction.     Family History  Family history is  unknown by patient.      Social History   reports that he has been smoking cigarettes. He has been smoking an average of 1 pack per day. He has never used smokeless tobacco. He reports current alcohol use of about 9.6 oz of alcohol per week. He reports current drug use. Drugs: Inhaled and Marijuana.    Allergies  Allergies   Allergen Reactions    Ceclor [Cefaclor] Anaphylaxis    Cephalosporins Anaphylaxis    Pcn [Penicillins] Anaphylaxis     Medications  Prior to Admission Medications   Prescriptions Last Dose Informant Patient Reported? Taking?   Empagliflozin (JARDIANCE) 25 MG Tab   No No   Sig: Take 25 mg by mouth every day.   Semaglutide,0.25 or 0.5MG/DOS, (OZEMPIC, 0.25 OR 0.5 MG/DOSE,) 2 MG/1.5ML Solution Pen-injector  Historical Yes No   Sig: Inject 0.5 mg under the skin every 7 days.   atomoxetine (STRATTERA) 40 MG capsule   No No   Sig: Take 1 Capsule by mouth every day.   buPROPion (WELLBUTRIN SR) 200 MG SR tablet   No No   Sig: Take 1 Tablet by mouth every day.   hydrOXYzine pamoate (VISTARIL) 50 MG Cap   No No   Sig: Take 1 Capsule by mouth 3 times a day as needed for Anxiety.   lisinopril (PRINIVIL) 20 MG Tab   No No   Sig: Take 1 Tablet by mouth every day.   metFORMIN (GLUCOPHAGE) 500 MG Tab  Historical Yes No   Sig: Take 1,000 mg by mouth 2 times a day.   olanzapine (ZYPREXA) 15 MG tablet   No No   Sig: Take 1 Tablet by mouth every evening.   traZODone (DESYREL) 150 MG Tab  Historical Yes No   Sig: Take 150 mg by mouth at bedtime.      Facility-Administered Medications: None     Physical Exam  Temp:  [36.8 °C (98.3 °F)] 36.8 °C (98.3 °F)  Pulse:  [108-141] 108  Resp:  [18-22] 18  BP: (140-173)/(91-96) 144/92  SpO2:  [96 %-98 %] 97 %  Blood Pressure: (!) 140/91   Temperature: 36.8 °C (98.3 °F)   Pulse: (!) 112   Respiration: 20   Pulse Oximetry: 98 %     Physical Exam  Constitutional:       General: He is not in acute distress.     Appearance: He is not ill-appearing or diaphoretic.   HENT:      Head:  Atraumatic.      Right Ear: External ear normal.      Left Ear: External ear normal.      Nose: No congestion or rhinorrhea.      Mouth/Throat:      Mouth: Mucous membranes are dry.   Eyes:      General: No scleral icterus.        Right eye: No discharge.         Left eye: No discharge.      Pupils: Pupils are equal, round, and reactive to light.   Cardiovascular:      Rate and Rhythm: Regular rhythm. Tachycardia present.   Pulmonary:      Comments: Tachypnea.  Saturating well on room air.  Abdominal:      General: There is no distension.   Musculoskeletal:      Cervical back: Neck supple. No rigidity. No muscular tenderness.      Right lower leg: No edema.      Left lower leg: No edema.   Skin:     General: Skin is dry.      Capillary Refill: Capillary refill takes 2 to 3 seconds.      Coloration: Skin is not jaundiced or pale.   Neurological:      Mental Status: He is alert and oriented to person, place, and time.      Coordination: Coordination normal.   Psychiatric:         Mood and Affect: Mood normal.         Behavior: Behavior normal.       Laboratory:  Recent Labs     08/10/23  1240   WBC 8.7   RBC 5.52   HEMOGLOBIN 17.2   HEMATOCRIT 48.2   MCV 87.3   MCH 31.2   MCHC 35.7   RDW 45.3   PLATELETCT 292   MPV 9.9     Recent Labs     08/10/23  1240   SODIUM 137   POTASSIUM 3.5*   CHLORIDE 100   CO2 18*   GLUCOSE 134*   BUN 14   CREATININE 0.98   CALCIUM 9.5     Recent Labs     08/10/23  1240   ALTSGPT 58*   ASTSGOT 35   ALKPHOSPHAT 123*   TBILIRUBIN 0.9   LIPASE 15   GLUCOSE 134*         No results for input(s): NTPROBNP in the last 72 hours.      Recent Labs     08/10/23  1240 08/10/23  1454   TROPONINT 9 9     Imaging:  DX-CHEST-PORTABLE (1 VIEW)   Final Result      No acute cardiopulmonary disease evident.        I personally reviewed patient's EKG shows sinus tachycardia with a rate of 143, there is no ST elevation, there is abnormal repolarization pattern with flattening of T waves and T wave inversions in  lead II, aVL, lead III, aVF, QTc is 497.    Assessment/Plan:  Justification for Admission Status  I anticipate this patient will require at least two midnights for appropriate medical management, necessitating inpatient admission because the patient has pneumonia will require intravenous antibiotics.  In addition the patient have tachyarrhythmia due to recent methamphetamine use    Patient will need a Med/Surg bed on cardiology.  Patient has tachyarrhythmia and pneumonia.    * Lobar pneumonia (HCC)- (present on admission)  Assessment & Plan  I personally reviewed patient chest x-ray, it is concerning for left lower lobe pneumonia.  I will start intravenous antibiotics with Levaquin, follow on cultures and sensitivities.    Pain in the chest- (present on admission)  Assessment & Plan  Chest pain is likely due to methamphetamine use  Counseling.  Multimodal pain control.  Labetalol as needed.  EKG shows sinus tachycardia with a rate of 143, there is no ST elevation, there is abnormal repolarization pattern with flattening of T waves and T wave inversions in lead II, aVL, lead III, aVF, QTc is 497.  Initial troponin is within normal limits, I will trend  I ordered Stat EKG and troponin for recurrence of chest pain.   I will place on continuous cardiac monitoring.    Hypertensive urgency- (present on admission)  Assessment & Plan  Likely secondary to methamphetamine use.  I will start labetalol as needed for extreme hypertension.    Hypokalemia- (present on admission)  Assessment & Plan  Replacing, checking magnesium    Continue to monitor replace as needed     Type 2 diabetes mellitus without complication, without long-term current use of insulin (HCC)- (present on admission)  Assessment & Plan  With no hyperglycemia  I will start short acting insulin for now  I will order Accu-Checks, hypoglycemia protocol  Adjust according to blood sugars trend       VTE prophylaxis: SCDs/TEDs and enoxaparin ppx    Addendum, despite  extensive counseling the patient decided to leave AGAINST MEDICAL ADVICE.  He is alert and oriented x 4.  He understands and accept the risk of his decision.  He said he will call 911 or come back to the emergency room if he started to feel worse.

## 2023-08-15 LAB
BACTERIA BLD CULT: NORMAL
BACTERIA BLD CULT: NORMAL
SIGNIFICANT IND 70042: NORMAL
SIGNIFICANT IND 70042: NORMAL
SITE SITE: NORMAL
SITE SITE: NORMAL
SOURCE SOURCE: NORMAL
SOURCE SOURCE: NORMAL

## 2023-08-16 ENCOUNTER — APPOINTMENT (OUTPATIENT)
Dept: RADIOLOGY | Facility: MEDICAL CENTER | Age: 43
End: 2023-08-16
Attending: EMERGENCY MEDICINE
Payer: MEDICAID

## 2023-08-16 ENCOUNTER — HOSPITAL ENCOUNTER (EMERGENCY)
Facility: MEDICAL CENTER | Age: 43
End: 2023-08-16
Attending: EMERGENCY MEDICINE
Payer: MEDICAID

## 2023-08-16 VITALS
OXYGEN SATURATION: 98 % | HEIGHT: 75 IN | WEIGHT: 289.9 LBS | BODY MASS INDEX: 36.05 KG/M2 | RESPIRATION RATE: 22 BRPM | DIASTOLIC BLOOD PRESSURE: 88 MMHG | SYSTOLIC BLOOD PRESSURE: 161 MMHG | HEART RATE: 100 BPM | TEMPERATURE: 98.9 F

## 2023-08-16 DIAGNOSIS — F10.939 ALCOHOL WITHDRAWAL SYNDROME WITH COMPLICATION (HCC): ICD-10-CM

## 2023-08-16 DIAGNOSIS — F15.10 METHAMPHETAMINE USE (HCC): ICD-10-CM

## 2023-08-16 DIAGNOSIS — R07.9 ACUTE CHEST PAIN: ICD-10-CM

## 2023-08-16 LAB
ALBUMIN SERPL BCP-MCNC: 4.3 G/DL (ref 3.2–4.9)
ALBUMIN/GLOB SERPL: 1.6 G/DL
ALP SERPL-CCNC: 123 U/L (ref 30–99)
ALT SERPL-CCNC: 67 U/L (ref 2–50)
ANION GAP SERPL CALC-SCNC: 20 MMOL/L (ref 7–16)
AST SERPL-CCNC: 53 U/L (ref 12–45)
BASOPHILS # BLD AUTO: 0.8 % (ref 0–1.8)
BASOPHILS # BLD: 0.07 K/UL (ref 0–0.12)
BILIRUB SERPL-MCNC: 0.6 MG/DL (ref 0.1–1.5)
BUN SERPL-MCNC: 10 MG/DL (ref 8–22)
CALCIUM ALBUM COR SERPL-MCNC: 8.5 MG/DL (ref 8.5–10.5)
CALCIUM SERPL-MCNC: 8.7 MG/DL (ref 8.5–10.5)
CHLORIDE SERPL-SCNC: 100 MMOL/L (ref 96–112)
CO2 SERPL-SCNC: 18 MMOL/L (ref 20–33)
CREAT SERPL-MCNC: 0.93 MG/DL (ref 0.5–1.4)
EKG IMPRESSION: NORMAL
EOSINOPHIL # BLD AUTO: 0.06 K/UL (ref 0–0.51)
EOSINOPHIL NFR BLD: 0.7 % (ref 0–6.9)
ERYTHROCYTE [DISTWIDTH] IN BLOOD BY AUTOMATED COUNT: 45.7 FL (ref 35.9–50)
GFR SERPLBLD CREATININE-BSD FMLA CKD-EPI: 104 ML/MIN/1.73 M 2
GLOBULIN SER CALC-MCNC: 2.7 G/DL (ref 1.9–3.5)
GLUCOSE SERPL-MCNC: 126 MG/DL (ref 65–99)
HCT VFR BLD AUTO: 45.4 % (ref 42–52)
HGB BLD-MCNC: 16 G/DL (ref 14–18)
IMM GRANULOCYTES # BLD AUTO: 0.05 K/UL (ref 0–0.11)
IMM GRANULOCYTES NFR BLD AUTO: 0.6 % (ref 0–0.9)
LYMPHOCYTES # BLD AUTO: 3.18 K/UL (ref 1–4.8)
LYMPHOCYTES NFR BLD: 35.5 % (ref 22–41)
MCH RBC QN AUTO: 31 PG (ref 27–33)
MCHC RBC AUTO-ENTMCNC: 35.2 G/DL (ref 32.3–36.5)
MCV RBC AUTO: 88 FL (ref 81.4–97.8)
MONOCYTES # BLD AUTO: 0.56 K/UL (ref 0–0.85)
MONOCYTES NFR BLD AUTO: 6.2 % (ref 0–13.4)
NEUTROPHILS # BLD AUTO: 5.05 K/UL (ref 1.82–7.42)
NEUTROPHILS NFR BLD: 56.2 % (ref 44–72)
NRBC # BLD AUTO: 0 K/UL
NRBC BLD-RTO: 0 /100 WBC (ref 0–0.2)
PLATELET # BLD AUTO: 296 K/UL (ref 164–446)
PMV BLD AUTO: 10 FL (ref 9–12.9)
POTASSIUM SERPL-SCNC: 3.7 MMOL/L (ref 3.6–5.5)
PROT SERPL-MCNC: 7 G/DL (ref 6–8.2)
RBC # BLD AUTO: 5.16 M/UL (ref 4.7–6.1)
SODIUM SERPL-SCNC: 138 MMOL/L (ref 135–145)
TROPONIN T SERPL-MCNC: 7 NG/L (ref 6–19)
TROPONIN T SERPL-MCNC: 8 NG/L (ref 6–19)
WBC # BLD AUTO: 9 K/UL (ref 4.8–10.8)

## 2023-08-16 PROCEDURE — 36415 COLL VENOUS BLD VENIPUNCTURE: CPT

## 2023-08-16 PROCEDURE — 96374 THER/PROPH/DIAG INJ IV PUSH: CPT

## 2023-08-16 PROCEDURE — A9270 NON-COVERED ITEM OR SERVICE: HCPCS | Mod: UD | Performed by: EMERGENCY MEDICINE

## 2023-08-16 PROCEDURE — 93005 ELECTROCARDIOGRAM TRACING: CPT | Performed by: EMERGENCY MEDICINE

## 2023-08-16 PROCEDURE — 700111 HCHG RX REV CODE 636 W/ 250 OVERRIDE (IP): Mod: UD | Performed by: EMERGENCY MEDICINE

## 2023-08-16 PROCEDURE — 93005 ELECTROCARDIOGRAM TRACING: CPT

## 2023-08-16 PROCEDURE — 71045 X-RAY EXAM CHEST 1 VIEW: CPT

## 2023-08-16 PROCEDURE — 700102 HCHG RX REV CODE 250 W/ 637 OVERRIDE(OP): Mod: UD | Performed by: EMERGENCY MEDICINE

## 2023-08-16 PROCEDURE — 700105 HCHG RX REV CODE 258: Mod: JZ,UD | Performed by: EMERGENCY MEDICINE

## 2023-08-16 PROCEDURE — 80053 COMPREHEN METABOLIC PANEL: CPT

## 2023-08-16 PROCEDURE — 99285 EMERGENCY DEPT VISIT HI MDM: CPT

## 2023-08-16 PROCEDURE — 84484 ASSAY OF TROPONIN QUANT: CPT

## 2023-08-16 PROCEDURE — 85025 COMPLETE CBC W/AUTO DIFF WBC: CPT

## 2023-08-16 RX ORDER — LORAZEPAM 2 MG/ML
2 INJECTION INTRAMUSCULAR ONCE
Status: COMPLETED | OUTPATIENT
Start: 2023-08-16 | End: 2023-08-16

## 2023-08-16 RX ORDER — SODIUM CHLORIDE, SODIUM LACTATE, POTASSIUM CHLORIDE, CALCIUM CHLORIDE 600; 310; 30; 20 MG/100ML; MG/100ML; MG/100ML; MG/100ML
1000 INJECTION, SOLUTION INTRAVENOUS ONCE
Status: COMPLETED | OUTPATIENT
Start: 2023-08-16 | End: 2023-08-16

## 2023-08-16 RX ORDER — CHLORDIAZEPOXIDE HYDROCHLORIDE 25 MG/1
50 CAPSULE, GELATIN COATED ORAL ONCE
Status: COMPLETED | OUTPATIENT
Start: 2023-08-16 | End: 2023-08-16

## 2023-08-16 RX ADMIN — LORAZEPAM 2 MG: 2 INJECTION INTRAMUSCULAR; INTRAVENOUS at 10:45

## 2023-08-16 RX ADMIN — CHLORDIAZEPOXIDE HYDROCHLORIDE 50 MG: 25 CAPSULE ORAL at 13:59

## 2023-08-16 RX ADMIN — SODIUM CHLORIDE, POTASSIUM CHLORIDE, SODIUM LACTATE AND CALCIUM CHLORIDE 1000 ML: 600; 310; 30; 20 INJECTION, SOLUTION INTRAVENOUS at 13:02

## 2023-08-16 ASSESSMENT — FIBROSIS 4 INDEX: FIB4 SCORE: 0.68

## 2023-08-16 NOTE — ED NOTES
Pt brought back to room from triage, endorses meth use and started having CP and feeling like his heart is racing. Pt states he is going to have a heart attack. Pt offered support and advised he will be helped.  Labs collected and sent, pt on the monitor

## 2023-08-16 NOTE — ED NOTES
Pt given d/c papers and reviewed needing to refrain from meth use, and cutting down on alcohol. Pt verbalizes understanding. Pt ambulated w/ steady gait out of the dept w/ all belongings; cleared and stable for d/c after HR and BP improvement along w/ anxiety

## 2023-08-16 NOTE — ED NOTES
Pt medicated per MAR, pt continues to remove monitor cords despite advising he needs to keep them on

## 2023-08-16 NOTE — ED TRIAGE NOTES
"Chief Complaint   Patient presents with    Chest Pain     Pt reports he did meth last night. Now experiencing CP and palpitations. Pt also reports he drinks \"a gallon\" of alcohol daily. Last drink last night.      BP (!) 165/99   Pulse (!) 146   Temp 36.8 °C (98.3 °F) (Temporal)   Resp (!) 24   Ht 1.905 m (6' 3\")   Wt (!) 132 kg (289 lb 14.5 oz)   SpO2 100%   BMI 36.24 kg/m²     Pt ambulatory to triage for above.   "

## 2023-08-16 NOTE — ED NOTES
Repeat trop collected and sent, LR bolus initiated  Pt is more calm than initial presentation but remains anxious and requesting more Ativan, pt advised again to keep monitor cords on and to not remove them

## 2023-08-16 NOTE — ED NOTES
Pt medicated per MAR for elevated HR, agitation  Pt had repeatedly gotten up and anxious, feels like his heart will explode. Pt continues to try to be redirected

## 2023-08-16 NOTE — ED NOTES
Pt on the call light  again requesting Ativan w/ ongoing chest pain, pt advised HR has improved.  ERP notified

## 2023-08-16 NOTE — ED PROVIDER NOTES
"ED Provider Note    CHIEF COMPLAINT  Chief Complaint   Patient presents with    Chest Pain     Pt reports he did meth last night. Now experiencing CP and palpitations. Pt also reports he drinks \"a gallon\" of alcohol daily. Last drink last night.        HPI  Brannon Sharma is a 43 y.o. male who presents for evaluation of chest pain, anxiety, and palpitations.  He states his heart rate has been up to 170 for the last 4 hours, ever since he did methamphetamine.  He also says he drinks excessively every day, up to a gallon.  He notes that his last drink was last night and that he could be withdrawing as well.  He has had no vomiting or diarrhea and has no abdominal pain.  EXTERNAL RECORDS REVIEWED  Reviewed ED record from August 10 of this month for what sounds like an identical presentation.  ROS  Constitutional: No fevers or chills  Skin: No rashes  HEENT: No sore throat, runny nose  Neck: No neck pain  Chest: Central chest pain noted.  Pulm: No shortness of breath, cough, wheezing, stridor, or pain with inspiration/expiration  Gastrointestinal: No nausea, vomiting, diarrhea, constipation, bloating, melena, hematochezia or abdominal pain.  Genitourinary: No dysuria or hematuria  Musculoskeletal: No pain, swelling, or focal weakness  Neurologic: No sensory or focal motor changes to extremities. No confusion or disorientation.  Psych: Anxious  Heme: No bleeding or bruising problems.   Immuno: No hx of recurrent infections        LIMITATION TO HISTORY   None  OUTSIDE HISTORIAN(S):  None        PAST FAM HISTORY  Family History   Family history unknown: Yes       PAST MEDICAL HISTORY   has a past medical history of Alcohol-induced psychosis (HCC), Anxiety, Depression, Paranoid schizophrenia (HCC), Psychiatric disorder, Schizophrenia, paranoid (HCC), and Suicidal ideations.    SOCIAL HISTORY  Social History     Tobacco Use    Smoking status: Every Day     Packs/day: 1     Types: Cigarettes    Smokeless tobacco: " "Never   Vaping Use    Vaping Use: Every day    Substances: Nicotine    Devices: Pre-filled or refillable cartridge   Substance and Sexual Activity    Alcohol use: Yes     Alcohol/week: 9.6 oz     Types: 16 Cans of beer per week    Drug use: Yes     Types: Inhaled, Marijuana     Comment: Meth daily    Sexual activity: Yes     Partners: Female       SURGICAL HISTORY   has a past surgical history that includes other and nasal septal reconstruction.    CURRENT MEDICATIONS  Home Medications       Reviewed by Phyllis Kay R.N. (Registered Nurse) on 08/16/23 at 0957  Med List Status: Partial     Medication Last Dose Status   carBAMazepine (TEGRETOL) 200 MG Tab  Active   gabapentin (NEURONTIN) 100 MG Cap  Active   risperiDONE (RISPERDAL) 2 MG Tab  Active   sertraline (ZOLOFT) 100 MG Tab  Active   traZODone (DESYREL) 150 MG Tab  Active                     ALLERGIES  Allergies   Allergen Reactions    Ceclor [Cefaclor] Anaphylaxis    Cephalosporins Anaphylaxis    Pcn [Penicillins] Anaphylaxis       PHYSICAL EXAM  VITAL SIGNS: BP (!) 161/88   Pulse 100   Temp 37.2 °C (98.9 °F) (Temporal)   Resp (!) 22   Ht 1.905 m (6' 3\")   Wt (!) 132 kg (289 lb 14.5 oz)   SpO2 98%   BMI 36.24 kg/m²    Gen: Alert, anxious, restless, animated  HEENT: No signs of trauma, Bilateral external ears normal, Nose normal. Conjunctiva normal, Non-icteric.   Neck:  No tenderness, Supple, No masses  Lymphatic: No cervical lymphadenopathy noted.   Cardiovascular: Tachycardia with regular rhythm, no murmurs.  Capillary refill less than 3 seconds to all extremities, 2+ distal pulses.  Thorax & Lungs: Normal breath sounds, apnea but no respiratory distress, No wheezing bilateral chest rise  Abdomen: Bowel sounds normal, Soft, No tenderness, No masses, No pulsatile masses. No Guarding or rebound  Skin: Warm, Dry, No erythema, No rash noted to exposed areas.   Extremities: Intact distal pulses, No edema  Neurologic: Alert , no facial droop, grossly " normal coordination and strength  Psychiatric: Affect anxious    INITIAL IMPRESSION  Patient arrives with symptoms that are very consistent with sympathomimetic abuse and/or alcohol withdrawal.  Likely it is the combination of the 2.  He is not hemodynamically unstable but is fairly tachycardic.  He is quite anxious about his chest pain and will need a cardiac work-up to include troponin and EKG as well.  He has good breath sounds bilaterally but is mildly tachypneic.  He is not requiring supplemental oxygen and does not appear toxic.  He will be given 2 mg of IV Ativan as he is quite anxious and is not staying in the room.  He is following commands but needs to be redirected frequently.  He denies any suicidal intent and will need to be watched closely for hemodynamic deterioration as he may be withdrawing from alcohol and seizures are possible.  LABS  Results for orders placed or performed during the hospital encounter of 08/16/23   CBC with Differential   Result Value Ref Range    WBC 9.0 4.8 - 10.8 K/uL    RBC 5.16 4.70 - 6.10 M/uL    Hemoglobin 16.0 14.0 - 18.0 g/dL    Hematocrit 45.4 42.0 - 52.0 %    MCV 88.0 81.4 - 97.8 fL    MCH 31.0 27.0 - 33.0 pg    MCHC 35.2 32.3 - 36.5 g/dL    RDW 45.7 35.9 - 50.0 fL    Platelet Count 296 164 - 446 K/uL    MPV 10.0 9.0 - 12.9 fL    Neutrophils-Polys 56.20 44.00 - 72.00 %    Lymphocytes 35.50 22.00 - 41.00 %    Monocytes 6.20 0.00 - 13.40 %    Eosinophils 0.70 0.00 - 6.90 %    Basophils 0.80 0.00 - 1.80 %    Immature Granulocytes 0.60 0.00 - 0.90 %    Nucleated RBC 0.00 0.00 - 0.20 /100 WBC    Neutrophils (Absolute) 5.05 1.82 - 7.42 K/uL    Lymphs (Absolute) 3.18 1.00 - 4.80 K/uL    Monos (Absolute) 0.56 0.00 - 0.85 K/uL    Eos (Absolute) 0.06 0.00 - 0.51 K/uL    Baso (Absolute) 0.07 0.00 - 0.12 K/uL    Immature Granulocytes (abs) 0.05 0.00 - 0.11 K/uL    NRBC (Absolute) 0.00 K/uL   Complete Metabolic Panel (CMP)   Result Value Ref Range    Sodium 138 135 - 145 mmol/L     Potassium 3.7 3.6 - 5.5 mmol/L    Chloride 100 96 - 112 mmol/L    Co2 18 (L) 20 - 33 mmol/L    Anion Gap 20.0 (H) 7.0 - 16.0    Glucose 126 (H) 65 - 99 mg/dL    Bun 10 8 - 22 mg/dL    Creatinine 0.93 0.50 - 1.40 mg/dL    Calcium 8.7 8.5 - 10.5 mg/dL    Correct Calcium 8.5 8.5 - 10.5 mg/dL    AST(SGOT) 53 (H) 12 - 45 U/L    ALT(SGPT) 67 (H) 2 - 50 U/L    Alkaline Phosphatase 123 (H) 30 - 99 U/L    Total Bilirubin 0.6 0.1 - 1.5 mg/dL    Albumin 4.3 3.2 - 4.9 g/dL    Total Protein 7.0 6.0 - 8.2 g/dL    Globulin 2.7 1.9 - 3.5 g/dL    A-G Ratio 1.6 g/dL   Troponins NOW   Result Value Ref Range    Troponin T 7 6 - 19 ng/L   Troponins in two (2) hours   Result Value Ref Range    Troponin T 8 6 - 19 ng/L   ESTIMATED GFR   Result Value Ref Range    GFR (CKD-EPI) 104 >60 mL/min/1.73 m 2   EKG   Result Value Ref Range    Report       [facility] Emergency Dept.    Test Date:  2023  Pt Name:    OSITO BLACK               Department: ER  MRN:        [id number]                      Room:  Gender:     Male                         Technician: [id number]  :        1980                   Requested By:ER TRIAGE PROTOCOL  Order #:    [id number]                    Reading MD:    Measurements  Intervals                                Axis  Rate:       137                          P:          43  OR:         119                          QRS:        -82  QRSD:       100                          T:          48  QT:         298  QTc:        450    Interpretive Statements  Sinus tachycardia  Incomplete RBBB and LAFB  Low voltage, precordial leads  Abnormal R-wave progression, late transition  Compared to ECG 08/10/2023 12:26:18  Left anterior fascicular block now present  Right bundle-branch block now present       I have independently interpreted this EKG  Sinus tachycardia rate of 137, it is possible there is an incomplete right bundle branch block however this is very similar to his previous EKG performed on the  10th of this month.  He has abnormal R wave progression but, again, this is not new.  He has no convincing ST or T wave changes to suggest ischemia and no ectopy.  RADIOLOGY  DX-CHEST-PORTABLE (1 VIEW)   Final Result         1. Peribronchial thickening and interstitial prominence could relate to chronic change or mild fluid overload.           I have independently interpreted the diagnostic imaging associated with this visit and am waiting the final reading from the radiologist.   My preliminary interpretation is a follows: Single view chest x-ray: No convincing pulmonary opacities to suggest lobar infiltrates or effusions.  Cardiomediastinal silhouette appears to be within normal limits, there are no bony abnormalities    HYDRATION: Based on the patient's presentation of Tachycardia the patient was given IV fluids. IV Hydration was used because oral hydration was not as rapid as required. Upon recheck following hydration, the patient was improving.        COURSE & MEDICAL DECISION MAKING  Pertinent Labs & Imaging studies reviewed. (See chart for details)  ED observation? No  Patient arrived for evaluation of what appears to be a combination of sympathomimetic abuse and alcohol withdrawal.  This resolved to a significant degree after hydration and empiric treatment with Ativan.  I did offer Librium if he wished to attempt quitting alcohol however he noted that he was not planning on stopping drinking.  He states understanding that if he continues with the substance abuse he will likely suffer permanent injury and even death.  At this point I do not feel it worthwhile bringing him into the hospital for rule out and the patient does not want to stay anyway.  Patient has a heart score of 1.    I have discussed management of the patient with the following physicians and TANNER's: None     Escalation of care considered, and ultimately not performed:CT imaging    Barriers to care at this time, including but not limited to:  None.     Decision tools and Rx drugs considered including, but not limited to : Ativan Librium    Discussion of management with other Q or appropriate source(s): None    The patient will not drink alcohol nor drive with prescribed medications. The patient will return for worsening symptoms and is stable at the time of discharge. The patient verbalizes understanding and will comply.    FINAL IMPRESSION  1. Acute chest pain    2. Alcohol withdrawal syndrome with complication (HCC)    3. Methamphetamine use (HCC)        Electronically signed by: Agustín Arnold M.D., 8/16/2023 10:45 AM

## 2023-08-20 ENCOUNTER — HOSPITAL ENCOUNTER (INPATIENT)
Facility: MEDICAL CENTER | Age: 43
LOS: 1 days | DRG: 894 | End: 2023-08-21
Attending: EMERGENCY MEDICINE | Admitting: INTERNAL MEDICINE
Payer: MEDICAID

## 2023-08-20 ENCOUNTER — APPOINTMENT (OUTPATIENT)
Dept: RADIOLOGY | Facility: MEDICAL CENTER | Age: 43
DRG: 894 | End: 2023-08-20
Attending: EMERGENCY MEDICINE
Payer: MEDICAID

## 2023-08-20 DIAGNOSIS — R07.9 ACUTE CHEST PAIN: ICD-10-CM

## 2023-08-20 DIAGNOSIS — F10.939 ALCOHOL WITHDRAWAL SYNDROME WITH COMPLICATION (HCC): ICD-10-CM

## 2023-08-20 PROBLEM — Z72.0 TOBACCO ABUSE: Status: ACTIVE | Noted: 2023-08-20

## 2023-08-20 PROBLEM — E11.9 TYPE 2 DIABETES MELLITUS WITHOUT COMPLICATION, WITHOUT LONG-TERM CURRENT USE OF INSULIN (HCC): Status: RESOLVED | Noted: 2023-08-10 | Resolved: 2023-08-20

## 2023-08-20 PROBLEM — R74.01 TRANSAMINITIS: Status: ACTIVE | Noted: 2023-08-20

## 2023-08-20 LAB
ALBUMIN SERPL BCP-MCNC: 4.4 G/DL (ref 3.2–4.9)
ALBUMIN/GLOB SERPL: 1.5 G/DL
ALP SERPL-CCNC: 132 U/L (ref 30–99)
ALT SERPL-CCNC: 66 U/L (ref 2–50)
ANION GAP SERPL CALC-SCNC: 12 MMOL/L (ref 7–16)
AST SERPL-CCNC: 51 U/L (ref 12–45)
BASOPHILS # BLD AUTO: 1.1 % (ref 0–1.8)
BASOPHILS # BLD: 0.07 K/UL (ref 0–0.12)
BILIRUB SERPL-MCNC: 0.5 MG/DL (ref 0.1–1.5)
BUN SERPL-MCNC: 6 MG/DL (ref 8–22)
CALCIUM ALBUM COR SERPL-MCNC: 9.2 MG/DL (ref 8.5–10.5)
CALCIUM SERPL-MCNC: 9.5 MG/DL (ref 8.5–10.5)
CARBAMAZEPINE SERPL-MCNC: <2 UG/ML (ref 4–12)
CHLORIDE SERPL-SCNC: 105 MMOL/L (ref 96–112)
CO2 SERPL-SCNC: 23 MMOL/L (ref 20–33)
CREAT SERPL-MCNC: 0.97 MG/DL (ref 0.5–1.4)
EKG IMPRESSION: NORMAL
EOSINOPHIL # BLD AUTO: 0.3 K/UL (ref 0–0.51)
EOSINOPHIL NFR BLD: 4.6 % (ref 0–6.9)
ERYTHROCYTE [DISTWIDTH] IN BLOOD BY AUTOMATED COUNT: 47.2 FL (ref 35.9–50)
GFR SERPLBLD CREATININE-BSD FMLA CKD-EPI: 99 ML/MIN/1.73 M 2
GLOBULIN SER CALC-MCNC: 2.9 G/DL (ref 1.9–3.5)
GLUCOSE SERPL-MCNC: 137 MG/DL (ref 65–99)
HCT VFR BLD AUTO: 47.3 % (ref 42–52)
HGB BLD-MCNC: 16.4 G/DL (ref 14–18)
IMM GRANULOCYTES # BLD AUTO: 0.02 K/UL (ref 0–0.11)
IMM GRANULOCYTES NFR BLD AUTO: 0.3 % (ref 0–0.9)
LYMPHOCYTES # BLD AUTO: 2.76 K/UL (ref 1–4.8)
LYMPHOCYTES NFR BLD: 42.3 % (ref 22–41)
MCH RBC QN AUTO: 31.5 PG (ref 27–33)
MCHC RBC AUTO-ENTMCNC: 34.7 G/DL (ref 32.3–36.5)
MCV RBC AUTO: 91 FL (ref 81.4–97.8)
MONOCYTES # BLD AUTO: 0.63 K/UL (ref 0–0.85)
MONOCYTES NFR BLD AUTO: 9.6 % (ref 0–13.4)
NEUTROPHILS # BLD AUTO: 2.75 K/UL (ref 1.82–7.42)
NEUTROPHILS NFR BLD: 42.1 % (ref 44–72)
NRBC # BLD AUTO: 0 K/UL
NRBC BLD-RTO: 0 /100 WBC (ref 0–0.2)
PLATELET # BLD AUTO: 219 K/UL (ref 164–446)
PMV BLD AUTO: 10.1 FL (ref 9–12.9)
POTASSIUM SERPL-SCNC: 3.5 MMOL/L (ref 3.6–5.5)
PROT SERPL-MCNC: 7.3 G/DL (ref 6–8.2)
RBC # BLD AUTO: 5.2 M/UL (ref 4.7–6.1)
SODIUM SERPL-SCNC: 140 MMOL/L (ref 135–145)
TROPONIN T SERPL-MCNC: 6 NG/L (ref 6–19)
TROPONIN T SERPL-MCNC: 8 NG/L (ref 6–19)
WBC # BLD AUTO: 6.5 K/UL (ref 4.8–10.8)

## 2023-08-20 PROCEDURE — 700105 HCHG RX REV CODE 258: Mod: UD | Performed by: EMERGENCY MEDICINE

## 2023-08-20 PROCEDURE — 700101 HCHG RX REV CODE 250: Mod: UD | Performed by: EMERGENCY MEDICINE

## 2023-08-20 PROCEDURE — 770020 HCHG ROOM/CARE - TELE (206)

## 2023-08-20 PROCEDURE — 99223 1ST HOSP IP/OBS HIGH 75: CPT | Mod: 25 | Performed by: INTERNAL MEDICINE

## 2023-08-20 PROCEDURE — HZ2ZZZZ DETOXIFICATION SERVICES FOR SUBSTANCE ABUSE TREATMENT: ICD-10-PCS | Performed by: EMERGENCY MEDICINE

## 2023-08-20 PROCEDURE — 85025 COMPLETE CBC W/AUTO DIFF WBC: CPT

## 2023-08-20 PROCEDURE — 96374 THER/PROPH/DIAG INJ IV PUSH: CPT

## 2023-08-20 PROCEDURE — 93005 ELECTROCARDIOGRAM TRACING: CPT

## 2023-08-20 PROCEDURE — 84484 ASSAY OF TROPONIN QUANT: CPT | Mod: 91

## 2023-08-20 PROCEDURE — 99285 EMERGENCY DEPT VISIT HI MDM: CPT

## 2023-08-20 PROCEDURE — 71045 X-RAY EXAM CHEST 1 VIEW: CPT

## 2023-08-20 PROCEDURE — 96375 TX/PRO/DX INJ NEW DRUG ADDON: CPT

## 2023-08-20 PROCEDURE — 99406 BEHAV CHNG SMOKING 3-10 MIN: CPT

## 2023-08-20 PROCEDURE — 700102 HCHG RX REV CODE 250 W/ 637 OVERRIDE(OP): Performed by: INTERNAL MEDICINE

## 2023-08-20 PROCEDURE — 700111 HCHG RX REV CODE 636 W/ 250 OVERRIDE (IP): Mod: JZ | Performed by: INTERNAL MEDICINE

## 2023-08-20 PROCEDURE — 99406 BEHAV CHNG SMOKING 3-10 MIN: CPT | Performed by: INTERNAL MEDICINE

## 2023-08-20 PROCEDURE — 80156 ASSAY CARBAMAZEPINE TOTAL: CPT

## 2023-08-20 PROCEDURE — 700111 HCHG RX REV CODE 636 W/ 250 OVERRIDE (IP): Mod: UD | Performed by: EMERGENCY MEDICINE

## 2023-08-20 PROCEDURE — 36415 COLL VENOUS BLD VENIPUNCTURE: CPT

## 2023-08-20 PROCEDURE — 700105 HCHG RX REV CODE 258: Performed by: INTERNAL MEDICINE

## 2023-08-20 PROCEDURE — A9270 NON-COVERED ITEM OR SERVICE: HCPCS | Performed by: INTERNAL MEDICINE

## 2023-08-20 PROCEDURE — 93005 ELECTROCARDIOGRAM TRACING: CPT | Performed by: EMERGENCY MEDICINE

## 2023-08-20 PROCEDURE — 94760 N-INVAS EAR/PLS OXIMETRY 1: CPT

## 2023-08-20 PROCEDURE — 96372 THER/PROPH/DIAG INJ SC/IM: CPT

## 2023-08-20 PROCEDURE — 80053 COMPREHEN METABOLIC PANEL: CPT

## 2023-08-20 RX ORDER — CARBAMAZEPINE 200 MG/1
200 TABLET ORAL DAILY
Status: DISCONTINUED | OUTPATIENT
Start: 2023-08-21 | End: 2023-08-21 | Stop reason: HOSPADM

## 2023-08-20 RX ORDER — RISPERIDONE 1 MG/1
2 TABLET ORAL EVERY EVENING
Status: DISCONTINUED | OUTPATIENT
Start: 2023-08-20 | End: 2023-08-21 | Stop reason: HOSPADM

## 2023-08-20 RX ORDER — GAUZE BANDAGE 2" X 2"
100 BANDAGE TOPICAL DAILY
Status: DISCONTINUED | OUTPATIENT
Start: 2023-08-21 | End: 2023-08-21 | Stop reason: HOSPADM

## 2023-08-20 RX ORDER — FOLIC ACID 1 MG/1
1 TABLET ORAL DAILY
Status: DISCONTINUED | OUTPATIENT
Start: 2023-08-21 | End: 2023-08-21 | Stop reason: HOSPADM

## 2023-08-20 RX ORDER — ONDANSETRON 4 MG/1
4 TABLET, ORALLY DISINTEGRATING ORAL EVERY 4 HOURS PRN
Status: DISCONTINUED | OUTPATIENT
Start: 2023-08-20 | End: 2023-08-21 | Stop reason: HOSPADM

## 2023-08-20 RX ORDER — CHLORDIAZEPOXIDE HYDROCHLORIDE 25 MG/1
50 CAPSULE, GELATIN COATED ORAL EVERY 6 HOURS
Status: COMPLETED | OUTPATIENT
Start: 2023-08-20 | End: 2023-08-21

## 2023-08-20 RX ORDER — BISACODYL 10 MG
10 SUPPOSITORY, RECTAL RECTAL
Status: DISCONTINUED | OUTPATIENT
Start: 2023-08-20 | End: 2023-08-21 | Stop reason: HOSPADM

## 2023-08-20 RX ORDER — LORAZEPAM 2 MG/ML
1.5 INJECTION INTRAMUSCULAR
Status: DISCONTINUED | OUTPATIENT
Start: 2023-08-20 | End: 2023-08-20

## 2023-08-20 RX ORDER — LORAZEPAM 2 MG/ML
1 INJECTION INTRAMUSCULAR
Status: DISCONTINUED | OUTPATIENT
Start: 2023-08-20 | End: 2023-08-21 | Stop reason: HOSPADM

## 2023-08-20 RX ORDER — PROCHLORPERAZINE EDISYLATE 5 MG/ML
5-10 INJECTION INTRAMUSCULAR; INTRAVENOUS EVERY 4 HOURS PRN
Status: DISCONTINUED | OUTPATIENT
Start: 2023-08-20 | End: 2023-08-21 | Stop reason: HOSPADM

## 2023-08-20 RX ORDER — LORAZEPAM 2 MG/1
2 TABLET ORAL
Status: DISCONTINUED | OUTPATIENT
Start: 2023-08-20 | End: 2023-08-21 | Stop reason: HOSPADM

## 2023-08-20 RX ORDER — PROMETHAZINE HYDROCHLORIDE 25 MG/1
12.5-25 TABLET ORAL EVERY 4 HOURS PRN
Status: DISCONTINUED | OUTPATIENT
Start: 2023-08-20 | End: 2023-08-21 | Stop reason: HOSPADM

## 2023-08-20 RX ORDER — GAUZE BANDAGE 2" X 2"
100 BANDAGE TOPICAL DAILY
Status: DISCONTINUED | OUTPATIENT
Start: 2023-08-21 | End: 2023-08-20

## 2023-08-20 RX ORDER — LORAZEPAM 2 MG/1
4 TABLET ORAL
Status: DISCONTINUED | OUTPATIENT
Start: 2023-08-20 | End: 2023-08-21 | Stop reason: HOSPADM

## 2023-08-20 RX ORDER — POLYETHYLENE GLYCOL 3350 17 G/17G
1 POWDER, FOR SOLUTION ORAL
Status: DISCONTINUED | OUTPATIENT
Start: 2023-08-20 | End: 2023-08-21 | Stop reason: HOSPADM

## 2023-08-20 RX ORDER — SODIUM CHLORIDE 9 MG/ML
INJECTION, SOLUTION INTRAVENOUS CONTINUOUS
Status: DISCONTINUED | OUTPATIENT
Start: 2023-08-20 | End: 2023-08-21 | Stop reason: HOSPADM

## 2023-08-20 RX ORDER — OXYCODONE HYDROCHLORIDE 5 MG/1
5 TABLET ORAL
Status: DISCONTINUED | OUTPATIENT
Start: 2023-08-20 | End: 2023-08-21 | Stop reason: HOSPADM

## 2023-08-20 RX ORDER — NICOTINE 21 MG/24HR
21 PATCH, TRANSDERMAL 24 HOURS TRANSDERMAL
Status: DISCONTINUED | OUTPATIENT
Start: 2023-08-20 | End: 2023-08-21 | Stop reason: HOSPADM

## 2023-08-20 RX ORDER — HYDROMORPHONE HYDROCHLORIDE 1 MG/ML
0.25 INJECTION, SOLUTION INTRAMUSCULAR; INTRAVENOUS; SUBCUTANEOUS
Status: DISCONTINUED | OUTPATIENT
Start: 2023-08-20 | End: 2023-08-21 | Stop reason: HOSPADM

## 2023-08-20 RX ORDER — LABETALOL HYDROCHLORIDE 5 MG/ML
10 INJECTION, SOLUTION INTRAVENOUS EVERY 4 HOURS PRN
Status: DISCONTINUED | OUTPATIENT
Start: 2023-08-20 | End: 2023-08-21 | Stop reason: HOSPADM

## 2023-08-20 RX ORDER — ONDANSETRON 2 MG/ML
4 INJECTION INTRAMUSCULAR; INTRAVENOUS EVERY 4 HOURS PRN
Status: DISCONTINUED | OUTPATIENT
Start: 2023-08-20 | End: 2023-08-21 | Stop reason: HOSPADM

## 2023-08-20 RX ORDER — LORAZEPAM 2 MG/ML
1 INJECTION INTRAMUSCULAR
Status: DISCONTINUED | OUTPATIENT
Start: 2023-08-20 | End: 2023-08-20

## 2023-08-20 RX ORDER — CHLORDIAZEPOXIDE HYDROCHLORIDE 25 MG/1
25 CAPSULE, GELATIN COATED ORAL EVERY 6 HOURS
Status: DISCONTINUED | OUTPATIENT
Start: 2023-08-21 | End: 2023-08-21 | Stop reason: HOSPADM

## 2023-08-20 RX ORDER — LORAZEPAM 2 MG/1
2 TABLET ORAL
Status: DISCONTINUED | OUTPATIENT
Start: 2023-08-20 | End: 2023-08-20

## 2023-08-20 RX ORDER — LORAZEPAM 2 MG/ML
2 INJECTION INTRAMUSCULAR
Status: DISCONTINUED | OUTPATIENT
Start: 2023-08-20 | End: 2023-08-20

## 2023-08-20 RX ORDER — POTASSIUM CHLORIDE 20 MEQ/1
40 TABLET, EXTENDED RELEASE ORAL ONCE
Status: COMPLETED | OUTPATIENT
Start: 2023-08-20 | End: 2023-08-20

## 2023-08-20 RX ORDER — LORAZEPAM 2 MG/ML
2 INJECTION INTRAMUSCULAR
Status: DISCONTINUED | OUTPATIENT
Start: 2023-08-20 | End: 2023-08-21 | Stop reason: HOSPADM

## 2023-08-20 RX ORDER — LORAZEPAM 1 MG/1
1 TABLET ORAL EVERY 4 HOURS PRN
Status: DISCONTINUED | OUTPATIENT
Start: 2023-08-20 | End: 2023-08-20

## 2023-08-20 RX ORDER — LORAZEPAM 1 MG/1
1 TABLET ORAL EVERY 4 HOURS PRN
Status: DISCONTINUED | OUTPATIENT
Start: 2023-08-20 | End: 2023-08-21 | Stop reason: HOSPADM

## 2023-08-20 RX ORDER — LORAZEPAM 2 MG/ML
1.5 INJECTION INTRAMUSCULAR
Status: DISCONTINUED | OUTPATIENT
Start: 2023-08-20 | End: 2023-08-21 | Stop reason: HOSPADM

## 2023-08-20 RX ORDER — AMOXICILLIN 250 MG
2 CAPSULE ORAL 2 TIMES DAILY
Status: DISCONTINUED | OUTPATIENT
Start: 2023-08-20 | End: 2023-08-21 | Stop reason: HOSPADM

## 2023-08-20 RX ORDER — LORAZEPAM 2 MG/ML
0.5 INJECTION INTRAMUSCULAR EVERY 4 HOURS PRN
Status: DISCONTINUED | OUTPATIENT
Start: 2023-08-20 | End: 2023-08-20

## 2023-08-20 RX ORDER — FOLIC ACID 1 MG/1
1 TABLET ORAL DAILY
Status: DISCONTINUED | OUTPATIENT
Start: 2023-08-21 | End: 2023-08-20

## 2023-08-20 RX ORDER — ACETAMINOPHEN 325 MG/1
650 TABLET ORAL EVERY 6 HOURS PRN
Status: DISCONTINUED | OUTPATIENT
Start: 2023-08-20 | End: 2023-08-21 | Stop reason: HOSPADM

## 2023-08-20 RX ORDER — PROMETHAZINE HYDROCHLORIDE 25 MG/1
12.5-25 SUPPOSITORY RECTAL EVERY 4 HOURS PRN
Status: DISCONTINUED | OUTPATIENT
Start: 2023-08-20 | End: 2023-08-21 | Stop reason: HOSPADM

## 2023-08-20 RX ORDER — ENOXAPARIN SODIUM 100 MG/ML
40 INJECTION SUBCUTANEOUS DAILY
Status: DISCONTINUED | OUTPATIENT
Start: 2023-08-20 | End: 2023-08-21 | Stop reason: HOSPADM

## 2023-08-20 RX ORDER — LORAZEPAM 1 MG/1
0.5 TABLET ORAL EVERY 4 HOURS PRN
Status: DISCONTINUED | OUTPATIENT
Start: 2023-08-20 | End: 2023-08-20

## 2023-08-20 RX ORDER — LORAZEPAM 2 MG/1
4 TABLET ORAL
Status: DISCONTINUED | OUTPATIENT
Start: 2023-08-20 | End: 2023-08-20

## 2023-08-20 RX ORDER — LORAZEPAM 2 MG/ML
0.5 INJECTION INTRAMUSCULAR EVERY 4 HOURS PRN
Status: DISCONTINUED | OUTPATIENT
Start: 2023-08-20 | End: 2023-08-21 | Stop reason: HOSPADM

## 2023-08-20 RX ORDER — OXYCODONE HYDROCHLORIDE 5 MG/1
2.5 TABLET ORAL
Status: DISCONTINUED | OUTPATIENT
Start: 2023-08-20 | End: 2023-08-21 | Stop reason: HOSPADM

## 2023-08-20 RX ORDER — LORAZEPAM 0.5 MG/1
0.5 TABLET ORAL EVERY 4 HOURS PRN
Status: DISCONTINUED | OUTPATIENT
Start: 2023-08-20 | End: 2023-08-21 | Stop reason: HOSPADM

## 2023-08-20 RX ORDER — GABAPENTIN 100 MG/1
100 CAPSULE ORAL 3 TIMES DAILY
Status: DISCONTINUED | OUTPATIENT
Start: 2023-08-20 | End: 2023-08-21 | Stop reason: HOSPADM

## 2023-08-20 RX ADMIN — TRAZODONE HYDROCHLORIDE 150 MG: 50 TABLET ORAL at 21:15

## 2023-08-20 RX ADMIN — POTASSIUM CHLORIDE 40 MEQ: 1500 TABLET, EXTENDED RELEASE ORAL at 16:29

## 2023-08-20 RX ADMIN — GABAPENTIN 100 MG: 100 CAPSULE ORAL at 16:29

## 2023-08-20 RX ADMIN — SODIUM CHLORIDE: 9 INJECTION, SOLUTION INTRAVENOUS at 21:17

## 2023-08-20 RX ADMIN — LORAZEPAM 0.5 MG: 2 INJECTION INTRAMUSCULAR; INTRAVENOUS at 14:23

## 2023-08-20 RX ADMIN — CHLORDIAZEPOXIDE HYDROCHLORIDE 50 MG: 25 CAPSULE ORAL at 23:11

## 2023-08-20 RX ADMIN — SODIUM CHLORIDE: 9 INJECTION, SOLUTION INTRAVENOUS at 18:09

## 2023-08-20 RX ADMIN — MAGNESIUM SULFATE HEPTAHYDRATE: 500 INJECTION, SOLUTION INTRAMUSCULAR; INTRAVENOUS at 14:22

## 2023-08-20 RX ADMIN — CHLORDIAZEPOXIDE HYDROCHLORIDE 50 MG: 25 CAPSULE ORAL at 18:08

## 2023-08-20 RX ADMIN — NICOTINE TRANSDERMAL SYSTEM 21 MG: 21 PATCH, EXTENDED RELEASE TRANSDERMAL at 18:08

## 2023-08-20 RX ADMIN — GABAPENTIN 100 MG: 100 CAPSULE ORAL at 21:15

## 2023-08-20 RX ADMIN — RISPERIDONE 2 MG: 2 TABLET ORAL at 18:08

## 2023-08-20 RX ADMIN — SENNOSIDES AND DOCUSATE SODIUM 2 TABLET: 50; 8.6 TABLET ORAL at 18:07

## 2023-08-20 RX ADMIN — ENOXAPARIN SODIUM 40 MG: 100 INJECTION SUBCUTANEOUS at 18:07

## 2023-08-20 ASSESSMENT — ENCOUNTER SYMPTOMS
VOMITING: 0
ABDOMINAL PAIN: 0
NAUSEA: 0
HEADACHES: 0
DIZZINESS: 0
CHILLS: 0
SHORTNESS OF BREATH: 0
DEPRESSION: 0
FEVER: 0
MYALGIAS: 0
BLURRED VISION: 0
TREMORS: 1
NERVOUS/ANXIOUS: 1

## 2023-08-20 ASSESSMENT — LIFESTYLE VARIABLES
TOTAL SCORE: 4
AGITATION: NORMAL ACTIVITY
ON A TYPICAL DAY WHEN YOU DRINK ALCOHOL HOW MANY DRINKS DO YOU HAVE: 5
ANXIETY: MODERATELY ANXIOUS OR GUARDED, SO ANXIETY IS INFERRED
TREMOR: *
ORIENTATION AND CLOUDING OF SENSORIUM: ORIENTED AND CAN DO SERIAL ADDITIONS
ORIENTATION AND CLOUDING OF SENSORIUM: ORIENTED AND CAN DO SERIAL ADDITIONS
NAUSEA AND VOMITING: NO NAUSEA AND NO VOMITING
TOTAL SCORE: 4
AGITATION: NORMAL ACTIVITY
AVERAGE NUMBER OF DAYS PER WEEK YOU HAVE A DRINK CONTAINING ALCOHOL: 7
HAVE YOU EVER FELT YOU SHOULD CUT DOWN ON YOUR DRINKING: YES
TOTAL SCORE: 4
SUBSTANCE_ABUSE: 1
AVERAGE NUMBER OF DAYS PER WEEK YOU HAVE A DRINK CONTAINING ALCOHOL: 7
PAROXYSMAL SWEATS: BARELY PERCEPTIBLE SWEATING. PALMS MOIST
VISUAL DISTURBANCES: NOT PRESENT
CONSUMPTION TOTAL: POSITIVE
CONSUMPTION TOTAL: POSITIVE
TOTAL SCORE: 4
AUDITORY DISTURBANCES: NOT PRESENT
HAVE YOU EVER FELT YOU SHOULD CUT DOWN ON YOUR DRINKING: YES
TREMOR: *
ALCOHOL_USE: YES
VISUAL DISTURBANCES: NOT PRESENT
TOTAL SCORE: 9
DO YOU DRINK ALCOHOL: YES
ANXIETY: MODERATELY ANXIOUS OR GUARDED, SO ANXIETY IS INFERRED
HEADACHE, FULLNESS IN HEAD: MILD
TOTAL SCORE: 4
EVER HAD A DRINK FIRST THING IN THE MORNING TO STEADY YOUR NERVES TO GET RID OF A HANGOVER: YES
ON A TYPICAL DAY WHEN YOU DRINK ALCOHOL HOW MANY DRINKS DO YOU HAVE: 5
EVER FELT BAD OR GUILTY ABOUT YOUR DRINKING: YES
HAVE PEOPLE ANNOYED YOU BY CRITICIZING YOUR DRINKING: YES
TOTAL SCORE: 7
HAVE PEOPLE ANNOYED YOU BY CRITICIZING YOUR DRINKING: YES
TOTAL SCORE: 4
HOW MANY TIMES IN THE PAST YEAR HAVE YOU HAD 5 OR MORE DRINKS IN A DAY: 5
NAUSEA AND VOMITING: NO NAUSEA AND NO VOMITING
HOW MANY TIMES IN THE PAST YEAR HAVE YOU HAD 5 OR MORE DRINKS IN A DAY: 5
EVER FELT BAD OR GUILTY ABOUT YOUR DRINKING: YES
EVER HAD A DRINK FIRST THING IN THE MORNING TO STEADY YOUR NERVES TO GET RID OF A HANGOVER: YES
PAROXYSMAL SWEATS: BARELY PERCEPTIBLE SWEATING. PALMS MOIST
HEADACHE, FULLNESS IN HEAD: NOT PRESENT
AUDITORY DISTURBANCES: NOT PRESENT

## 2023-08-20 ASSESSMENT — COGNITIVE AND FUNCTIONAL STATUS - GENERAL
MOBILITY SCORE: 24
SUGGESTED CMS G CODE MODIFIER MOBILITY: CH
DAILY ACTIVITIY SCORE: 24
SUGGESTED CMS G CODE MODIFIER DAILY ACTIVITY: CH

## 2023-08-20 ASSESSMENT — PATIENT HEALTH QUESTIONNAIRE - PHQ9
2. FEELING DOWN, DEPRESSED, IRRITABLE, OR HOPELESS: NOT AT ALL
1. LITTLE INTEREST OR PLEASURE IN DOING THINGS: NOT AT ALL
SUM OF ALL RESPONSES TO PHQ9 QUESTIONS 1 AND 2: 0

## 2023-08-20 ASSESSMENT — FIBROSIS 4 INDEX
FIB4 SCORE: 1.23
FIB4 SCORE: 0.94
FIB4 SCORE: 1.23

## 2023-08-20 NOTE — ED NOTES
Pt ambulated to green 36. Pt states that he would like to stop drinking alcohol and using meth. Last use was three days ago. PIV placed.

## 2023-08-20 NOTE — ED NOTES
Med rec updated and complete. Allergies reviewed.  Confirmed name and date of birth. Pt reports that he has not taken any of his medications > 1 week.      No antibiotic use in last 30 days.        Home Pharmacy  [facility] = [phone]

## 2023-08-20 NOTE — ASSESSMENT & PLAN NOTE
Drinks 1 L of vodka daily   Stopped drinking 2 days   CIWA protocol   Librium   Continue home gabapentin   IVF   MVI, folate, thiamine

## 2023-08-20 NOTE — ED TRIAGE NOTES
Brannon Sharma.  43 y.o.  Male  Chief Complaint   Patient presents with    Chest Pain     Pt c/o chest tightness that started while lying in bed this morning. Pt also c/o palpitations/sensation of heart racing. Also c/o SOB.    ETOH Withdrawal     Pt reports hx of ETOH, drinks 1 gallon of vodka/day. Reports last drink 3 days ago. C/o anxiety & feeling diaphoretic/clammy. Mild tremor noted.  Hx seizures with same. On tegretol, has not taken in a few days.     Patient educated on triage process, to alert staff if any changes in condition.    Labs & EKG ordered.

## 2023-08-20 NOTE — ED PROVIDER NOTES
ER Provider Note    Scribed for Hang Pérez M.d. by Best [employer]n. 8/20/2023  1:50 PM    Primary Care Provider: Gregory Hardin M.D.    CHIEF COMPLAINT  Chief Complaint   Patient presents with    Chest Pain     Pt c/o chest tightness that started while lying in bed this morning. Pt also c/o palpitations/sensation of heart racing. Also c/o SOB.    ETOH Withdrawal     Pt reports hx of ETOH, drinks 1 gallon of vodka/day. Reports last drink 3 days ago. C/o anxiety & feeling diaphoretic/clammy. Mild tremor noted.  Hx seizures with same. On tegretol, has not taken in a few days.     EXTERNAL RECORDS REVIEWED  Other Patient was seen in the ED four days ago for evaluation of chest pain. The patient was consulted on the risks of alcohol abuse at this time and was discharged after a reassuring work-up. The patient also presented to the ED 10 days ago for the same complaints. He was found to have pneumonia of the left lower lobe at this time, but left AMA prior to hospitalization    HPI/ROS  LIMITATION TO HISTORY   Select: : None    OUTSIDE HISTORIAN(S):  None    Brannon Sharma is a 43 y.o. male who presents to the ED for evaluation of chest pain, onset this morning. The patient states he was laying in bed this morning after waking up when he began experiencing chest tightness with associated palpitations and heart racing sensation. Prompting him to present to the ED. While in the ED he also reports associated symptoms of shortness of breath and feeling anxious. He denies any vomiting or diarrhea. Additionally, the patient notes he has a history of alcohol abuse and methamphetamine use. He has not used methamphetamines for the past ten days, and quit drinking alcohol approximately 2-3 days ago so he believes he may be experiencing alcohol withdraw symptoms. He was previously drinking 1 gallon of Vodka a day. He has also previously gone through alcohol withdraw and has a history of seizures with this.  "Finally, the patient notes he is on tegretol, but has not taken it for the past few days.     PAST MEDICAL HISTORY  Past Medical History:   Diagnosis Date    Alcohol-induced psychosis (HCC)     Anxiety     Depression     Paranoid schizophrenia (HCC)     Psychiatric disorder     panic attack    Schizophrenia, paranoid (HCC)     Suicidal ideations        SURGICAL HISTORY  Past Surgical History:   Procedure Laterality Date    NASAL SEPTAL RECONSTRUCTION      OTHER      deviated septum       FAMILY HISTORY  Family History   Family history unknown: Yes       SOCIAL HISTORY   reports that he has been smoking cigarettes. He has been smoking an average of 1 pack per day. He has never used smokeless tobacco. He reports current alcohol use of about 9.6 oz of alcohol per week. He reports current drug use. Drugs: Inhaled and Marijuana.    CURRENT MEDICATIONS  Previous Medications    CARBAMAZEPINE (TEGRETOL) 200 MG TAB    Take 200 mg by mouth every day.    GABAPENTIN (NEURONTIN) 100 MG CAP    Take 100 mg by mouth in the morning, at noon, and at bedtime.    RISPERIDONE (RISPERDAL) 2 MG TAB    Take 2 mg by mouth every evening.    SERTRALINE (ZOLOFT) 100 MG TAB    Take 150 mg by mouth every day. 1.5 tablet = 150 mg    TRAZODONE (DESYREL) 150 MG TAB    Take 150 mg by mouth at bedtime.       ALLERGIES  Ceclor [cefaclor], Cephalosporins, and Pcn [penicillins]    PHYSICAL EXAM  BP (!) 173/123   Pulse (!) 103   Temp 36.1 °C (97 °F) (Temporal)   Resp 16   Ht 1.905 m (6' 3\")   Wt (!) 130 kg (287 lb 4.2 oz)   SpO2 97%   BMI 35.90 kg/m²   Constitutional: Alert in no apparent distress.  HENT: No signs of trauma, Bilateral external ears normal, Nose normal. Uvula midline.   Eyes: Pupils are equal and reactive, Conjunctiva normal, Non-icteric.   Neck: Normal range of motion, No tenderness, Supple, No stridor.   Lymphatic: No lymphadenopathy noted.   Cardiovascular: Tachycardic rate and regular rhythm, no murmurs.   Thorax & Lungs: " Normal breath sounds, No respiratory distress, No wheezing, No chest tenderness.   Abdomen: Bowel sounds normal, Soft, No tenderness, No peritoneal signs, No masses, No pulsatile masses.   Skin: Warm, Dry, No erythema, No rash.   Back: No bony tenderness, No CVA tenderness.   Extremities: Intact distal pulses, No edema, No tenderness, No cyanosis.  Musculoskeletal: Good range of motion in all major joints. No tenderness to palpation or major deformities noted.   Neurologic: Bilateral hand tremors, Alert , Normal motor function, Normal sensory function, No focal deficits noted.   Psychiatric: Affect normal, Judgment normal, Mood normal.            DIAGNOSTIC STUDIES    Labs:   Labs Reviewed   CBC WITH DIFFERENTIAL - Abnormal; Notable for the following components:       Result Value    Neutrophils-Polys 42.10 (*)     Lymphocytes 42.30 (*)     All other components within normal limits    Narrative:     Biotin intake of greater than 5 mg per day may interfere with  troponin levels, causing false low values.   COMP METABOLIC PANEL - Abnormal; Notable for the following components:    Potassium 3.5 (*)     Glucose 137 (*)     Bun 6 (*)     AST(SGOT) 51 (*)     ALT(SGPT) 66 (*)     Alkaline Phosphatase 132 (*)     All other components within normal limits    Narrative:     Biotin intake of greater than 5 mg per day may interfere with  troponin levels, causing false low values.   CARBAMAZEPINE - Abnormal; Notable for the following components:    Carbamazepine <2.0 (*)     All other components within normal limits   TROPONIN    Narrative:     Biotin intake of greater than 5 mg per day may interfere with  troponin levels, causing false low values.   TROPONIN    Narrative:     Biotin intake of greater than 5 mg per day may interfere with  troponin levels, causing false low values.   ESTIMATED GFR    Narrative:     Biotin intake of greater than 5 mg per day may interfere with  troponin levels, causing false low values.         EKG:   I have independently interpreted this EKG   Report   Date Value Ref Range Status   2023       [facility] Emergency Dept.    Test Date:  2023  Pt Name:    OSITO BLACK               Department: ER  MRN:        [id number]                      Room:  Gender:     Male                         Technician: [id number]  :        1980                   Requested By:ER TRIAGE PROTOCOL  Order #:    [id number]                    Reading MD:    Measurements  Intervals                                Axis  Rate:       111                          P:          22  PA:         161                          QRS:        147  QRSD:       103                          T:          28  QT:         339  QTc:        461    Interpretive Statements  Sinus tachycardia  Consider right ventricular hypertrophy  Compared to ECG 2023 09:52:57  Left anterior fascicular block no longer present  Incomplete right bundle-branch block no longer present  Right bundle-branch block no longer present                 Radiology:   The attending emergency physician has independently interpreted the diagnostic imaging associated with this visit and am waiting the final reading from the radiologist.   Preliminary interpretation is a follows: no ptx  Radiologist interpretation:     DX-CHEST-PORTABLE (1 VIEW)   Final Result      1.  Increased inflation from prior exam.   2.  Findings suggest interstitial lung disease.   3.  No lobar pneumonia or pneumothorax.      EC-ECHOCARDIOGRAM COMPLETE W/O CONT    (Results Pending)        COURSE & MEDICAL DECISION MAKING     ED Observation Status? No; Patient does not meet criteria for ED Observation.     INITIAL ASSESSMENT, COURSE AND PLAN  Care Narrative: 43 y.o. M p/w CC of chest pain and alcohol withdraw symptoms onset today.      1:50 PM - Patient seen and examined at bedside. Discussed plan of care, including a diagnostic work-up and medication for the patient's current  symptoms. Patient agrees to the plan of care. The patient will be given a Detox IV and medicated with Ativan as needed. Ordered for EKG, DX-Chest, Carbamazepine, CBC with differential, CMP, and Troponin to evaluate his symptoms.     #Alcohol withdrawal  Given significant alcohol withdrawal present along with hand tremors and tongue fasciculations  Alcohol withdrawal scoring obtained using CIWA  Current CIWA score is: 15  Prior history of alcohol withdrawal with seizures    3:39 PM Patient was reevaluated at bedside. He appears to still be withdrawing at this time and is possibly becoming hypoxic, despite w/d    3:37 PM Paged Hospitalist.      3:55 PM I discussed the patient's case and the above findings with Dr. Velásquez (Hospitalist) who agrees to evaluate the patient for hospitalization.     Intravenous fluids administered for alcohol detox.  Patient not appropriate for oral rehydration, as surgical process needs to be ruled out before trial of oral rehydration.   On repeat evaluation, pt w/ positive fluid response.         DISPOSITION AND DISCUSSIONS  I have discussed management of the patient with the following physicians and TANNER's:  Dr. Velásquez (Hospitalist)     Discussion of management with other [facility] or appropriate source(s): None     Barriers to care at this time, including but not limited to:  None known at this time .     Decision tools and prescription drugs considered including, but not limited to:  CIWA Score: 15.    DISPOSITION:  Patient will be hospitalized by Dr. Velásquez in guarded condition.     FINAL DIAGNOSIS  1. Alcohol withdrawal syndrome with complication (HCC)    2. Acute chest pain        Best CABA), am scribing for, and in the presence of, Hang Pérez M.D..    Electronically signed by: Best Hearn (Paul), 8/20/2023    IHang M.D. personally performed the services described in this documentation, as scribed by Best Hearn in my presence, and it is both  accurate and complete.      The note accurately reflects work and decisions made by me.  Hang Pérez M.D.  8/20/2023  8:35 PM

## 2023-08-20 NOTE — H&P
Hospital Medicine History & Physical Note    Date of Service  8/20/2023    Primary Care Physician  Gregory Hardin M.D.    Consultants  N/A    Code Status  Full Code    Chief Complaint  Chief Complaint   Patient presents with    Chest Pain     Pt c/o chest tightness that started while lying in bed this morning. Pt also c/o palpitations/sensation of heart racing. Also c/o SOB.    ETOH Withdrawal     Pt reports hx of ETOH, drinks 1 gallon of vodka/day. Reports last drink 3 days ago. C/o anxiety & feeling diaphoretic/clammy. Mild tremor noted.  Hx seizures with same. On tegretol, has not taken in a few days.       History of Presenting Illness  Brnanon Sharma is a 43 y.o. male who presented 8/20/2023 with chest pain.  Patient has a long history of alcohol abuse drinking about a liter of vodka a day and methamphetamine abuse.  He reports he stopped using methamphetamines 10 days ago and quit drinking alcohol 2 to 3 days ago.  He reports since last night he feels very anxious with tremors and chest pain that gets worse with anxiety.  He denies any nausea or vomiting.  He does report diarrhea and no abdominal pain.  Patient reports that he is ready to quit, has also started decreasing his cigarette use.  He used to use 1 pack of cigarettes per day within the last 3 days has only used 3.    In the ER vitals significant for tachycardia. Labs significant for K 3.5, AST 51, ALT 66, alk phos 132, troponin negative. CXR showed no acute findings. EKG with sinus tachycardia.  Admitted for alcohol withdrawal.    I discussed the plan of care with patient and ERP .    Review of Systems  Review of Systems   Constitutional:  Negative for chills and fever.   HENT:  Negative for congestion.    Eyes:  Negative for blurred vision.   Respiratory:  Negative for shortness of breath.    Cardiovascular:  Positive for chest pain.   Gastrointestinal:  Negative for abdominal pain, nausea and vomiting.   Genitourinary:  Negative for  dysuria.   Musculoskeletal:  Negative for myalgias.   Skin:  Negative for rash.   Neurological:  Positive for tremors. Negative for dizziness and headaches.   Psychiatric/Behavioral:  Positive for substance abuse. Negative for depression. The patient is nervous/anxious.    All other systems reviewed and are negative.      Past Medical History   has a past medical history of Alcohol-induced psychosis (HCC), Anxiety, Depression, Paranoid schizophrenia (HCC), Psychiatric disorder, Schizophrenia, paranoid (HCC), and Suicidal ideations.    Surgical History   has a past surgical history that includes other and nasal septal reconstruction.     Family History  Family history is unknown by patient.   Family history reviewed with patient. There is no family history that is pertinent to the chief complaint.     Social History   reports that he has been smoking cigarettes. He has been smoking an average of 1 pack per day. He has never used smokeless tobacco. He reports current alcohol use of about 9.6 oz of alcohol per week. He reports current drug use. Drugs: Inhaled and Marijuana.    Allergies  Allergies   Allergen Reactions    Ceclor [Cefaclor] Anaphylaxis    Cephalosporins Anaphylaxis     > 5 years ago    Pcn [Penicillins] Anaphylaxis     > 30 years ago       Medications  Prior to Admission Medications   Prescriptions Last Dose Informant Patient Reported? Taking?   carBAMazepine (TEGRETOL) 200 MG Tab  Patient Yes No   Sig: Take 200 mg by mouth every day.   gabapentin (NEURONTIN) 100 MG Cap  Patient Yes No   Sig: Take 100 mg by mouth in the morning, at noon, and at bedtime.   risperiDONE (RISPERDAL) 2 MG Tab  Patient Yes No   Sig: Take 2 mg by mouth every evening.   sertraline (ZOLOFT) 100 MG Tab  Patient Yes No   Sig: Take 150 mg by mouth every day. 1.5 tablet = 150 mg   traZODone (DESYREL) 150 MG Tab  Patient Yes No   Sig: Take 150 mg by mouth at bedtime.      Facility-Administered Medications: None       Physical  Exam  Temp:  [36.1 °C (97 °F)] 36.1 °C (97 °F)  Pulse:  [103-106] 106  Resp:  [16-18] 18  BP: (143-173)/(103-123) 143/103  SpO2:  [97 %-98 %] 98 %  Blood Pressure: (!) 173/123   Temperature: 36.1 °C (97 °F)   Pulse: (!) 103   Respiration: 16   Pulse Oximetry: 97 %       Physical Exam  Vitals and nursing note reviewed.   Constitutional:       General: He is not in acute distress.     Appearance: Normal appearance.   HENT:      Head: Normocephalic and atraumatic.      Right Ear: External ear normal.      Left Ear: External ear normal.      Nose: Nose normal.      Mouth/Throat:      Pharynx: Oropharynx is clear.   Eyes:      Conjunctiva/sclera: Conjunctivae normal.   Cardiovascular:      Rate and Rhythm: Normal rate and regular rhythm.      Pulses: Normal pulses.   Pulmonary:      Effort: Pulmonary effort is normal. No respiratory distress.      Breath sounds: Normal breath sounds. No wheezing.   Abdominal:      General: Abdomen is flat. There is no distension.      Palpations: Abdomen is soft.      Tenderness: There is no abdominal tenderness.   Musculoskeletal:         General: No swelling. Normal range of motion.      Cervical back: Normal range of motion and neck supple.   Skin:     General: Skin is warm and dry.      Findings: No rash.   Neurological:      General: No focal deficit present.      Mental Status: He is alert and oriented to person, place, and time.      Cranial Nerves: No cranial nerve deficit.      Motor: Tremor present.   Psychiatric:         Mood and Affect: Mood is anxious.         Behavior: Behavior normal.         Laboratory:  Recent Labs     08/20/23  1331   WBC 6.5   RBC 5.20   HEMOGLOBIN 16.4   HEMATOCRIT 47.3   MCV 91.0   MCH 31.5   MCHC 34.7   RDW 47.2   PLATELETCT 219   MPV 10.1     Recent Labs     08/20/23  1331   SODIUM 140   POTASSIUM 3.5*   CHLORIDE 105   CO2 23   GLUCOSE 137*   BUN 6*   CREATININE 0.97   CALCIUM 9.5     Recent Labs     08/20/23  1331   ALTSGPT 66*   ASTSGOT 51*  "  ALKPHOSPHAT 132*   TBILIRUBIN 0.5   GLUCOSE 137*         No results for input(s): \"NTPROBNP\" in the last 72 hours.      Recent Labs     08/20/23  1331   TROPONINT 8       Imaging:  DX-CHEST-PORTABLE (1 VIEW)   Final Result      1.  Increased inflation from prior exam.   2.  Findings suggest interstitial lung disease.   3.  No lobar pneumonia or pneumothorax.          X-Ray:  I have personally reviewed the images and compared with prior images.  EKG:  I have personally reviewed the images and compared with prior images.    Assessment/Plan:  Justification for Admission Status  I anticipate this patient will require at least two midnights for appropriate medical management, necessitating inpatient admission because chest pain and alcohol withdrawal needing IVF, telemetry monitoring. Monitor on CIWA.    * Alcohol withdrawal syndrome with complication, with unspecified complication (HCC)- (present on admission)  Assessment & Plan  Drinks 1 L of vodka daily   Stopped drinking 2 days   [facility] protocol   Librium   Continue home gabapentin   IVF   MVI, folate, thiamine     Pain in the chest- (present on admission)  Assessment & Plan  The ASCVD Risk score (Almaz CAMPUZANO, et al., 2019) failed to calculate for the following reasons:    Cannot find a previous HDL lab    Cannot find a previous total cholesterol lab    Likely from drug/alcohol   Troponin negative   If recurrence after alcohol withdrawal consider stress testing  Telemetry monitoring     Tobacco abuse- (present on admission)  Assessment & Plan  Counseled on importance of tobacco cessation, total time 6 minutes  Patient wants to quit all his substance use  Ordered nicotine replacement     Transaminitis- (present on admission)  Assessment & Plan  Likely from alcohol use   Trend CMP    Hypokalemia- (present on admission)  Assessment & Plan  Replace as needed    Bipolar 1 disorder (HCC)- (present on admission)  Assessment & Plan  Continue tegertol, risperdal and " sertraline        VTE prophylaxis: SCDs/TEDs and enoxaparin ppx

## 2023-08-20 NOTE — ASSESSMENT & PLAN NOTE
The ASCVD Risk score (Almaz CAMPUZANO, et al., 2019) failed to calculate for the following reasons:    Cannot find a previous HDL lab    Cannot find a previous total cholesterol lab    Likely from drug/alcohol   Troponin negative   Echo pending  If recurrence after alcohol withdrawal consider stress testing  Telemetry monitoring

## 2023-08-20 NOTE — ASSESSMENT & PLAN NOTE
Counseled on importance of tobacco cessation, total time 6 minutes  Patient wants to quit all his substance use  Ordered nicotine replacement

## 2023-08-20 NOTE — ASSESSMENT & PLAN NOTE
Daily Note     Today's date: 2019  Patient name: Yair Baumann  : 1954  MRN: [id number]  Referring provider: Ciro Mock MD  Dx:   Encounter Diagnosis     ICD-10-CM    1  Lymphedema I89 0                   Subjective: Pt c/o some heaviness in upper triceps/posterior arm region  Objective: See treatment diary below    Precautions: R UE lymphedema    Daily Treatment Diary     Manual   8 8-15 8-20 8-23       MLD R UE NV arb [facility] jp       Compression bandaging JPH arb h h [facility]       R UE measurements                            x55' x55' x55' x55' x50'           Exercise Diary                                                                                                                                                                                                                                                                                      Modalities                                                         Assessment: Tolerated treatment well  Patient would benefit from continued PT  Mild heaviness posterior arm, most sig in proximal triceps to post shldr region this day  Resumed bandaging of hand today as pt admitted that was more swollen yesterday  Plan: Continue per plan of care  Replace as needed

## 2023-08-21 ENCOUNTER — APPOINTMENT (OUTPATIENT)
Dept: CARDIOLOGY | Facility: MEDICAL CENTER | Age: 43
DRG: 894 | End: 2023-08-21
Attending: INTERNAL MEDICINE
Payer: MEDICAID

## 2023-08-21 VITALS
HEIGHT: 75 IN | OXYGEN SATURATION: 97 % | RESPIRATION RATE: 18 BRPM | DIASTOLIC BLOOD PRESSURE: 91 MMHG | TEMPERATURE: 98.4 F | WEIGHT: 287.04 LBS | SYSTOLIC BLOOD PRESSURE: 145 MMHG | BODY MASS INDEX: 35.69 KG/M2 | HEART RATE: 60 BPM

## 2023-08-21 LAB
ALBUMIN SERPL BCP-MCNC: 3.7 G/DL (ref 3.2–4.9)
ALBUMIN/GLOB SERPL: 1.6 G/DL
ALP SERPL-CCNC: 96 U/L (ref 30–99)
ALT SERPL-CCNC: 51 U/L (ref 2–50)
ANION GAP SERPL CALC-SCNC: 11 MMOL/L (ref 7–16)
AST SERPL-CCNC: 35 U/L (ref 12–45)
BILIRUB SERPL-MCNC: 0.6 MG/DL (ref 0.1–1.5)
BUN SERPL-MCNC: 6 MG/DL (ref 8–22)
CALCIUM ALBUM COR SERPL-MCNC: 9.2 MG/DL (ref 8.5–10.5)
CALCIUM SERPL-MCNC: 9 MG/DL (ref 8.5–10.5)
CHLORIDE SERPL-SCNC: 112 MMOL/L (ref 96–112)
CO2 SERPL-SCNC: 22 MMOL/L (ref 20–33)
CREAT SERPL-MCNC: 0.75 MG/DL (ref 0.5–1.4)
ERYTHROCYTE [DISTWIDTH] IN BLOOD BY AUTOMATED COUNT: 47.9 FL (ref 35.9–50)
GFR SERPLBLD CREATININE-BSD FMLA CKD-EPI: 115 ML/MIN/1.73 M 2
GLOBULIN SER CALC-MCNC: 2.3 G/DL (ref 1.9–3.5)
GLUCOSE SERPL-MCNC: 118 MG/DL (ref 65–99)
HCT VFR BLD AUTO: 42.7 % (ref 42–52)
HGB BLD-MCNC: 14.5 G/DL (ref 14–18)
LV EJECT FRACT  99904: 40
LV EJECT FRACT MOD 2C 99903: -4.05
LV EJECT FRACT MOD 4C 99902: 43.56
LV EJECT FRACT MOD BP 99901: 22.35
MAGNESIUM SERPL-MCNC: 2.3 MG/DL (ref 1.5–2.5)
MCH RBC QN AUTO: 31 PG (ref 27–33)
MCHC RBC AUTO-ENTMCNC: 34 G/DL (ref 32.3–36.5)
MCV RBC AUTO: 91.2 FL (ref 81.4–97.8)
PHOSPHATE SERPL-MCNC: 3 MG/DL (ref 2.5–4.5)
PLATELET # BLD AUTO: 202 K/UL (ref 164–446)
PMV BLD AUTO: 10.4 FL (ref 9–12.9)
POTASSIUM SERPL-SCNC: 3.4 MMOL/L (ref 3.6–5.5)
PROT SERPL-MCNC: 6 G/DL (ref 6–8.2)
RBC # BLD AUTO: 4.68 M/UL (ref 4.7–6.1)
SODIUM SERPL-SCNC: 145 MMOL/L (ref 135–145)
WBC # BLD AUTO: 6.6 K/UL (ref 4.8–10.8)

## 2023-08-21 PROCEDURE — 80053 COMPREHEN METABOLIC PANEL: CPT

## 2023-08-21 PROCEDURE — 83735 ASSAY OF MAGNESIUM: CPT

## 2023-08-21 PROCEDURE — 93306 TTE W/DOPPLER COMPLETE: CPT | Mod: 26 | Performed by: INTERNAL MEDICINE

## 2023-08-21 PROCEDURE — 700102 HCHG RX REV CODE 250 W/ 637 OVERRIDE(OP): Mod: JZ | Performed by: INTERNAL MEDICINE

## 2023-08-21 PROCEDURE — A9270 NON-COVERED ITEM OR SERVICE: HCPCS | Performed by: INTERNAL MEDICINE

## 2023-08-21 PROCEDURE — 36415 COLL VENOUS BLD VENIPUNCTURE: CPT

## 2023-08-21 PROCEDURE — 700105 HCHG RX REV CODE 258: Performed by: INTERNAL MEDICINE

## 2023-08-21 PROCEDURE — 700102 HCHG RX REV CODE 250 W/ 637 OVERRIDE(OP): Performed by: INTERNAL MEDICINE

## 2023-08-21 PROCEDURE — 93306 TTE W/DOPPLER COMPLETE: CPT

## 2023-08-21 PROCEDURE — 84100 ASSAY OF PHOSPHORUS: CPT

## 2023-08-21 PROCEDURE — A9270 NON-COVERED ITEM OR SERVICE: HCPCS | Mod: JZ | Performed by: INTERNAL MEDICINE

## 2023-08-21 PROCEDURE — 99239 HOSP IP/OBS DSCHRG MGMT >30: CPT | Performed by: INTERNAL MEDICINE

## 2023-08-21 PROCEDURE — 85027 COMPLETE CBC AUTOMATED: CPT

## 2023-08-21 RX ORDER — POTASSIUM CHLORIDE 20 MEQ/1
40 TABLET, EXTENDED RELEASE ORAL ONCE
Status: COMPLETED | OUTPATIENT
Start: 2023-08-21 | End: 2023-08-21

## 2023-08-21 RX ADMIN — THERA TABS 1 TABLET: TAB at 06:02

## 2023-08-21 RX ADMIN — FOLIC ACID 1 MG: 1 TABLET ORAL at 06:03

## 2023-08-21 RX ADMIN — SODIUM CHLORIDE: 9 INJECTION, SOLUTION INTRAVENOUS at 10:30

## 2023-08-21 RX ADMIN — NICOTINE TRANSDERMAL SYSTEM 21 MG: 21 PATCH, EXTENDED RELEASE TRANSDERMAL at 06:03

## 2023-08-21 RX ADMIN — Medication 100 MG: at 06:03

## 2023-08-21 RX ADMIN — GABAPENTIN 100 MG: 100 CAPSULE ORAL at 10:09

## 2023-08-21 RX ADMIN — CHLORDIAZEPOXIDE HYDROCHLORIDE 50 MG: 25 CAPSULE ORAL at 12:15

## 2023-08-21 RX ADMIN — POTASSIUM CHLORIDE 40 MEQ: 1500 TABLET, EXTENDED RELEASE ORAL at 10:09

## 2023-08-21 RX ADMIN — CHLORDIAZEPOXIDE HYDROCHLORIDE 50 MG: 25 CAPSULE ORAL at 06:02

## 2023-08-21 RX ADMIN — CARBAMAZEPINE 200 MG: 200 TABLET ORAL at 06:02

## 2023-08-21 RX ADMIN — SENNOSIDES AND DOCUSATE SODIUM 2 TABLET: 50; 8.6 TABLET ORAL at 06:03

## 2023-08-21 RX ADMIN — SERTRALINE 150 MG: 100 TABLET, FILM COATED ORAL at 06:03

## 2023-08-21 ASSESSMENT — LIFESTYLE VARIABLES
VISUAL DISTURBANCES: NOT PRESENT
NAUSEA AND VOMITING: NO NAUSEA AND NO VOMITING
VISUAL DISTURBANCES: NOT PRESENT
ORIENTATION AND CLOUDING OF SENSORIUM: ORIENTED AND CAN DO SERIAL ADDITIONS
VISUAL DISTURBANCES: NOT PRESENT
TREMOR: NO TREMOR
NAUSEA AND VOMITING: NO NAUSEA AND NO VOMITING
NAUSEA AND VOMITING: NO NAUSEA AND NO VOMITING
PAROXYSMAL SWEATS: BARELY PERCEPTIBLE SWEATING. PALMS MOIST
AGITATION: NORMAL ACTIVITY
TOTAL SCORE: 2
TREMOR: TREMOR NOT VISIBLE BUT CAN BE FELT, FINGERTIP TO FINGERTIP
ANXIETY: *
ANXIETY: *
TREMOR: TREMOR NOT VISIBLE BUT CAN BE FELT, FINGERTIP TO FINGERTIP
ORIENTATION AND CLOUDING OF SENSORIUM: ORIENTED AND CAN DO SERIAL ADDITIONS
AUDITORY DISTURBANCES: NOT PRESENT
HEADACHE, FULLNESS IN HEAD: NOT PRESENT
AUDITORY DISTURBANCES: NOT PRESENT
ANXIETY: *
TOTAL SCORE: 4
HEADACHE, FULLNESS IN HEAD: NOT PRESENT
PAROXYSMAL SWEATS: BARELY PERCEPTIBLE SWEATING. PALMS MOIST
HEADACHE, FULLNESS IN HEAD: NOT PRESENT
AUDITORY DISTURBANCES: NOT PRESENT
AGITATION: NORMAL ACTIVITY
PAROXYSMAL SWEATS: NO SWEAT VISIBLE
TOTAL SCORE: 4
AGITATION: NORMAL ACTIVITY
ORIENTATION AND CLOUDING OF SENSORIUM: ORIENTED AND CAN DO SERIAL ADDITIONS

## 2023-08-21 NOTE — PROGRESS NOTES
Bedside report received from night RN; assumed pt care. Pt assessment complete. Pt A&Ox4 Reviewed plan of care with pt. Pt on tele.  All needs met at this time. Chart and labs reviewed. Bed in lowest position, and 2 side rails up.

## 2023-08-21 NOTE — PROGRESS NOTES
1345: Pt is concerned about losing his bed at [facility]. Threatening to leave AMA if ECHO results are not read by 1430. MD notified    1415: Echo results back, EF estimated at 40%. MD spoke to patient about treatment. Patient leaving AMA. Paperwork signed and all questions answered. Tele and IV removed.     1430: Patient left AMA

## 2023-08-21 NOTE — PROGRESS NOTES
4 Eyes Skin Assessment Completed by BHAVANA Lucero and JESSE Alexander.    Head WDL  Ears WDL  Nose WDL  Mouth WDL  Neck WDL  Breast/Chest WDL  Shoulder Blades WDL  Spine WDL  (R) Arm/Elbow/Hand WDL  (L) Arm/Elbow/Hand WDL  Abdomen WDL  Groin WDL  Scrotum/Coccyx/Buttocks WDL  (R) Leg WDL  (L) Leg WDL  (R) Heel/Foot/Toe Redness  (L) Heel/Foot/Toe Redness small blister          Devices In Places Tele Box and Pulse Ox      Interventions In Place N/A    Possible Skin Injury No    Pictures Uploaded Into Epic N/A  Wound Consult Placed N/A  RN Wound Prevention Protocol Ordered No

## 2023-08-21 NOTE — PROGRESS NOTES
Pt arrived from ED at 2000. Skin check preformed. Vitals done and monitor applied. Currently resting in bed.

## 2023-08-21 NOTE — CARE PLAN
The patient is Stable - Low risk of patient condition declining or worsening         Progress made toward(s) clinical / shift goals:    Problem: Knowledge Deficit - Standard  Goal: Patient and family/care givers will demonstrate understanding of plan of care, disease process/condition, diagnostic tests and medications  Outcome: Progressing    Problem: Optimal Care for Alcohol Withdrawal  Goal: Optimal Care for the alcohol withdrawal patient  Outcome: Progressing     Problem: Lifestyle Changes  Goal: Patient's ability to identify lifestyle changes and available resources to help reduce recurrence of condition will improve  Outcome: Progressing     Problem: Psychosocial  Goal: Patient's level of anxiety will decrease  Outcome: Progressing  Goal: Spiritual and cultural needs incorporated into hospitalization  Outcome: Progressing

## 2023-08-22 NOTE — DISCHARGE SUMMARY
Discharge Summary    CHIEF COMPLAINT ON ADMISSION  Chief Complaint   Patient presents with    Chest Pain     Pt c/o chest tightness that started while lying in bed this morning. Pt also c/o palpitations/sensation of heart racing. Also c/o SOB.    ETOH Withdrawal     Pt reports hx of ETOH, drinks 1 gallon of vodka/day. Reports last drink 3 days ago. C/o anxiety & feeling diaphoretic/clammy. Mild tremor noted.  Hx seizures with same. On tegretol, has not taken in a few days.       Reason for Admission  chest pain     Admission Date  8/20/2023    CODE STATUS  Prior    HPI & HOSPITAL COURSE  This is a 43 y.o. male  male who presented 8/20/2023 with chest pain.  Patient has a long history of alcohol abuse drinking about a liter of vodka a day and methamphetamine abuse.  He reports he stopped using methamphetamines 10 days ago and quit drinking alcohol 2 to 3 days ago  the ER vitals significant for tachycardia. Labs significant for K 3.5, AST 51, ALT 66, alk phos 132, troponin negative. CXR showed no acute findings. EKG with sinus tachycardia.  Admitted for alcohol withdrawal.  Started on CIWA. Also an echo was ordered since he was complaining of chest pain, echo showed EF fo 40% possible related to drug abuse and alcohol abuse. I Have counseled patient to quit . Patient was continue don CIWA protocol and also discussed with patient that I will consult cardiology regarding his HF, but patient now would like to leave the hospital . I informed patient about the risk of leaving the facility against medical advice and that he still needs to be treated. Patient alert and oriented x4 and able to make medical decision and is adamant of leaving . So he left the hospital against medical advice     Discharge Date  8/21/2023      DISCHARGE DIAGNOSES  Principal Problem:    Alcohol withdrawal syndrome with complication, with unspecified complication (HCC) (POA: Yes)  Active Problems:    Bipolar 1 disorder (HCC) (POA: Yes)     Hypokalemia (POA: Yes)    Transaminitis (POA: Yes)    Pain in the chest (POA: Yes)    Tobacco abuse (POA: Yes)  Resolved Problems:    * No resolved hospital problems. *      FOLLOW UP  No future appointments.  Gregory Hardin M.D.  [address] [address]  [phone]          Allergies  Allergies   Allergen Reactions    Ceclor [Cefaclor] Anaphylaxis    Cephalosporins Anaphylaxis     > 5 years ago    Pcn [Penicillins] Anaphylaxis     > 30 years ago           LABORATORY  Lab Results   Component Value Date    SODIUM 145 08/21/2023    POTASSIUM 3.4 (L) 08/21/2023    CHLORIDE 112 08/21/2023    CO2 22 08/21/2023    GLUCOSE 118 (H) 08/21/2023    BUN 6 (L) 08/21/2023    CREATININE 0.75 08/21/2023    GLOMRATE 107 07/27/2023        Lab Results   Component Value Date    WBC 6.6 08/21/2023    HEMOGLOBIN 14.5 08/21/2023    HEMATOCRIT 42.7 08/21/2023    PLATELETCT 202 08/21/2023        Total time of the discharge process exceeds 35 minutes.

## 2023-08-27 ENCOUNTER — HOSPITAL ENCOUNTER (EMERGENCY)
Facility: MEDICAL CENTER | Age: 43
End: 2023-08-29
Attending: EMERGENCY MEDICINE | Admitting: STUDENT IN AN ORGANIZED HEALTH CARE EDUCATION/TRAINING PROGRAM
Payer: MEDICAID

## 2023-08-27 DIAGNOSIS — R45.851 SUICIDAL IDEATION: ICD-10-CM

## 2023-08-27 DIAGNOSIS — F15.10 METHAMPHETAMINE ABUSE (HCC): ICD-10-CM

## 2023-08-27 DIAGNOSIS — I50.9 CONGESTIVE HEART FAILURE, UNSPECIFIED HF CHRONICITY, UNSPECIFIED HEART FAILURE TYPE (HCC): ICD-10-CM

## 2023-08-27 DIAGNOSIS — F32.A DEPRESSION, UNSPECIFIED DEPRESSION TYPE: ICD-10-CM

## 2023-08-27 LAB
AMPHET UR QL SCN: NEGATIVE
BARBITURATES UR QL SCN: NEGATIVE
BENZODIAZ UR QL SCN: POSITIVE
BZE UR QL SCN: NEGATIVE
CANNABINOIDS UR QL SCN: NEGATIVE
EKG IMPRESSION: NORMAL
FENTANYL UR QL: NEGATIVE
METHADONE UR QL SCN: NEGATIVE
OPIATES UR QL SCN: NEGATIVE
OXYCODONE UR QL SCN: NEGATIVE
PCP UR QL SCN: NEGATIVE
POC BREATHALIZER: 0.06 PERCENT (ref 0–0.01)
PROPOXYPH UR QL SCN: NEGATIVE

## 2023-08-27 PROCEDURE — C9803 HOPD COVID-19 SPEC COLLECT: HCPCS | Performed by: EMERGENCY MEDICINE

## 2023-08-27 PROCEDURE — 93005 ELECTROCARDIOGRAM TRACING: CPT | Performed by: EMERGENCY MEDICINE

## 2023-08-27 PROCEDURE — A9270 NON-COVERED ITEM OR SERVICE: HCPCS | Mod: UD | Performed by: PSYCHIATRY & NEUROLOGY

## 2023-08-27 PROCEDURE — 90791 PSYCH DIAGNOSTIC EVALUATION: CPT

## 2023-08-27 PROCEDURE — 80307 DRUG TEST PRSMV CHEM ANLYZR: CPT

## 2023-08-27 PROCEDURE — 700102 HCHG RX REV CODE 250 W/ 637 OVERRIDE(OP): Mod: UD | Performed by: PSYCHIATRY & NEUROLOGY

## 2023-08-27 PROCEDURE — 302970 POC BREATHALIZER: Performed by: EMERGENCY MEDICINE

## 2023-08-27 PROCEDURE — 0241U HCHG SARS-COV-2 COVID-19 NFCT DS RESP RNA 4 TRGT MIC: CPT

## 2023-08-27 PROCEDURE — A9270 NON-COVERED ITEM OR SERVICE: HCPCS | Performed by: EMERGENCY MEDICINE

## 2023-08-27 PROCEDURE — 99285 EMERGENCY DEPT VISIT HI MDM: CPT

## 2023-08-27 PROCEDURE — 302970 POC BREATHALIZER

## 2023-08-27 PROCEDURE — 700102 HCHG RX REV CODE 250 W/ 637 OVERRIDE(OP): Performed by: EMERGENCY MEDICINE

## 2023-08-27 RX ORDER — MIRTAZAPINE 15 MG/1
15 TABLET, FILM COATED ORAL
Status: DISCONTINUED | OUTPATIENT
Start: 2023-08-27 | End: 2023-08-29 | Stop reason: HOSPADM

## 2023-08-27 RX ORDER — LORAZEPAM 1 MG/1
1 TABLET ORAL ONCE
Status: COMPLETED | OUTPATIENT
Start: 2023-08-27 | End: 2023-08-27

## 2023-08-27 RX ORDER — TRAZODONE HYDROCHLORIDE 150 MG/1
150 TABLET ORAL NIGHTLY
Status: SHIPPED | COMMUNITY
End: 2023-09-10 | Stop reason: SDUPTHER

## 2023-08-27 RX ORDER — RISPERIDONE 1 MG/1
1 TABLET ORAL EVERY EVENING
Status: DISCONTINUED | OUTPATIENT
Start: 2023-08-27 | End: 2023-08-29 | Stop reason: HOSPADM

## 2023-08-27 RX ORDER — QUETIAPINE FUMARATE 25 MG/1
25 TABLET, FILM COATED ORAL 2 TIMES DAILY
Status: DISCONTINUED | OUTPATIENT
Start: 2023-08-27 | End: 2023-08-29

## 2023-08-27 RX ORDER — LISINOPRIL 20 MG/1
20 TABLET ORAL DAILY
Status: DISCONTINUED | OUTPATIENT
Start: 2023-08-27 | End: 2023-08-29 | Stop reason: HOSPADM

## 2023-08-27 RX ORDER — RISPERIDONE 2 MG/1
2 TABLET ORAL
Status: SHIPPED | COMMUNITY
End: 2023-09-10 | Stop reason: SDUPTHER

## 2023-08-27 RX ORDER — GABAPENTIN 100 MG/1
100 CAPSULE ORAL 3 TIMES DAILY
Status: SHIPPED | COMMUNITY
End: 2023-09-10 | Stop reason: SDUPTHER

## 2023-08-27 RX ORDER — QUETIAPINE FUMARATE 100 MG/1
100 TABLET, FILM COATED ORAL NIGHTLY
Status: DISCONTINUED | OUTPATIENT
Start: 2023-08-27 | End: 2023-08-29 | Stop reason: HOSPADM

## 2023-08-27 RX ORDER — RISPERIDONE 2 MG/1
2 TABLET ORAL EVERY EVENING
Status: DISCONTINUED | OUTPATIENT
Start: 2023-08-27 | End: 2023-08-27

## 2023-08-27 RX ADMIN — QUETIAPINE FUMARATE 25 MG: 25 TABLET ORAL at 18:16

## 2023-08-27 RX ADMIN — MIRTAZAPINE 15 MG: 15 TABLET, FILM COATED ORAL at 21:06

## 2023-08-27 RX ADMIN — QUETIAPINE FUMARATE 100 MG: 100 TABLET ORAL at 21:06

## 2023-08-27 RX ADMIN — LIDOCAINE HYDROCHLORIDE 30 ML: 20 SOLUTION OROPHARYNGEAL at 04:35

## 2023-08-27 RX ADMIN — RISPERIDONE 1 MG: 1 TABLET ORAL at 18:15

## 2023-08-27 RX ADMIN — LORAZEPAM 1 MG: 1 TABLET ORAL at 12:29

## 2023-08-27 ASSESSMENT — FIBROSIS 4 INDEX: FIB4 SCORE: 1.04

## 2023-08-27 NOTE — ED NOTES
Pt resting in gurney watching TV with visible rise and fall of chest. Resp are even and unlabored. NAD. Sitter in place.
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
## 2023-08-27 NOTE — ED NOTES
Patient resting, denies any needs at this time. No distress noted. 1:1 sitter in direct line of sight.

## 2023-08-27 NOTE — ED NOTES
Report rec'd, patient care assumed at this time.  Pt resting comfortably watching TV.  1:1 sitter outside the room for observation.  Pt no fall risk, on RA.

## 2023-08-27 NOTE — DISCHARGE PLANNING
Alert Team/Behavioral Health   Note:        - [facility]: Talked to Tra. Referral is on pending list. No beds currently available. No discharges scheduled or expected today.     - [facility] : Talked to Jesus. Referral is on pending list. No beds currently available.
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
## 2023-08-27 NOTE — ED NOTES
Patient sleeping, ,equal chest rise and fall. No distress noted. 1:1 sitter in direct line of sight.

## 2023-08-27 NOTE — ED TRIAGE NOTES
"Chief Complaint   Patient presents with    Suicidal Ideation     Pt reports SI after dx w/ CHF x 3 days. SI symptoms increased  due to homelessness and stress. Plan: OD on medication (lisinopril, gabapentin, risperidone).        42 y/o male ambulatory to triage for above complaint. Pt reports ETOH withdrawal. Last etoh consumption x 5 hrs ago. Pt is aaox4. Ambulatory w/ steady gait.    Pt place in waiting area. Educated on triage process. Pt will inform staff of any medical changes.    BP (!) 156/102   Pulse (!) 110   Temp 36.4 °C (97.5 °F) (Temporal)   Resp 18   Ht 1.905 m (6' 3\")   Wt (!) 136 kg (300 lb)   SpO2 98%   BMI 37.50 kg/m²     "

## 2023-08-27 NOTE — ED NOTES
Report received from Alesha BATEMAN. Pt resting with visible rise and fall of chest. 1:1 sitter in place. NAD. Will continue to monitor.
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
## 2023-08-27 NOTE — DISCHARGE PLANNING
Alert Team/Behavioral Health   Note:      [facility] ALERT TEAM DISCHARGE PLANNING NOTE    Date:  8/27/23  Patient Name:  Brannon Sharma - 43 y.o. - Discharge Planning  MRN:  [id number]   YOB: 1980  ADMISSION DATE:  8/27/2023     Writer forwarded referral packet for inpatient psychiatric care to the following community providers:  [facility] , [facility]     Items included in the referral packet:   _x____Face Sheet   _x____Pages 1 and 2 of completed legal hold   _x____Alert Team/Psych Assessment   _x____H&P   _x____UDS   _x____Blood Alcohol   _x____Vital signs   _n/a____Pregnancy Test (if applicable)   _x____Medications List   _____Covid Screen

## 2023-08-28 ENCOUNTER — APPOINTMENT (OUTPATIENT)
Dept: RADIOLOGY | Facility: MEDICAL CENTER | Age: 43
End: 2023-08-28
Attending: EMERGENCY MEDICINE
Payer: MEDICAID

## 2023-08-28 PROBLEM — I42.9 CARDIOMYOPATHY (HCC): Status: ACTIVE | Noted: 2023-08-28

## 2023-08-28 PROBLEM — I10 HYPERTENSION: Status: ACTIVE | Noted: 2023-08-28

## 2023-08-28 PROBLEM — R45.851 SUICIDAL IDEATION: Status: ACTIVE | Noted: 2023-08-28

## 2023-08-28 LAB
ANION GAP SERPL CALC-SCNC: 10 MMOL/L (ref 7–16)
BASOPHILS # BLD AUTO: 0.7 % (ref 0–1.8)
BASOPHILS # BLD: 0.03 K/UL (ref 0–0.12)
BUN SERPL-MCNC: 11 MG/DL (ref 8–22)
CALCIUM SERPL-MCNC: 8.5 MG/DL (ref 8.5–10.5)
CHLORIDE SERPL-SCNC: 104 MMOL/L (ref 96–112)
CHOLEST SERPL-MCNC: 114 MG/DL (ref 100–199)
CO2 SERPL-SCNC: 23 MMOL/L (ref 20–33)
CREAT SERPL-MCNC: 0.79 MG/DL (ref 0.5–1.4)
EOSINOPHIL # BLD AUTO: 0.22 K/UL (ref 0–0.51)
EOSINOPHIL NFR BLD: 5.2 % (ref 0–6.9)
ERYTHROCYTE [DISTWIDTH] IN BLOOD BY AUTOMATED COUNT: 50.2 FL (ref 35.9–50)
FLUAV RNA SPEC QL NAA+PROBE: NEGATIVE
FLUBV RNA SPEC QL NAA+PROBE: NEGATIVE
GFR SERPLBLD CREATININE-BSD FMLA CKD-EPI: 113 ML/MIN/1.73 M 2
GLUCOSE SERPL-MCNC: 133 MG/DL (ref 65–99)
HCT VFR BLD AUTO: 45.9 % (ref 42–52)
HDLC SERPL-MCNC: 19 MG/DL
HGB BLD-MCNC: 15.7 G/DL (ref 14–18)
IMM GRANULOCYTES # BLD AUTO: 0.01 K/UL (ref 0–0.11)
IMM GRANULOCYTES NFR BLD AUTO: 0.2 % (ref 0–0.9)
LDLC SERPL CALC-MCNC: 53 MG/DL
LYMPHOCYTES # BLD AUTO: 1.36 K/UL (ref 1–4.8)
LYMPHOCYTES NFR BLD: 32.4 % (ref 22–41)
MCH RBC QN AUTO: 31.5 PG (ref 27–33)
MCHC RBC AUTO-ENTMCNC: 34.2 G/DL (ref 32.3–36.5)
MCV RBC AUTO: 92 FL (ref 81.4–97.8)
MONOCYTES # BLD AUTO: 0.57 K/UL (ref 0–0.85)
MONOCYTES NFR BLD AUTO: 13.6 % (ref 0–13.4)
NEUTROPHILS # BLD AUTO: 2.01 K/UL (ref 1.82–7.42)
NEUTROPHILS NFR BLD: 47.9 % (ref 44–72)
NRBC # BLD AUTO: 0 K/UL
NRBC BLD-RTO: 0 /100 WBC (ref 0–0.2)
NT-PROBNP SERPL IA-MCNC: <36 PG/ML (ref 0–125)
PLATELET # BLD AUTO: 153 K/UL (ref 164–446)
PMV BLD AUTO: 10.6 FL (ref 9–12.9)
POTASSIUM SERPL-SCNC: 3.8 MMOL/L (ref 3.6–5.5)
RBC # BLD AUTO: 4.99 M/UL (ref 4.7–6.1)
RSV RNA SPEC QL NAA+PROBE: NEGATIVE
SARS-COV-2 RNA RESP QL NAA+PROBE: NOTDETECTED
SODIUM SERPL-SCNC: 137 MMOL/L (ref 135–145)
SPECIMEN SOURCE: NORMAL
TRIGL SERPL-MCNC: 211 MG/DL (ref 0–149)
TROPONIN T SERPL-MCNC: <6 NG/L (ref 6–19)
WBC # BLD AUTO: 4.2 K/UL (ref 4.8–10.8)

## 2023-08-28 PROCEDURE — 80061 LIPID PANEL: CPT

## 2023-08-28 PROCEDURE — A9270 NON-COVERED ITEM OR SERVICE: HCPCS | Mod: UD | Performed by: STUDENT IN AN ORGANIZED HEALTH CARE EDUCATION/TRAINING PROGRAM

## 2023-08-28 PROCEDURE — 84484 ASSAY OF TROPONIN QUANT: CPT

## 2023-08-28 PROCEDURE — 83880 ASSAY OF NATRIURETIC PEPTIDE: CPT

## 2023-08-28 PROCEDURE — A9270 NON-COVERED ITEM OR SERVICE: HCPCS | Mod: UD | Performed by: PSYCHIATRY & NEUROLOGY

## 2023-08-28 PROCEDURE — 85025 COMPLETE CBC W/AUTO DIFF WBC: CPT

## 2023-08-28 PROCEDURE — 71045 X-RAY EXAM CHEST 1 VIEW: CPT

## 2023-08-28 PROCEDURE — A9270 NON-COVERED ITEM OR SERVICE: HCPCS | Mod: UD | Performed by: EMERGENCY MEDICINE

## 2023-08-28 PROCEDURE — 700102 HCHG RX REV CODE 250 W/ 637 OVERRIDE(OP): Mod: UD | Performed by: PSYCHIATRY & NEUROLOGY

## 2023-08-28 PROCEDURE — 99282 EMERGENCY DEPT VISIT SF MDM: CPT | Performed by: STUDENT IN AN ORGANIZED HEALTH CARE EDUCATION/TRAINING PROGRAM

## 2023-08-28 PROCEDURE — 700102 HCHG RX REV CODE 250 W/ 637 OVERRIDE(OP): Mod: UD | Performed by: STUDENT IN AN ORGANIZED HEALTH CARE EDUCATION/TRAINING PROGRAM

## 2023-08-28 PROCEDURE — 36415 COLL VENOUS BLD VENIPUNCTURE: CPT

## 2023-08-28 PROCEDURE — 700102 HCHG RX REV CODE 250 W/ 637 OVERRIDE(OP): Mod: UD | Performed by: EMERGENCY MEDICINE

## 2023-08-28 PROCEDURE — 80048 BASIC METABOLIC PNL TOTAL CA: CPT

## 2023-08-28 RX ORDER — LORAZEPAM 2 MG/ML
0.5 INJECTION INTRAMUSCULAR EVERY 4 HOURS PRN
Status: DISCONTINUED | OUTPATIENT
Start: 2023-08-28 | End: 2023-08-29 | Stop reason: HOSPADM

## 2023-08-28 RX ORDER — ATORVASTATIN CALCIUM 40 MG/1
40 TABLET, FILM COATED ORAL EVERY EVENING
Status: DISCONTINUED | OUTPATIENT
Start: 2023-08-28 | End: 2023-08-29 | Stop reason: HOSPADM

## 2023-08-28 RX ORDER — LORAZEPAM 1 MG/1
0.5 TABLET ORAL EVERY 4 HOURS PRN
Status: DISCONTINUED | OUTPATIENT
Start: 2023-08-28 | End: 2023-08-29 | Stop reason: HOSPADM

## 2023-08-28 RX ORDER — LORAZEPAM 2 MG/ML
1.5 INJECTION INTRAMUSCULAR
Status: DISCONTINUED | OUTPATIENT
Start: 2023-08-28 | End: 2023-08-29 | Stop reason: HOSPADM

## 2023-08-28 RX ORDER — LORAZEPAM 1 MG/1
1 TABLET ORAL EVERY 4 HOURS PRN
Status: DISCONTINUED | OUTPATIENT
Start: 2023-08-28 | End: 2023-08-29 | Stop reason: HOSPADM

## 2023-08-28 RX ORDER — METOPROLOL SUCCINATE 25 MG/1
25 TABLET, EXTENDED RELEASE ORAL
Status: DISCONTINUED | OUTPATIENT
Start: 2023-08-28 | End: 2023-08-29 | Stop reason: HOSPADM

## 2023-08-28 RX ORDER — LORAZEPAM 2 MG/1
2 TABLET ORAL
Status: DISCONTINUED | OUTPATIENT
Start: 2023-08-28 | End: 2023-08-29 | Stop reason: HOSPADM

## 2023-08-28 RX ORDER — LORAZEPAM 2 MG/1
4 TABLET ORAL
Status: DISCONTINUED | OUTPATIENT
Start: 2023-08-28 | End: 2023-08-29 | Stop reason: HOSPADM

## 2023-08-28 RX ORDER — LORAZEPAM 2 MG/ML
1 INJECTION INTRAMUSCULAR
Status: DISCONTINUED | OUTPATIENT
Start: 2023-08-28 | End: 2023-08-29 | Stop reason: HOSPADM

## 2023-08-28 RX ORDER — LORAZEPAM 2 MG/ML
2 INJECTION INTRAMUSCULAR
Status: DISCONTINUED | OUTPATIENT
Start: 2023-08-28 | End: 2023-08-29 | Stop reason: HOSPADM

## 2023-08-28 RX ADMIN — METOPROLOL SUCCINATE 25 MG: 25 TABLET, EXTENDED RELEASE ORAL at 17:06

## 2023-08-28 RX ADMIN — MIRTAZAPINE 15 MG: 15 TABLET, FILM COATED ORAL at 21:31

## 2023-08-28 RX ADMIN — RISPERIDONE 1 MG: 1 TABLET ORAL at 17:06

## 2023-08-28 RX ADMIN — QUETIAPINE FUMARATE 25 MG: 25 TABLET ORAL at 06:10

## 2023-08-28 RX ADMIN — LISINOPRIL 20 MG: 20 TABLET ORAL at 06:10

## 2023-08-28 RX ADMIN — ATORVASTATIN CALCIUM 40 MG: 40 TABLET, FILM COATED ORAL at 17:05

## 2023-08-28 RX ADMIN — QUETIAPINE FUMARATE 25 MG: 25 TABLET ORAL at 17:06

## 2023-08-28 RX ADMIN — QUETIAPINE FUMARATE 100 MG: 100 TABLET ORAL at 21:31

## 2023-08-28 ASSESSMENT — ENCOUNTER SYMPTOMS
NAUSEA: 0
VOMITING: 0
DIZZINESS: 0
SHORTNESS OF BREATH: 0
PALPITATIONS: 0
DEPRESSION: 1
WHEEZING: 0
COUGH: 0

## 2023-08-28 NOTE — ED NOTES
Patient is resting comfortably in NAD, eating breakfast  Pt still pending placement  Safety/L2K precautions remain in place  Pt allowed to have call light    Tele-monitor remains in the room at BS in direct 1:1 view of pt for safety

## 2023-08-28 NOTE — PROGRESS NOTES
ED Observation Progress Note    Date of Service: 08/28/23    Interval History and Interventions  Patient had been recently admitted to the hospital for chest tightness, palpitations.  He has history of heart problems secondary to methamphetamine use and alcohol according to the chart.  When contacted by previous ER physician, hospitalist service stated there was further work-up to be done in the hospital.  Labs were therefore ordered, troponin has returned negative.  Patient to be readmitted for ongoing work-up, cardiology consultation was pending when the patient left AGAINST MEDICAL ADVICE    Labs  Results for orders placed or performed during the hospital encounter of 08/27/23   Urine Drug Screen   Result Value Ref Range    Amphetamines Urine Negative Negative    Barbiturates Negative Negative    Benzodiazepines Positive (A) Negative    Cocaine Metabolite Negative Negative    Fentanyl, Urine Negative Negative    Methadone Negative Negative    Opiates Negative Negative    Oxycodone Negative Negative    Phencyclidine -Pcp Negative Negative    Propoxyphene Negative Negative    Cannabinoid Metab Negative Negative   CoV-2, Flu A/B, And RSV by PCR ([employer])    Specimen: Respirate   Result Value Ref Range    Influenza virus A RNA Negative Negative    Influenza virus B, PCR Negative Negative    RSV, PCR Negative Negative    SARS-CoV-2 by PCR NotDetected     SARS-CoV-2 Source NP Swab    CBC WITH DIFFERENTIAL   Result Value Ref Range    WBC 4.2 (L) 4.8 - 10.8 K/uL    RBC 4.99 4.70 - 6.10 M/uL    Hemoglobin 15.7 14.0 - 18.0 g/dL    Hematocrit 45.9 42.0 - 52.0 %    MCV 92.0 81.4 - 97.8 fL    MCH 31.5 27.0 - 33.0 pg    MCHC 34.2 32.3 - 36.5 g/dL    RDW 50.2 (H) 35.9 - 50.0 fL    Platelet Count 153 (L) 164 - 446 K/uL    MPV 10.6 9.0 - 12.9 fL    Neutrophils-Polys 47.90 44.00 - 72.00 %    Lymphocytes 32.40 22.00 - 41.00 %    Monocytes 13.60 (H) 0.00 - 13.40 %    Eosinophils 5.20 0.00 - 6.90 %    Basophils 0.70 0.00 - 1.80 %     Immature Granulocytes 0.20 0.00 - 0.90 %    Nucleated RBC 0.00 0.00 - 0.20 /100 WBC    Neutrophils (Absolute) 2.01 1.82 - 7.42 K/uL    Lymphs (Absolute) 1.36 1.00 - 4.80 K/uL    Monos (Absolute) 0.57 0.00 - 0.85 K/uL    Eos (Absolute) 0.22 0.00 - 0.51 K/uL    Baso (Absolute) 0.03 0.00 - 0.12 K/uL    Immature Granulocytes (abs) 0.01 0.00 - 0.11 K/uL    NRBC (Absolute) 0.00 K/uL   Basic Metabolic Panel   Result Value Ref Range    Sodium 137 135 - 145 mmol/L    Potassium 3.8 3.6 - 5.5 mmol/L    Chloride 104 96 - 112 mmol/L    Co2 23 20 - 33 mmol/L    Glucose 133 (H) 65 - 99 mg/dL    Bun 11 8 - 22 mg/dL    Creatinine 0.79 0.50 - 1.40 mg/dL    Calcium 8.5 8.5 - 10.5 mg/dL    Anion Gap 10.0 7.0 - 16.0   TROPONIN   Result Value Ref Range    Troponin T <6 6 - 19 ng/L   ESTIMATED GFR   Result Value Ref Range    GFR (CKD-EPI) 113 >60 mL/min/1.73 m 2   POC BREATHALIZER   Result Value Ref Range    POC Breathalizer 0.059 (A) 0.00 - 0.01 Percent   EKG   Result Value Ref Range    Report       [facility] Emergency Dept.    Test Date:  2023  Pt Name:    OSITO BLACK               Department: ER  MRN:        [id number]                      Room:       [facility]  Gender:     Male                         Technician: [id number]  :        1980                   Requested By:LENNY MCKAY  Order #:    [id number]                    Reading MD: LENNY MCKAY, DO    Measurements  Intervals                                Axis  Rate:       99                           P:          23  PA:         155                          QRS:        137  QRSD:       104                          T:          55  QT:         345  QTc:        443    Interpretive Statements  Sinus rhythm  Right axis deviation  Low voltage, precordial leads  Minimal ST elevation, diffuse leads  Compared to ECG 2023 13:18:59  Right-axis deviation now present  Low QRS voltage now present  ST (T wave) deviation now present  Sinus tachycardia no longer  present  Electronically Signed On 08-  06:39:56 PDT by LENNY MCKAY, DO         Radiology  DX-CHEST-PORTABLE (1 VIEW)   Final Result      Mild hypoinflation without other evidence for acute cardiopulmonary disease.          Problem List  1.  Heart failure: Patient left AGAINST MEDICAL ADVICE from the hospital, has now returned to continue his work-up.  Troponin returns negative.        Electronically signed by: Gadiel Dietrich M.D., 8/28/2023 2:03 PM

## 2023-08-28 NOTE — PROGRESS NOTES
"ED Observation Progress Note    Date of Service: 08/28/23    Interval History and Interventions    9:39 AM  Contacted [facility] for hospitalist consult regarding patient's recent admission and discharge AGAINST MEDICAL ADVICE.    9:52 AM D/W Hospitalist who agrees to see patient in consult for medical management given his recent diagnosis of heart failure.  The patient signed himself out AGAINST MEDICAL ADVICE but was never started on proper medications. Labs ordered then will relay to oncoming ERP to contact [facility] hospitalist.     Physical Exam  BP (!) 141/75   Pulse 74   Temp 37.7 °C (99.9 °F) (Temporal)   Resp 17   Ht 1.905 m (6' 3\")   Wt (!) 136 kg (300 lb)   SpO2 96%   BMI 37.50 kg/m² .    Constitutional: Awake and alert. Nontoxic  HENT:  Grossly normal  Eyes: Grossly normal  Neck: Normal range of motion  Cardiovascular: Normal heart rate   Thorax & Lungs: No respiratory distress  Abdomen: Nontender  Skin:  No pathologic rash.   Extremities: Well perfused  Psychiatric: Affect normal    Labs        Problem List  1. Suicidal ideation        2. Depression, unspecified depression type        3. Methamphetamine abuse (HCC)              Electronically signed by: Xiomy Parra D.O., 8/28/2023 9:38 AM          "

## 2023-08-28 NOTE — ED NOTES
Pt provided sandwich. Bed linens changed and a new shirt provided. Telesitter remains in room for patient safety.

## 2023-08-28 NOTE — ED NOTES
Pt resting comfortably in NAD, breathing even and unlabored  Tele-monitor remains in room at BS for 1:1 direct obs for safety  All safety/L2K precautions in place

## 2023-08-28 NOTE — ED NOTES
Breakfast tray placed at BS for pt when he wakes    Pt currently sleeping in NAD, breathing even and unlabored  Pt pending placement to facility  Safety/ L2K precautions in place     Tele-monitor remains in room at BS in direct 1:1 view of pt for safety

## 2023-08-28 NOTE — CONSULTS
Hospital Medicine Consultation    Date of Service  8/28/2023    Referring Physician  Gadiel Dietrich M.D.    Consulting Physician  Jt Hunt M.D.    Reason for Consultation  Medical management    History of Presenting Illness  43 y.o. male with significant past medical history of alcohol abuse, meth abuse, recent diagnosis of cardiomyopathy, ejection fraction 40 to 40%, grade 2 diastolic dysfunction who presented 8/27/2023 with suicidal ideation.      Chart reviewed patient recently sent out AMA on 8/21/23 when he was treated for alcohol withdrawal.  On last admission echocardiogram noted with systolic and diastolic dysfunction.  He did not wait for cardiology evaluation.    Patient seen and examined at bedside.  His vitals remained stable, on room air saturating at 90%.  He denies chest pain, shortness of breath, nausea/vomiting, palpitation, dizziness.  On exam no Rales on auscultation, no lower extremity edema.    Recent labs reviewed, negative troponin CBC, BMP unremarkable.  EKG from 8/27 noted with sinus rhythm, right axis deviation    Case discussed with cardiology Dr. William .  Cardiology recommended outpatient follow up .    I spoke and discussed the case with the ER physician, Dr. Dietrich .    Review of Systems  Review of Systems   Respiratory:  Negative for cough, shortness of breath and wheezing.    Cardiovascular:  Negative for chest pain and palpitations.   Gastrointestinal:  Negative for nausea and vomiting.   Neurological:  Negative for dizziness.   Psychiatric/Behavioral:  Positive for depression and suicidal ideas.        Past Medical History   has a past medical history of Alcohol-induced psychosis (HCC), Anxiety, Depression, Paranoid schizophrenia (HCC), Psychiatric disorder, Schizophrenia, paranoid (HCC), and Suicidal ideations.    Surgical History   has a past surgical history that includes other and nasal septal reconstruction.    Family History  Family history is unknown by  patient.    Social History   reports that he has been smoking cigarettes. He has never used smokeless tobacco. He reports current alcohol use of about 9.6 oz of alcohol per week. He reports current drug use. Drugs: Inhaled and Marijuana.    Medications  Prior to Admission Medications   Prescriptions Last Dose Informant Patient Reported? Taking?   carBAMazepine (TEGRETOL) 200 MG Tab > 1 week at unknown Patient Yes No   Sig: Take 200 mg by mouth 2 times a day.   gabapentin (NEURONTIN) 100 MG Cap > 1 week at unknown Patient Yes Yes   Sig: Take 100 mg by mouth 3 times a day.   risperiDONE (RISPERDAL) 2 MG Tab > 1 week at unknown Patient Yes Yes   Sig: Take 2 mg by mouth at bedtime.   sertraline (ZOLOFT) 100 MG Tab > 1 week at unknown Patient Yes No   Sig: Take 150 mg by mouth every day. 1.5 tablet = 150 mg   traZODone (DESYREL) 150 MG Tab > 1 week at unknown Patient Yes Yes   Sig: Take 150 mg by mouth every evening.      Facility-Administered Medications: None       Allergies  Allergies   Allergen Reactions    Ceclor [Cefaclor] Anaphylaxis    Cephalosporins Anaphylaxis     > 5 years ago    Pcn [Penicillins] Anaphylaxis     > 30 years ago       Physical Exam  Pulse:  [74-98] 74  Resp:  [16-17] 17  BP: (141-150)/(75-90) 141/75  SpO2:  [96 %-97 %] 96 %    Physical Exam  Constitutional:       Appearance: Normal appearance.   Cardiovascular:      Rate and Rhythm: Normal rate and regular rhythm.      Pulses: Normal pulses.      Heart sounds: Normal heart sounds.   Pulmonary:      Effort: Pulmonary effort is normal.      Breath sounds: No wheezing or rales.   Abdominal:      General: Abdomen is flat.   Musculoskeletal:      Right lower leg: No edema.      Left lower leg: No edema.   Skin:     General: Skin is warm.   Neurological:      General: No focal deficit present.      Mental Status: He is alert and oriented to person, place, and time.   Psychiatric:         Mood and Affect: Mood normal.          Fluids      Laboratory  Recent Labs     08/28/23  1048   WBC 4.2*   RBC 4.99   HEMOGLOBIN 15.7   HEMATOCRIT 45.9   MCV 92.0   MCH 31.5   MCHC 34.2   RDW 50.2*   PLATELETCT 153*   MPV 10.6     Recent Labs     08/28/23  1048   SODIUM 137   POTASSIUM 3.8   CHLORIDE 104   CO2 23   GLUCOSE 133*   BUN 11   CREATININE 0.79   CALCIUM 8.5                     Imaging  DX-CHEST-PORTABLE (1 VIEW)   Final Result      Mild hypoinflation without other evidence for acute cardiopulmonary disease.          Assessment/Plan  * Cardiomyopathy (HCC)  Assessment & Plan  Echocardiogram from 8/21/2023 noted with Mildly reduced left ventricular systolic function. The left ventricular   ejection fraction is visually estimated to be 40-45%. Grade II   diastolic dysfunction.    Currently euvolemic on exam  No concern of acute heart failure  Continue on beta-blocker, lisinopril, Lipitor  Patient advised for cessation of meth abuse  Need to repeat echocardiogram in 6-month    Given active meth abuse patient not a candidate for any invasive procedure.    Cardiology recommended outpatient follow up .        Hypertension  Assessment & Plan  Continue on lisinopril      Suicidal ideation  Assessment & Plan  On legal hold  Psych following   assisting with inpatient psych placement

## 2023-08-28 NOTE — ED NOTES
BS report from Saray BATEMAN  Pt currently sleeping in NAD, breathing even and unlabored  Pt pending placement to facility  Safety/ L2K precautions in place     Tele-monitor remains in room at BS in direct 1:1 view of pt for safety

## 2023-08-28 NOTE — PSYCHIATRY
BRIEF PSYCHIATRIC CONSULT NOTE: patient seen, full note to follow.  -Legal Hold:extended  -Sitter:telemonitor  -Restrictions: No changes  -Orders Placed: No new  -Assessment: Ongoing SI with plan to OD. No change, recommend inpatient psych unless stabilizes on meds in next few days  -Plan:continue to follow     Full note to follow

## 2023-08-28 NOTE — ED NOTES
PIV est, labs collected and sent, pt tolerated well  Pt resting comfortably in NAD, breathing even and unlabored  Tele-monitor remains in room at BS for 1:1 direct obs for safety  All safety/L2K precautions in place

## 2023-08-28 NOTE — ED NOTES
Pt currently sleeping in NAD, breathing even and unlabored  Pt pending placement to facility and admission  Safety/ L2K precautions in place     Tele-monitor remains in room at BS in direct 1:1 view of pt for safety

## 2023-08-28 NOTE — ED NOTES
Pt provided additional crackers and applesauce. Pt has no further needs at this time. Telesitter remains at bedside.

## 2023-08-28 NOTE — DISCHARGE PLANNING
Alert Team Note:    Contacted by [facility], spoke to Doreen. No bed availability at this time. Per Doreen, facility is likely not accepting new admissions today.

## 2023-08-28 NOTE — ED NOTES
Pt medicated per MAR. Pt provided additional crackers. Pt ambulatory with steady gait to bathroom with staff member present. Pt has no further needs at this time. Telesitter at bedside.

## 2023-08-28 NOTE — DISCHARGE PLANNING
Alert Team Note:    Contacted by [facility], spoke to caroline. Pt referral has been received and is being reviewed.

## 2023-08-28 NOTE — ASSESSMENT & PLAN NOTE
Echocardiogram from 8/21/2023 noted with Mildly reduced left ventricular systolic function. The left ventricular   ejection fraction is visually estimated to be 40-45%. Grade II   diastolic dysfunction.    Currently euvolemic on exam  No concern of acute heart failure  Continue on beta-blocker, lisinopril, Lipitor  Patient advised for cessation of meth abuse  Need to repeat echocardiogram in 6-month    Given active meth abuse patient not a candidate for any invasive procedure.    Cardiology recommended outpatient follow up .

## 2023-08-28 NOTE — PROGRESS NOTES
ED Observation Progress Note    Date of Service: 08/28/23    Interval History and Interventions  I spoke with hospitalist service twice.  Initial plan for admission to the hospital as requested by the hospitalist this morning has now been changed by subsequent conversation and now the patient will stay in the ER on psychiatric hold with the hospitalist service consulting  and initiate medications for heart failure, clinically the patient appears stable at this time.      Problem List  1.  Congestive heart failure: Patient currently stable, normal pulse oximetry, consultation by hospitalist service and starting heart medications for this has been initiated.    2.  Suicidal ideation: On psychiatric hold, await placement to inpatient psychiatric bed    Electronically signed by: Gadiel Dietrich M.D., 8/28/2023 2:38 PM

## 2023-08-28 NOTE — DISCHARGE PLANNING
Alert Team Note:     Contacted by [facility], spoke to Doreen. Pt is on the pending list. Facility expects possible discharges later today.

## 2023-08-28 NOTE — ED NOTES
Assumed care of patient, bedside report received from BHAVANA Chin.  Introduced self as pt RN, POC discussed, call light within pt reach, pt on room air at this time, pt ambulatory with steady gait.  Pt on legal hold and 1:1 sitter present at bedside. Safety precautions in place, check list completed on back of SI door sign, stop sign remains in place. Report given to sitter.   Clofazimine Counseling:  I discussed with the patient the risks of clofazimine including but not limited to skin and eye pigmentation, liver damage, nausea/vomiting, gastrointestinal bleeding and allergy.

## 2023-08-29 VITALS
HEIGHT: 75 IN | DIASTOLIC BLOOD PRESSURE: 83 MMHG | BODY MASS INDEX: 37.3 KG/M2 | OXYGEN SATURATION: 97 % | SYSTOLIC BLOOD PRESSURE: 125 MMHG | HEART RATE: 67 BPM | RESPIRATION RATE: 18 BRPM | WEIGHT: 300 LBS | TEMPERATURE: 98.7 F

## 2023-08-29 PROCEDURE — 700102 HCHG RX REV CODE 250 W/ 637 OVERRIDE(OP): Mod: UD | Performed by: EMERGENCY MEDICINE

## 2023-08-29 PROCEDURE — A9270 NON-COVERED ITEM OR SERVICE: HCPCS | Mod: UD | Performed by: PSYCHIATRY & NEUROLOGY

## 2023-08-29 PROCEDURE — 700102 HCHG RX REV CODE 250 W/ 637 OVERRIDE(OP): Mod: UD | Performed by: PSYCHIATRY & NEUROLOGY

## 2023-08-29 PROCEDURE — 700102 HCHG RX REV CODE 250 W/ 637 OVERRIDE(OP): Mod: UD | Performed by: STUDENT IN AN ORGANIZED HEALTH CARE EDUCATION/TRAINING PROGRAM

## 2023-08-29 PROCEDURE — A9270 NON-COVERED ITEM OR SERVICE: HCPCS | Mod: UD | Performed by: STUDENT IN AN ORGANIZED HEALTH CARE EDUCATION/TRAINING PROGRAM

## 2023-08-29 PROCEDURE — A9270 NON-COVERED ITEM OR SERVICE: HCPCS | Mod: UD | Performed by: EMERGENCY MEDICINE

## 2023-08-29 RX ORDER — GABAPENTIN 100 MG/1
100 CAPSULE ORAL 3 TIMES DAILY
Status: DISCONTINUED | OUTPATIENT
Start: 2023-08-29 | End: 2023-08-29 | Stop reason: HOSPADM

## 2023-08-29 RX ADMIN — QUETIAPINE FUMARATE 25 MG: 25 TABLET ORAL at 05:22

## 2023-08-29 RX ADMIN — METOPROLOL SUCCINATE 25 MG: 25 TABLET, EXTENDED RELEASE ORAL at 05:22

## 2023-08-29 RX ADMIN — LISINOPRIL 20 MG: 20 TABLET ORAL at 05:21

## 2023-08-29 RX ADMIN — GABAPENTIN 100 MG: 100 CAPSULE ORAL at 13:52

## 2023-08-29 NOTE — ED NOTES
Assist RN: medication given per MAR. Pt amb to bathroom and back with steady gait. Warm blankets provided to pt. Telesitter in place.

## 2023-08-29 NOTE — ED NOTES
Pt accidentally spilled water on paper scrubs, new scrubs provided. Pt resting with even chest rise and fall, reports no needs at this time, call light available and in reach. Telesitter remains bedside.

## 2023-08-29 NOTE — DISCHARGE PLANNING
Legal Hold Transfer     Referral: Legal hold transfer to Mental Health Facility     Intervention: Informed by Doreen at [facility] that pt has been accepted.     Pt's accepting physcian is Dr. Garcia     Transport arranged through [facility]    Spoke to [place] with [facility]     The pt will be picked up at 1700      Notified Bedside RN Ailyn, Alert Team RN Bilile, and Dr. De Los Santos of the departure time as well as accepting facility.      Transfer packet to be created with original legal hold and placed on chart by Alert team RN.      Plan: Pt will be transferring to [facility] via [facility] at 1700

## 2023-08-29 NOTE — PROGRESS NOTES
"ED Observation Progress Note    Date of Service: 08/29/23    Interval History and Interventions  Patient seen at the bedside.  His only complaint is persistent anxiety.  He is on Seroquel but does not find it particularly helpful.  He was seen by psychiatry.  Legal hold has been extended.  He was evaluated by the hospitalist yesterday and medications for heart failure have been initiated.  I discussed with psychiatry additional therapies for his anxiety and they have suggested avoiding benzodiazepines and starting gabapentin, 100 mg 3 times daily.  Patient has no other complaints, care will be signed out to oncoming ERP.    Physical Exam  /75   Pulse 76   Temp 37.2 °C (99 °F) (Temporal)   Resp 16   Ht 1.905 m (6' 3\")   Wt (!) 136 kg (300 lb)   SpO2 98%   BMI 37.50 kg/m² .    Constitutional: Awake and alert. Nontoxic  HENT:  Grossly normal  Eyes: Grossly normal  Neck: Normal range of motion  Cardiovascular: Normal heart rate   Thorax & Lungs: No respiratory distress  Abdomen: Nontender  Skin:  No pathologic rash.   Extremities: Well perfused  Psychiatric: Affect normal    Labs  Results for orders placed or performed during the hospital encounter of 08/27/23   Urine Drug Screen   Result Value Ref Range    Amphetamines Urine Negative Negative    Barbiturates Negative Negative    Benzodiazepines Positive (A) Negative    Cocaine Metabolite Negative Negative    Fentanyl, Urine Negative Negative    Methadone Negative Negative    Opiates Negative Negative    Oxycodone Negative Negative    Phencyclidine -Pcp Negative Negative    Propoxyphene Negative Negative    Cannabinoid Metab Negative Negative   CoV-2, Flu A/B, And RSV by PCR ([employer])    Specimen: Respirate   Result Value Ref Range    Influenza virus A RNA Negative Negative    Influenza virus B, PCR Negative Negative    RSV, PCR Negative Negative    SARS-CoV-2 by PCR NotDetected     SARS-CoV-2 Source NP Swab    CBC WITH DIFFERENTIAL   Result Value Ref Range "    WBC 4.2 (L) 4.8 - 10.8 K/uL    RBC 4.99 4.70 - 6.10 M/uL    Hemoglobin 15.7 14.0 - 18.0 g/dL    Hematocrit 45.9 42.0 - 52.0 %    MCV 92.0 81.4 - 97.8 fL    MCH 31.5 27.0 - 33.0 pg    MCHC 34.2 32.3 - 36.5 g/dL    RDW 50.2 (H) 35.9 - 50.0 fL    Platelet Count 153 (L) 164 - 446 K/uL    MPV 10.6 9.0 - 12.9 fL    Neutrophils-Polys 47.90 44.00 - 72.00 %    Lymphocytes 32.40 22.00 - 41.00 %    Monocytes 13.60 (H) 0.00 - 13.40 %    Eosinophils 5.20 0.00 - 6.90 %    Basophils 0.70 0.00 - 1.80 %    Immature Granulocytes 0.20 0.00 - 0.90 %    Nucleated RBC 0.00 0.00 - 0.20 /100 WBC    Neutrophils (Absolute) 2.01 1.82 - 7.42 K/uL    Lymphs (Absolute) 1.36 1.00 - 4.80 K/uL    Monos (Absolute) 0.57 0.00 - 0.85 K/uL    Eos (Absolute) 0.22 0.00 - 0.51 K/uL    Baso (Absolute) 0.03 0.00 - 0.12 K/uL    Immature Granulocytes (abs) 0.01 0.00 - 0.11 K/uL    NRBC (Absolute) 0.00 K/uL   Basic Metabolic Panel   Result Value Ref Range    Sodium 137 135 - 145 mmol/L    Potassium 3.8 3.6 - 5.5 mmol/L    Chloride 104 96 - 112 mmol/L    Co2 23 20 - 33 mmol/L    Glucose 133 (H) 65 - 99 mg/dL    Bun 11 8 - 22 mg/dL    Creatinine 0.79 0.50 - 1.40 mg/dL    Calcium 8.5 8.5 - 10.5 mg/dL    Anion Gap 10.0 7.0 - 16.0   TROPONIN   Result Value Ref Range    Troponin T <6 6 - 19 ng/L   ESTIMATED GFR   Result Value Ref Range    GFR (CKD-EPI) 113 >60 mL/min/1.73 m 2   Lipid Profile   Result Value Ref Range    Cholesterol,Tot 114 100 - 199 mg/dL    Triglycerides 211 (H) 0 - 149 mg/dL    HDL 19 (A) >=40 mg/dL    LDL 53 <100 mg/dL   proBrain Natriuretic Peptide, NT   Result Value Ref Range    NT-proBNP <36 0 - 125 pg/mL   POC BREATHALIZER   Result Value Ref Range    POC Breathalizer 0.059 (A) 0.00 - 0.01 Percent   EKG   Result Value Ref Range    Report       [facility] Emergency Dept.    Test Date:  2023-08-27  Pt Name:    OSITO BLACK               Department: ER  MRN:        [id number]                      Room:       [facility]  Gender:      Male                         Technician: [id number]  :        1980                   Requested By:XIOMY PARRA  Order #:    [id number]                    Reading MD: XIOMY PARRA DO    Measurements  Intervals                                Axis  Rate:       99                           P:          23  VA:         155                          QRS:        137  QRSD:       104                          T:          55  QT:         345  QTc:        443    Interpretive Statements  Sinus rhythm  Right axis deviation  Low voltage, precordial leads  Minimal ST elevation, diffuse leads  Compared to ECG 2023 13:18:59  Right-axis deviation now present  Low QRS voltage now present  ST (T wave) deviation now present  Sinus tachycardia no longer present  Electronically Signed On 2023  06:39:56 PDT by XIOMY PARRA DO         Radiology  DX-CHEST-PORTABLE (1 VIEW)   Final Result      Mild hypoinflation without other evidence for acute cardiopulmonary disease.          Problem List  1. Suicidal ideation        2. Depression, unspecified depression type        3. Methamphetamine abuse (HCC)        4. Congestive heart failure, unspecified HF chronicity, unspecified heart failure type (HCC)              Electronically signed by: Xiomy Parra D.O., 2023 3:59 AM

## 2023-08-29 NOTE — ED NOTES
Bedside report given to BHAVANA Robertson. Pt resting with even chest rise and fall on room air, stop sign on door, telesitter beside.

## 2023-08-29 NOTE — DISCHARGE PLANNING
Legal Hold     Referral: Legal Hold Court     Intervention: Pt presented for legal hold meeting with  via video conferencing to discuss legal options of contesting hold and meeting with the court doctors tomorrow afternoon, or not contesting legal hold and continuing the hold for one week.  advised that pt's legal hold will be be continued for one week (9/7).       Plan: Pt's legal hold has been continued for one week. Pt awaiting transfer to an in patient psych facility.

## 2023-08-29 NOTE — CONSULTS
"[facility]  PSYCHIATRIC CONSULTATION - Follow-up  Established Patient    DOS: 23     Reason for Admission:  Pt reports SI after dx w/ CHF x 3 days. SI symptoms increased  due to homelessness and stress. Plan: OD on medication (lisinopril, gabapentin, risperidone).   Legal Hold Status: on legal hold - court    CC:   Chief Complaint   Patient presents with    Suicidal Ideation     Pt reports SI after dx w/ CHF x 3 days. SI symptoms increased  due to homelessness and stress. Plan: OD on medication (lisinopril, gabapentin, risperidone).                S:   Observed in bed, easy to wake. Reports improved sleep; adequate appetite, improved mood. Reports lethargy expressed d/t daytime Seroquel, vocalizing benefit from both Risperdal and gabapentin in the past. Admits SI without planning, states improving from previous encounter. Admits AH of \"chatter,\" denies CAH. Denies VH. Denies HI. No GI distress, HA, dizziness, appears to be tolerating medications. Continues to present a danger to self d/t SI, adjusting medication for psychosis, continue to seek IP placement.     O:   Medical ROS (as pertinent):   Recent Labs     23  1048   WBC 4.2*   RBC 4.99   HEMOGLOBIN 15.7   HEMATOCRIT 45.9   MCV 92.0   MCH 31.5   RDW 50.2*   PLATELETCT 153*   MPV 10.6   NEUTSPOLYS 47.90   LYMPHOCYTES 32.40   MONOCYTES 13.60*   EOSINOPHILS 5.20   BASOPHILS 0.70     Recent Labs     23  1048   SODIUM 137   POTASSIUM 3.8   CHLORIDE 104   CO2 23   GLUCOSE 133*   BUN 11     Recent Labs     23  1048   CREATININE 0.79       EKG:   Results for orders placed or performed during the hospital encounter of 23   EKG   Result Value Ref Range    Report       [facility] Emergency Dept.    Test Date:  2023  Pt Name:    OSITO BLACK               Department: ER  MRN:        [id number]                      Room:       [facility]  Gender:     Male                         Technician: [id number]  :       " " 1980                   Requested By:LENNY MCKAY  Order #:    [id number]                    Reading MD: LENNY MCKAY DO    Measurements  Intervals                                Axis  Rate:       99                           P:          23  IL:         155                          QRS:        137  QRSD:       104                          T:          55  QT:         345  QTc:        443    Interpretive Statements  Sinus rhythm  Right axis deviation  Low voltage, precordial leads  Minimal ST elevation, diffuse leads  Compared to ECG 08/20/2023 13:18:59  Right-axis deviation now present  Low QRS voltage now present  ST (T wave) deviation now present  Sinus tachycardia no longer present  Electronically Signed On 08-  06:39:56 PDT by LENNY MCKAY DO          MSE:   /77   Pulse 70   Temp 37.2 °C (99 °F) (Temporal)   Resp 16   Ht 1.905 m (6' 3\")   Wt (!) 136 kg (300 lb)   SpO2 97%     Constitutional: as noted above  General Appearance/Behavior: 43 y.o. appears obese intermittent eye contact cooperative, No behavioral disturbances  Abnormal Movements: none, no PMA/PMR or tremor observed.  Gait and Posture: not observed  Musculoskeletal: as noted above  Mood: \"better\"  Affect: Flat   Speech: normal rate, normal rhythm, normal tone, normal volume, and normal fluency  Language:  spontaneous, comprehends spoken commands, and fluent   Thought Process: Linear, Logical, and Goal Directed, Future Oriented  Thought Content: Admits SI, AH. Denies HI, VH  Insight/Judgement:  fair  Alert/Orientation: alert, oriented to person, place and time  Attn/Concentration: normal  MMSE: deferred this visit     Medications:  Scheduled Medications   Medication Dose Frequency    metoprolol SR  25 mg Q DAY    atorvastatin  40 mg Q EVENING    lisinopril  20 mg DAILY    mirtazapine  15 mg QHS    QUEtiapine  25 mg BID    QUEtiapine  100 mg Nightly    risperiDONE  1 mg Q EVENING       Allergies:   Allergies   Allergen " Reactions    Ceclor [Cefaclor] Anaphylaxis    Cephalosporins Anaphylaxis     > 5 years ago    Pcn [Penicillins] Anaphylaxis     > 30 years ago        Assessment:   Diagnosis:   1. Suicidal ideation    2. Depression, unspecified depression type    3. Methamphetamine abuse (HCC)    4. Congestive heart failure, unspecified HF chronicity, unspecified heart failure type (HCC)         Psychiatric:   Stimulant use disorder - methamphetamine  Alcohol use disorder, severe  Schizoaffective disorder  Anxiety disorder, generalized    Medical: as noted by the medical treatment team.   CHF, RF 45%  SZ with withdrawal     Recommendations:  Legal Status: on legal hold - court    Please transfer patient to inpatient psychiatric hospital when medically cleared and bed is available.    Observation status:   - Telesitter    Phone: Yes - Okay to use at nursing availability/discretion.   Visitors: No   Personal belongings: Yes, call light    Discussed/voalted: AIDEN Robertson RN, DO    Medication Recommendations: Final orders as per Treatment Team  Change Seroquel to 100mg PO QHS  Increase Risperdal to 1mg BID start 8/30,   Continue Mitrazapine 15mg QHS  Risks/benefits/side effects discussed, patient verbalized understanding.  Medication reconciliation was completed.    Reviewed safety plan: 911, ER, PCM, MHC, suicide crisis line, nursing staff while inpatient.    Will Continue to Follow. Thank you for the consult.

## 2023-08-29 NOTE — ED NOTES
Assumed care of patient, bedside report received from off coming Tra BATEMAN.  Introduced self to pt as RN, POC discussed, call light in reach, Pt on room air, telesitter remains bedside, bed on lowest level and locked. Pt allowed to have call light in room.

## 2023-08-29 NOTE — PSYCHIATRY
[facility] PSYCHIATRIC FOLLOW-UP:(established)  *Reason for admission:  Pt reports SI after dx w/ CHF x 3 days. SI symptoms increased  due to homelessness and stress. Plan: OD on medication (lisinopril, gabapentin, risperidone).   *Legal Hold Status: extended                S: Patient continues to endorse suicidal ideation secondary to his diagnoses.  Endorses a plan to overdose.  Continues to have mild auditory hallucinations of chattering.  Last drink was 2 days ago and is experiencing sweating but no tremors or other complicated symptoms.  Remains motivated for inpatient treatment.    O: Medical ROS (as pertinent):                      *Psychiatric Examination:   Vitals:   Vitals:    08/27/23 2100 08/28/23 0200 08/28/23 0603 08/28/23 1630   BP: (!) 145/90 (!) 150/86 (!) 141/75 125/76   Pulse: 98 96 74 61   Resp: 17 16 17 16   Temp:    37.2 °C (99 °F)   TempSrc:    Temporal   SpO2: 97% 97% 96% 97%   Weight:       Height:         General Appearance:  obese, good eye contact, and friendly  Abnormal Movements: none   Gait and Posture: not observed  Speech: within normal limits  Thought Process: normal rate  Associations:   linear and logical  Abnormal or Psychotic Thoughts: none  Judgement and Insight: intact  Orientation: location, date, month, year, and city  Recent and Remote Memory: short term memory, long term memory, and intact  Attention Span and Concentration: intact  Language:spontaneous and fluent  Fund of Knowledge: good  Mood and Affect: depressed and constricted  SI/HI:  suicidal - yes and homicidal - no  MMSE score and/or clock drawing:not applicable     Current Medications:  Scheduled Medications   Medication Dose Frequency    metoprolol SR  25 mg Q DAY    atorvastatin  40 mg Q EVENING    lisinopril  20 mg DAILY    mirtazapine  15 mg QHS    QUEtiapine  25 mg BID    QUEtiapine  100 mg Nightly    risperiDONE  1 mg Q EVENING          *ASSESSMENT/RECOMENDATIONS:  1. Methamphetamine use  disorder: every other day  2  Alcohol use disorder severe: daily  3  Schizoaffective disorder  4  Anxiety disorder unspc with panic      -R/O PTSD          Medical:   -CHF, EF 45%: discussed  -has had sz on withdrawal from alcohol sometimes       Legal hold: extended  Observation status:   -telemonitor      Visitors: no  Personal belongings: yes        Discussed/voalted:     Medication and Other Recommendations: final orders as per Tx Tm  Risks/benefits/expectations discussed  2    seroquel 25/25/100: for anxiety and sleep. May need further adjustment  3    risperdol 1 mg hs: AH  4    remeron 15 for depression and anxiety: will need to go up in dose: will take weeks  5   May need tegretol 200 mg bid for mood stabilization which he has had in the past and feels it did help with his mood.   6    when pt is ready for dc, here or from inpt, pt would like [facility] into [facility] [place]: says he needs a long term program having failed short term programs  7    risks of continue meth use and alcohol discussed.       Will continue to follow with you.    Thank you for the consult.         Discharge recommendations: inpt psych     If released from [facility]: Discharge Instructions:  -Reviewed safety plan: 911, ER, PCM, MHC, Suicide crisis line  -Please assist with outpatient Psychiatric/substance use follow up appointments at discharge once medically cleared.

## 2023-08-29 NOTE — ED NOTES
Pt resting with even chest rise and fall, reports no needs at this time, call light available and in reach. Telesitter remains bedside

## 2023-08-29 NOTE — ED NOTES
Pt resting with even chest rise and fall, reports no needs at this time, call light available and in reach. Telesitter remains bedside.

## 2023-08-29 NOTE — ED NOTES
Patient's home medications have been reviewed by the pharmacy team.     Past Medical History:   Diagnosis Date    Alcohol-induced psychosis (HCC)     Anxiety     Depression     Paranoid schizophrenia (HCC)     Psychiatric disorder     panic attack    Schizophrenia, paranoid (HCC)     Suicidal ideations        Patient's Medications   New Prescriptions    No medications on file   Previous Medications    CARBAMAZEPINE (TEGRETOL) 200 MG TAB    Take 200 mg by mouth 2 times a day.    GABAPENTIN (NEURONTIN) 100 MG CAP    Take 100 mg by mouth 3 times a day.    RISPERIDONE (RISPERDAL) 2 MG TAB    Take 2 mg by mouth at bedtime.    SERTRALINE (ZOLOFT) 100 MG TAB    Take 150 mg by mouth every day. 1.5 tablet = 150 mg    TRAZODONE (DESYREL) 150 MG TAB    Take 150 mg by mouth every evening.   Modified Medications    No medications on file   Discontinued Medications    GABAPENTIN (NEURONTIN) 100 MG CAP    Take 100 mg by mouth 3 times a day.    RISPERIDONE (RISPERDAL) 2 MG TAB    Take 2 mg by mouth at bedtime.    TRAZODONE (DESYREL) 150 MG TAB    Take 150 mg by mouth at bedtime.             A:  - Patient presenting for SI with intent to overdose on his own medications (specifically lisinopril, gabapentin, and risperidone).   - Patient reports drinking 10-15 tall boys daily, with last drink ~0000 on 8/27. Recent admission requiring tx for withdrawal.       P:   - Psych has consulted and initiated psychotropic regimen (Remeron, Seroquel, Risperdal)  - Newly dx CHF: beta-blocker, ACEI, and high-intensity statin have been ordered.   - Plan for transfer to inpt psych facility pending bed availability.   - Recommend CIWA protocol be ordered as patient is high risk for alcohol withdrawal.       Patito Colbert, PharmD  José Luis Armijo, PharmD, BCCCP

## 2023-08-29 NOTE — ED NOTES
Pt medicated per MAR and ambulatory to restroom with steady gait. PT now back in room, pt resting with even chest rise and fall, reports no needs at this time, call light available and in reach. Telesitter remains bedside.

## 2023-08-29 NOTE — ED NOTES
Pt ambulatory to restroom in sight of RN. Pt back in room resting with even chest rise and fall. Telesitter remains bedside.

## 2023-08-29 NOTE — DISCHARGE PLANNING
Alert Team:    Patient resting through out the night without incident with telemonitor observing patient safety inside room. Patient compliant with medication regime; no PRN medication requested or required over night. Legal hold extended per psychiatry. Patient awaits psychiatric bed availability at [facility]. Alert Team will continue to monitor.

## 2023-08-29 NOTE — ED NOTES
Pt resting with eyes shut with even chest rise and fall, call light available and in reach. Telesitter remains bedside

## 2023-08-29 NOTE — DISCHARGE PLANNING
Alert Team Note:    Contacted by [facility], spoke to Doreen. Pt is on the pending list. No bed availability at this time.     Contacted by [facility], spoke to Beth. Facility not accepting admissions today.

## 2023-08-29 NOTE — DISCHARGE PLANNING
Alert Team Note:    Contacted by [facility], spoke to Doreen. Pt has been accepted PENDING a negative Covid test, infection control form, and nurse to nurse contact.   Writer requested Covid test from Dr. De Los Santos and form from Alert team BHAVANA Wise.

## 2023-08-29 NOTE — ED NOTES
Patient is resting comfortably in NAD, breathing even and unlabored  Safety/ L2K precautions in place- pt allowed to have call light    Tele-monitor remains in room at BS in direct 1:1 view of pt

## 2023-08-29 NOTE — ED NOTES
Pt resting with eyes shut with even chest rise and fall, call light available and in reach. Telesitter remains bedside.

## 2023-08-30 NOTE — ED NOTES
Pt discharged to [facility]. Belongings given to [facility]. Pt left ambulating independently

## 2023-09-05 NOTE — DOCUMENTATION QUERY
"                                                                         [facility]                                                                       Query Response Note      PATIENT:               OSITO BLACK  ACCT #:                  [id number]  MRN:                     [id number]  :                      1980  ADMIT DATE:       2023 1:28 PM  DISCH DATE:        2023 3:49 PM  RESPONDING  PROVIDER #:        [id number]           QUERY TEXT:    Documentation in the medical record indicates that this patient is being treated for?heart failure.    Can the diagnosis of heart failure be further clarified based on the above clinical indicators?    The patient's Clinical Indicators include:  43 y.o. male who was admitted for alcohol withdrawal. Also c/o chest pain and shortness of breath.    Discharge summary: An echo was ordered since he was complaining of chest pain, echo showed EF fo 40% possible related to drug abuse and alcohol abuse.\" \"I have consulted cardiology regarding his HF, but patient would like to leave [facility].\"     Echocardiogram findings: Mildly reduced left ventricular systolic function. The left ventricular   ejection fraction is visually estimated to be 40-45%. Grade II diastolic dysfunction. Normal right ventricular size and systolic function. Mildly dilated left atrium. Mild mitral regurgitation. No pericardial effusion.     Treatment: echocardiogram, labs    Risk factors: Alcohol withdrawal syndrome, HF    Thank you,  [email or web address]  Options provided:   -- Acute Systolic & Diastolic Congestive Heart Failure   -- Acute on Chronic Systolic & Diastolic Congestive Heart Failure   -- Chronic Systolic & Diastolic Congestive Heart Failure   -- Acute Systolic Congestive Heart Failure   -- Acute on Chronic Systolic Congestive Heart Failure   -- Chronic Systolic Congestive Heart Failure   -- Acute Diastolic Congestive Heart Failure   -- Acute on Chronic " Diastolic Congestive Heart Failure   -- Chronic Diastolic Congestive Heart Failure   -- Other explanation, Please specify   -- Unable to Determine      Query created by: Alicia Amezquita on 9/5/2023 6:12 AM    RESPONSE TEXT:    Acute on Chronic Systolic Congestive Heart Failure          Electronically signed by:  REAL ARDON MD 9/5/2023 11:30 AM

## 2023-09-10 ENCOUNTER — HOSPITAL ENCOUNTER (EMERGENCY)
Facility: MEDICAL CENTER | Age: 43
End: 2023-09-10
Attending: EMERGENCY MEDICINE
Payer: MEDICAID

## 2023-09-10 ENCOUNTER — APPOINTMENT (OUTPATIENT)
Dept: RADIOLOGY | Facility: MEDICAL CENTER | Age: 43
End: 2023-09-10
Attending: EMERGENCY MEDICINE
Payer: MEDICAID

## 2023-09-10 VITALS
OXYGEN SATURATION: 97 % | DIASTOLIC BLOOD PRESSURE: 85 MMHG | HEART RATE: 89 BPM | RESPIRATION RATE: 18 BRPM | BODY MASS INDEX: 37.3 KG/M2 | SYSTOLIC BLOOD PRESSURE: 129 MMHG | TEMPERATURE: 98.5 F | HEIGHT: 75 IN | WEIGHT: 300 LBS

## 2023-09-10 DIAGNOSIS — F23 ACUTE PSYCHOSIS (HCC): ICD-10-CM

## 2023-09-10 LAB
ALBUMIN SERPL BCP-MCNC: 4.2 G/DL (ref 3.2–4.9)
ALBUMIN/GLOB SERPL: 1.4 G/DL
ALP SERPL-CCNC: 152 U/L (ref 30–99)
ALT SERPL-CCNC: 73 U/L (ref 2–50)
AMPHET UR QL SCN: NEGATIVE
ANION GAP SERPL CALC-SCNC: 14 MMOL/L (ref 7–16)
AST SERPL-CCNC: 45 U/L (ref 12–45)
BARBITURATES UR QL SCN: NEGATIVE
BASOPHILS # BLD AUTO: 1.4 % (ref 0–1.8)
BASOPHILS # BLD: 0.11 K/UL (ref 0–0.12)
BENZODIAZ UR QL SCN: NEGATIVE
BILIRUB SERPL-MCNC: 0.3 MG/DL (ref 0.1–1.5)
BUN SERPL-MCNC: 9 MG/DL (ref 8–22)
BZE UR QL SCN: NEGATIVE
CALCIUM ALBUM COR SERPL-MCNC: 8.5 MG/DL (ref 8.5–10.5)
CALCIUM SERPL-MCNC: 8.7 MG/DL (ref 8.5–10.5)
CANNABINOIDS UR QL SCN: NEGATIVE
CHLORIDE SERPL-SCNC: 103 MMOL/L (ref 96–112)
CO2 SERPL-SCNC: 21 MMOL/L (ref 20–33)
CREAT SERPL-MCNC: 0.84 MG/DL (ref 0.5–1.4)
EKG IMPRESSION: NORMAL
EOSINOPHIL # BLD AUTO: 0.2 K/UL (ref 0–0.51)
EOSINOPHIL NFR BLD: 2.5 % (ref 0–6.9)
ERYTHROCYTE [DISTWIDTH] IN BLOOD BY AUTOMATED COUNT: 45.3 FL (ref 35.9–50)
FENTANYL UR QL: NEGATIVE
GFR SERPLBLD CREATININE-BSD FMLA CKD-EPI: 111 ML/MIN/1.73 M 2
GLOBULIN SER CALC-MCNC: 3 G/DL (ref 1.9–3.5)
GLUCOSE SERPL-MCNC: 172 MG/DL (ref 65–99)
HCT VFR BLD AUTO: 46.4 % (ref 42–52)
HGB BLD-MCNC: 16.5 G/DL (ref 14–18)
IMM GRANULOCYTES # BLD AUTO: 0.02 K/UL (ref 0–0.11)
IMM GRANULOCYTES NFR BLD AUTO: 0.2 % (ref 0–0.9)
LYMPHOCYTES # BLD AUTO: 3.73 K/UL (ref 1–4.8)
LYMPHOCYTES NFR BLD: 46.2 % (ref 22–41)
MCH RBC QN AUTO: 31.5 PG (ref 27–33)
MCHC RBC AUTO-ENTMCNC: 35.6 G/DL (ref 32.3–36.5)
MCV RBC AUTO: 88.5 FL (ref 81.4–97.8)
METHADONE UR QL SCN: NEGATIVE
MONOCYTES # BLD AUTO: 0.8 K/UL (ref 0–0.85)
MONOCYTES NFR BLD AUTO: 9.9 % (ref 0–13.4)
NEUTROPHILS # BLD AUTO: 3.21 K/UL (ref 1.82–7.42)
NEUTROPHILS NFR BLD: 39.8 % (ref 44–72)
NRBC # BLD AUTO: 0 K/UL
NRBC BLD-RTO: 0 /100 WBC (ref 0–0.2)
OPIATES UR QL SCN: NEGATIVE
OXYCODONE UR QL SCN: NEGATIVE
PCP UR QL SCN: NEGATIVE
PLATELET # BLD AUTO: 388 K/UL (ref 164–446)
PMV BLD AUTO: 9.5 FL (ref 9–12.9)
POC BREATHALIZER: 0.13 PERCENT (ref 0–0.01)
POTASSIUM SERPL-SCNC: 3.5 MMOL/L (ref 3.6–5.5)
PROPOXYPH UR QL SCN: NEGATIVE
PROT SERPL-MCNC: 7.2 G/DL (ref 6–8.2)
RBC # BLD AUTO: 5.24 M/UL (ref 4.7–6.1)
SODIUM SERPL-SCNC: 138 MMOL/L (ref 135–145)
TROPONIN T SERPL-MCNC: <6 NG/L (ref 6–19)
WBC # BLD AUTO: 8.1 K/UL (ref 4.8–10.8)

## 2023-09-10 PROCEDURE — 93005 ELECTROCARDIOGRAM TRACING: CPT

## 2023-09-10 PROCEDURE — 80053 COMPREHEN METABOLIC PANEL: CPT

## 2023-09-10 PROCEDURE — 93005 ELECTROCARDIOGRAM TRACING: CPT | Performed by: EMERGENCY MEDICINE

## 2023-09-10 PROCEDURE — 85025 COMPLETE CBC W/AUTO DIFF WBC: CPT

## 2023-09-10 PROCEDURE — 99285 EMERGENCY DEPT VISIT HI MDM: CPT

## 2023-09-10 PROCEDURE — 71045 X-RAY EXAM CHEST 1 VIEW: CPT

## 2023-09-10 PROCEDURE — 84484 ASSAY OF TROPONIN QUANT: CPT

## 2023-09-10 PROCEDURE — 96372 THER/PROPH/DIAG INJ SC/IM: CPT

## 2023-09-10 PROCEDURE — 700111 HCHG RX REV CODE 636 W/ 250 OVERRIDE (IP): Mod: JZ,UD | Performed by: EMERGENCY MEDICINE

## 2023-09-10 PROCEDURE — 36415 COLL VENOUS BLD VENIPUNCTURE: CPT

## 2023-09-10 PROCEDURE — 80307 DRUG TEST PRSMV CHEM ANLYZR: CPT

## 2023-09-10 PROCEDURE — 302970 POC BREATHALIZER: Performed by: EMERGENCY MEDICINE

## 2023-09-10 PROCEDURE — 302970 POC BREATHALIZER

## 2023-09-10 RX ORDER — GABAPENTIN 100 MG/1
100 CAPSULE ORAL 3 TIMES DAILY
Qty: 90 CAPSULE | Refills: 0 | Status: SHIPPED | OUTPATIENT
Start: 2023-09-10

## 2023-09-10 RX ORDER — HALOPERIDOL 5 MG/ML
5 INJECTION INTRAMUSCULAR ONCE
Status: COMPLETED | OUTPATIENT
Start: 2023-09-10 | End: 2023-09-10

## 2023-09-10 RX ORDER — RISPERIDONE 2 MG/1
2 TABLET ORAL
Qty: 60 TABLET | Refills: 0 | Status: SHIPPED | OUTPATIENT
Start: 2023-09-10

## 2023-09-10 RX ORDER — TRAZODONE HYDROCHLORIDE 150 MG/1
150 TABLET ORAL NIGHTLY
Qty: 30 TABLET | Refills: 0 | Status: SHIPPED | OUTPATIENT
Start: 2023-09-10

## 2023-09-10 RX ORDER — LORAZEPAM 2 MG/ML
2 INJECTION INTRAMUSCULAR ONCE
Status: COMPLETED | OUTPATIENT
Start: 2023-09-10 | End: 2023-09-10

## 2023-09-10 RX ADMIN — LORAZEPAM 2 MG: 2 INJECTION INTRAMUSCULAR; INTRAVENOUS at 13:34

## 2023-09-10 RX ADMIN — HALOPERIDOL LACTATE 5 MG: 5 INJECTION, SOLUTION INTRAMUSCULAR at 13:34

## 2023-09-10 ASSESSMENT — FIBROSIS 4 INDEX: FIB4 SCORE: 1.97

## 2023-09-10 NOTE — ED TRIAGE NOTES
"Chief Complaint   Patient presents with    Anxiety     Hx anxiety, pt states he 'drank a lot' and took librium today to help him calm down    Chest Pain     Pt states he gets chest pain when he's anxious, states this feels the same, hx SVT and CHF per pt, pt arrives in a normal sinus rhythm     Suicidal Ideation     Chronic SI but reports this as one of his reasons for being here today, hx of attempt with OD, no attempt today     Pt BIB EMS for above complaints, VSS on RA, GCS 15 with anxiety. Pt roomed after triage in [facility].    Pt has hx schizophrenia and alcohol induced psychosis. Hx meth use but states he stopped meth 2 weeks ago because he was told he has CHF.    Pt endorses hearing voices in his head.    /87   Pulse (!) 113   Temp 36.8 °C (98.3 °F) (Temporal)   Resp (!) 22   Ht 1.905 m (6' 3\")   Wt (!) 136 kg (300 lb)   SpO2 100%   BMI 37.50 kg/m²     "

## 2023-09-10 NOTE — ED NOTES
Per ERP, pt ok for no redraw on green top for diagnostic alcohol and repeat troponin. 1:1 sitter remains at bedside.

## 2023-09-10 NOTE — ED PROVIDER NOTES
ED Provider Note    CHIEF COMPLAINT  Chief Complaint   Patient presents with    Anxiety     Hx anxiety, pt states he 'drank a lot' and took librium today to help him calm down    Chest Pain     Pt states he gets chest pain when he's anxious, states this feels the same, hx SVT and CHF per pt, pt arrives in a normal sinus rhythm     Suicidal Ideation     Chronic SI but reports this as one of his reasons for being here today, hx of attempt with OD, no attempt today       HPI/ROS  Brannon Sharma is a 43 y.o. male who presents with anxiety and agitation.  The patient states that he feels like he is going to die.  He states he is not suicidal but he feels like he is going to die.  He admits to not taking care of himself and drinking heavily.  He states he has not abused amphetamines in the last several days.  He states he took a lot of Librium to help calm himself down but states he is having a severe anxiety attack.  He is diaphoretic.  He does have a known history of's schizophrenia after review of his prior records.    PAST MEDICAL HISTORY   has a past medical history of Alcohol-induced psychosis (HCC), Anxiety, Depression, Paranoid schizophrenia (HCC), Psychiatric disorder, Schizophrenia, paranoid (HCC), and Suicidal ideations.    SURGICAL HISTORY   has a past surgical history that includes other and nasal septal reconstruction.    FAMILY HISTORY  Family History   Family history unknown: Yes       SOCIAL HISTORY  Social History     Tobacco Use    Smoking status: Every Day     Current packs/day: 1.00     Types: Cigarettes    Smokeless tobacco: Never   Vaping Use    Vaping Use: Every day    Substances: Nicotine    Devices: Pre-filled or refillable cartridge   Substance and Sexual Activity    Alcohol use: Yes     Alcohol/week: 9.6 oz     Types: 16 Cans of beer per week    Drug use: Yes     Types: Inhaled, Marijuana     Comment: Meth daily    Sexual activity: Yes     Partners: Female       CURRENT  "MEDICATIONS  Home Medications       Reviewed by Yan Pimentel R.N. (Registered Nurse) on 09/10/23 at 1240  Med List Status: <None>     Medication Last Dose Status   gabapentin (NEURONTIN) 100 MG Cap  Active   risperiDONE (RISPERDAL) 2 MG Tab  Active   traZODone (DESYREL) 150 MG Tab  Active                    ALLERGIES  Allergies   Allergen Reactions    Ceclor [Cefaclor] Anaphylaxis    Cephalosporins Anaphylaxis     > 5 years ago    Pcn [Penicillins] Anaphylaxis     > 30 years ago       PHYSICAL EXAM  VITAL SIGNS: BP (!) 132/93   Pulse (!) 102   Temp 36.8 °C (98.3 °F) (Temporal)   Resp (!) 28   Ht 1.905 m (6' 3\")   Wt (!) 136 kg (300 lb)   SpO2 96%   BMI 37.50 kg/m²    In general the patient is agitated and diaphoretic and in distress    HEENT unremarkable    Pulmonary the patient's lungs are clear to auscultation bilaterally    Cardiovascular S1-S2 with a tachycardic rate    GI abdomen soft    Skin no rashes    Extremities no edema    Neurologic examination is grossly intact    Psychiatric exam the patient is agitated but does converse appropriately.  He states he has been hearing voices and he denies suicidal and homicidal ideation.    DIAGNOSTIC STUDIES   Results for orders placed or performed during the hospital encounter of 09/10/23   CBC with Differential   Result Value Ref Range    WBC 8.1 4.8 - 10.8 K/uL    RBC 5.24 4.70 - 6.10 M/uL    Hemoglobin 16.5 14.0 - 18.0 g/dL    Hematocrit 46.4 42.0 - 52.0 %    MCV 88.5 81.4 - 97.8 fL    MCH 31.5 27.0 - 33.0 pg    MCHC 35.6 32.3 - 36.5 g/dL    RDW 45.3 35.9 - 50.0 fL    Platelet Count 388 164 - 446 K/uL    MPV 9.5 9.0 - 12.9 fL    Neutrophils-Polys 39.80 (L) 44.00 - 72.00 %    Lymphocytes 46.20 (H) 22.00 - 41.00 %    Monocytes 9.90 0.00 - 13.40 %    Eosinophils 2.50 0.00 - 6.90 %    Basophils 1.40 0.00 - 1.80 %    Immature Granulocytes 0.20 0.00 - 0.90 %    Nucleated RBC 0.00 0.00 - 0.20 /100 WBC    Neutrophils (Absolute) 3.21 1.82 - 7.42 K/uL    Lymphs " (Absolute) 3.73 1.00 - 4.80 K/uL    Monos (Absolute) 0.80 0.00 - 0.85 K/uL    Eos (Absolute) 0.20 0.00 - 0.51 K/uL    Baso (Absolute) 0.11 0.00 - 0.12 K/uL    Immature Granulocytes (abs) 0.02 0.00 - 0.11 K/uL    NRBC (Absolute) 0.00 K/uL   Complete Metabolic Panel (CMP)   Result Value Ref Range    Sodium 138 135 - 145 mmol/L    Potassium 3.5 (L) 3.6 - 5.5 mmol/L    Chloride 103 96 - 112 mmol/L    Co2 21 20 - 33 mmol/L    Anion Gap 14.0 7.0 - 16.0    Glucose 172 (H) 65 - 99 mg/dL    Bun 9 8 - 22 mg/dL    Creatinine 0.84 0.50 - 1.40 mg/dL    Calcium 8.7 8.5 - 10.5 mg/dL    Correct Calcium 8.5 8.5 - 10.5 mg/dL    AST(SGOT) 45 12 - 45 U/L    ALT(SGPT) 73 (H) 2 - 50 U/L    Alkaline Phosphatase 152 (H) 30 - 99 U/L    Total Bilirubin 0.3 0.1 - 1.5 mg/dL    Albumin 4.2 3.2 - 4.9 g/dL    Total Protein 7.2 6.0 - 8.2 g/dL    Globulin 3.0 1.9 - 3.5 g/dL    A-G Ratio 1.4 g/dL   Troponins NOW   Result Value Ref Range    Troponin T <6 6 - 19 ng/L   Urine Drug Screen   Result Value Ref Range    Amphetamines Urine Negative Negative    Barbiturates Negative Negative    Benzodiazepines Negative Negative    Cocaine Metabolite Negative Negative    Fentanyl, Urine Negative Negative    Methadone Negative Negative    Opiates Negative Negative    Oxycodone Negative Negative    Phencyclidine -Pcp Negative Negative    Propoxyphene Negative Negative    Cannabinoid Metab Negative Negative   ESTIMATED GFR   Result Value Ref Range    GFR (CKD-EPI) 111 >60 mL/min/1.73 m 2   POC BREATHALIZER   Result Value Ref Range    POC Breathalizer 0.128 (A) 0.00 - 0.01 Percent   EKG   Result Value Ref Range    Report       [facility] Emergency Dept.    Test Date:  2023-09-10  Pt Name:    OSITO BLACK               Department: ER  MRN:        [id number]                      Room:  Gender:     Male                         Technician: [id number]  :        1980                   Requested By:ER TRIAGE PROTOCOL  Order #:    [id number]                     Reading MD: KWESI BANKS MD    Measurements  Intervals                                Axis  Rate:       99                           P:          27  TX:         161                          QRS:        71  QRSD:       108                          T:          44  QT:         352  QTc:        452    Interpretive Statements  Twelve-lead EKG shows normal sinus rhythm with a ventricular to 99, some  J-point elevation anteriorly, no reciprocal changes to support ischemia,  normal T waves.  Electronically Signed On 09- 16:39:58 PDT by KWESI BANKS MD         EKG  I have independently interpreted this EKG  Please see my interpretation above    RADIOLOGY  Radiologist interpretation:   DX-CHEST-PORTABLE (1 VIEW)   Final Result         1. No acute cardiopulmonary abnormalities are identified.            COURSE & MEDICAL DECISION MAKING    This a 43-year-old gentleman who presents the emergency department acutely ill in appearance.  The patient was agitated and diaphoretic.  I was concerned for potential alcohol abuse versus methamphetamine induced intoxication.  The patient was treated aggressively with IM Haldol and Ativan.  Laboratory analysis was obtained.  Subsequently the patient fell asleep and he is resting comfortably.  He has been observed for a prolonged period of time.  At approximately 1640 the patient was reexamined and feels significantly better.  He denies suicidal and homicidal ideation.  He contracts for safety.  He states he has been noncompliant with his medication and does not currently have his medication.  He is tolerating a meal at this time.  I do not have a clear source with the underlying psychosis and agitation but the patient has had significant improvement.  Therefore the patient will be discharged and I will send his prescriptions into the [place] pharmacy here at [facility] so he can get back on his medication.  The patient will return to the emergency department as  needed.  I would like him to follow-up with his primary care doctor in 48 to 72 hours.    FINAL DIAGNOSIS  1.  Acute psychosis and agitation requiring sedation  2.  Critical care time 35 minutes    CRITICAL CARE  The very real possibilty of a deterioration of this patient's condition required the highest level of my preparedness for sudden, emergent intervention.  I provided critical care services, which included medication orders, frequent reevaluations of the patient's condition and response to treatment, ordering and reviewing test results, and discussing the case with various consultants.  The critical care time associated with the care of the patient was 35 minutes. Review chart for interventions. This time is exclusive of any other billable procedures.       Disposition  The patient will be discharged in stable condition         Electronically signed by: Candido Quintero M.D., 9/10/2023 1:29 PM

## 2023-09-10 NOTE — ED NOTES
Pt endorses hx of EtOH withdrawal seizures. Pt refusing attempts to pad/raise bed rails at this time d/t anxiety. Pt remains on essential monitors.

## 2023-09-10 NOTE — ED NOTES
Pt dressed down into paper scrubs, belongings collected for security clearance. 1:1 sitter initiated.

## 2023-09-11 ENCOUNTER — HOSPITAL ENCOUNTER (EMERGENCY)
Facility: MEDICAL CENTER | Age: 43
End: 2023-09-12
Attending: STUDENT IN AN ORGANIZED HEALTH CARE EDUCATION/TRAINING PROGRAM
Payer: MEDICAID

## 2023-09-11 DIAGNOSIS — F41.9 ANXIETY: ICD-10-CM

## 2023-09-11 DIAGNOSIS — R45.851 SUICIDAL IDEATION: ICD-10-CM

## 2023-09-11 LAB
AMPHET UR QL SCN: NEGATIVE
BARBITURATES UR QL SCN: NEGATIVE
BENZODIAZ UR QL SCN: POSITIVE
BZE UR QL SCN: NEGATIVE
CANNABINOIDS UR QL SCN: NEGATIVE
FENTANYL UR QL: NEGATIVE
METHADONE UR QL SCN: NEGATIVE
OPIATES UR QL SCN: NEGATIVE
OXYCODONE UR QL SCN: NEGATIVE
PCP UR QL SCN: NEGATIVE
POC BREATHALIZER: 0.11 PERCENT (ref 0–0.01)
PROPOXYPH UR QL SCN: NEGATIVE

## 2023-09-11 PROCEDURE — 93005 ELECTROCARDIOGRAM TRACING: CPT

## 2023-09-11 PROCEDURE — 80307 DRUG TEST PRSMV CHEM ANLYZR: CPT

## 2023-09-11 PROCEDURE — 93005 ELECTROCARDIOGRAM TRACING: CPT | Performed by: STUDENT IN AN ORGANIZED HEALTH CARE EDUCATION/TRAINING PROGRAM

## 2023-09-11 PROCEDURE — 96375 TX/PRO/DX INJ NEW DRUG ADDON: CPT

## 2023-09-11 PROCEDURE — 96374 THER/PROPH/DIAG INJ IV PUSH: CPT

## 2023-09-11 PROCEDURE — 99285 EMERGENCY DEPT VISIT HI MDM: CPT

## 2023-09-11 PROCEDURE — 302970 POC BREATHALIZER

## 2023-09-11 PROCEDURE — A9270 NON-COVERED ITEM OR SERVICE: HCPCS | Mod: UD | Performed by: STUDENT IN AN ORGANIZED HEALTH CARE EDUCATION/TRAINING PROGRAM

## 2023-09-11 PROCEDURE — 302970 POC BREATHALIZER: Performed by: STUDENT IN AN ORGANIZED HEALTH CARE EDUCATION/TRAINING PROGRAM

## 2023-09-11 PROCEDURE — 700102 HCHG RX REV CODE 250 W/ 637 OVERRIDE(OP): Mod: UD | Performed by: STUDENT IN AN ORGANIZED HEALTH CARE EDUCATION/TRAINING PROGRAM

## 2023-09-11 PROCEDURE — 700111 HCHG RX REV CODE 636 W/ 250 OVERRIDE (IP): Mod: JZ,UD | Performed by: STUDENT IN AN ORGANIZED HEALTH CARE EDUCATION/TRAINING PROGRAM

## 2023-09-11 RX ORDER — DIAZEPAM 5 MG/ML
5 INJECTION, SOLUTION INTRAMUSCULAR; INTRAVENOUS ONCE
Status: COMPLETED | OUTPATIENT
Start: 2023-09-11 | End: 2023-09-11

## 2023-09-11 RX ORDER — DIAZEPAM 5 MG/1
5 TABLET ORAL ONCE
Status: COMPLETED | OUTPATIENT
Start: 2023-09-11 | End: 2023-09-11

## 2023-09-11 RX ORDER — HALOPERIDOL 5 MG/ML
2 INJECTION INTRAMUSCULAR ONCE
Status: COMPLETED | OUTPATIENT
Start: 2023-09-11 | End: 2023-09-11

## 2023-09-11 RX ADMIN — DIAZEPAM 5 MG: 5 TABLET ORAL at 22:44

## 2023-09-11 RX ADMIN — DIAZEPAM 5 MG: 5 INJECTION, SOLUTION INTRAMUSCULAR; INTRAVENOUS at 19:41

## 2023-09-11 RX ADMIN — HALOPERIDOL LACTATE 2 MG: 5 INJECTION, SOLUTION INTRAMUSCULAR at 19:39

## 2023-09-11 ASSESSMENT — FIBROSIS 4 INDEX: FIB4 SCORE: 0.58

## 2023-09-12 VITALS
HEART RATE: 82 BPM | DIASTOLIC BLOOD PRESSURE: 87 MMHG | HEIGHT: 75 IN | OXYGEN SATURATION: 98 % | RESPIRATION RATE: 18 BRPM | WEIGHT: 300 LBS | BODY MASS INDEX: 37.3 KG/M2 | TEMPERATURE: 98.1 F | SYSTOLIC BLOOD PRESSURE: 135 MMHG

## 2023-09-12 LAB — EKG IMPRESSION: NORMAL

## 2023-09-12 PROCEDURE — 90791 PSYCH DIAGNOSTIC EVALUATION: CPT

## 2023-09-12 PROCEDURE — 700102 HCHG RX REV CODE 250 W/ 637 OVERRIDE(OP): Mod: UD | Performed by: STUDENT IN AN ORGANIZED HEALTH CARE EDUCATION/TRAINING PROGRAM

## 2023-09-12 PROCEDURE — 96375 TX/PRO/DX INJ NEW DRUG ADDON: CPT

## 2023-09-12 PROCEDURE — 700111 HCHG RX REV CODE 636 W/ 250 OVERRIDE (IP): Mod: UD | Performed by: STUDENT IN AN ORGANIZED HEALTH CARE EDUCATION/TRAINING PROGRAM

## 2023-09-12 PROCEDURE — A9270 NON-COVERED ITEM OR SERVICE: HCPCS | Mod: UD | Performed by: STUDENT IN AN ORGANIZED HEALTH CARE EDUCATION/TRAINING PROGRAM

## 2023-09-12 RX ORDER — METOPROLOL SUCCINATE 25 MG/1
25 TABLET, EXTENDED RELEASE ORAL DAILY
Status: SHIPPED | COMMUNITY
Start: 2023-09-07 | End: 2023-09-23

## 2023-09-12 RX ORDER — LORAZEPAM 2 MG/ML
2 INJECTION INTRAMUSCULAR
Status: COMPLETED | OUTPATIENT
Start: 2023-09-12 | End: 2023-09-12

## 2023-09-12 RX ORDER — RISPERIDONE 2 MG/1
2 TABLET ORAL ONCE
Status: COMPLETED | OUTPATIENT
Start: 2023-09-12 | End: 2023-09-12

## 2023-09-12 RX ORDER — LISINOPRIL 20 MG/1
20 TABLET ORAL DAILY
Status: SHIPPED | COMMUNITY
Start: 2023-09-07 | End: 2023-09-23

## 2023-09-12 RX ORDER — HYDROXYZINE PAMOATE 50 MG/1
50 CAPSULE ORAL 3 TIMES DAILY PRN
Status: SHIPPED | COMMUNITY
Start: 2023-09-07 | End: 2023-09-23

## 2023-09-12 RX ORDER — BACLOFEN 10 MG/1
10 TABLET ORAL 2 TIMES DAILY PRN
Status: SHIPPED | COMMUNITY
Start: 2023-09-07 | End: 2023-09-23

## 2023-09-12 RX ORDER — CARBAMAZEPINE 200 MG/1
200 TABLET ORAL 2 TIMES DAILY
Status: SHIPPED | COMMUNITY
Start: 2023-09-07 | End: 2023-09-23

## 2023-09-12 RX ORDER — TRAZODONE HYDROCHLORIDE 50 MG/1
150 TABLET ORAL ONCE
Status: COMPLETED | OUTPATIENT
Start: 2023-09-12 | End: 2023-09-12

## 2023-09-12 RX ADMIN — LORAZEPAM 2 MG: 2 INJECTION INTRAMUSCULAR; INTRAVENOUS at 05:39

## 2023-09-12 RX ADMIN — TRAZODONE HYDROCHLORIDE 150 MG: 50 TABLET ORAL at 00:55

## 2023-09-12 RX ADMIN — RISPERIDONE 2 MG: 2 TABLET ORAL at 00:55

## 2023-09-12 NOTE — ED PROVIDER NOTES
"ED Provider Note    CHIEF COMPLAINT  Chief Complaint   Patient presents with    Suicidal Ideation     Pt brought in by EMS screaming \"I am going to kill myself today\", pt started to hit his head on the wall and became agitated     Alcohol Intoxication     Pt was found drinking at a bar this evening, ems stated pt was slurring his words and smelt heavily of alcohol    Chest Pain     Pt states his chest is hurting him but is unable to describe the location or give a description of the pain       EXTERNAL RECORDS REVIEWED  Outpatient Notes patient was seen in the emergency department yesterday for anxiety, chest pain and suicidal ideation.  He arrived agitated and diaphoretic and required emergent sedation with Haldol and Ativan.  Upon awakening patient was significantly improved and ultimately discharged.    HPI/ROS  LIMITATION TO HISTORY   Select: Behavior Jeremy Richard Hagblom is a 43 y.o. male who presents to the emergency department brought in by EMS for suicidal ideation.  Per report from EMS the patient was found drinking at a bar stating that he wanted to kill himself.  He became acutely quite anxious and agitated with EMS repeatedly screaming that he was going to kill himself.  Patient also reported to EMS that his chest was hurting but could not elaborate further.  He denies this to me currently.  He is perseverating on the fact that he is planning to kill himself by walking into traffic and that he needs medications to relax.  He states that he is very anxious and needs medications to calm down.    PAST MEDICAL HISTORY   has a past medical history of Alcohol-induced psychosis (HCC), Anxiety, Depression, Paranoid schizophrenia (HCC), Psychiatric disorder, Schizophrenia, paranoid (HCC), and Suicidal ideations.    SURGICAL HISTORY   has a past surgical history that includes other and nasal septal reconstruction.    FAMILY HISTORY  Family History   Family history unknown: Yes       SOCIAL HISTORY  Social " "History     Tobacco Use    Smoking status: Every Day     Current packs/day: 1.00     Types: Cigarettes    Smokeless tobacco: Never   Vaping Use    Vaping Use: Every day    Substances: Nicotine    Devices: Pre-filled or refillable cartridge   Substance and Sexual Activity    Alcohol use: Yes     Alcohol/week: 9.6 oz     Types: 16 Cans of beer per week    Drug use: Yes     Types: Inhaled, Marijuana     Comment: Meth daily    Sexual activity: Yes     Partners: Female       CURRENT MEDICATIONS  Home Medications    **Home medications have not yet been reviewed for this encounter**         ALLERGIES  Allergies   Allergen Reactions    Ceclor [Cefaclor] Anaphylaxis    Cephalosporins Anaphylaxis     > 5 years ago    Pcn [Penicillins] Anaphylaxis     > 30 years ago       PHYSICAL EXAM  VITAL SIGNS: /68   Pulse 78   Temp 37.1 °C (98.7 °F) (Temporal)   Resp 16   Ht 1.905 m (6' 3\")   Wt (!) 136 kg (300 lb)   SpO2 94%   BMI 37.50 kg/m²    Constitutional: Agitated, anxious, borderline belligerent  HEENT: Atraumatic, normocephalic, pupils are equal round reactive to light, nose normal, mouth shows moist mucous membranes  Neck: Supple, no JVD, no tracheal deviation  Cardiovascular: Tachycardic, regular, no murmur, rub or gallop, 2+ pulses peripherally x4  Thorax & Lungs: Tachypneic, no wheezes, rales or rhonchi, no chest wall tenderness.  GI: Soft, non-distended, non-tender, no rebound  Skin: Warm, flushed  Musculoskeletal: Moving all extremities, no acute deformity, no edema, no tenderness  Neurologic: A&Ox3, at baseline mentation, moving all extremities  Psychiatric: Actively suicidal with a plan, agitated, borderline belligerent but following commands at this point      DIAGNOSTIC STUDIES / PROCEDURES  EKG  I have independently interpreted this EKG  Results for orders placed or performed during the hospital encounter of 09/11/23   EKG   Result Value Ref Range    Report       [facility] Emergency " Dept.    Test Date:  2023  Pt Name:    OSITO BLACK               Department: ER  MRN:        [id number]                      Room:        29  Gender:     Male                         Technician: [id number]  :        1980                   Requested By:ER TRIAGE PROTOCOL  Order #:    [id number]                    Reading MD: Nehemias Pierce    Measurements  Intervals                                Axis  Rate:       109                          P:          34  UT:         157                          QRS:        155  QRSD:       110                          T:          45  QT:         347  QTc:        468    Interpretive Statements  EKG interpretation: Sinus tachycardia rate of 109, left posterior fascicular  block, normal intervals, no ST elevation or depression, no T wave inversion.  No significant change from prior EKG.  Electronically Signed On 2023 00:40:44 PDT by Nehemias Pierce             LABS  Labs Reviewed   URINE DRUG SCREEN - Abnormal; Notable for the following components:       Result Value    Benzodiazepines Positive (*)     All other components within normal limits   POC BREATHALIZER - Abnormal; Notable for the following components:    POC Breathalizer 0.110 (*)     All other components within normal limits           COURSE & MEDICAL DECISION MAKING    ED Observation Status? Yes; I am placing the patient in to an observation status due to a diagnostic uncertainty as well as therapeutic intensity. Patient placed in observation status at 7:35 PM, 2023.     Observation plan is as follows: Sedation, behavioral health evaluation and reevaluation following this      INITIAL ASSESSMENT, COURSE AND PLAN  Care Narrative:     Patient arrives to the emergency department quite agitated and anxious endorsing suicidal ideation.  Is initially responsive to verbal de-escalation though I feel is at significant risk for profound agitation, belligerence and a danger to himself and others given his  "current state and history of similar yesterday at which time he required very aggressive sedation.  Will provide anxiolysis with Valium and Haldol.  Patient placed on a mental health hold pending behavioral health evaluation.  He is not reporting any chest pain to me currently and his EKG is nonischemic.  Do not feel that further laboratory work-up at this point is indicated.    On reevaluation after Haldol and Valium patient is much more relaxed and calm appearing.  Despite this he continues to report that he is \"very anxious\".  He continues to state repeatedly \"I am suicidal\".  He will not further elaborate on this.  At this point I do suspect some degree of secondary gain as patient continuously requests additional anxiolytics but is otherwise sleeping with no visible evidence of distress, stable vitals.  We will plan to discuss case with alert RN Theresa who knows the patient quite well.    Theresa evaluated the patient at bedside and feels quite comfortable that he is not in fact actively suicidal or any risk to himself.  I agree with this.  She does however state that it would be in the patient's best interest to remain in observation overnight pending [facility] social work services and peer recovery in the morning.  She notes that he is been seen repeatedly recently and will likely continue to be seen in the emergency department with decompensated anxiety in the setting of his social situation and lack of resources.  She feels that this will help to solve this issue long-term.  I am in agreement with this plan and will provide patient's home sleep medications.  He will be handed off to the oncoming provider pending this morning plan with discharge following.      ADDITIONAL PROBLEM LIST  Polysubstance abuse disorder, alcohol intoxication    DISPOSITION AND DISCUSSIONS  I have discussed management of the patient with the following physicians and TANNER's: None    Discussion of management with other QHP or " appropriate source(s): Behavioral Health Theresa      Barriers to care at this time, including but not limited to: Patient does not have established PCP, Patient is homeless, Patient does not have insurance, and Patient had difficult affording medications.       FINAL DIAGNOSIS  1. Suicidal ideation    2. Anxiety           Electronically signed by: Nehemias Pierce M.D., 9/11/2023 7:23 PM

## 2023-09-12 NOTE — DISCHARGE SUMMARY
"  ED Observation Discharge Summary    Patient:Brannon Sharma  Patient : 1980  Patient MRN: [id number]  Patient PCP: Gregory Hardin M.D.    Admit Date: 2023  Discharge Date and Time: 23 9:08 AM  Discharge Diagnosis:   1. Suicidal ideation    2. Anxiety        Discharge Attending: Cayden Romero M.D.  Discharge Service: ED Observation    ED Course  Brannon is a 43 y.o. male who was evaluated at [facility] emergency department with alcoholism and suicidal ideation.  Patient has a history of chronic alcoholism.  He has some vague suicidal ideation.  Seen by peer resources and the behavioral health team.  Plan is to take the patient from the emergency department to [place] [facility].  There he can receive treatment for his suicidal ideation/depression and his alcoholism and substance abuse problems.  Patient is in agreement with this plan.    Discharge Exam:  BP (!) 138/90   Pulse 80   Temp 37.1 °C (98.7 °F) (Temporal)   Resp 16   Ht 1.905 m (6' 3\")   Wt (!) 136 kg (300 lb)   SpO2 95%   BMI 37.50 kg/m² .    Constitutional: Awake and alert. Nontoxic  HENT:  Grossly normal  Eyes: Grossly normal  Neck: Normal range of motion  Cardiovascular: Normal heart rate   Thorax & Lungs: No respiratory distress  Abdomen: Nontender  Skin:  No pathologic rash.   Extremities: Well perfused  Psychiatric: Affect normal    Labs  Results for orders placed or performed during the hospital encounter of 23   Urine Drug Screen   Result Value Ref Range    Amphetamines Urine Negative Negative    Barbiturates Negative Negative    Benzodiazepines Positive (A) Negative    Cocaine Metabolite Negative Negative    Fentanyl, Urine Negative Negative    Methadone Negative Negative    Opiates Negative Negative    Oxycodone Negative Negative    Phencyclidine -Pcp Negative Negative    Propoxyphene Negative Negative    Cannabinoid Metab Negative Negative   POC BREATHALIZER   Result Value Ref Range    POC " Breathalizer 0.110 (A) 0.00 - 0.01 Percent   EKG   Result Value Ref Range    Report       [facility] Emergency Dept.    Test Date:  2023  Pt Name:    OSITO BLACK               Department: ER  MRN:        [id number]                      Room:       [facility]  Gender:     Male                         Technician: [id number]  :        1980                   Requested By:ER TRIAGE PROTOCOL  Order #:    [id number]                    Reading MD: Nehemias Peirce    Measurements  Intervals                                Axis  Rate:       109                          P:          34  MS:         157                          QRS:        155  QRSD:       110                          T:          45  QT:         347  QTc:        468    Interpretive Statements  EKG interpretation: Sinus tachycardia rate of 109, left posterior fascicular  block, normal intervals, no ST elevation or depression, no T wave inversion.  No significant change from prior EKG.  Electronically Signed On 2023 00:40:44 PDT by Nehemias Pierce         Radiology  No orders to display       Medications:   New Prescriptions    No medications on file       My final assessment includes   1. Suicidal ideation    2. Anxiety        Upon Reevaluation, the patient's condition has: Improved; and will be discharged.    Patient discharged from ED Observation status at 9:09 AM  (Time) 2023  (Date).     Total time spent on this ED Observation discharge encounter is < 30 Minutes    Electronically signed by: Cayden Romero M.D., 2023 9:08 AM

## 2023-09-12 NOTE — ED NOTES
Discharged with peer support to [facility]. Pt belongings returned to him. Ambulatory

## 2023-09-12 NOTE — ED NOTES
Pt increasingly becoming more anxious and agitated. Pt pacing, coming to door and shutting/opening door. Pt medicated per MAR.  Pt refusing all vitals equipment. Pt educated on the importance of continuous monitoring. Pt raise voice at this RN stating he does not want equipment on. Safety measures all in place, call light in reach of the patient.

## 2023-09-12 NOTE — ED NOTES
Patient remains comfortable on gurney, no identifiable needs at this time. Equal chest rise and fall bilaterally. Safety measures in place, call light within reach.
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
## 2023-09-12 NOTE — ED NOTES
Pt medicated per ERP orders. Tolerated well. Pt back to resting on gurney, blanket provided. Per ERP, no sitter needed at this time. Plan to observe pt until ETOH metabolizes then will re-evaluate

## 2023-09-12 NOTE — ED NOTES
Pt appears to be anxious stating 'my heart is racing'. ERP at bedside. Pt to Neil. Redirected to joo.

## 2023-09-12 NOTE — ED NOTES
Legal hold discontinued by ERP. Plan for medications to assist pt to calm down or sleep. Per alert team, will have pt meet with peer recovery and  in the morning.

## 2023-09-12 NOTE — ED TRIAGE NOTES
"Chief Complaint   Patient presents with    Suicidal Ideation     Pt brought in by EMS screaming \"I am going to kill myself today\", pt started to hit his head on the wall and became agitated     Alcohol Intoxication     Pt was found drinking at a bar this evening, ems stated pt was slurring his words and smelt heavily of alcohol    Chest Pain     Pt states his chest is hurting him but is unable to describe the location or give a description of the pain     Pt screaming out random numbers while being roomed, pt non-compliant for staff and unable to sit still  "

## 2023-09-12 NOTE — ED NOTES
Pt resting on gurney in [place]. No distress. Pt calm at this time. No other needs identified.    no

## 2023-09-12 NOTE — ED NOTES
Bedside report given to Sunni BATEMAN. Pt in and out of bed and to the bathroom with a steady gait. Pt reports increasing anxiety. ERP aware. Pt updated on his POC. Pt on RA, not a fall risk.

## 2023-09-12 NOTE — ED NOTES
Pt ambulated to the bathroom with a steady gait. Pt states he is still feeling anxious. Pt layed back in bed, does not appear in any acute distress. No other needs at this time

## 2023-09-12 NOTE — ED NOTES
Pt becoming increasingly anxious and agitated. Remains redirectable. ERP to bedside, verbal order received.

## 2023-09-12 NOTE — ED NOTES
Bedside report given to RN Saray    Oxygen:YONATAN  SI precautions started: pt placed on gown, belongings removed, security aware

## 2023-09-13 NOTE — DISCHARGE PLANNING
Alert Team:    POC update after discharged with [facility] and [facility].      Hello,  I took Brannon to [facility] and after hours of assessment, they admitted him. Brannon signed a consent(see attached) to share info, [facility] would not share what he was being admitted for or for how long.     [facility] and I both submitted applications for inpatient treatment with [facility]. Our hope is to get him from [facility] directly into [facility] or [facility]. Stay tuned.     Please contact me if you have any questions or concerns      Thanks!,    Will Yanet     [facility]  [phone]

## 2023-09-23 ENCOUNTER — HOSPITAL ENCOUNTER (EMERGENCY)
Facility: MEDICAL CENTER | Age: 43
End: 2023-09-24
Attending: EMERGENCY MEDICINE
Payer: MEDICAID

## 2023-09-23 ENCOUNTER — APPOINTMENT (OUTPATIENT)
Dept: RADIOLOGY | Facility: MEDICAL CENTER | Age: 43
End: 2023-09-23
Attending: EMERGENCY MEDICINE
Payer: MEDICAID

## 2023-09-23 DIAGNOSIS — R07.9 ACUTE CHEST PAIN: ICD-10-CM

## 2023-09-23 DIAGNOSIS — F15.929 METHAMPHETAMINE INTOXICATION (HCC): ICD-10-CM

## 2023-09-23 DIAGNOSIS — F15.10 METHAMPHETAMINE ABUSE (HCC): ICD-10-CM

## 2023-09-23 LAB
ALBUMIN SERPL BCP-MCNC: 4.4 G/DL (ref 3.2–4.9)
ALBUMIN/GLOB SERPL: 1.4 G/DL
ALP SERPL-CCNC: 133 U/L (ref 30–99)
ALT SERPL-CCNC: 84 U/L (ref 2–50)
ANION GAP SERPL CALC-SCNC: 20 MMOL/L (ref 7–16)
AST SERPL-CCNC: 54 U/L (ref 12–45)
BASOPHILS # BLD AUTO: 0.9 % (ref 0–1.8)
BASOPHILS # BLD: 0.08 K/UL (ref 0–0.12)
BILIRUB SERPL-MCNC: 1.1 MG/DL (ref 0.1–1.5)
BUN SERPL-MCNC: 7 MG/DL (ref 8–22)
CALCIUM ALBUM COR SERPL-MCNC: 8.5 MG/DL (ref 8.5–10.5)
CALCIUM SERPL-MCNC: 8.8 MG/DL (ref 8.5–10.5)
CHLORIDE SERPL-SCNC: 98 MMOL/L (ref 96–112)
CO2 SERPL-SCNC: 18 MMOL/L (ref 20–33)
CREAT SERPL-MCNC: 0.83 MG/DL (ref 0.5–1.4)
EKG IMPRESSION: NORMAL
EOSINOPHIL # BLD AUTO: 0.24 K/UL (ref 0–0.51)
EOSINOPHIL NFR BLD: 2.6 % (ref 0–6.9)
ERYTHROCYTE [DISTWIDTH] IN BLOOD BY AUTOMATED COUNT: 42.2 FL (ref 35.9–50)
GFR SERPLBLD CREATININE-BSD FMLA CKD-EPI: 111 ML/MIN/1.73 M 2
GLOBULIN SER CALC-MCNC: 3.1 G/DL (ref 1.9–3.5)
GLUCOSE SERPL-MCNC: 145 MG/DL (ref 65–99)
HCT VFR BLD AUTO: 46.1 % (ref 42–52)
HGB BLD-MCNC: 16.9 G/DL (ref 14–18)
IMM GRANULOCYTES # BLD AUTO: 0.02 K/UL (ref 0–0.11)
IMM GRANULOCYTES NFR BLD AUTO: 0.2 % (ref 0–0.9)
LIPASE SERPL-CCNC: 22 U/L (ref 11–82)
LYMPHOCYTES # BLD AUTO: 3.46 K/UL (ref 1–4.8)
LYMPHOCYTES NFR BLD: 38 % (ref 22–41)
MCH RBC QN AUTO: 32.1 PG (ref 27–33)
MCHC RBC AUTO-ENTMCNC: 36.7 G/DL (ref 32.3–36.5)
MCV RBC AUTO: 87.6 FL (ref 81.4–97.8)
MONOCYTES # BLD AUTO: 1.27 K/UL (ref 0–0.85)
MONOCYTES NFR BLD AUTO: 14 % (ref 0–13.4)
NEUTROPHILS # BLD AUTO: 4.03 K/UL (ref 1.82–7.42)
NEUTROPHILS NFR BLD: 44.3 % (ref 44–72)
NRBC # BLD AUTO: 0 K/UL
NRBC BLD-RTO: 0 /100 WBC (ref 0–0.2)
PLATELET # BLD AUTO: 244 K/UL (ref 164–446)
PMV BLD AUTO: 9.7 FL (ref 9–12.9)
POTASSIUM SERPL-SCNC: 3.5 MMOL/L (ref 3.6–5.5)
PROT SERPL-MCNC: 7.5 G/DL (ref 6–8.2)
RBC # BLD AUTO: 5.26 M/UL (ref 4.7–6.1)
SODIUM SERPL-SCNC: 136 MMOL/L (ref 135–145)
TROPONIN T SERPL-MCNC: 6 NG/L (ref 6–19)
TROPONIN T SERPL-MCNC: 7 NG/L (ref 6–19)
WBC # BLD AUTO: 9.1 K/UL (ref 4.8–10.8)

## 2023-09-23 PROCEDURE — 93005 ELECTROCARDIOGRAM TRACING: CPT | Performed by: EMERGENCY MEDICINE

## 2023-09-23 PROCEDURE — 80053 COMPREHEN METABOLIC PANEL: CPT

## 2023-09-23 PROCEDURE — 700111 HCHG RX REV CODE 636 W/ 250 OVERRIDE (IP): Mod: UD | Performed by: EMERGENCY MEDICINE

## 2023-09-23 PROCEDURE — 71045 X-RAY EXAM CHEST 1 VIEW: CPT

## 2023-09-23 PROCEDURE — 96375 TX/PRO/DX INJ NEW DRUG ADDON: CPT

## 2023-09-23 PROCEDURE — 84484 ASSAY OF TROPONIN QUANT: CPT

## 2023-09-23 PROCEDURE — 700105 HCHG RX REV CODE 258: Mod: UD | Performed by: EMERGENCY MEDICINE

## 2023-09-23 PROCEDURE — 83690 ASSAY OF LIPASE: CPT

## 2023-09-23 PROCEDURE — 93005 ELECTROCARDIOGRAM TRACING: CPT

## 2023-09-23 PROCEDURE — 99285 EMERGENCY DEPT VISIT HI MDM: CPT

## 2023-09-23 PROCEDURE — 96376 TX/PRO/DX INJ SAME DRUG ADON: CPT

## 2023-09-23 PROCEDURE — 36415 COLL VENOUS BLD VENIPUNCTURE: CPT

## 2023-09-23 PROCEDURE — 96374 THER/PROPH/DIAG INJ IV PUSH: CPT

## 2023-09-23 PROCEDURE — 85025 COMPLETE CBC W/AUTO DIFF WBC: CPT

## 2023-09-23 RX ORDER — LORAZEPAM 2 MG/ML
2 INJECTION INTRAMUSCULAR ONCE
Status: COMPLETED | OUTPATIENT
Start: 2023-09-23 | End: 2023-09-23

## 2023-09-23 RX ORDER — LORAZEPAM 2 MG/ML
1 INJECTION INTRAMUSCULAR ONCE
Status: COMPLETED | OUTPATIENT
Start: 2023-09-24 | End: 2023-09-24

## 2023-09-23 RX ORDER — BACLOFEN 10 MG/1
10 TABLET ORAL 2 TIMES DAILY PRN
COMMUNITY

## 2023-09-23 RX ORDER — LISINOPRIL 20 MG/1
20 TABLET ORAL DAILY
COMMUNITY

## 2023-09-23 RX ORDER — SODIUM CHLORIDE, SODIUM LACTATE, POTASSIUM CHLORIDE, CALCIUM CHLORIDE 600; 310; 30; 20 MG/100ML; MG/100ML; MG/100ML; MG/100ML
1000 INJECTION, SOLUTION INTRAVENOUS ONCE
Status: COMPLETED | OUTPATIENT
Start: 2023-09-23 | End: 2023-09-23

## 2023-09-23 RX ORDER — METOPROLOL SUCCINATE 25 MG/1
25 TABLET, EXTENDED RELEASE ORAL DAILY
COMMUNITY

## 2023-09-23 RX ORDER — LORAZEPAM 2 MG/ML
1 INJECTION INTRAMUSCULAR ONCE
Status: COMPLETED | OUTPATIENT
Start: 2023-09-23 | End: 2023-09-23

## 2023-09-23 RX ORDER — HYDROXYZINE PAMOATE 50 MG/1
50 CAPSULE ORAL 3 TIMES DAILY PRN
COMMUNITY

## 2023-09-23 RX ORDER — CARBAMAZEPINE 200 MG/1
200 TABLET ORAL 2 TIMES DAILY
COMMUNITY

## 2023-09-23 RX ADMIN — LORAZEPAM 2 MG: 2 INJECTION INTRAMUSCULAR; INTRAVENOUS at 17:54

## 2023-09-23 RX ADMIN — SODIUM CHLORIDE, POTASSIUM CHLORIDE, SODIUM LACTATE AND CALCIUM CHLORIDE 1000 ML: 600; 310; 30; 20 INJECTION, SOLUTION INTRAVENOUS at 17:54

## 2023-09-23 RX ADMIN — LORAZEPAM 1 MG: 2 INJECTION INTRAMUSCULAR; INTRAVENOUS at 19:01

## 2023-09-23 RX ADMIN — LORAZEPAM 2 MG: 2 INJECTION INTRAMUSCULAR; INTRAVENOUS at 20:50

## 2023-09-23 ASSESSMENT — FIBROSIS 4 INDEX: FIB4 SCORE: 0.58

## 2023-09-24 VITALS
TEMPERATURE: 98.6 F | HEART RATE: 100 BPM | BODY MASS INDEX: 39.28 KG/M2 | SYSTOLIC BLOOD PRESSURE: 152 MMHG | DIASTOLIC BLOOD PRESSURE: 97 MMHG | HEIGHT: 72 IN | WEIGHT: 290 LBS | OXYGEN SATURATION: 93 % | RESPIRATION RATE: 19 BRPM

## 2023-09-24 PROCEDURE — 700101 HCHG RX REV CODE 250: Mod: UD | Performed by: EMERGENCY MEDICINE

## 2023-09-24 PROCEDURE — 700111 HCHG RX REV CODE 636 W/ 250 OVERRIDE (IP): Mod: UD | Performed by: EMERGENCY MEDICINE

## 2023-09-24 PROCEDURE — 96376 TX/PRO/DX INJ SAME DRUG ADON: CPT

## 2023-09-24 RX ORDER — LORAZEPAM 2 MG/ML
1 INJECTION INTRAMUSCULAR ONCE
Status: COMPLETED | OUTPATIENT
Start: 2023-09-24 | End: 2023-09-24

## 2023-09-24 RX ORDER — METOPROLOL TARTRATE 1 MG/ML
5 INJECTION, SOLUTION INTRAVENOUS ONCE
Status: COMPLETED | OUTPATIENT
Start: 2023-09-24 | End: 2023-09-24

## 2023-09-24 RX ADMIN — METOPROLOL TARTRATE 5 MG: 5 INJECTION INTRAVENOUS at 02:10

## 2023-09-24 RX ADMIN — LORAZEPAM 1 MG: 2 INJECTION INTRAMUSCULAR; INTRAVENOUS at 02:10

## 2023-09-24 RX ADMIN — LORAZEPAM 1 MG: 2 INJECTION INTRAMUSCULAR; INTRAVENOUS at 00:05

## 2023-09-24 NOTE — ED TRIAGE NOTES
Chief Complaint   Patient presents with    Other     Pt biba [place], pt snorted a gram of meth, pt now states his heart is racing, ems reported heart rate at 130bpm, pt states he feels his heart racing.      BP (!) 147/94   Pulse (!) 140   Temp 37.4 °C (99.3 °F) (Temporal)   Resp 18   Ht 1.829 m (6')   Wt (!) 132 kg (290 lb)   SpO2 97%   BMI 39.33 kg/m²

## 2023-09-24 NOTE — ED NOTES
Erp made aware of pts still being anxious of his heart rate not slowing down, erp ordered one mg ativan.

## 2023-09-24 NOTE — DISCHARGE INSTRUCTIONS
Please discuss the following findings with your regular doctor:  DX-CHEST-PORTABLE (1 VIEW)   Final Result      Negative single view of the chest.        Labs Reviewed   CBC WITH DIFFERENTIAL - Abnormal; Notable for the following components:       Result Value    MCHC 36.7 (*)     Monocytes 14.00 (*)     Monos (Absolute) 1.27 (*)     All other components within normal limits    Narrative:     Biotin intake of greater than 5 mg per day may interfere with  troponin levels, causing false low values.   COMP METABOLIC PANEL - Abnormal; Notable for the following components:    Potassium 3.5 (*)     Co2 18 (*)     Anion Gap 20.0 (*)     Glucose 145 (*)     Bun 7 (*)     AST(SGOT) 54 (*)     ALT(SGPT) 84 (*)     Alkaline Phosphatase 133 (*)     All other components within normal limits    Narrative:     Biotin intake of greater than 5 mg per day may interfere with  troponin levels, causing false low values.   TROPONIN    Narrative:     Biotin intake of greater than 5 mg per day may interfere with  troponin levels, causing false low values.   LIPASE    Narrative:     Biotin intake of greater than 5 mg per day may interfere with  troponin levels, causing false low values.   ESTIMATED GFR    Narrative:     Biotin intake of greater than 5 mg per day may interfere with  troponin levels, causing false low values.   TROPONIN    Narrative:     Biotin intake of greater than 5 mg per day may interfere with  troponin levels, causing false low values.   TROPONIN

## 2023-09-24 NOTE — ED NOTES
Med rec updated and complete. Allergies reviewed.  Confirmed name and date of birth. Pt stated he has not taken   Any of his medications since 09/18/23.      Home pharmacy   [facility] = [phone]

## 2023-09-24 NOTE — DISCHARGE SUMMARY
ED Observation Discharge Summary    Patient:Brannon Sharma  Patient : 1980  Patient MRN: [id number]  Patient PCP: Gregory Hardin M.D.    Admit Date: 2023  Discharge Date and Time: 23 12:19 AM  Discharge Diagnosis: Substance abuse  Discharge Attending: Dada Martinez M.D.  Discharge Service: ED Observation    ED Course  Brannon is a 43 y.o. male who was evaluated at [facility] for tachycardia status post methamphetamine use.  Patient serial troponins are negative EKG is nonischemic.  Did have an anion gap acidosis however was given significant IV fluids significant benzodiazepines his heart rate is now acceptable hovering around 100 he is feeling better he is anxious to leave at this time.  Given instructions return for worsening symptoms or concerns including chest pain shortness of breath any other acute symptom change or concern given instructions to avoid substance abuse discharged in stable and improved condition.    Discharge Exam:  /77   Pulse (!) 106   Temp 37.4 °C (99.3 °F) (Temporal)   Resp 18   Ht 1.829 m (6')   Wt (!) 132 kg (290 lb)   SpO2 95%   BMI 39.33 kg/m² .    Constitutional: Awake and alert. Nontoxic  HENT:  Grossly normal  Eyes: Grossly normal  Neck: Normal range of motion  Cardiovascular: Normal heart rate   Thorax & Lungs: No respiratory distress  Abdomen: Nontender  Skin:  No pathologic rash.   Extremities: Well perfused  Psychiatric: Affect normal    Labs  Results for orders placed or performed during the hospital encounter of 23   TROPONIN   Result Value Ref Range    Troponin T 6 6 - 19 ng/L   LIPASE   Result Value Ref Range    Lipase 22 11 - 82 U/L   CBC WITH DIFFERENTIAL   Result Value Ref Range    WBC 9.1 4.8 - 10.8 K/uL    RBC 5.26 4.70 - 6.10 M/uL    Hemoglobin 16.9 14.0 - 18.0 g/dL    Hematocrit 46.1 42.0 - 52.0 %    MCV 87.6 81.4 - 97.8 fL    MCH 32.1 27.0 - 33.0 pg    MCHC 36.7 (H) 32.3 - 36.5 g/dL    RDW 42.2 35.9 - 50.0 fL    Platelet  Count 244 164 - 446 K/uL    MPV 9.7 9.0 - 12.9 fL    Neutrophils-Polys 44.30 44.00 - 72.00 %    Lymphocytes 38.00 22.00 - 41.00 %    Monocytes 14.00 (H) 0.00 - 13.40 %    Eosinophils 2.60 0.00 - 6.90 %    Basophils 0.90 0.00 - 1.80 %    Immature Granulocytes 0.20 0.00 - 0.90 %    Nucleated RBC 0.00 0.00 - 0.20 /100 WBC    Neutrophils (Absolute) 4.03 1.82 - 7.42 K/uL    Lymphs (Absolute) 3.46 1.00 - 4.80 K/uL    Monos (Absolute) 1.27 (H) 0.00 - 0.85 K/uL    Eos (Absolute) 0.24 0.00 - 0.51 K/uL    Baso (Absolute) 0.08 0.00 - 0.12 K/uL    Immature Granulocytes (abs) 0.02 0.00 - 0.11 K/uL    NRBC (Absolute) 0.00 K/uL   CMP   Result Value Ref Range    Sodium 136 135 - 145 mmol/L    Potassium 3.5 (L) 3.6 - 5.5 mmol/L    Chloride 98 96 - 112 mmol/L    Co2 18 (L) 20 - 33 mmol/L    Anion Gap 20.0 (H) 7.0 - 16.0    Glucose 145 (H) 65 - 99 mg/dL    Bun 7 (L) 8 - 22 mg/dL    Creatinine 0.83 0.50 - 1.40 mg/dL    Calcium 8.8 8.5 - 10.5 mg/dL    Correct Calcium 8.5 8.5 - 10.5 mg/dL    AST(SGOT) 54 (H) 12 - 45 U/L    ALT(SGPT) 84 (H) 2 - 50 U/L    Alkaline Phosphatase 133 (H) 30 - 99 U/L    Total Bilirubin 1.1 0.1 - 1.5 mg/dL    Albumin 4.4 3.2 - 4.9 g/dL    Total Protein 7.5 6.0 - 8.2 g/dL    Globulin 3.1 1.9 - 3.5 g/dL    A-G Ratio 1.4 g/dL   ESTIMATED GFR   Result Value Ref Range    GFR (CKD-EPI) 111 >60 mL/min/1.73 m 2   TROPONIN   Result Value Ref Range    Troponin T 7 6 - 19 ng/L   EKG (NOW)   Result Value Ref Range    Report       [facility] Emergency Dept.    Test Date:  2023  Pt Name:    OSITO BLACK               Department: ER  MRN:        [id number]                      Room:       [facility]  Gender:     Male                         Technician: [id number]  :        1980                   Requested By:ER TRIAGE PROTOCOL  Order #:    [id number]                    Reading MD: Hang Pérez MD    Measurements  Intervals                                Axis  Rate:       141                          P:           45  FL:         129                          QRS:        158  QRSD:       103                          T:          13  QT:         301  QTc:        461    Interpretive Statements  Sinus tachycardia  Right axis deviation  Abnormal R-wave progression, late transition  Compared to ECG 09/11/2023 18:56:04  Right-axis deviation now present  Left posterior fascicular block no longer present  ST (T wave) deviation no longer present  T-wave abnormality no longer present  Electronically Catherine d On 09- 21:50:16 PDT by Hang Pérez MD         Radiology  DX-CHEST-PORTABLE (1 VIEW)   Final Result      Negative single view of the chest.          Medications:   New Prescriptions    No medications on file       My final assessment includes reevaluation and assessment of vital signs  Upon Reevaluation, the patient's condition has: Improved; and will be discharged.    Patient discharged from ED Observation status at 12:20 AM  09/24/23      Total time spent on this ED Observation discharge encounter is < 30 Minutes    Electronically signed by: Dada Martinez M.D., 9/24/2023 12:19 AM

## 2023-09-24 NOTE — ED NOTES
Pt understands DC instructions, DC paperwork provided, IV removed, NAD, VS stable, pt ambulated to [facility] with steady gait for DC

## 2023-09-24 NOTE — ED NOTES
Pt ambulated to restroom safely w/ steady and balanced gait. Provided w/ refreshments . Tolerated well. Denies N/V

## 2023-09-24 NOTE — ED NOTES
MD communication: informed of pt's current HR of 104-114 bpm. Pt is anxious, reports feeling better and expressed wanting to be DC home. Per MD communication, ativan administered, pt can be DC home upon HR of 100 bpm assessed. Pt informed of POC, pt is compliant.

## 2023-09-24 NOTE — ED NOTES
Ambulated into . C/O About a few weeks ago I was her for STD testing. She believes she has a yeast infection. S/S white discharge, itchy and smell.   Bedside report received from Quirino BATEMAN. Pt appears anxious w/  bpm. S/p meth use. Pt denies SI/HI. Pt is safely in bed, fall recaution in place. Sign on door. Awaiting lab draw.

## 2023-09-24 NOTE — ED PROVIDER NOTES
ER Provider Note    Scribed for Hang Pérez M.d. by Slick Bolaños. 9/23/2023  5:30 PM    Primary Care Provider: Gregory Hardin M.D.    CHIEF COMPLAINT  Chief Complaint   Patient presents with    Other     Pt edina [place], pt snorted a gram of meth, pt now states his heart is racing, ems reported heart rate at 130bpm, pt states he feels his heart racing.      HPI/ROS  LIMITATION TO HISTORY   Select: : None  OUTSIDE HISTORIAN(S):  None    Brannon Sharma is a 43 y.o. male who presents to the ED complaining of a high heart rate onset prior to arrival. The patient explains he snorted one gram of methamphetamine. He describes his heart rate as a racing sensation. He has associated chest pain. He denies the use of any blood thinners. No alleviating or exacerbating factors noted.    PAST MEDICAL HISTORY  Past Medical History:   Diagnosis Date    Alcohol-induced psychosis (HCC)     Anxiety     Depression     Paranoid schizophrenia (HCC)     Psychiatric disorder     panic attack    Schizophrenia, paranoid (HCC)     Suicidal ideations        SURGICAL HISTORY  Past Surgical History:   Procedure Laterality Date    NASAL SEPTAL RECONSTRUCTION      OTHER      deviated septum       FAMILY HISTORY  Family History   Family history unknown: Yes       SOCIAL HISTORY   reports that he has been smoking cigarettes. He has never used smokeless tobacco. He reports current alcohol use of about 9.6 oz of alcohol per week. He reports current drug use. Drugs: Inhaled and Marijuana.    CURRENT MEDICATIONS  Previous Medications    BACLOFEN (LIORESAL) 10 MG TAB    Take 10 mg by mouth 2 times a day as needed for Muscle Spasms.    CARBAMAZEPINE (TEGRETOL) 200 MG TAB    Take 200 mg by mouth 2 times a day.    GABAPENTIN (NEURONTIN) 100 MG CAP    Take 1 Capsule by mouth 3 times a day.    HYDROXYZINE PAMOATE (VISTARIL) 50 MG CAP    Take 50 mg by mouth 3 times a day as needed for Anxiety.    LISINOPRIL (PRINIVIL) 20 MG TAB    Take 20 mg by  mouth every day.    METOPROLOL SR (TOPROL XL) 25 MG TABLET SR 24 HR    Take 25 mg by mouth every day.    RISPERIDONE (RISPERDAL) 2 MG TAB    Take 1 Tablet by mouth at bedtime.    SERTRALINE (ZOLOFT) 50 MG TAB    Take 150 mg by mouth every day.    TRAZODONE (DESYREL) 150 MG TAB    Take 1 Tablet by mouth every evening.       ALLERGIES  Ceclor [cefaclor], Cephalosporins, and Pcn [penicillins]    PHYSICAL EXAM  BP (!) 147/94   Pulse (!) 140   Temp 37.4 °C (99.3 °F) (Temporal)   Resp 18   Ht 1.829 m (6')   Wt (!) 132 kg (290 lb)   SpO2 97%   BMI 39.33 kg/m²     Constitutional: Uncomfortable appearing,  HENT: No signs of trauma, Bilateral external ears normal, Nose normal. Uvula midline.   Eyes: Pupils are equal and reactive, Conjunctiva normal, Non-icteric.   Neck: Normal range of motion, No tenderness, Supple, No stridor.   Lymphatic: No lymphadenopathy noted.   Cardiovascular: Tachycardic rate and rhythm, no murmurs.   Thorax & Lungs: Normal breath sounds, No respiratory distress, No wheezing, No chest tenderness.   Abdomen: Bowel sounds normal, Soft, No tenderness, No peritoneal signs, No masses, No pulsatile masses.   Skin: Warm, Dry, No erythema, No rash.   Back: No bony tenderness, No CVA tenderness.   Extremities: Intact distal pulses, No edema, No tenderness, No cyanosis.  Musculoskeletal: Good range of motion in all major joints. No tenderness to palpation or major deformities noted.   Neurologic: Alert , Normal motor function, Normal sensory function, No focal deficits noted.   Psychiatric: Affect normal, Judgment normal, Mood normal.      DIAGNOSTIC STUDIES    Labs:   Labs Reviewed   CBC WITH DIFFERENTIAL - Abnormal; Notable for the following components:       Result Value    MCHC 36.7 (*)     Monocytes 14.00 (*)     Monos (Absolute) 1.27 (*)     All other components within normal limits    Narrative:     Biotin intake of greater than 5 mg per day may interfere with  troponin levels, causing false low  values.   COMP METABOLIC PANEL - Abnormal; Notable for the following components:    Potassium 3.5 (*)     Co2 18 (*)     Anion Gap 20.0 (*)     Glucose 145 (*)     Bun 7 (*)     AST(SGOT) 54 (*)     ALT(SGPT) 84 (*)     Alkaline Phosphatase 133 (*)     All other components within normal limits    Narrative:     Biotin intake of greater than 5 mg per day may interfere with  troponin levels, causing false low values.   TROPONIN    Narrative:     Biotin intake of greater than 5 mg per day may interfere with  troponin levels, causing false low values.   LIPASE    Narrative:     Biotin intake of greater than 5 mg per day may interfere with  troponin levels, causing false low values.   ESTIMATED GFR    Narrative:     Biotin intake of greater than 5 mg per day may interfere with  troponin levels, causing false low values.   TROPONIN    Narrative:     Biotin intake of greater than 5 mg per day may interfere with  troponin levels, causing false low values.   TROPONIN        EKG:   I have independently interpreted this EKG   Report   Date Value Ref Range Status   2023       [facility] Emergency Dept.    Test Date:  2023  Pt Name:    OSITO BLACK               Department: ER  MRN:        [id number]                      Room:       [facility]  Gender:     Male                         Technician: [id number]  :        1980                   Requested By:ER TRIAGE PROTOCOL  Order #:    [id number]                    Reading MD:    Measurements  Intervals                                Axis  Rate:       141                          P:          45  DE:         129                          QRS:        158  QRSD:       103                          T:          13  QT:         301  QTc:        461    Interpretive Statements  Sinus tachycardia  Right axis deviation  Abnormal R-wave progression, late transition  Compared to ECG 2023 18:56:04  Right-axis deviation now present  Left posterior fascicular  block no longer present  ST (T wave) deviation no longer present  T-wave abnormality no longer present                 Radiology:   The attending emergency physician has independently interpreted the diagnostic imaging associated with this visit and am waiting the final reading from the radiologist.   Preliminary interpretation is a follows: no PTX  Radiologist interpretation:   DX-CHEST-PORTABLE (1 VIEW)   Final Result      Negative single view of the chest.         COURSE & MEDICAL DECISION MAKING     ED Observation Status? Yes; I am placing the patient in to an observation status due to a diagnostic uncertainty as well as therapeutic intensity. Patient placed in observation status at 7:00 PM, 9/23/2023.     Observation plan is as follows: The patient will be reevaluated following labs and diagnostics.    9:48 PM patient reassessed by me with persistent tachycardia and tangential behavior consistent with meth intoxication    INITIAL ASSESSMENT, COURSE AND PLAN  Care Narrative: 43-year-old male with prior history of congestive heart failure presents after meth abuse with acute onset of chest pain.    5:30 PM - Patient presents to the ED with complaints of fast heart rate. Patient will be treated with Ativan 2 mg and LR Bolus. Ordered for EKG, CBC w/ Diff, Lipase, Troponin, and DX-Chest to evaluate his symptoms.      6:55 PM - I ordered Ativan 1 mg and Troponin to treat the patient's symptoms.    8:42 PM - Patient was reevaluated at bedside. Discussed radiology results with the patient and informed them his heart rate is improving and his troponin level is not elevated.    HYDRATION: Based on the patient's presentation of Tachycardia the patient was given IV fluids. IV Hydration was used because oral hydration was not adequate alone. Upon recheck following hydration, the patient was improved.    Heart score of 3 with negative troponins x2 and no evidence of STEMI on EKG or significant ST or T wave changes    9:50 PM  patient signed out to oncoming attending pending sober reevaluation and anticipated discharge home    Patient counseled about meth cessation          FINAL DIAGNOSIS  1. Methamphetamine abuse (HCC)    2. Acute chest pain    3. Methamphetamine intoxication (HCC)       Slick CABA (Paul), am scribing for, and in the presence of, Hang Pérez M.D..    Electronically signed by: Slick Bolaños ([email or web address]), 9/23/2023    IHang M.D. personally performed the services described in this documentation, as scribed by Slick Bolaños in my presence, and it is both accurate and complete.      The note accurately reflects work and decisions made by me.  Hang Pérez M.D.  9/23/2023  9:51 PM

## 2023-09-24 NOTE — ED NOTES
Bedside report received by BHAVANA Peacock    Oxygen:RA  Fall Risk status: N/A  Patient Mentation:A+O x4    Pt aware of POC, no needs at this time
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
## 2023-10-19 ENCOUNTER — HOSPITAL ENCOUNTER (EMERGENCY)
Facility: MEDICAL CENTER | Age: 43
End: 2023-10-19
Attending: STUDENT IN AN ORGANIZED HEALTH CARE EDUCATION/TRAINING PROGRAM
Payer: MEDICAID

## 2023-10-19 ENCOUNTER — APPOINTMENT (OUTPATIENT)
Dept: RADIOLOGY | Facility: MEDICAL CENTER | Age: 43
End: 2023-10-19
Attending: STUDENT IN AN ORGANIZED HEALTH CARE EDUCATION/TRAINING PROGRAM
Payer: MEDICAID

## 2023-10-19 ENCOUNTER — HOSPITAL ENCOUNTER (EMERGENCY)
Facility: MEDICAL CENTER | Age: 43
End: 2023-10-19
Payer: MEDICAID

## 2023-10-19 VITALS
RESPIRATION RATE: 20 BRPM | WEIGHT: 287.48 LBS | TEMPERATURE: 98 F | HEIGHT: 75 IN | SYSTOLIC BLOOD PRESSURE: 136 MMHG | BODY MASS INDEX: 35.74 KG/M2 | HEART RATE: 109 BPM | DIASTOLIC BLOOD PRESSURE: 100 MMHG | OXYGEN SATURATION: 95 %

## 2023-10-19 VITALS
WEIGHT: 291.01 LBS | DIASTOLIC BLOOD PRESSURE: 83 MMHG | HEART RATE: 102 BPM | HEIGHT: 75 IN | SYSTOLIC BLOOD PRESSURE: 142 MMHG | BODY MASS INDEX: 36.18 KG/M2 | OXYGEN SATURATION: 95 % | TEMPERATURE: 98.3 F | RESPIRATION RATE: 19 BRPM

## 2023-10-19 DIAGNOSIS — R07.9 CHEST PAIN, UNSPECIFIED TYPE: ICD-10-CM

## 2023-10-19 DIAGNOSIS — R00.0 TACHYCARDIA: ICD-10-CM

## 2023-10-19 DIAGNOSIS — F15.10 METHAMPHETAMINE ABUSE (HCC): ICD-10-CM

## 2023-10-19 LAB
ALBUMIN SERPL BCP-MCNC: 4.1 G/DL (ref 3.2–4.9)
ALBUMIN/GLOB SERPL: 1.2 G/DL
ALP SERPL-CCNC: 122 U/L (ref 30–99)
ALT SERPL-CCNC: 50 U/L (ref 2–50)
ANION GAP SERPL CALC-SCNC: 17 MMOL/L (ref 7–16)
AST SERPL-CCNC: 46 U/L (ref 12–45)
BASOPHILS # BLD AUTO: 0.8 % (ref 0–1.8)
BASOPHILS # BLD: 0.05 K/UL (ref 0–0.12)
BILIRUB SERPL-MCNC: 1 MG/DL (ref 0.1–1.5)
BUN SERPL-MCNC: 8 MG/DL (ref 8–22)
CALCIUM ALBUM COR SERPL-MCNC: 8.7 MG/DL (ref 8.5–10.5)
CALCIUM SERPL-MCNC: 8.8 MG/DL (ref 8.5–10.5)
CHLORIDE SERPL-SCNC: 101 MMOL/L (ref 96–112)
CO2 SERPL-SCNC: 21 MMOL/L (ref 20–33)
CREAT SERPL-MCNC: 0.88 MG/DL (ref 0.5–1.4)
EKG IMPRESSION: NORMAL
EKG IMPRESSION: NORMAL
EOSINOPHIL # BLD AUTO: 0.19 K/UL (ref 0–0.51)
EOSINOPHIL NFR BLD: 3.1 % (ref 0–6.9)
ERYTHROCYTE [DISTWIDTH] IN BLOOD BY AUTOMATED COUNT: 46 FL (ref 35.9–50)
GFR SERPLBLD CREATININE-BSD FMLA CKD-EPI: 109 ML/MIN/1.73 M 2
GLOBULIN SER CALC-MCNC: 3.3 G/DL (ref 1.9–3.5)
GLUCOSE SERPL-MCNC: 168 MG/DL (ref 65–99)
HCT VFR BLD AUTO: 48.9 % (ref 42–52)
HGB BLD-MCNC: 16.6 G/DL (ref 14–18)
IMM GRANULOCYTES # BLD AUTO: 0.01 K/UL (ref 0–0.11)
IMM GRANULOCYTES NFR BLD AUTO: 0.2 % (ref 0–0.9)
LYMPHOCYTES # BLD AUTO: 2.21 K/UL (ref 1–4.8)
LYMPHOCYTES NFR BLD: 36.1 % (ref 22–41)
MCH RBC QN AUTO: 31.2 PG (ref 27–33)
MCHC RBC AUTO-ENTMCNC: 33.9 G/DL (ref 32.3–36.5)
MCV RBC AUTO: 91.9 FL (ref 81.4–97.8)
MONOCYTES # BLD AUTO: 0.51 K/UL (ref 0–0.85)
MONOCYTES NFR BLD AUTO: 8.3 % (ref 0–13.4)
NEUTROPHILS # BLD AUTO: 3.16 K/UL (ref 1.82–7.42)
NEUTROPHILS NFR BLD: 51.5 % (ref 44–72)
NRBC # BLD AUTO: 0 K/UL
NRBC BLD-RTO: 0 /100 WBC (ref 0–0.2)
PLATELET # BLD AUTO: 232 K/UL (ref 164–446)
PMV BLD AUTO: 10 FL (ref 9–12.9)
POTASSIUM SERPL-SCNC: 3 MMOL/L (ref 3.6–5.5)
PROT SERPL-MCNC: 7.4 G/DL (ref 6–8.2)
RBC # BLD AUTO: 5.32 M/UL (ref 4.7–6.1)
SODIUM SERPL-SCNC: 139 MMOL/L (ref 135–145)
TROPONIN T SERPL-MCNC: 10 NG/L (ref 6–19)
TROPONIN T SERPL-MCNC: 7 NG/L (ref 6–19)
WBC # BLD AUTO: 6.1 K/UL (ref 4.8–10.8)

## 2023-10-19 PROCEDURE — 36415 COLL VENOUS BLD VENIPUNCTURE: CPT

## 2023-10-19 PROCEDURE — 700105 HCHG RX REV CODE 258: Mod: UD | Performed by: STUDENT IN AN ORGANIZED HEALTH CARE EDUCATION/TRAINING PROGRAM

## 2023-10-19 PROCEDURE — 302449 STATCHG TRIAGE ONLY (STATISTIC)

## 2023-10-19 PROCEDURE — 96374 THER/PROPH/DIAG INJ IV PUSH: CPT

## 2023-10-19 PROCEDURE — 71045 X-RAY EXAM CHEST 1 VIEW: CPT

## 2023-10-19 PROCEDURE — 99285 EMERGENCY DEPT VISIT HI MDM: CPT

## 2023-10-19 PROCEDURE — 84484 ASSAY OF TROPONIN QUANT: CPT

## 2023-10-19 PROCEDURE — 700111 HCHG RX REV CODE 636 W/ 250 OVERRIDE (IP): Mod: UD | Performed by: STUDENT IN AN ORGANIZED HEALTH CARE EDUCATION/TRAINING PROGRAM

## 2023-10-19 PROCEDURE — 93005 ELECTROCARDIOGRAM TRACING: CPT | Performed by: STUDENT IN AN ORGANIZED HEALTH CARE EDUCATION/TRAINING PROGRAM

## 2023-10-19 PROCEDURE — 700102 HCHG RX REV CODE 250 W/ 637 OVERRIDE(OP): Mod: JZ,UD | Performed by: STUDENT IN AN ORGANIZED HEALTH CARE EDUCATION/TRAINING PROGRAM

## 2023-10-19 PROCEDURE — 85025 COMPLETE CBC W/AUTO DIFF WBC: CPT

## 2023-10-19 PROCEDURE — 93005 ELECTROCARDIOGRAM TRACING: CPT

## 2023-10-19 PROCEDURE — 80053 COMPREHEN METABOLIC PANEL: CPT

## 2023-10-19 PROCEDURE — A9270 NON-COVERED ITEM OR SERVICE: HCPCS | Mod: JZ,UD | Performed by: STUDENT IN AN ORGANIZED HEALTH CARE EDUCATION/TRAINING PROGRAM

## 2023-10-19 RX ORDER — SODIUM CHLORIDE 9 MG/ML
1000 INJECTION, SOLUTION INTRAVENOUS ONCE
Status: COMPLETED | OUTPATIENT
Start: 2023-10-19 | End: 2023-10-19

## 2023-10-19 RX ORDER — POTASSIUM CHLORIDE 20 MEQ/1
20 TABLET, EXTENDED RELEASE ORAL ONCE
Status: COMPLETED | OUTPATIENT
Start: 2023-10-19 | End: 2023-10-19

## 2023-10-19 RX ORDER — LORAZEPAM 2 MG/ML
1 INJECTION INTRAMUSCULAR ONCE
Status: COMPLETED | OUTPATIENT
Start: 2023-10-19 | End: 2023-10-19

## 2023-10-19 RX ADMIN — SODIUM CHLORIDE 1000 ML: 9 INJECTION, SOLUTION INTRAVENOUS at 02:31

## 2023-10-19 RX ADMIN — LORAZEPAM 1 MG: 2 INJECTION INTRAMUSCULAR; INTRAVENOUS at 02:47

## 2023-10-19 RX ADMIN — SODIUM CHLORIDE 1000 ML: 9 INJECTION, SOLUTION INTRAVENOUS at 04:33

## 2023-10-19 RX ADMIN — POTASSIUM CHLORIDE 20 MEQ: 1500 TABLET, EXTENDED RELEASE ORAL at 04:33

## 2023-10-19 ASSESSMENT — FIBROSIS 4 INDEX
FIB4 SCORE: 1.21
FIB4 SCORE: 1.04

## 2023-10-19 NOTE — ED TRIAGE NOTES
"Chief Complaint   Patient presents with    Chest Pain     5:10, sharp, onset approximately 1 hr after using methamphetamine.      BIB EMS. VS: 178/91, , SP02 98% RA, GCS 15, . 18 GA IV rt AC.     Pt complains of rapid heart rate. Hx of same with methamphetamine use. Requesting ativan to treat. Pt anxious, restless.     BP (!) 173/104   Pulse (!) 153   Resp 20   Ht 1.905 m (6' 3\")   Wt (!) 132 kg (291 lb 0.1 oz)   SpO2 99%   BMI 36.37 kg/m²    "

## 2023-10-19 NOTE — ED NOTES
Report to BHAVANA Ramirez.    Patient is a 89y old  Female who presents with a chief complaint of fever (27 Apr 2017 23:45)      INTERVAL HPI/OVERNIGHT EVENTS: Patient seen and examined. NAD. No complaints. Pleasantly confused.      Vital Signs Last 24 Hrs  T(C): --  T(F): --  HR: --  BP: --  BP(mean): --  RR: --  SpO2: --I&O's Summary  I & Os for 24h ending 01 May 2017 07:00  =============================================  IN: 1470 ml / OUT: 750 ml / NET: 720 ml    I & Os for current day (as of 01 May 2017 14:54)  =============================================  IN: 100 ml / OUT: 150 ml / NET: -50 ml      LABS:                        9.3    6.8   )-----------( 90       ( 30 Apr 2017 06:25 )             29.8     04-30    146<H>  |  116<H>  |  19  ----------------------------<  109<H>  3.3<L>   |  18<L>  |  1.50<H>    Ca    7.8<L>      30 Apr 2017 06:25  Phos  1.6     04-30  Mg     1.9     04-30    TPro  5.9<L>  /  Alb  2.2<L>  /  TBili  0.4  /  DBili  x   /  AST  15  /  ALT  13  /  AlkPhos  62  04-30    PT/INR - ( 30 Apr 2017 06:25 )   PT: 16.1 sec;   INR: 1.47 ratio             CAPILLARY BLOOD GLUCOSE    blood culture --  04-29 @ 11:20     urine culture --  04-29 @ 11:20  results   No growth to date.  blood culture --  04-27 @ 21:59     urine culture --  04-27 @ 21:59  results   >100,000 CFU/ml Proteus mirabilis  blood culture --  04-27 @ 21:31     urine culture --  04-27 @ 21:31  results   Growth in anaerobic bottle: Coag Negative Staphylococcus  Single set isolate, possible contaminant. Contact  Microbiology if susceptibility testing clinically  indicated.    wound culture with gram stain  --   04-27 @ 21:59  organism ID  Proteus mirabilis        heparin  Injectable 5000Unit(s) SubCutaneous every 12 hours  acetaminophen   Tablet 650milliGRAM(s) Oral every 6 hours PRN  acetaminophen   Tablet. 650milliGRAM(s) Oral every 6 hours PRN  morphine  - Injectable 2milliGRAM(s) IV Push every 4 hours PRN  ondansetron Injectable 4milliGRAM(s) IV Push every 6 hours PRN  sodium chloride 0.9%. 1000milliLiter(s) IV Continuous <Continuous>  levothyroxine 100MICROGram(s) Oral daily  lactobacillus acidophilus 1Tablet(s) Oral two times a day with meals  tamsulosin 0.4milliGRAM(s) Oral at bedtime  lactulose Syrup 20Gram(s) Oral every other day  piperacillin/tazobactam IVPB. 3.375Gram(s) IV Intermittent every 8 hours  acetaminophen  Suppository 650milliGRAM(s) Rectal every 6 hours PRN      REVIEW OF SYSTEMS:  poor historian      PHYSICAL EXAM:  GENERAL: NAD,   HEAD:  Atraumatic, Normocephalic  EYES: EOMI, PERRLA, conjunctiva and sclera clear  ENMT: No tonsillar erythema, exudates, or enlargement; Moist mucous membranes  NECK: Supple, No JVD  NERVOUS SYSTEM:  confused  CHEST/LUNG: Clear to auscultation bilaterally; No rales, rhonchi, wheezing,  HEART: Regular rate and rhythm  ABDOMEN: Soft, intestines coming out of colostomy  EXTREMITIES:  No clubbing, cyanosis, or edema  LYMPH: No lymphadenopathy noted  SKIN: No rashes    Care Discussed with Consultants/Other Providers [ ] YES  [ ] NO

## 2023-10-19 NOTE — ED NOTES
Bedside report received from Antionette BATEMAN. Pt on cardiac monitor, pulse ox, and automatic BP. Fall precautions in place. Call light within reach. No supplemental O2 use at this time

## 2023-10-19 NOTE — ED NOTES
Discharge education provided. Patient verbalizes understanding. Patient A/0x4 and ambulatory to the lobby with a steady gait. Patient discharged home to self care.

## 2023-10-19 NOTE — ED NOTES
BREAK RN  Pt medicated per MAR, education provided, pt verbalized understanding.    ERP at bedside.

## 2023-10-19 NOTE — DISCHARGE INSTRUCTIONS
You were seen for chest pain after methamphetamine use.  Your laboratory testing is reassuring for your heart at this time.  Please refrain from doing meth any further.  Please keep your appointment at [facility] for drug detox admission tomorrow. If you have further chest pain please seek reevaluation in ER.

## 2023-10-19 NOTE — ED PROVIDER NOTES
ED Provider Note    CHIEF COMPLAINT  Chief Complaint   Patient presents with    Chest Pain     5:10, sharp, onset approximately 1 hr after using methamphetamine.        EXTERNAL RECORDS REVIEWED  External ED Note [facility] from 09/20/2023 with similar presentation    HPI/ROS  LIMITATION TO HISTORY   Select: : None  OUTSIDE HISTORIAN(S):  None    Brannon Sharma is a 43 y.o. male who presents with tachycardia and chest pain.  Patient notes meth use 1 hour prior to arrival.  He notes the pain in the chest is sharp.  He notes anxiety and he is concerned that his heart rate is still elevated.  He reports he was diagnosed with cardiomyopathy 2 months ago but refused admission.  He is not on medications for any sort of heart conditions.  Also notes drinking yesterday approximately 1 pint.  He tells me that tomorrow he is supposed to go to [facility] for detox admission.  No shortness of breath, no fevers, no SI or HI, no auditory visual hallucinations, no belly pain or diarrhea.    PAST MEDICAL HISTORY   has a past medical history of Alcohol-induced psychosis (HCC), Anxiety, Depression, Paranoid schizophrenia (HCC), Psychiatric disorder, Schizophrenia, paranoid (HCC), and Suicidal ideations.    SURGICAL HISTORY   has a past surgical history that includes other and nasal septal reconstruction.    FAMILY HISTORY  Family History   Family history unknown: Yes       SOCIAL HISTORY  Social History     Tobacco Use    Smoking status: Every Day     Current packs/day: 1.00     Types: Cigarettes    Smokeless tobacco: Never   Vaping Use    Vaping Use: Every day    Substances: Nicotine    Devices: Pre-filled or refillable cartridge   Substance and Sexual Activity    Alcohol use: Yes     Alcohol/week: 9.6 oz     Types: 16 Cans of beer per week    Drug use: Yes     Types: Inhaled, Marijuana     Comment: Meth daily    Sexual activity: Yes     Partners: Female       CURRENT MEDICATIONS  Home Medications       Reviewed by  "Antionette Mckeon R.N. (Registered Nurse) on 10/19/23 at 0211  Med List Status: Not Addressed     Medication Last Dose Status   baclofen (LIORESAL) 10 MG Tab  Active   carBAMazepine (TEGRETOL) 200 MG Tab  Active   gabapentin (NEURONTIN) 100 MG Cap  Active   hydrOXYzine pamoate (VISTARIL) 50 MG Cap  Active   lisinopril (PRINIVIL) 20 MG Tab  Active   metoprolol SR (TOPROL XL) 25 MG TABLET SR 24 HR  Active   risperiDONE (RISPERDAL) 2 MG Tab  Active   sertraline (ZOLOFT) 50 MG Tab  Active   traZODone (DESYREL) 150 MG Tab  Active                    ALLERGIES  Allergies   Allergen Reactions    Ceclor [Cefaclor] Anaphylaxis    Cephalosporins Anaphylaxis     > 5 years ago    Pcn [Penicillins] Anaphylaxis     > 30 years ago       PHYSICAL EXAM  VITAL SIGNS: BP (!) 152/88   Pulse 99   Temp 37.3 °C (99.1 °F) (Temporal)   Resp 17   Ht 1.905 m (6' 3\")   Wt (!) 132 kg (291 lb 0.1 oz)   SpO2 94%   BMI 36.37 kg/m²    Constitutional: Awake and alert. No acute distress  HEENT: Normal Conjunctiva.  Neck: Grossly normal range of motion. Airway midline.  Cardiovascular: Sinus tachycardia Normal rhythm.  Thorax & Lungs: No respiratory distress. Clear to Auscultation Bilaterally.  Abdomen: Normal inspection. Normoactive Bowel sounds. Non tender. Nondistended  Skin: No obvious rash.  Back: No tenderness, No CVA tenderness.   Musculoskeletal: No obvious deformity. Moves all extremities Well.  Neurologic: A&Ox3.   Psychiatric: Mood and affect are appropriate for situation. Denies SI/HI. Mildly pressured speech    DIAGNOSTIC STUDIES / PROCEDURES  EKG  I have independently interpreted this EKG  Sinus tachycardia rate 148. No acute ST elevations or depressions.    LABS  Results for orders placed or performed during the hospital encounter of 10/19/23   CBC with Differential   Result Value Ref Range    WBC 6.1 4.8 - 10.8 K/uL    RBC 5.32 4.70 - 6.10 M/uL    Hemoglobin 16.6 14.0 - 18.0 g/dL    Hematocrit 48.9 42.0 - 52.0 %    MCV 91.9 81.4 - " 97.8 fL    MCH 31.2 27.0 - 33.0 pg    MCHC 33.9 32.3 - 36.5 g/dL    RDW 46.0 35.9 - 50.0 fL    Platelet Count 232 164 - 446 K/uL    MPV 10.0 9.0 - 12.9 fL    Neutrophils-Polys 51.50 44.00 - 72.00 %    Lymphocytes 36.10 22.00 - 41.00 %    Monocytes 8.30 0.00 - 13.40 %    Eosinophils 3.10 0.00 - 6.90 %    Basophils 0.80 0.00 - 1.80 %    Immature Granulocytes 0.20 0.00 - 0.90 %    Nucleated RBC 0.00 0.00 - 0.20 /100 WBC    Neutrophils (Absolute) 3.16 1.82 - 7.42 K/uL    Lymphs (Absolute) 2.21 1.00 - 4.80 K/uL    Monos (Absolute) 0.51 0.00 - 0.85 K/uL    Eos (Absolute) 0.19 0.00 - 0.51 K/uL    Baso (Absolute) 0.05 0.00 - 0.12 K/uL    Immature Granulocytes (abs) 0.01 0.00 - 0.11 K/uL    NRBC (Absolute) 0.00 K/uL   Complete Metabolic Panel (CMP)   Result Value Ref Range    Sodium 139 135 - 145 mmol/L    Potassium 3.0 (L) 3.6 - 5.5 mmol/L    Chloride 101 96 - 112 mmol/L    Co2 21 20 - 33 mmol/L    Anion Gap 17.0 (H) 7.0 - 16.0    Glucose 168 (H) 65 - 99 mg/dL    Bun 8 8 - 22 mg/dL    Creatinine 0.88 0.50 - 1.40 mg/dL    Calcium 8.8 8.5 - 10.5 mg/dL    Correct Calcium 8.7 8.5 - 10.5 mg/dL    AST(SGOT) 46 (H) 12 - 45 U/L    ALT(SGPT) 50 2 - 50 U/L    Alkaline Phosphatase 122 (H) 30 - 99 U/L    Total Bilirubin 1.0 0.1 - 1.5 mg/dL    Albumin 4.1 3.2 - 4.9 g/dL    Total Protein 7.4 6.0 - 8.2 g/dL    Globulin 3.3 1.9 - 3.5 g/dL    A-G Ratio 1.2 g/dL   Troponins NOW   Result Value Ref Range    Troponin T 7 6 - 19 ng/L   Troponins in two (2) hours   Result Value Ref Range    Troponin T 10 6 - 19 ng/L   ESTIMATED GFR   Result Value Ref Range    GFR (CKD-EPI) 109 >60 mL/min/1.73 m 2   EKG   Result Value Ref Range    Report       [facility] Emergency Dept.    Test Date:  2023-10-19  Pt Name:    OSITO PANDEYPRETTYGONZÁLEZ               Department: ER  MRN:        [id number]                      Room:        08  Gender:     Male                         Technician: [id number]  :        1980                   Requested By:ER  TRIAGE PROTOCOL  Order #:    [id number]                    Reading MD:    Measurements  Intervals                                Axis  Rate:       148                          P:          49  AZ:         132                          QRS:        171  QRSD:       101                          T:          -26  QT:         284  QTc:        446    Interpretive Statements  Sinus tachycardia  Consider right ventricular hypertrophy  Borderline T abnormalities, inferior leads  Compared to ECG 09/23/2023 17:31:11  T-wave abnormality now present  Right-axis deviation no longer present           RADIOLOGY  I have independently interpreted the diagnostic imaging associated with this visit and am waiting the final reading from the radiologist.   My preliminary interpretation is as follows: No acute opacity or other findings  Radiologist interpretation:   DX-CHEST-PORTABLE (1 VIEW)   Final Result         1.  Mild interstitial edema and/or infiltrates            COURSE & MEDICAL DECISION MAKING    ED Observation Status? Yes; I am placing the patient in to an observation status due to a diagnostic uncertainty as well as therapeutic intensity. Patient placed in observation status at 2:27 AM, 10/19/2023.     Observation plan is as follows: We will administer IV fluids and benzodiazepines to counteract the methamphetamine.  We will perform serial troponin analysis for his chest pain and disposition will be based on laboratory results.    Upon Reevaluation, the patient's condition has: Improved; and will be discharged.    Patient discharged from ED Observation status at 05:23 (Time) 10/19/2023 (Date).     INITIAL ASSESSMENT, COURSE AND PLAN  Care Narrative:   43-year-old male with history of methamphetamine abuse presents after methamphetamine use with chest pain and tachycardia.  Afebrile, tachycardic, hypertensive.  On exam he is pressured speech and mildly anxious and tremulous.  Differential includes ACS, methamphetamine abuse, SVT,  PE  Will evaluate for ACS in the setting of meth abuse and chest pain and will treat the symptoms of meth toxicity with IV fluids and benzodiazepines.  Serial troponins are normal patient's heart rate has normalized after several hours and interventions to counteract the effects of meth.  He will be discharged to prior living conditions.  He tells me he has an appointment with [facility] for detox admission at noon and I again strongly encouraged him to keep this appointment.  HYDRATION: Based on the patient's presentation of Dehydration and Tachycardia the patient was given IV fluids. IV Hydration was used because oral hydration was not as rapid as required. Upon recheck following hydration, the patient was improved with resolution of tachycardia.      ADDITIONAL PROBLEM LIST  Methamphetamine abuse  Chest pain  Hypertension  Tachycardia    DISPOSITION AND DISCUSSIONS  I have discussed management of the patient with the following physicians and TANNER's:  None    Discussion of management with other QHP or appropriate source(s): None     Escalation of care considered, and ultimately not performed:blood analysis and acute inpatient care management, however at this time, the patient is most appropriate for outpatient management    Barriers to care at this time, including but not limited to: Patient does not have established PCP and Substance abuse .     Decision tools and prescription drugs considered including, but not limited to: HEART Score 2 .    FINAL DIAGNOSIS  1. Methamphetamine abuse (HCC) Active   2. Tachycardia Active   3. Chest pain, unspecified type Active          Electronically signed by: Homer Magana D.O., 10/19/2023 2:22 AM

## 2023-10-19 NOTE — ED NOTES
Pt provided with 2 warm blankets. Pt resting, even chest rise and fall noted. Pt denies any needs at this time. Call light within reach

## 2023-10-19 NOTE — ED TRIAGE NOTES
"Chief Complaint   Patient presents with    Chest Pain    Palpitations    Detox     44 yo male DAMON Chávez from [facility].  Patient to triage and reports he is having palpitations and feeling anxious.  Reports he did use meth about 12 hours ago.  Denies SOB or nausea     BP (!) 136/100   Pulse (!) 109   Temp 36.7 °C (98 °F) (Temporal)   Resp 20   Ht 1.905 m (6' 3\")   Wt (!) 130 kg (287 lb 7.7 oz)   SpO2 95%   BMI 35.93 kg/m²     "

## 2023-10-20 ENCOUNTER — HOSPITAL ENCOUNTER (EMERGENCY)
Facility: MEDICAL CENTER | Age: 43
End: 2023-10-20
Attending: EMERGENCY MEDICINE
Payer: MEDICAID

## 2023-10-20 ENCOUNTER — APPOINTMENT (OUTPATIENT)
Dept: RADIOLOGY | Facility: MEDICAL CENTER | Age: 43
End: 2023-10-20
Attending: EMERGENCY MEDICINE
Payer: MEDICAID

## 2023-10-20 VITALS
OXYGEN SATURATION: 93 % | WEIGHT: 260 LBS | SYSTOLIC BLOOD PRESSURE: 110 MMHG | HEIGHT: 75 IN | BODY MASS INDEX: 32.33 KG/M2 | HEART RATE: 88 BPM | RESPIRATION RATE: 20 BRPM | TEMPERATURE: 99.1 F | DIASTOLIC BLOOD PRESSURE: 67 MMHG

## 2023-10-20 DIAGNOSIS — F10.930 ALCOHOL WITHDRAWAL SYNDROME WITHOUT COMPLICATION (HCC): ICD-10-CM

## 2023-10-20 DIAGNOSIS — R07.9 CHEST PAIN, UNSPECIFIED TYPE: ICD-10-CM

## 2023-10-20 DIAGNOSIS — F10.10 ALCOHOL ABUSE: ICD-10-CM

## 2023-10-20 LAB
ALBUMIN SERPL BCP-MCNC: 4 G/DL (ref 3.2–4.9)
ALBUMIN/GLOB SERPL: 1.3 G/DL
ALP SERPL-CCNC: 112 U/L (ref 30–99)
ALT SERPL-CCNC: 51 U/L (ref 2–50)
ANION GAP SERPL CALC-SCNC: 15 MMOL/L (ref 7–16)
AST SERPL-CCNC: 59 U/L (ref 12–45)
BASOPHILS # BLD AUTO: 1.6 % (ref 0–1.8)
BASOPHILS # BLD: 0.07 K/UL (ref 0–0.12)
BILIRUB SERPL-MCNC: 0.8 MG/DL (ref 0.1–1.5)
BUN SERPL-MCNC: 5 MG/DL (ref 8–22)
CALCIUM ALBUM COR SERPL-MCNC: 8.8 MG/DL (ref 8.5–10.5)
CALCIUM SERPL-MCNC: 8.8 MG/DL (ref 8.5–10.5)
CHLORIDE SERPL-SCNC: 105 MMOL/L (ref 96–112)
CO2 SERPL-SCNC: 21 MMOL/L (ref 20–33)
CREAT SERPL-MCNC: 0.84 MG/DL (ref 0.5–1.4)
EKG IMPRESSION: NORMAL
EOSINOPHIL # BLD AUTO: 0.2 K/UL (ref 0–0.51)
EOSINOPHIL NFR BLD: 4.6 % (ref 0–6.9)
ERYTHROCYTE [DISTWIDTH] IN BLOOD BY AUTOMATED COUNT: 47.5 FL (ref 35.9–50)
ETHANOL BLD-MCNC: 137.9 MG/DL
GFR SERPLBLD CREATININE-BSD FMLA CKD-EPI: 111 ML/MIN/1.73 M 2
GLOBULIN SER CALC-MCNC: 3 G/DL (ref 1.9–3.5)
GLUCOSE SERPL-MCNC: 223 MG/DL (ref 65–99)
HCT VFR BLD AUTO: 48.3 % (ref 42–52)
HGB BLD-MCNC: 16.1 G/DL (ref 14–18)
IMM GRANULOCYTES # BLD AUTO: 0.03 K/UL (ref 0–0.11)
IMM GRANULOCYTES NFR BLD AUTO: 0.7 % (ref 0–0.9)
LYMPHOCYTES # BLD AUTO: 1.99 K/UL (ref 1–4.8)
LYMPHOCYTES NFR BLD: 46.1 % (ref 22–41)
MCH RBC QN AUTO: 31.5 PG (ref 27–33)
MCHC RBC AUTO-ENTMCNC: 33.3 G/DL (ref 32.3–36.5)
MCV RBC AUTO: 94.5 FL (ref 81.4–97.8)
MONOCYTES # BLD AUTO: 0.37 K/UL (ref 0–0.85)
MONOCYTES NFR BLD AUTO: 8.6 % (ref 0–13.4)
NEUTROPHILS # BLD AUTO: 1.66 K/UL (ref 1.82–7.42)
NEUTROPHILS NFR BLD: 38.4 % (ref 44–72)
NRBC # BLD AUTO: 0 K/UL
NRBC BLD-RTO: 0 /100 WBC (ref 0–0.2)
PLATELET # BLD AUTO: 232 K/UL (ref 164–446)
PMV BLD AUTO: 10 FL (ref 9–12.9)
POTASSIUM SERPL-SCNC: 3.6 MMOL/L (ref 3.6–5.5)
PROT SERPL-MCNC: 7 G/DL (ref 6–8.2)
RBC # BLD AUTO: 5.11 M/UL (ref 4.7–6.1)
SODIUM SERPL-SCNC: 141 MMOL/L (ref 135–145)
TROPONIN T SERPL-MCNC: 7 NG/L (ref 6–19)
TROPONIN T SERPL-MCNC: <6 NG/L (ref 6–19)
WBC # BLD AUTO: 4.3 K/UL (ref 4.8–10.8)

## 2023-10-20 PROCEDURE — 84484 ASSAY OF TROPONIN QUANT: CPT

## 2023-10-20 PROCEDURE — 36415 COLL VENOUS BLD VENIPUNCTURE: CPT

## 2023-10-20 PROCEDURE — 93005 ELECTROCARDIOGRAM TRACING: CPT | Performed by: EMERGENCY MEDICINE

## 2023-10-20 PROCEDURE — 93005 ELECTROCARDIOGRAM TRACING: CPT

## 2023-10-20 PROCEDURE — 99285 EMERGENCY DEPT VISIT HI MDM: CPT

## 2023-10-20 PROCEDURE — 71045 X-RAY EXAM CHEST 1 VIEW: CPT

## 2023-10-20 PROCEDURE — 85025 COMPLETE CBC W/AUTO DIFF WBC: CPT

## 2023-10-20 PROCEDURE — 80053 COMPREHEN METABOLIC PANEL: CPT

## 2023-10-20 PROCEDURE — 700102 HCHG RX REV CODE 250 W/ 637 OVERRIDE(OP): Mod: UD | Performed by: EMERGENCY MEDICINE

## 2023-10-20 PROCEDURE — 94760 N-INVAS EAR/PLS OXIMETRY 1: CPT

## 2023-10-20 PROCEDURE — 82077 ASSAY SPEC XCP UR&BREATH IA: CPT

## 2023-10-20 PROCEDURE — A9270 NON-COVERED ITEM OR SERVICE: HCPCS | Mod: UD | Performed by: EMERGENCY MEDICINE

## 2023-10-20 RX ORDER — CHLORDIAZEPOXIDE HYDROCHLORIDE 25 MG/1
25 CAPSULE, GELATIN COATED ORAL ONCE
Status: COMPLETED | OUTPATIENT
Start: 2023-10-20 | End: 2023-10-20

## 2023-10-20 RX ORDER — GABAPENTIN 300 MG/1
300 CAPSULE ORAL 3 TIMES DAILY
Qty: 15 CAPSULE | Refills: 0 | Status: SHIPPED | OUTPATIENT
Start: 2023-10-20 | End: 2023-10-25

## 2023-10-20 RX ADMIN — LIDOCAINE HYDROCHLORIDE 30 ML: 20 SOLUTION OROPHARYNGEAL at 13:20

## 2023-10-20 RX ADMIN — CHLORDIAZEPOXIDE HYDROCHLORIDE 25 MG: 25 CAPSULE ORAL at 12:20

## 2023-10-20 ASSESSMENT — HEART SCORE
ECG: NORMAL
HEART SCORE: 2
TROPONIN: LESS THAN OR EQUAL TO NORMAL LIMIT
HISTORY: SLIGHTLY SUSPICIOUS
RISK FACTORS: >2 RISK FACTORS OR HX OF ATHEROSCLEROTIC DISEASE
AGE: <45

## 2023-10-20 ASSESSMENT — LIFESTYLE VARIABLES: DO YOU DRINK ALCOHOL: NO

## 2023-10-20 ASSESSMENT — FIBROSIS 4 INDEX: FIB4 SCORE: 1.21

## 2023-10-20 NOTE — DISCHARGE INSTRUCTIONS
Return the emergency department if you have thoughts of hurting yourself or others.  New or different chest pain, cough up blood or passout.

## 2023-10-20 NOTE — ED NOTES
Discharge instructions given.  All questions answered.  Prescriptions given x1.  Pt to follow-up with PCP and [facility], return to ER if symptoms worsen as discussed.  Pt verbalized understanding.  All belongings with pt.  Pt ambulated to [facility].

## 2023-10-20 NOTE — ED TRIAGE NOTES
Last Appointment:  6/3/2021  Future Appointments   Date Time Provider Flako Dan   2/25/2022  8:00 AM Jenn Mckeon  W [address] [place] [address] Pt BIB EMS from outside the [facility].  Pt reports sz this morning d/t detox off of alcohol.  Pt admits to drinking 15-16 tall boys daily.  Pt reports approx 1 hour ago started to have chest pain.  Pt reports that he was seen recently for elevated HR from Meth use.  Last meth use 2 days ago per pt.  Pt also has h/o schizophrenia and hasn't taken his medications.  Pt denies SI/HI.  Last drink 1 hour ago.  ERP to see.

## 2023-10-20 NOTE — ED PROVIDER NOTES
"  ER Provider Note    Scribed for Selvin Gallegos M.d. by Jordan Alarcon. 10/20/2023  11:30 AM    Primary Care Provider: Gregory Hardin M.D.    CHIEF COMPLAINT  Chief Complaint   Patient presents with    Detox     Reports possible sz today d/t detox    Chest Pain     For the last hour    Off Psych Meds     Reports hasn't taken for 2 days, but reports that he doesn't take them when he drinks. \"I drink all the time\"  denies SI/HI.      EXTERNAL RECORDS REVIEWED  Outpatient Notes shows that the patient was seen at [facility] for chest pain. He had a cardiac workup at 2 AM which was negative, and was discharged. After being discharged, he then went to check into [facility] for methamphetamine abuse. They directed him to [facility] for further evaluation. There, patient left against medical advice.      HPI/ROS  LIMITATION TO HISTORY   Select: : None  OUTSIDE HISTORIAN(S):  EMS , who was able to help contribute to the patient's history.     Brannon Sharma is a 43 y.o. male with a history of schizoaffective disorder and alcoholism who presents to the ED via EMS complaining of seizures secondary to alcohol withdrawals onset around midnight. Patient states that his last drink was at 6 PM last night, and then went to the [facility]. At around midnight last night, patient believes he had a seizure. He does note a history of alcohol withdrawal seizures. He reports that he was on a bed and the next thing he remembers is waking up on the floor. Per bystanders, patient was on the floor with full body shaking. Patient states that two days ago, he was found to be SVT. He is very concerned about going into SVT again due to his current symptoms, prompting EMS to be called and present the patient to the ED today for further evaluation. Currently in the ED, patient is feeling very anxious. He states that he is very interested in quitting both methamphetamine and alcohol, and would like to be admitted " "into a detox facility. His last use of methamphetamine was two days ago. Patient has associated auditory hallucinations but states that \"they are just saying numbers.\" He also has some mild abdominal pain and chest discomfort, but attributes this to his current anxiety. Denies any tongue biting, urinary incontinence, suicidal ideation, or homicidal ideation. Denies history of blood clots. He is noncompliant with all of his medications. He is currently homeless.     PAST MEDICAL HISTORY  Past Medical History:   Diagnosis Date    Alcohol-induced psychosis (HCC)     Anxiety     Depression     Paranoid schizophrenia ([facility])     Psychiatric disorder     panic attack    Schizophrenia, paranoid ([facility])     Suicidal ideations        SURGICAL HISTORY  Past Surgical History:   Procedure Laterality Date    NASAL SEPTAL RECONSTRUCTION      OTHER      deviated septum       FAMILY HISTORY  Family History   Family history unknown: Yes       SOCIAL HISTORY   reports that he has been smoking cigarettes. He has never used smokeless tobacco. He reports current alcohol use of about 9.6 oz of alcohol per week. He reports current drug use. Drugs: Inhaled and Marijuana.    CURRENT MEDICATIONS  Discharge Medication List as of 10/20/2023  2:19 PM        CONTINUE these medications which have NOT CHANGED    Details   baclofen (LIORESAL) 10 MG Tab Take 10 mg by mouth 2 times a day as needed. Indications: Muscle Spasm, Historical Med      carBAMazepine (TEGRETOL) 200 MG Tab Take 200 mg by mouth 2 times a day., Historical Med      hydrOXYzine pamoate (VISTARIL) 50 MG Cap Take 50 mg by mouth 3 times a day as needed for Anxiety., Historical Med      lisinopril (PRINIVIL) 20 MG Tab Take 20 mg by mouth every day., Historical Med      metoprolol SR (TOPROL XL) 25 MG TABLET SR 24 HR Take 25 mg by mouth every day., Historical Med      sertraline (ZOLOFT) 50 MG Tab Take 50 mg by mouth every day. 50 mg x 3 tablets = 150 mg, Historical Med      !! gabapentin " "(NEURONTIN) 100 MG Cap Take 1 Capsule by mouth 3 times a day., Disp-90 Capsule, R-0, Normal      risperiDONE (RISPERDAL) 2 MG Tab Take 1 Tablet by mouth at bedtime., Disp-60 Tablet, R-0, Normal      traZODone (DESYREL) 150 MG Tab Take 1 Tablet by mouth every evening., Disp-30 Tablet, R-0, Normal       !! - Potential duplicate medications found. Please discuss with provider.          ALLERGIES  Ceclor [cefaclor], Cephalosporins, and Pcn [penicillins]    PHYSICAL EXAM  /81   Pulse 99   Resp 20   Ht 1.905 m (6' 3\")   Wt 118 kg (260 lb)   SpO2 95%   BMI 32.50 kg/m²   Constitutional: Well developed, Well nourished, Mild distress.   HENT: Normocephalic, Atraumatic, Oropharynx moist, No oral exudates.   Eyes: Conjunctiva normal, No discharge.   Neck: Supple, No stridor   Cardiovascular: Normal heart rate, Normal rhythm, No murmurs, equal pulses.   Pulmonary: Normal breath sounds, No respiratory distress, No wheezing, No rales, No rhonchi.  Chest: No chest wall tenderness or deformity.   Abdomen:Soft, mild epigastric discomfort, No masses, no rebound, no guarding.   Back: No CVA tenderness.   Musculoskeletal: No major deformities noted, No tenderness. No calf tenderness.   Skin: Warm, Dry, No erythema, No rash.   Neurologic: Alert & oriented x 3, Normal motor function,  No focal deficits noted.   Psychiatric: Slightly anxious. States that he is hearing numbers. No SI/HI.       DIAGNOSTIC STUDIES    Labs:   Results for orders placed or performed during the hospital encounter of 10/20/23   CBC with Differential   Result Value Ref Range    WBC 4.3 (L) 4.8 - 10.8 K/uL    RBC 5.11 4.70 - 6.10 M/uL    Hemoglobin 16.1 14.0 - 18.0 g/dL    Hematocrit 48.3 42.0 - 52.0 %    MCV 94.5 81.4 - 97.8 fL    MCH 31.5 27.0 - 33.0 pg    MCHC 33.3 32.3 - 36.5 g/dL    RDW 47.5 35.9 - 50.0 fL    Platelet Count 232 164 - 446 K/uL    MPV 10.0 9.0 - 12.9 fL    Neutrophils-Polys 38.40 (L) 44.00 - 72.00 %    Lymphocytes 46.10 (H) 22.00 - " 41.00 %    Monocytes 8.60 0.00 - 13.40 %    Eosinophils 4.60 0.00 - 6.90 %    Basophils 1.60 0.00 - 1.80 %    Immature Granulocytes 0.70 0.00 - 0.90 %    Nucleated RBC 0.00 0.00 - 0.20 /100 WBC    Neutrophils (Absolute) 1.66 (L) 1.82 - 7.42 K/uL    Lymphs (Absolute) 1.99 1.00 - 4.80 K/uL    Monos (Absolute) 0.37 0.00 - 0.85 K/uL    Eos (Absolute) 0.20 0.00 - 0.51 K/uL    Baso (Absolute) 0.07 0.00 - 0.12 K/uL    Immature Granulocytes (abs) 0.03 0.00 - 0.11 K/uL    NRBC (Absolute) 0.00 K/uL   Complete Metabolic Panel (CMP)   Result Value Ref Range    Sodium 141 135 - 145 mmol/L    Potassium 3.6 3.6 - 5.5 mmol/L    Chloride 105 96 - 112 mmol/L    Co2 21 20 - 33 mmol/L    Anion Gap 15.0 7.0 - 16.0    Glucose 223 (H) 65 - 99 mg/dL    Bun 5 (L) 8 - 22 mg/dL    Creatinine 0.84 0.50 - 1.40 mg/dL    Calcium 8.8 8.5 - 10.5 mg/dL    Correct Calcium 8.8 8.5 - 10.5 mg/dL    AST(SGOT) 59 (H) 12 - 45 U/L    ALT(SGPT) 51 (H) 2 - 50 U/L    Alkaline Phosphatase 112 (H) 30 - 99 U/L    Total Bilirubin 0.8 0.1 - 1.5 mg/dL    Albumin 4.0 3.2 - 4.9 g/dL    Total Protein 7.0 6.0 - 8.2 g/dL    Globulin 3.0 1.9 - 3.5 g/dL    A-G Ratio 1.3 g/dL   Troponins NOW   Result Value Ref Range    Troponin T 7 6 - 19 ng/L   Troponins in two (2) hours   Result Value Ref Range    Troponin T <6 6 - 19 ng/L   DIAGNOSTIC ALCOHOL   Result Value Ref Range    Diagnostic Alcohol 137.9 (H) <10.1 mg/dL   ESTIMATED GFR   Result Value Ref Range    GFR (CKD-EPI) 111 >60 mL/min/1.73 m 2   EKG   Result Value Ref Range    Report       [facility] Emergency Dept.    Test Date:  2023-10-20  Pt Name:    OSITO BLACK               Department: ER  MRN:        [id number]                      Room:       [facility]  Gender:     Male                         Technician: [id number]  :        1980                   Requested By:ER TRIAGE PROTOCOL  Order #:    [id number]                    Reading MD: NYA CAMPOS MD    Measurements  Intervals                                 Axis  Rate:       100                          P:          11  VT:         151                          QRS:        109  QRSD:       109                          T:          55  QT:         376  QTc:        485    Interpretive Statements  Sinus tachycardia, Rate of 100, normal axis, no ST elevation  Consider right ventricular hypertrophy  Borderline prolonged QT interval  Compared to ECG 10/19/2023 11:29:55  No significant changes  Electronically Signed On 10- 14:37:54 PDT by NYA CAMPOS MD         EKG:   I have independently interpreted this EKG above     Radiology:   The attending emergency physician has independently interpreted the diagnostic imaging associated with this visit and am waiting the final reading from the radiologist.   Preliminary interpretation is a follows: Cardiomegaly,  Radiologist interpretation:   DX-CHEST-PORTABLE (1 VIEW)   Final Result      LEFT lung atelectasis or pneumonia           COURSE & MEDICAL DECISION MAKING     ED Observation Status? Yes; I am placing the patient in to an observation status due to a diagnostic uncertainty as well as therapeutic intensity. Patient placed in observation status at 11:35 AM, 10/20/2023.     Observation plan is as follows: We will manage their symptoms, evaluate with diagnostic testing, and then reassess after results are reviewed      Upon Reevaluation, the patient's condition has: Improved; and will be discharged.    Patient discharged from ED Observation status at 2:10 PM (Time) 10/20/2023 (Date).     INITIAL ASSESSMENT, COURSE AND PLAN  Care Narrative:     11:30 AM - Patient was seen and evaluated at bedside. Patient presents to the ED for alcohol withdrawal seizures. After my exam, I discussed with the patient the plan of care, which includes treating the patient with medication for their symptoms, as well as obtaining lab work and imaging for further evaluation. Patient understands and verbalizes agreement to  plan of care. Patient will be treated with Librium 25 mg for his symptoms. Ordered DX-chest, EKG, CBC with diff, CMP, Troponin NOW, Diagnostic alcohol, and Estimated GFR to evaluate.       1:18 PM - Patient was treated with a GI cocktail.     2:10 PM - Patient was reevaluated at bedside. Discussed lab and radiology results with the patient. I then informed the patient of my plan for discharge, which includes strict return precautions for any new or worsening symptoms. Patient understands and verbalizes agreement to plan of care. Patient is comfortable going home at this time.         PROBLEM LIST  Problem #1 chest pain patient presents with chest pain and has had this multiple times with negative work-up.  His work-up here is also negative.  I think this is more anxiety.  Patient felt better after Librium.  Patient's heart score is 2.  At this point time I think he can follow-up with his primary care for further cardiac work-up.    Problem #2 alcohol abuse.  Patient presents with a history of alcohol abuse.  He thought he may have had a seizure although he had no confusion, injury to his tongue or loss of bowel or bladder function.  At this point time I think the patient can be discharged with gabapentin to help with alcohol withdrawal symptoms.  Patient currently is intoxicated and does not show any signs of withdrawal.    DISPOSITION AND DISCUSSIONS  I have discussed management of the patient with the following physicians and TANNER's:  None    Discussion of management with other Q or appropriate source(s): None     Barriers to care at this time, including but not limited to:  None .     Decision tools and prescription drugs considered including, but not limited to: Heart score of 2 Neurontin to help treat his symptoms .    Patient will be discharged home.    FOLLOW UP:  Gregory Hardin M.D.  [address] [place] [id number]  [phone]    Schedule an appointment as soon as possible for a visit in 3  days      [facility]  [address] [address]  Schedule an appointment as soon as possible for a visit   To ask for help for your detox.      OUTPATIENT MEDICATIONS:  Discharge Medication List as of 10/20/2023  2:19 PM        START taking these medications    Details   !! gabapentin (NEURONTIN) 300 MG Cap Take 1 Capsule by mouth 3 times a day for 5 days., Disp-15 Capsule, R-0, Normal       !! - Potential duplicate medications found. Please discuss with provider.           FINAL DIAGNOSIS  1. Chest pain, unspecified type    2. Alcohol abuse    3. Alcohol withdrawal syndrome without complication (HCC)         Jordan CABA (Scribe), am scribing for, and in the presence of, ALEC Blancas*.    Electronically signed by: Jordan Alarcon (Scribe), 10/20/2023    Selvin CABA M.* personally performed the services described in this documentation, as scribed by Jordan Alarcon in my presence, and it is both accurate and complete.       The note accurately reflects work and decisions made by me.  Selvin Gallegos M.D.  10/20/2023  3:00 PM

## 2023-10-23 ENCOUNTER — HOSPITAL ENCOUNTER (EMERGENCY)
Facility: MEDICAL CENTER | Age: 43
End: 2023-10-24
Attending: EMERGENCY MEDICINE
Payer: MEDICAID

## 2023-10-23 VITALS
OXYGEN SATURATION: 94 % | BODY MASS INDEX: 35.62 KG/M2 | WEIGHT: 285 LBS | TEMPERATURE: 98 F | SYSTOLIC BLOOD PRESSURE: 135 MMHG | DIASTOLIC BLOOD PRESSURE: 84 MMHG | RESPIRATION RATE: 16 BRPM | HEART RATE: 63 BPM

## 2023-10-23 DIAGNOSIS — R45.851 SUICIDAL IDEATION: ICD-10-CM

## 2023-10-23 DIAGNOSIS — T14.91XA SUICIDE ATTEMPT (HCC): ICD-10-CM

## 2023-10-23 LAB
ALBUMIN SERPL BCP-MCNC: 4.1 G/DL (ref 3.2–4.9)
ALBUMIN/GLOB SERPL: 1.5 G/DL
ALP SERPL-CCNC: 110 U/L (ref 30–99)
ALT SERPL-CCNC: 78 U/L (ref 2–50)
AMPHET UR QL SCN: NEGATIVE
ANION GAP SERPL CALC-SCNC: 11 MMOL/L (ref 7–16)
AST SERPL-CCNC: 96 U/L (ref 12–45)
BARBITURATES UR QL SCN: NEGATIVE
BASOPHILS # BLD AUTO: 1.4 % (ref 0–1.8)
BASOPHILS # BLD: 0.08 K/UL (ref 0–0.12)
BENZODIAZ UR QL SCN: NEGATIVE
BILIRUB SERPL-MCNC: 0.9 MG/DL (ref 0.1–1.5)
BUN SERPL-MCNC: 6 MG/DL (ref 8–22)
BZE UR QL SCN: NEGATIVE
CALCIUM ALBUM COR SERPL-MCNC: 8.6 MG/DL (ref 8.5–10.5)
CALCIUM SERPL-MCNC: 8.7 MG/DL (ref 8.5–10.5)
CANNABINOIDS UR QL SCN: NEGATIVE
CHLORIDE SERPL-SCNC: 100 MMOL/L (ref 96–112)
CO2 SERPL-SCNC: 25 MMOL/L (ref 20–33)
CREAT SERPL-MCNC: 0.86 MG/DL (ref 0.5–1.4)
EOSINOPHIL # BLD AUTO: 0.13 K/UL (ref 0–0.51)
EOSINOPHIL NFR BLD: 2.2 % (ref 0–6.9)
ERYTHROCYTE [DISTWIDTH] IN BLOOD BY AUTOMATED COUNT: 46.1 FL (ref 35.9–50)
ETHANOL BLD-MCNC: <10.1 MG/DL
FENTANYL UR QL: NEGATIVE
GFR SERPLBLD CREATININE-BSD FMLA CKD-EPI: 110 ML/MIN/1.73 M 2
GLOBULIN SER CALC-MCNC: 2.8 G/DL (ref 1.9–3.5)
GLUCOSE SERPL-MCNC: 160 MG/DL (ref 65–99)
HCT VFR BLD AUTO: 48.4 % (ref 42–52)
HGB BLD-MCNC: 17.1 G/DL (ref 14–18)
IMM GRANULOCYTES # BLD AUTO: 0.03 K/UL (ref 0–0.11)
IMM GRANULOCYTES NFR BLD AUTO: 0.5 % (ref 0–0.9)
LIPASE SERPL-CCNC: 15 U/L (ref 11–82)
LYMPHOCYTES # BLD AUTO: 1.43 K/UL (ref 1–4.8)
LYMPHOCYTES NFR BLD: 24.7 % (ref 22–41)
MCH RBC QN AUTO: 33.3 PG (ref 27–33)
MCHC RBC AUTO-ENTMCNC: 35.3 G/DL (ref 32.3–36.5)
MCV RBC AUTO: 94.3 FL (ref 81.4–97.8)
METHADONE UR QL SCN: NEGATIVE
MONOCYTES # BLD AUTO: 0.48 K/UL (ref 0–0.85)
MONOCYTES NFR BLD AUTO: 8.3 % (ref 0–13.4)
NEUTROPHILS # BLD AUTO: 3.65 K/UL (ref 1.82–7.42)
NEUTROPHILS NFR BLD: 62.9 % (ref 44–72)
NRBC # BLD AUTO: 0 K/UL
NRBC BLD-RTO: 0 /100 WBC (ref 0–0.2)
OPIATES UR QL SCN: NEGATIVE
OXYCODONE UR QL SCN: NEGATIVE
PCP UR QL SCN: NEGATIVE
PLATELET # BLD AUTO: 186 K/UL (ref 164–446)
PMV BLD AUTO: 10.5 FL (ref 9–12.9)
POC BREATHALIZER: 0 PERCENT (ref 0–0.01)
POTASSIUM SERPL-SCNC: 3.7 MMOL/L (ref 3.6–5.5)
PROPOXYPH UR QL SCN: NEGATIVE
PROT SERPL-MCNC: 6.9 G/DL (ref 6–8.2)
RBC # BLD AUTO: 5.13 M/UL (ref 4.7–6.1)
SODIUM SERPL-SCNC: 136 MMOL/L (ref 135–145)
WBC # BLD AUTO: 5.8 K/UL (ref 4.8–10.8)

## 2023-10-23 PROCEDURE — 700111 HCHG RX REV CODE 636 W/ 250 OVERRIDE (IP): Mod: UD | Performed by: EMERGENCY MEDICINE

## 2023-10-23 PROCEDURE — 36415 COLL VENOUS BLD VENIPUNCTURE: CPT

## 2023-10-23 PROCEDURE — 99285 EMERGENCY DEPT VISIT HI MDM: CPT

## 2023-10-23 PROCEDURE — 700102 HCHG RX REV CODE 250 W/ 637 OVERRIDE(OP): Mod: UD | Performed by: EMERGENCY MEDICINE

## 2023-10-23 PROCEDURE — 302970 POC BREATHALIZER: Performed by: EMERGENCY MEDICINE

## 2023-10-23 PROCEDURE — 302970 POC BREATHALIZER

## 2023-10-23 PROCEDURE — 96376 TX/PRO/DX INJ SAME DRUG ADON: CPT

## 2023-10-23 PROCEDURE — 85025 COMPLETE CBC W/AUTO DIFF WBC: CPT

## 2023-10-23 PROCEDURE — 96375 TX/PRO/DX INJ NEW DRUG ADDON: CPT

## 2023-10-23 PROCEDURE — 700101 HCHG RX REV CODE 250: Performed by: EMERGENCY MEDICINE

## 2023-10-23 PROCEDURE — A9270 NON-COVERED ITEM OR SERVICE: HCPCS | Performed by: EMERGENCY MEDICINE

## 2023-10-23 PROCEDURE — A9270 NON-COVERED ITEM OR SERVICE: HCPCS | Mod: UD | Performed by: EMERGENCY MEDICINE

## 2023-10-23 PROCEDURE — 96366 THER/PROPH/DIAG IV INF ADDON: CPT

## 2023-10-23 PROCEDURE — 96365 THER/PROPH/DIAG IV INF INIT: CPT

## 2023-10-23 PROCEDURE — 700102 HCHG RX REV CODE 250 W/ 637 OVERRIDE(OP): Performed by: EMERGENCY MEDICINE

## 2023-10-23 PROCEDURE — 80307 DRUG TEST PRSMV CHEM ANLYZR: CPT

## 2023-10-23 PROCEDURE — 82077 ASSAY SPEC XCP UR&BREATH IA: CPT

## 2023-10-23 PROCEDURE — 80053 COMPREHEN METABOLIC PANEL: CPT

## 2023-10-23 PROCEDURE — 90791 PSYCH DIAGNOSTIC EVALUATION: CPT | Performed by: PSYCHOLOGIST

## 2023-10-23 PROCEDURE — 83690 ASSAY OF LIPASE: CPT

## 2023-10-23 RX ORDER — DIAZEPAM 5 MG/1
5 TABLET ORAL EVERY 6 HOURS PRN
Status: DISCONTINUED | OUTPATIENT
Start: 2023-10-23 | End: 2023-10-24 | Stop reason: HOSPADM

## 2023-10-23 RX ORDER — LORAZEPAM 2 MG/ML
1 INJECTION INTRAMUSCULAR ONCE
Status: COMPLETED | OUTPATIENT
Start: 2023-10-23 | End: 2023-10-23

## 2023-10-23 RX ORDER — LISINOPRIL 20 MG/1
20 TABLET ORAL
Status: DISCONTINUED | OUTPATIENT
Start: 2023-10-24 | End: 2023-10-24 | Stop reason: HOSPADM

## 2023-10-23 RX ORDER — LORAZEPAM 2 MG/ML
2 INJECTION INTRAMUSCULAR ONCE
Status: COMPLETED | OUTPATIENT
Start: 2023-10-23 | End: 2023-10-23

## 2023-10-23 RX ORDER — SERTRALINE HYDROCHLORIDE 100 MG/1
50 TABLET, FILM COATED ORAL DAILY
Status: DISCONTINUED | OUTPATIENT
Start: 2023-10-23 | End: 2023-10-24 | Stop reason: HOSPADM

## 2023-10-23 RX ORDER — METOPROLOL SUCCINATE 25 MG/1
25 TABLET, EXTENDED RELEASE ORAL
Status: DISCONTINUED | OUTPATIENT
Start: 2023-10-24 | End: 2023-10-24 | Stop reason: HOSPADM

## 2023-10-23 RX ORDER — TRAZODONE HYDROCHLORIDE 50 MG/1
50 TABLET ORAL
Status: DISCONTINUED | OUTPATIENT
Start: 2023-10-23 | End: 2023-10-24 | Stop reason: HOSPADM

## 2023-10-23 RX ADMIN — LORAZEPAM 2 MG: 2 INJECTION INTRAMUSCULAR; INTRAVENOUS at 10:08

## 2023-10-23 RX ADMIN — LORAZEPAM 1 MG: 2 INJECTION INTRAMUSCULAR; INTRAVENOUS at 21:55

## 2023-10-23 RX ADMIN — DIAZEPAM 5 MG: 5 TABLET ORAL at 17:28

## 2023-10-23 RX ADMIN — THIAMINE HYDROCHLORIDE: 100 INJECTION, SOLUTION INTRAMUSCULAR; INTRAVENOUS at 10:08

## 2023-10-23 RX ADMIN — SERTRALINE 50 MG: 100 TABLET, FILM COATED ORAL at 12:05

## 2023-10-23 RX ADMIN — LIDOCAINE HYDROCHLORIDE 15 ML: 20 SOLUTION OROPHARYNGEAL at 20:45

## 2023-10-23 ASSESSMENT — FIBROSIS 4 INDEX: FIB4 SCORE: 1.53

## 2023-10-23 NOTE — ED NOTES
Pt resting in bed watching TV with even and unlabored breaths. No acute distress noted and 1:1 sitter in view of pt. Will continue to monitor.

## 2023-10-23 NOTE — ED NOTES
Pt medicated with Zoloft per MAR, tolerated well. Pt does not appear to require PRN Valium at this time as he is resting in bed with his eyes closed. 1:1 sitter still in view of pt. Will continue to monitor.

## 2023-10-23 NOTE — ED NOTES
Pt resting in bed in view of 1:1 sitter with even and unlabored breaths, in no apparent distress. Will continue to monitor.

## 2023-10-23 NOTE — DISCHARGE PLANNING
Spoke with Devin Holt from [employer] [phone]. Pt is active on their service. They are available to assist with discharge planning as needed.

## 2023-10-23 NOTE — ED PROVIDER NOTES
ED Provider Note    Scribed for Alicia Schmidt M.D. by Raya Gr. 10/23/2023, 9:11 AM.    Primary care provider: Gregory Hardin M.D.  Means of arrival: EMS  History obtained from: Patient  History limited by: None    CHIEF COMPLAINT  Chief Complaint   Patient presents with    Suicide Attempt     Patient reports taking 5 x his normal dose of his 25mg metoprolol tablets 4 hours ago. Patient states suicidal ideation is related to homelessness and a new diagnosis of heart failure. Patient states he no longer wants to die and called EMS himself. Patient brought in from the [facility].        HPI/ROS  Brannon Sharma is a 43 y.o. male who presents to the Emergency Department complaining of suicidal ideation and failed suicide attempt onset earlier today. The patient reports that he took five of his 25mg Metoprolol tablets and planned to take more because of his new homelessness and diagnosis of CHF. However, the patient changed his mind and called EMS himself. Patient endorses that he has had suicidal ideation before, he reports a prior attempt many years ago.  He has a history of alcohol use and explains that he drinks everyday. His last drink was 4 hours ago and explains that he has been in the ED before for alcohol withdrawal. He also takes medication for his psychiatric history. Patient is also supposed to take Lisinopril, but does not at this time.    EXTERNAL RECORDS REVIEWED  Hospital Records Reviewed shows that the patient has had multiple visits within the last few months. He was seen at [facility] 9/23/23 for a high heart rate after snorting one gram of methamphetamine. He was then seen 10/19/23 at [facility] for chest pain, palpitations, and detox. He also presented to this ED 10/19/23 for the same complaints.    LIMITATION TO HISTORY   Select: : None    OUTSIDE HISTORIAN(S):  None      PAST MEDICAL HISTORY   has a past medical history of Alcohol-induced psychosis (HCC), Anxiety, Depression,  Paranoid schizophrenia (HCC), Psychiatric disorder, Schizophrenia, paranoid (HCC), and Suicidal ideations.    SURGICAL HISTORY   has a past surgical history that includes other and nasal septal reconstruction.    SOCIAL HISTORY  Social History     Tobacco Use    Smoking status: Every Day     Current packs/day: 1.00     Types: Cigarettes    Smokeless tobacco: Never   Vaping Use    Vaping Use: Every day    Substances: Nicotine    Devices: Pre-filled or refillable cartridge   Substance Use Topics    Alcohol use: Yes     Alcohol/week: 9.6 oz     Types: 16 Cans of beer per week     Comment: 15-16 tall boys daily    Drug use: Yes     Types: Inhaled, Marijuana     Comment: Meth daily, last use 10/18 per pt      Social History     Substance and Sexual Activity   Drug Use Yes    Types: Inhaled, Marijuana    Comment: Meth daily, last use 10/18 per pt       FAMILY HISTORY  Family History   Family history unknown: Yes       CURRENT MEDICATIONS  Home Medications    **Home medications have not yet been reviewed for this encounter**         ALLERGIES  Allergies   Allergen Reactions    Ceclor [Cefaclor] Anaphylaxis    Cephalosporins Anaphylaxis     > 5 years ago    Pcn [Penicillins] Anaphylaxis     > 30 years ago       PHYSICAL EXAM  VITAL SIGNS: BP (!) 148/104   Pulse 72   Temp 37.3 °C (99.1 °F)   Resp 18   Wt (!) 129 kg (285 lb)   SpO2 99%   BMI 35.62 kg/m²   Vitals reviewed by myself.  Physical Exam  Nursing note and vitals reviewed.  Constitutional: Well-developed and well-nourished. No acute distress.   HENT: Head is normocephalic and atraumatic.  Eyes: extra-ocular movements intact  Cardiovascular: Regular rate and regular rhythm. No murmur heard.  Pulmonary/Chest: Breath sounds normal. No wheezes or rales.   Abdominal: Soft and non-tender. No distention.    Musculoskeletal: Extremities exhibit normal range of motion without edema or tenderness.   Neurological: Awake and alert  Skin: Skin is warm and dry. No rash.       DIAGNOSTIC STUDIES:  LABS  Labs Reviewed   CBC WITH DIFFERENTIAL - Abnormal; Notable for the following components:       Result Value    MCH 33.3 (*)     All other components within normal limits   COMP METABOLIC PANEL - Abnormal; Notable for the following components:    Glucose 160 (*)     Bun 6 (*)     AST(SGOT) 96 (*)     ALT(SGPT) 78 (*)     Alkaline Phosphatase 110 (*)     All other components within normal limits   POC BREATHALIZER - Normal   URINE DRUG SCREEN   LIPASE   DIAGNOSTIC ALCOHOL   ESTIMATED GFR       All labs reviewed and independently interpreted by myself    COURSE & MEDICAL DECISION MAKING    ED Observation Status? Yes; I am placing the patient in to an observation status due to a diagnostic uncertainty as well as therapeutic intensity. Patient placed in observation status at 9:19 AM, 10/23/2023.     Observation plan is as follows: Monitor for symptom management and diagnostic results       INITIAL ASSESSMENT AND PLAN    Patient is a 43-year-old male who presents for evaluation of suicide attempt.  Differential diagnosis includes suicide attempt, alcohol intoxication, alcohol withdrawal, electrolyte derangement.  Diagnostic work-up includes labs.       ER COURSE AND FINAL DISPOSITION   Patient's initial vitals notable for slight hypertension, he is anxious appearing on exam.  He reports that he overdosed on metoprolol, however his dose was not a dangerous dose.  He is not exhibiting any signs of beta-blockade toxicity.  He is not bradycardic or hypotensive.,  We will continue to monitor for this.      For his anxiety and concern of alcohol withdrawal he is treated with Ativan and detox bag.  Alcohol level is negative and patient does not exhibiting any other overt signs of alcohol withdrawal, will continue to monitor, I have ordered as needed Valium.  Remainder of his labs notable for mildly elevated LFTs likely secondary to his known alcohol abuse.  He is not having any nausea, vomiting or  abdominal pain and therefore low suspicion for biliary pathology.  Further imaging not indicated.  At this time patient has been placed on a legal hold for suicidal ideation.  He is pending inpatient psychiatric placement.    ADDITIONAL PROBLEM LIST AND RESOURCE UTILIZATION    Additional problems aside from the chief complaint that I have addressed: none    I have discussed management of the patient with the following physicians and TANNER's: none    Discussion of management with other [facility] or appropriate source(s): none     Escalation of care considered, and ultimately not performed: see above.     Barriers to care at this time, including but not limited to: Patient is homeless.     Decision tools and prescription drugs considered including, but not limited to: see above.    Please see review of records as noted above      FINAL IMPRESSION  1. Suicidal ideation    2. Suicide attempt (HCC)          Raya CABA (Scribe), am scribing for, and in the presence of, Alicia Schmidt M.D..    Electronically signed by: Raya Gr (Hernánibe), 10/23/2023    IAlicia M.D. personally performed the services described in this documentation, as scribed by Raya Gr in my presence, and it is both accurate and complete.    The note accurately reflects work and decisions made by me.  Alicia Schmidt M.D.  10/23/2023  11:44 AM

## 2023-10-23 NOTE — ED NOTES
PIV placed, blood collected and sent to lab. Meds administered per MAR. Pt sitting up in bed watching TV and eating food provided per ERP, tolerating well. Security also now at bedside to check pt's belongings. 1:1 sitter in view of pt.

## 2023-10-23 NOTE — ED NOTES
Pt's bags checked by Security and placed in Locker [address]12 (x2 bags). Pt also has one bottle of medication that has been placed in a sealed med bag and taken to inpatient pharmacy for storage.

## 2023-10-23 NOTE — ED NOTES
Pt provided second [employer] at his request. Pt sitting up in bed eating it and tolerating well.
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
## 2023-10-23 NOTE — ED NOTES
Bedside report received from Amy Aldridge RN. Pt currently on all monitoring devices and is on room air, tolerating well. Pt placed on legal hold for suicide attempt at this time. Security called to check pt's belongings. Suicide precautions in place and 1:1 sitter in view of pt. Will continue to monitor.

## 2023-10-23 NOTE — ED TRIAGE NOTES
Chief Complaint   Patient presents with    Suicide Attempt     Patient reports taking 5 x his normal dose of his 25mg metoprolol tablets 4 hours ago. Patient states suicidal ideation is related to homelessness and a new diagnosis of heart failure. Patient states he no longer wants to die and called EMS himself. Patient brought in from the [facility].

## 2023-10-24 NOTE — DISCHARGE PLANNING
[facility] ALERT TEAM DISCHARGE PLANNING NOTE    Date:  10/23/23  Patient Name:  Brannon Sharma - 43 y.o. - Discharge Planning  MRN:  [id number]   YOB: 1980  ADMISSION DATE:  10/23/2023     Writer forwarded referral packet for inpatient psychiatric care to the following community providers:  [facility], [facility]    Items included in the referral packet:   __x___Face Sheet   __x___Pages 1 and 2 of completed legal hold   __x___Alert Team/Psych Assessment   __x___H&P   __x___UDS   __x___Blood Alcohol   __x___Vital signs   __n/a___Pregnancy Test (if applicable)   __x___Medications List   _____Covid Screen

## 2023-10-24 NOTE — ED NOTES
Bedside report given to BHAVANA Stokes. Upon shift change pt is resting in bed with even and unlabored breaths, in no apparent distress. Pt is connected to monitoring devices and is on room air, tolerating well. 1:1 sitter still in view of pt. This RN removed from care.

## 2023-10-24 NOTE — DISCHARGE PLANNING
Alert Team:     Referral: Adult Patient Transfer to Mental Health Facility     Intervention: Received call from Diana at [facility] stating that Valeria Hebert PA-C has accepted the patient for admission.  Facility requests that transport be arranged for 0100.     Arranged for transportation to be set up through [place] with [facility]  for [facility] transport.     The pt will be picked up at 0100.      Notified the RN of the departure time as well as accepting facility.      Created transfer packet and placed on chart.      Plan: Pt will transfer to [facility] at 0100.

## 2023-10-24 NOTE — ED NOTES
Patient provided with meal tray at this time. Rounded on pt. Respirations equal and unlabored. No acute distress at this time. 1:1 in line of site of site of patient. Safety measures in place.

## 2023-10-24 NOTE — ED NOTES
Rounded on patient. Patient remains asleep with unlabored respirations. No safety risk noted. Trained Personnel Sitter - 1:1 with continuous visual monitoring remains present outside door. Continued safety precaution. Gurney in low position, side rail up for pt safety. No needs identified at this time.

## 2023-10-24 NOTE — ED NOTES
Pt resting in bed in view of 1:1 sitter. No acute distress noted at this time and pt's breaths are even and unlabored. Will continue to monitor.

## 2023-10-24 NOTE — ED NOTES
Rounded on patient. Patient currently sitting in bed and having pleasant conversation with sitter No safety risk noted. Trained Personnel Sitter - 1:1 with continuous visual monitoring remains present outside door. Continued safety precaution. Gurney in low position, side rail up for pt safety. No needs identified at this time.

## 2023-10-24 NOTE — ED NOTES
Assumed care of patient, patient bedside report received from BHAVANA Reyes . Pt AAO X 4 , respirations even and unlabored, on room air . Introduced self as pt RN, POC discussed, safety risk interventions in place. Patient is in direct view of 1:1 sitter, will continue to observe.

## 2023-10-24 NOTE — ED NOTES
Rounded on patient. Patient currently asleep with even and unlabored respirations. No safety risk noted. Trained Personnel Sitter - 1:1 with continuous visual monitoring remains present outside door. Continued safety precaution. Gurney in low position, side rail up for pt safety. No needs identified at this time.

## 2023-10-24 NOTE — ED NOTES
Rounded on patient. Patient resting with unlabored respirations. No safety risk noted. Trained Personnel Sitter - 1:1 with continuous visual monitoring remains present outside door. Continued safety precaution. Gurney in low position, side rail up for pt safety. No needs identified at this time.  Medication given per MD orders

## 2023-10-24 NOTE — DISCHARGE SUMMARY
ED Observation Discharge Summary    Patient:Brannon Sharma  Patient : 1980  Patient MRN: [id number]  Patient PCP: Gregory Hardin M.D.    Admit Date: 10/23/2023  Discharge Date and Time: 10/24/23 12:45 AM  Discharge Diagnosis: Suicidal ideation  Discharge Attending: Avtar Crowell M.D.  Discharge Service: ED Observation    ED Course  Brannon is a 43 y.o. male who was evaluated at [facility] after suicide attempt by taking 5 extra doses of metoprolol.  Patient was medically cleared after observation in the emergency department with no hypotension or bradycardia or findings of beta-blocker toxicity, he was subsequently transferred to inpatient psychiatric facility    Discharge Exam:  /84   Pulse 63   Temp 36.7 °C (98 °F) (Temporal)   Resp 16   Wt (!) 129 kg (285 lb)   SpO2 94%   BMI 35.62 kg/m² .    Constitutional: Awake and alert. Nontoxic  HENT:  Grossly normal  Eyes: Grossly normal  Neck: Normal range of motion  Cardiovascular: Normal heart rate   Thorax & Lungs: No respiratory distress  Abdomen: Nontender  Skin:  No pathologic rash.   Extremities: Well perfused  Psychiatric: Depressed    Labs  Results for orders placed or performed during the hospital encounter of 10/23/23   Urine Drug Screen   Result Value Ref Range    Amphetamines Urine Negative Negative    Barbiturates Negative Negative    Benzodiazepines Negative Negative    Cocaine Metabolite Negative Negative    Fentanyl, Urine Negative Negative    Methadone Negative Negative    Opiates Negative Negative    Oxycodone Negative Negative    Phencyclidine -Pcp Negative Negative    Propoxyphene Negative Negative    Cannabinoid Metab Negative Negative   CBC WITH DIFFERENTIAL   Result Value Ref Range    WBC 5.8 4.8 - 10.8 K/uL    RBC 5.13 4.70 - 6.10 M/uL    Hemoglobin 17.1 14.0 - 18.0 g/dL    Hematocrit 48.4 42.0 - 52.0 %    MCV 94.3 81.4 - 97.8 fL    MCH 33.3 (H) 27.0 - 33.0 pg    MCHC 35.3 32.3 - 36.5 g/dL    RDW 46.1 35.9 - 50.0 fL     Platelet Count 186 164 - 446 K/uL    MPV 10.5 9.0 - 12.9 fL    Neutrophils-Polys 62.90 44.00 - 72.00 %    Lymphocytes 24.70 22.00 - 41.00 %    Monocytes 8.30 0.00 - 13.40 %    Eosinophils 2.20 0.00 - 6.90 %    Basophils 1.40 0.00 - 1.80 %    Immature Granulocytes 0.50 0.00 - 0.90 %    Nucleated RBC 0.00 0.00 - 0.20 /100 WBC    Neutrophils (Absolute) 3.65 1.82 - 7.42 K/uL    Lymphs (Absolute) 1.43 1.00 - 4.80 K/uL    Monos (Absolute) 0.48 0.00 - 0.85 K/uL    Eos (Absolute) 0.13 0.00 - 0.51 K/uL    Baso (Absolute) 0.08 0.00 - 0.12 K/uL    Immature Granulocytes (abs) 0.03 0.00 - 0.11 K/uL    NRBC (Absolute) 0.00 K/uL   Comp Metabolic Panel   Result Value Ref Range    Sodium 136 135 - 145 mmol/L    Potassium 3.7 3.6 - 5.5 mmol/L    Chloride 100 96 - 112 mmol/L    Co2 25 20 - 33 mmol/L    Anion Gap 11.0 7.0 - 16.0    Glucose 160 (H) 65 - 99 mg/dL    Bun 6 (L) 8 - 22 mg/dL    Creatinine 0.86 0.50 - 1.40 mg/dL    Calcium 8.7 8.5 - 10.5 mg/dL    Correct Calcium 8.6 8.5 - 10.5 mg/dL    AST(SGOT) 96 (H) 12 - 45 U/L    ALT(SGPT) 78 (H) 2 - 50 U/L    Alkaline Phosphatase 110 (H) 30 - 99 U/L    Total Bilirubin 0.9 0.1 - 1.5 mg/dL    Albumin 4.1 3.2 - 4.9 g/dL    Total Protein 6.9 6.0 - 8.2 g/dL    Globulin 2.8 1.9 - 3.5 g/dL    A-G Ratio 1.5 g/dL   LIPASE   Result Value Ref Range    Lipase 15 11 - 82 U/L   DIAGNOSTIC ALCOHOL   Result Value Ref Range    Diagnostic Alcohol <10.1 <10.1 mg/dL   ESTIMATED GFR   Result Value Ref Range    GFR (CKD-EPI) 110 >60 mL/min/1.73 m 2   POC BREATHALIZER   Result Value Ref Range    POC Breathalizer 0.0 0.00 - 0.01 Percent       Radiology  No orders to display       Medications:   Discharge Medication List as of 10/24/2023 12:58 AM          My final assessment includes suicidal ideation  Upon Reevaluation, the patient's condition has: not improved; and will be escalated to hospitalization.  With inpatient psychiatric facility    Patient discharged from ED Observation status at 1245 am (Time)  10/24/23   (Date).     Total time spent on this ED Observation discharge encounter is > 30 Minutes    Electronically signed by: Avtar Crowell M.D., 10/24/2023 12:05 AM

## 2023-10-24 NOTE — ED NOTES
Pt medicated with PRN Valium at this time as he was c/o feeling anxious. 1:1 sitter still in view of pt. Will continue to monitor.

## 2023-10-24 NOTE — ED NOTES
Assumed care of pt, received bedside report from BHAVANA Stokes. SI precautions in place. Safety rounds completed, pt resting comfortably and stable on room air. Patient is wearing a paper gown. Verified that Legal Hold appropriate and signed in patient's chart. Pt educated about legal hold procedures and pt verbalized understanding. 1:1 sitter continued per protocol for close monitoring. Continuous visual monitoring by Trained Personnel. Sitter has unobstructed view of patient at all times. Discussion with sitter about patient care, safety and support.

## 2023-10-24 NOTE — ED NOTES
Patient moved rooms and in much happier that his new room has a TV. He is calm and cooperative. Stable on RA, VSS, AOX4, and aware of POC.

## 2023-12-12 ENCOUNTER — APPOINTMENT (OUTPATIENT)
Dept: RADIOLOGY | Facility: MEDICAL CENTER | Age: 43
End: 2023-12-12
Attending: EMERGENCY MEDICINE
Payer: MEDICAID

## 2023-12-12 ENCOUNTER — HOSPITAL ENCOUNTER (EMERGENCY)
Facility: MEDICAL CENTER | Age: 43
End: 2023-12-12
Attending: EMERGENCY MEDICINE
Payer: MEDICAID

## 2023-12-12 VITALS
SYSTOLIC BLOOD PRESSURE: 141 MMHG | OXYGEN SATURATION: 94 % | HEART RATE: 100 BPM | RESPIRATION RATE: 17 BRPM | DIASTOLIC BLOOD PRESSURE: 92 MMHG | TEMPERATURE: 98.1 F | HEIGHT: 75 IN | WEIGHT: 301.81 LBS | BODY MASS INDEX: 37.53 KG/M2

## 2023-12-12 DIAGNOSIS — F41.9 ANXIETY: ICD-10-CM

## 2023-12-12 LAB
ALBUMIN SERPL BCP-MCNC: 4.8 G/DL (ref 3.2–4.9)
ALBUMIN/GLOB SERPL: 1.3 G/DL
ALP SERPL-CCNC: 145 U/L (ref 30–99)
ALT SERPL-CCNC: 49 U/L (ref 2–50)
ANION GAP SERPL CALC-SCNC: 16 MMOL/L (ref 7–16)
AST SERPL-CCNC: 32 U/L (ref 12–45)
BASOPHILS # BLD AUTO: 0.4 % (ref 0–1.8)
BASOPHILS # BLD: 0.05 K/UL (ref 0–0.12)
BILIRUB SERPL-MCNC: 0.7 MG/DL (ref 0.1–1.5)
BUN SERPL-MCNC: 18 MG/DL (ref 8–22)
CALCIUM ALBUM COR SERPL-MCNC: 9.6 MG/DL (ref 8.5–10.5)
CALCIUM SERPL-MCNC: 10.2 MG/DL (ref 8.4–10.2)
CHLORIDE SERPL-SCNC: 102 MMOL/L (ref 96–112)
CO2 SERPL-SCNC: 23 MMOL/L (ref 20–33)
CREAT SERPL-MCNC: 0.94 MG/DL (ref 0.5–1.4)
EKG IMPRESSION: NORMAL
EOSINOPHIL # BLD AUTO: 0.35 K/UL (ref 0–0.51)
EOSINOPHIL NFR BLD: 2.5 % (ref 0–6.9)
ERYTHROCYTE [DISTWIDTH] IN BLOOD BY AUTOMATED COUNT: 41.5 FL (ref 35.9–50)
GFR SERPLBLD CREATININE-BSD FMLA CKD-EPI: 103 ML/MIN/1.73 M 2
GLOBULIN SER CALC-MCNC: 3.7 G/DL (ref 1.9–3.5)
GLUCOSE SERPL-MCNC: 161 MG/DL (ref 65–99)
HCT VFR BLD AUTO: 57.4 % (ref 42–52)
HGB BLD-MCNC: 19.9 G/DL (ref 14–18)
IMM GRANULOCYTES # BLD AUTO: 0.05 K/UL (ref 0–0.11)
IMM GRANULOCYTES NFR BLD AUTO: 0.4 % (ref 0–0.9)
LYMPHOCYTES # BLD AUTO: 2.09 K/UL (ref 1–4.8)
LYMPHOCYTES NFR BLD: 15 % (ref 22–41)
MCH RBC QN AUTO: 31.1 PG (ref 27–33)
MCHC RBC AUTO-ENTMCNC: 34.7 G/DL (ref 32.3–36.5)
MCV RBC AUTO: 89.7 FL (ref 81.4–97.8)
MONOCYTES # BLD AUTO: 0.97 K/UL (ref 0–0.85)
MONOCYTES NFR BLD AUTO: 7 % (ref 0–13.4)
NEUTROPHILS # BLD AUTO: 10.38 K/UL (ref 1.82–7.42)
NEUTROPHILS NFR BLD: 74.7 % (ref 44–72)
NRBC # BLD AUTO: 0 K/UL
NRBC BLD-RTO: 0 /100 WBC (ref 0–0.2)
PLATELET # BLD AUTO: 232 K/UL (ref 164–446)
PMV BLD AUTO: 10.7 FL (ref 9–12.9)
POTASSIUM SERPL-SCNC: 4.9 MMOL/L (ref 3.6–5.5)
PROT SERPL-MCNC: 8.5 G/DL (ref 6–8.2)
RBC # BLD AUTO: 6.4 M/UL (ref 4.7–6.1)
SODIUM SERPL-SCNC: 141 MMOL/L (ref 135–145)
TROPONIN T SERPL-MCNC: <6 NG/L (ref 6–19)
WBC # BLD AUTO: 13.9 K/UL (ref 4.8–10.8)

## 2023-12-12 PROCEDURE — 93005 ELECTROCARDIOGRAM TRACING: CPT | Performed by: EMERGENCY MEDICINE

## 2023-12-12 PROCEDURE — 700111 HCHG RX REV CODE 636 W/ 250 OVERRIDE (IP): Performed by: EMERGENCY MEDICINE

## 2023-12-12 PROCEDURE — 36415 COLL VENOUS BLD VENIPUNCTURE: CPT

## 2023-12-12 PROCEDURE — 96374 THER/PROPH/DIAG INJ IV PUSH: CPT

## 2023-12-12 PROCEDURE — 99285 EMERGENCY DEPT VISIT HI MDM: CPT

## 2023-12-12 PROCEDURE — 96375 TX/PRO/DX INJ NEW DRUG ADDON: CPT

## 2023-12-12 PROCEDURE — 71045 X-RAY EXAM CHEST 1 VIEW: CPT

## 2023-12-12 PROCEDURE — 700105 HCHG RX REV CODE 258: Performed by: EMERGENCY MEDICINE

## 2023-12-12 PROCEDURE — 85025 COMPLETE CBC W/AUTO DIFF WBC: CPT

## 2023-12-12 PROCEDURE — 84484 ASSAY OF TROPONIN QUANT: CPT

## 2023-12-12 PROCEDURE — 80053 COMPREHEN METABOLIC PANEL: CPT

## 2023-12-12 RX ORDER — DIPHENHYDRAMINE HYDROCHLORIDE 50 MG/ML
50 INJECTION INTRAMUSCULAR; INTRAVENOUS ONCE
Status: COMPLETED | OUTPATIENT
Start: 2023-12-12 | End: 2023-12-12

## 2023-12-12 RX ORDER — SODIUM CHLORIDE, SODIUM LACTATE, POTASSIUM CHLORIDE, CALCIUM CHLORIDE 600; 310; 30; 20 MG/100ML; MG/100ML; MG/100ML; MG/100ML
1000 INJECTION, SOLUTION INTRAVENOUS ONCE
Status: COMPLETED | OUTPATIENT
Start: 2023-12-12 | End: 2023-12-12

## 2023-12-12 RX ORDER — PROCHLORPERAZINE EDISYLATE 5 MG/ML
10 INJECTION INTRAMUSCULAR; INTRAVENOUS ONCE
Status: COMPLETED | OUTPATIENT
Start: 2023-12-12 | End: 2023-12-12

## 2023-12-12 RX ADMIN — PROCHLORPERAZINE EDISYLATE 10 MG: 5 INJECTION INTRAMUSCULAR; INTRAVENOUS at 20:19

## 2023-12-12 RX ADMIN — SODIUM CHLORIDE, POTASSIUM CHLORIDE, SODIUM LACTATE AND CALCIUM CHLORIDE 1000 ML: 600; 310; 30; 20 INJECTION, SOLUTION INTRAVENOUS at 20:16

## 2023-12-12 RX ADMIN — DIPHENHYDRAMINE HYDROCHLORIDE 50 MG: 50 INJECTION INTRAMUSCULAR; INTRAVENOUS at 20:18

## 2023-12-12 ASSESSMENT — FIBROSIS 4 INDEX: FIB4 SCORE: 2.51

## 2023-12-13 NOTE — ED PROVIDER NOTES
"ED Provider Note    CHIEF COMPLAINT  Chief Complaint   Patient presents with    Hypertension     W/ elevated heart rate          EXTERNAL RECORDS REVIEWED  Outpatient Notes ER, [facility], psych    HPI/ROS  LIMITATION TO HISTORY   Select: Behavior anxious  OUTSIDE HISTORIAN(S):  None    Brannon Sharma is a 43 y.o. male who presents here for evaluation of hypertension and high heart rate.  Patient states that he is here because intermittently he will have a high heart rate with high blood pressure.  States his blood pressure was \"really high\" today, but is much better now.  He has no chest pain shortness of breath, or vomiting.  He has no headache, no vision changes, and no back pain.  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  He states that he would prefer not to have benzos, because he is trying to get into a treatment center.    PAST MEDICAL HISTORY   has a past medical history of Alcohol-induced psychosis (HCC), Anxiety, Depression, Paranoid schizophrenia (HCC), Psychiatric disorder, Schizophrenia, paranoid (HCC), and Suicidal ideations.    SURGICAL HISTORY   has a past surgical history that includes other and nasal septal reconstruction.    FAMILY HISTORY  Family History   Family history unknown: Yes       SOCIAL HISTORY  Social History     Tobacco Use    Smoking status: Every Day     Current packs/day: 1.00     Types: Cigarettes    Smokeless tobacco: Never   Vaping Use    Vaping Use: Every day    Substances: Nicotine    Devices: Pre-filled or refillable cartridge   Substance and Sexual Activity    Alcohol use: Yes     Alcohol/week: 9.6 oz     Types: 16 Cans of beer per week     Comment: 15-16 tall boys daily    Drug use: Not Currently     Types: Inhaled, Marijuana     Comment: Meth daily, last use 10/18 per pt    Sexual activity: Yes     Partners: Female       CURRENT MEDICATIONS  Home Medications    **Home medications have not yet been " "reviewed for this encounter**         ALLERGIES  Allergies   Allergen Reactions    Ceclor [Cefaclor] Anaphylaxis    Cephalosporins Anaphylaxis     > 5 years ago    Pcn [Penicillins] Anaphylaxis     > 30 years ago       PHYSICAL EXAM  VITAL SIGNS: BP (!) 141/92   Pulse 95   Temp 36 °C (96.8 °F) (Temporal)   Resp (!) 23   Ht 1.905 m (6' 3\")   Wt (!) 137 kg (301 lb 13 oz)   SpO2 94%   BMI 37.72 kg/m²    Constitutional: Well developed, well nourished. moderate acute distress.  HEENT: Normocephalic, atraumatic. Posterior pharynx clear and moist.  Eyes:  EOMI. Normal sclera.  Neck: Supple, Full range of motion, nontender.  Chest/Pulmonary: clear to ausculation. Symmetrical expansion.   Cardio: tachycardic rate and rhythm with no murmur.   Abdomen: Soft, nontender. No peritoneal signs. No guarding. No palpable masses.  Musculoskeletal: No deformity, no edema, neurovascular intact.   Neuro: Clear speech, appropriate, cooperative, cranial nerves II-XII grossly intact.  Psych: anxious  mood and affect      DIAGNOSTIC STUDIES / PROCEDURES  Results for orders placed or performed during the hospital encounter of 12/12/23   CBC w/ Differential   Result Value Ref Range    WBC 13.9 (H) 4.8 - 10.8 K/uL    RBC 6.40 (H) 4.70 - 6.10 M/uL    Hemoglobin 19.9 (H) 14.0 - 18.0 g/dL    Hematocrit 57.4 (H) 42.0 - 52.0 %    MCV 89.7 81.4 - 97.8 fL    MCH 31.1 27.0 - 33.0 pg    MCHC 34.7 32.3 - 36.5 g/dL    RDW 41.5 35.9 - 50.0 fL    Platelet Count 232 164 - 446 K/uL    MPV 10.7 9.0 - 12.9 fL    Neutrophils-Polys 74.70 (H) 44.00 - 72.00 %    Lymphocytes 15.00 (L) 22.00 - 41.00 %    Monocytes 7.00 0.00 - 13.40 %    Eosinophils 2.50 0.00 - 6.90 %    Basophils 0.40 0.00 - 1.80 %    Immature Granulocytes 0.40 0.00 - 0.90 %    Nucleated RBC 0.00 0.00 - 0.20 /100 WBC    Neutrophils (Absolute) 10.38 (H) 1.82 - 7.42 K/uL    Lymphs (Absolute) 2.09 1.00 - 4.80 K/uL    Monos (Absolute) 0.97 (H) 0.00 - 0.85 K/uL    Eos (Absolute) 0.35 0.00 - 0.51 " K/uL    Baso (Absolute) 0.05 0.00 - 0.12 K/uL    Immature Granulocytes (abs) 0.05 0.00 - 0.11 K/uL    NRBC (Absolute) 0.00 K/uL   Complete Metabolic Panel (CMP)   Result Value Ref Range    Sodium 141 135 - 145 mmol/L    Potassium 4.9 3.6 - 5.5 mmol/L    Chloride 102 96 - 112 mmol/L    Co2 23 20 - 33 mmol/L    Anion Gap 16.0 7.0 - 16.0    Glucose 161 (H) 65 - 99 mg/dL    Bun 18 8 - 22 mg/dL    Creatinine 0.94 0.50 - 1.40 mg/dL    Calcium 10.2 8.4 - 10.2 mg/dL    Correct Calcium 9.6 8.5 - 10.5 mg/dL    AST(SGOT) 32 12 - 45 U/L    ALT(SGPT) 49 2 - 50 U/L    Alkaline Phosphatase 145 (H) 30 - 99 U/L    Total Bilirubin 0.7 0.1 - 1.5 mg/dL    Albumin 4.8 3.2 - 4.9 g/dL    Total Protein 8.5 (H) 6.0 - 8.2 g/dL    Globulin 3.7 (H) 1.9 - 3.5 g/dL    A-G Ratio 1.3 g/dL   Troponin - STAT Once   Result Value Ref Range    Troponin T <6 6 - 19 ng/L   ESTIMATED GFR   Result Value Ref Range    GFR (CKD-EPI) 103 >60 mL/min/1.73 m 2   EKG   Result Value Ref Range    Report       [facility] Emergency Dept.    Test Date:  2023  Pt Name:    OSITO BLACK               Department: [facility]  MRN:        [id number]                      Room:  Gender:     Male                         Technician: EFREN  :        1980                   Requested By:ER TRIAGE PROTOCOL  Order #:    [id number]                    Reading MD:    Measurements  Intervals                                Axis  Rate:       127                          P:          43  MO:         145                          QRS:        214  QRSD:       96                           T:          17  QT:         308  QTc:        448    Interpretive Statements  Sinus tachycardia  Ventricular premature complex  Aberrant complex  Probable left atrial enlargement  Consider right ventricular hypertrophy  Borderline T abnormalities, inferior leads  Baseline wander in lead(s) V2  Compared to ECG 10/20/2023 11:16:55  Ventricular premature complex(es) now present  Aberrant  conduction of supraventr icular beat(s) now present  T-wave abnormality now present  ST (T wave) deviation no longer present       EKG; sinus tachycardia at 127.  No acute ST elevation or depression.  QTc is 448.  Mild right ventricle hypertrophy.  Compared to EKG from 10/20/2023.    RADIOLOGY  I have independently interpreted the diagnostic imaging associated with this visit and am waiting the final reading from the radiologist.   My preliminary interpretation is as follows: see below   Radiologist interpretation:   DX-CHEST-PORTABLE (1 VIEW)   Final Result         1.  Left basilar atelectasis, no focal infiltrate            COURSE & MEDICAL DECISION MAKING    Patient will be discharged in stable condition.    INITIAL ASSESSMENT, COURSE AND PLAN  Care Narrative: This is a 43-year-old male here for evaluation of hypertension and elevated heart rate.  Patient states he only gets this when he uses methamphetamine, but states he has been clean for 41 days.  He states he does have high anxiety, and feels as though that this may be just related to that.  He has no shortness of breath, and no chest pain.  Workup here shows no acute findings.  He has been given Benadryl Compazine, to relax him, his blood pressures come down, as has his heart rate.  He has been given IV fluids in addition to these.  He feels comfortable going home.  9:27 PM  After the compazine and benadryl, the pt feels much less anxious.     DISPOSITION AND DISCUSSIONS  I have discussed management of the patient with the following physicians and TANNER's: None    Discussion of management with other QHP or appropriate source(s): None    Escalation of care considered, and ultimately not performed: None    Barriers to care at this time, including but not limited to: Patient does not have established PCP.     Decision tools and prescription drugs considered including, but not limited to: None.    FINAL DIAGNOSIS  1. Anxiety             Electronically signed by:  Niels Broussard D.O., 12/12/2023 8:13 PM

## 2023-12-13 NOTE — ED TRIAGE NOTES
"Chief Complaint   Patient presents with    Hypertension     W/ elevated heart rate        Pt states that he was sitting around when he noted that his heart rate was racing. Pt states that it started about 90 minutes ago. Pt is restless in triage and diaphoretic, pt states that he is \"freaking out\". EKG ordered in triage.    BP (!) 153/108   Pulse (!) 126   Temp 36 °C (96.8 °F) (Temporal)   Resp 18   Ht 1.905 m (6' 3\")   Wt (!) 137 kg (301 lb 13 oz)   SpO2 98%   BMI 37.72 kg/m²     "

## 2024-06-11 ENCOUNTER — HOSPITAL ENCOUNTER (EMERGENCY)
Facility: MEDICAL CENTER | Age: 44
End: 2024-06-11
Attending: EMERGENCY MEDICINE
Payer: MEDICAID

## 2024-06-11 VITALS
BODY MASS INDEX: 36.02 KG/M2 | TEMPERATURE: 98.7 F | RESPIRATION RATE: 16 BRPM | HEIGHT: 75 IN | SYSTOLIC BLOOD PRESSURE: 134 MMHG | OXYGEN SATURATION: 94 % | WEIGHT: 289.68 LBS | DIASTOLIC BLOOD PRESSURE: 92 MMHG | HEART RATE: 85 BPM

## 2024-06-11 DIAGNOSIS — I10 ESSENTIAL HYPERTENSION: ICD-10-CM

## 2024-06-11 DIAGNOSIS — F41.9 ANXIETY: ICD-10-CM

## 2024-06-11 LAB
ALBUMIN SERPL BCP-MCNC: 4.2 G/DL (ref 3.2–4.9)
ALBUMIN/GLOB SERPL: 1.3 G/DL
ALP SERPL-CCNC: 145 U/L (ref 30–99)
ALT SERPL-CCNC: 23 U/L (ref 2–50)
ANION GAP SERPL CALC-SCNC: 12 MMOL/L (ref 7–16)
AST SERPL-CCNC: 15 U/L (ref 12–45)
BASOPHILS # BLD AUTO: 0.7 % (ref 0–1.8)
BASOPHILS # BLD: 0.06 K/UL (ref 0–0.12)
BILIRUB SERPL-MCNC: 1.2 MG/DL (ref 0.1–1.5)
BUN SERPL-MCNC: 14 MG/DL (ref 8–22)
CALCIUM ALBUM COR SERPL-MCNC: 9.4 MG/DL (ref 8.5–10.5)
CALCIUM SERPL-MCNC: 9.6 MG/DL (ref 8.5–10.5)
CHLORIDE SERPL-SCNC: 107 MMOL/L (ref 96–112)
CO2 SERPL-SCNC: 24 MMOL/L (ref 20–33)
CREAT SERPL-MCNC: 0.92 MG/DL (ref 0.5–1.4)
EKG IMPRESSION: NORMAL
EOSINOPHIL # BLD AUTO: 0.24 K/UL (ref 0–0.51)
EOSINOPHIL NFR BLD: 2.6 % (ref 0–6.9)
ERYTHROCYTE [DISTWIDTH] IN BLOOD BY AUTOMATED COUNT: 42.1 FL (ref 35.9–50)
GFR SERPLBLD CREATININE-BSD FMLA CKD-EPI: 105 ML/MIN/1.73 M 2
GLOBULIN SER CALC-MCNC: 3.3 G/DL (ref 1.9–3.5)
GLUCOSE SERPL-MCNC: 109 MG/DL (ref 65–99)
HCT VFR BLD AUTO: 45.1 % (ref 42–52)
HGB BLD-MCNC: 15.6 G/DL (ref 14–18)
IMM GRANULOCYTES # BLD AUTO: 0.02 K/UL (ref 0–0.11)
IMM GRANULOCYTES NFR BLD AUTO: 0.2 % (ref 0–0.9)
LYMPHOCYTES # BLD AUTO: 2.95 K/UL (ref 1–4.8)
LYMPHOCYTES NFR BLD: 32.2 % (ref 22–41)
MCH RBC QN AUTO: 30.7 PG (ref 27–33)
MCHC RBC AUTO-ENTMCNC: 34.6 G/DL (ref 32.3–36.5)
MCV RBC AUTO: 88.8 FL (ref 81.4–97.8)
MONOCYTES # BLD AUTO: 0.75 K/UL (ref 0–0.85)
MONOCYTES NFR BLD AUTO: 8.2 % (ref 0–13.4)
NEUTROPHILS # BLD AUTO: 5.14 K/UL (ref 1.82–7.42)
NEUTROPHILS NFR BLD: 56.1 % (ref 44–72)
NRBC # BLD AUTO: 0 K/UL
NRBC BLD-RTO: 0 /100 WBC (ref 0–0.2)
NT-PROBNP SERPL IA-MCNC: 124 PG/ML (ref 0–125)
PLATELET # BLD AUTO: 251 K/UL (ref 164–446)
PMV BLD AUTO: 10.9 FL (ref 9–12.9)
POTASSIUM SERPL-SCNC: 3.4 MMOL/L (ref 3.6–5.5)
PROT SERPL-MCNC: 7.5 G/DL (ref 6–8.2)
RBC # BLD AUTO: 5.08 M/UL (ref 4.7–6.1)
SODIUM SERPL-SCNC: 143 MMOL/L (ref 135–145)
TROPONIN T SERPL-MCNC: 8 NG/L (ref 6–19)
TSH SERPL DL<=0.005 MIU/L-ACNC: 3.44 UIU/ML (ref 0.38–5.33)
WBC # BLD AUTO: 9.2 K/UL (ref 4.8–10.8)

## 2024-06-11 PROCEDURE — 83880 ASSAY OF NATRIURETIC PEPTIDE: CPT

## 2024-06-11 PROCEDURE — A9270 NON-COVERED ITEM OR SERVICE: HCPCS | Mod: UD | Performed by: EMERGENCY MEDICINE

## 2024-06-11 PROCEDURE — 85025 COMPLETE CBC W/AUTO DIFF WBC: CPT

## 2024-06-11 PROCEDURE — 80053 COMPREHEN METABOLIC PANEL: CPT

## 2024-06-11 PROCEDURE — 36415 COLL VENOUS BLD VENIPUNCTURE: CPT

## 2024-06-11 PROCEDURE — 84484 ASSAY OF TROPONIN QUANT: CPT

## 2024-06-11 PROCEDURE — 84443 ASSAY THYROID STIM HORMONE: CPT

## 2024-06-11 PROCEDURE — 99284 EMERGENCY DEPT VISIT MOD MDM: CPT

## 2024-06-11 PROCEDURE — 93005 ELECTROCARDIOGRAM TRACING: CPT | Performed by: EMERGENCY MEDICINE

## 2024-06-11 PROCEDURE — 700102 HCHG RX REV CODE 250 W/ 637 OVERRIDE(OP): Mod: UD | Performed by: EMERGENCY MEDICINE

## 2024-06-11 RX ORDER — CLONIDINE HYDROCHLORIDE 0.1 MG/1
0.2 TABLET ORAL ONCE
Status: COMPLETED | OUTPATIENT
Start: 2024-06-11 | End: 2024-06-11

## 2024-06-11 RX ORDER — LOSARTAN POTASSIUM 50 MG/1
50 TABLET ORAL DAILY
COMMUNITY

## 2024-06-11 RX ADMIN — CLONIDINE HYDROCHLORIDE 0.2 MG: 0.1 TABLET ORAL at 21:25

## 2024-06-11 ASSESSMENT — FIBROSIS 4 INDEX: FIB4 SCORE: 0.85

## 2024-06-12 NOTE — ED NOTES
AK home with written and verbal education on HTN. He verbalized understanding to follow up with primary. He is ambulatory on DC.

## 2024-06-12 NOTE — ED NOTES
Patient is very anxious - pacing in room. Reports high blood pressure with hx of same. Has being taking medications as prescribed. Took extra losartan today. Denies CP or pressure.

## 2024-06-12 NOTE — ED PROVIDER NOTES
ED Provider Note    CHIEF COMPLAINT  Chief Complaint   Patient presents with    Hypertension     The pt reports that the blood pressure has been increasing throughout the day. The pt took 100mg losartan at 6:30pm tonight. BP was 170/114 at home       EXTERNAL RECORDS REVIEWED  Other reviewed discharge summary dated October 23, 2023.  Patient had been on hold for suicide attempt.  Discharged on the 24th for admission to inpatient psychiatric facility.    HPI/ROS  LIMITATION TO HISTORY   Select: : None  OUTSIDE HISTORIAN(S):  None available    Brannon Sharma is a 43 y.o. male who presents for evaluation of elevated blood pressure readings.  Patient relates starting today at about 4 PM he began to feel unwell.  He notes no chest pain, no dyspnea, no dizziness, no headache.  He relates he was feeling more anxious than anything.  He checked his blood pressure and found it to be elevated.  Patient relates he rechecked his blood pressure at about 6:30 PM today and found it to be 170/114.  As such he took an extra dose of his losartan, 100 mg p.o.  He does note swelling to his legs and has a history of heart failure.  No difficulty breathing.  No recent change in his medication regimen.  Given feeling unwell and very high blood pressure readings he came to be assessed.    PAST MEDICAL HISTORY   has a past medical history of Alcohol-induced psychosis (HCC), Anxiety, Depression, Paranoid schizophrenia (HCC), Psychiatric disorder, Schizophrenia, paranoid (HCC), and Suicidal ideations.    SURGICAL HISTORY   has a past surgical history that includes other and nasal septal reconstruction.    FAMILY HISTORY  Family History   Family history unknown: Yes       SOCIAL HISTORY  Social History     Tobacco Use    Smoking status: Former     Current packs/day: 1.00     Types: Cigarettes    Smokeless tobacco: Never   Vaping Use    Vaping status: Every Day    Substances: Nicotine    Devices: Pre-filled or refillable cartridge  "  Substance and Sexual Activity    Alcohol use: Not Currently     Comment: 15-16 daily, but sober for 7 months    Drug use: Not Currently     Types: Inhaled, Marijuana     Comment: Meth daily, last use 10/18 per pt    Sexual activity: Yes     Partners: Female       CURRENT MEDICATIONS  Home Medications       Reviewed by Steven Knight R.N. (Registered Nurse) on 06/11/24 at 1937  Med List Status: Partial     Medication Last Dose Status   baclofen (LIORESAL) 10 MG Tab  Active   carBAMazepine (TEGRETOL) 200 MG Tab  Active   gabapentin (NEURONTIN) 100 MG Cap  Active   hydrOXYzine pamoate (VISTARIL) 50 MG Cap  Active   lisinopril (PRINIVIL) 20 MG Tab  Active   metoprolol SR (TOPROL XL) 25 MG TABLET SR 24 HR  Active   risperiDONE (RISPERDAL) 2 MG Tab  Active   sertraline (ZOLOFT) 50 MG Tab  Active   traZODone (DESYREL) 150 MG Tab  Active                    ALLERGIES  Allergies   Allergen Reactions    Ceclor [Cefaclor] Anaphylaxis    Cephalosporins Anaphylaxis     > 5 years ago    Pcn [Penicillins] Anaphylaxis     > 30 years ago       PHYSICAL EXAM  VITAL SIGNS: BP (!) 134/92   Pulse 85   Temp 37.1 °C (98.7 °F) (Temporal)   Resp 16   Ht 1.905 m (6' 3\")   Wt (!) 131 kg (289 lb 11 oz)   SpO2 94%   BMI 36.21 kg/m²    General: Alert, no acute distress, appears anxious  Skin: Warm, dry, normal for ethnicity  Head: Normocephalic, atraumatic  Neck: Trachea midline, no tenderness  Eye: PERRL, normal conjunctiva, extraocular movements intact without nystagmus  ENMT: Oral mucosa moist  Cardiovascular: Regular rate and rhythm, No murmur, Normal peripheral perfusion  Respiratory: Lungs CTA, respirations are non-labored, breath sounds are equal  Gastrointestinal: Soft, nontender, non distended  Musculoskeletal: No swelling, no deformity.  No pitting edema.  Neurological: Alert and oriented to person, place, time, and situation  Lymphatics: No lymphadenopathy  Psychiatric: Cooperative, anxious with mood congruent " affect    EKG/LABS  Results for orders placed or performed during the hospital encounter of 06/11/24   CBC w/ Differential   Result Value Ref Range    WBC 9.2 4.8 - 10.8 K/uL    RBC 5.08 4.70 - 6.10 M/uL    Hemoglobin 15.6 14.0 - 18.0 g/dL    Hematocrit 45.1 42.0 - 52.0 %    MCV 88.8 81.4 - 97.8 fL    MCH 30.7 27.0 - 33.0 pg    MCHC 34.6 32.3 - 36.5 g/dL    RDW 42.1 35.9 - 50.0 fL    Platelet Count 251 164 - 446 K/uL    MPV 10.9 9.0 - 12.9 fL    Neutrophils-Polys 56.10 44.00 - 72.00 %    Lymphocytes 32.20 22.00 - 41.00 %    Monocytes 8.20 0.00 - 13.40 %    Eosinophils 2.60 0.00 - 6.90 %    Basophils 0.70 0.00 - 1.80 %    Immature Granulocytes 0.20 0.00 - 0.90 %    Nucleated RBC 0.00 0.00 - 0.20 /100 WBC    Neutrophils (Absolute) 5.14 1.82 - 7.42 K/uL    Lymphs (Absolute) 2.95 1.00 - 4.80 K/uL    Monos (Absolute) 0.75 0.00 - 0.85 K/uL    Eos (Absolute) 0.24 0.00 - 0.51 K/uL    Baso (Absolute) 0.06 0.00 - 0.12 K/uL    Immature Granulocytes (abs) 0.02 0.00 - 0.11 K/uL    NRBC (Absolute) 0.00 K/uL   Complete Metabolic Panel (CMP)   Result Value Ref Range    Sodium 143 135 - 145 mmol/L    Potassium 3.4 (L) 3.6 - 5.5 mmol/L    Chloride 107 96 - 112 mmol/L    Co2 24 20 - 33 mmol/L    Anion Gap 12.0 7.0 - 16.0    Glucose 109 (H) 65 - 99 mg/dL    Bun 14 8 - 22 mg/dL    Creatinine 0.92 0.50 - 1.40 mg/dL    Calcium 9.6 8.5 - 10.5 mg/dL    Correct Calcium 9.4 8.5 - 10.5 mg/dL    AST(SGOT) 15 12 - 45 U/L    ALT(SGPT) 23 2 - 50 U/L    Alkaline Phosphatase 145 (H) 30 - 99 U/L    Total Bilirubin 1.2 0.1 - 1.5 mg/dL    Albumin 4.2 3.2 - 4.9 g/dL    Total Protein 7.5 6.0 - 8.2 g/dL    Globulin 3.3 1.9 - 3.5 g/dL    A-G Ratio 1.3 g/dL   Troponin - STAT Once   Result Value Ref Range    Troponin T 8 6 - 19 ng/L   TSH (for screening thyroid dysfunction)   Result Value Ref Range    TSH 3.440 0.380 - 5.330 uIU/mL   proBrain Natriuretic Peptide, NT   Result Value Ref Range    NT-proBNP 124 0 - 125 pg/mL   ESTIMATED GFR   Result Value Ref  Range    GFR (CKD-EPI) 105 >60 mL/min/1.73 m 2   EKG   Result Value Ref Range    Report       [facility] Emergency Dept.    Test Date:  2024  Pt Name:    OSITO BLACK               Department: ER  MRN:        [id number]                      Room:       [facility]  Gender:     Male                         Technician: [id number]  :        1980                   Requested By:MAICOL ANTHONY  Order #:    [id number]                    Reading MD: MAICOL ANTHONY MD    Measurements  Intervals                                Axis  Rate:       75                           P:          34  TN:         153                          QRS:        -6  QRSD:       109                          T:          29  QT:         392  QTc:        438    Interpretive Statements  Sinus rhythm  Low voltage, precordial leads  RSR' in V1 or V2, right VCD or RVH  Compared to ECG 2023 19:51:08  Low QRS voltage now present  RSR' in V1 or V2 now present  Sinus tachycardia no longer present  Ventricular premature complex(es) no longer present  Aberrant conduction of suprav entricular beat(s) no longer present  T-wave abnormality no longer present  Electronically Signed On 2024 23:41:27 PDT by MAICOL ANTHONY MD        I have independently interpreted this EKG          COURSE & MEDICAL DECISION MAKING    ASSESSMENT, COURSE AND PLAN  Care Narrative: This patient is a very pleasant 43-year-old gentleman with history of CHF and hypertension who presents for evaluation of elevated blood pressure readings.  Given he is symptomatic cardiac workup is obtained.  Thankfully this is unremarkable, there is no evidence of any endorgan damage.  Troponin unremarkable, renal function unremarkable.  This is thusly not consistent with hypertensive emergency, no indication as such for inpatient management.  Patient is amenable to follow-up with regard to his primary care provider who is managing his antihypertensive  "regimen.    Chest Pain: Heart score is 1, low risk stratification  ED OBS: Yes; I am placing the patient in to an observation status due to a diagnostic uncertainty as well as therapeutic intensity. Patient placed in observation status at 9:06 PM, 6/11/2024.     Observation plan is as follows: Patient relates he has done well with clonidine in the past, as such I have ordered clonidine 0.2 mg p.o.  Cardiac workup will be obtained.  Differential diagnosis at this point includes but is not restricted to hypertensive urgency, hypertensive emergency, acute coronary syndrome, acute anxiety, electrolyte derangement, acute kidney injury    2251: Patient reassessed, feeling better, blood pressure much improved, currently 134/92.  He is amenable to follow-up with his primary care provider with regard to blood pressure management.    Upon Reevaluation, the patient's condition has: Improved; and will be discharged.    Patient discharged from ED Observation status at 2253 (Time) 6/11/24 (Date).       Patient Vitals for the past 24 hrs:   BP Temp Temp src Pulse Resp SpO2 Height Weight   06/11/24 2228 (!) 134/92 -- -- 85 16 94 % -- --   06/11/24 2130 -- -- -- 81 16 94 % -- --   06/11/24 2115 (!) 171/99 -- -- 89 (!) 27 96 % -- --   06/11/24 2024 (!) 171/102 -- -- (!) 118 20 98 % -- --   06/11/24 1934 -- -- -- -- -- -- 1.905 m (6' 3\") (!) 131 kg (289 lb 11 oz)   06/11/24 1932 (!) 188/112 37.1 °C (98.7 °F) Temporal 97 18 98 % -- --        ADDITIONAL PROBLEMS MANAGED  Acute anxiety, symptomatic hypertension    DISPOSITION AND DISCUSSIONS  I have discussed management of the patient with the following physicians and TANNER's:  NA    Discussion of management with other QHP or appropriate source(s): None     Escalation of care considered, and ultimately not performed:acute inpatient care management, however at this time, the patient is most appropriate for outpatient management    Barriers to care at this time, including but not limited to: "  NA .     Decision tools and prescription drugs considered including, but not limited to:  Heart score is 1, low risk stratification .    The patient will return for new or worsening symptoms and is stable at the time of discharge.    Patient has had high blood pressure while in the emergency department, felt likely secondary to medical condition. Counseled patient to monitor blood pressure at home and follow up with primary care physician.      DISPOSITION:  Patient will be discharged home in stable condition.    FOLLOW UP:  Gregory Hardin M.D.  [address] [place] [id number]  [phone]    Schedule an appointment as soon as possible for a visit         OUTPATIENT MEDICATIONS:  Discharge Medication List as of 6/11/2024 11:05 PM             FINAL DIAGNOSIS  1. Essential hypertension    2. Anxiety           Electronically signed by: Lg Gray M.D., 6/11/2024 9:04 PM

## 2024-06-12 NOTE — ED TRIAGE NOTES
"Chief Complaint   Patient presents with    Hypertension     The pt reports that the blood pressure has been increasing throughout the day. The pt took 100mg losartan at 6:30pm tonight. BP was 170/114 at home       Pt ambulatory to triage. Pt A&Ox4, for the above complaint.     Pt to lobby . Pt educated on alerting staff in changes to condition. Pt verbalized understanding.      BP (!) 188/112   Pulse 97   Temp 37.1 °C (98.7 °F) (Temporal)   Resp 18   Ht 1.905 m (6' 3\")   Wt (!) 131 kg (289 lb 11 oz)   SpO2 98%   BMI 36.21 kg/m²     "

## 2024-07-19 ENCOUNTER — HOSPITAL ENCOUNTER (OUTPATIENT)
Facility: MEDICAL CENTER | Age: 44
End: 2024-07-19
Attending: PREVENTIVE MEDICINE
Payer: COMMERCIAL

## 2024-07-19 ENCOUNTER — APPOINTMENT (OUTPATIENT)
Dept: RADIOLOGY | Facility: IMAGING CENTER | Age: 44
End: 2024-07-19
Attending: PREVENTIVE MEDICINE
Payer: MEDICAID

## 2024-07-19 ENCOUNTER — NON-PROVIDER VISIT (OUTPATIENT)
Dept: OCCUPATIONAL MEDICINE | Facility: CLINIC | Age: 44
End: 2024-07-19

## 2024-07-19 ENCOUNTER — OFFICE VISIT (OUTPATIENT)
Dept: OCCUPATIONAL MEDICINE | Facility: CLINIC | Age: 44
End: 2024-07-19
Payer: MEDICAID

## 2024-07-19 DIAGNOSIS — Z02.89 VISIT FOR OCCUPATIONAL HEALTH EXAMINATION: ICD-10-CM

## 2024-07-19 DIAGNOSIS — Z02.89 VISIT FOR OCCUPATIONAL HEALTH EXAMINATION: Primary | ICD-10-CM

## 2024-07-19 LAB
ALBUMIN SERPL BCP-MCNC: 4.5 G/DL (ref 3.2–4.9)
ALBUMIN/GLOB SERPL: 2 G/DL
ALP SERPL-CCNC: 114 U/L (ref 30–99)
ALT SERPL-CCNC: 24 U/L (ref 2–50)
AMP AMPHETAMINE: NORMAL
ANION GAP SERPL CALC-SCNC: 12 MMOL/L (ref 7–16)
AST SERPL-CCNC: 22 U/L (ref 12–45)
BILIRUB SERPL-MCNC: 1.3 MG/DL (ref 0.1–1.5)
BREATH ALCOHOL COMMENT: NORMAL
BUN SERPL-MCNC: 14 MG/DL (ref 8–22)
CALCIUM ALBUM COR SERPL-MCNC: 9.6 MG/DL (ref 8.5–10.5)
CALCIUM SERPL-MCNC: 10 MG/DL (ref 8.5–10.5)
CHLORIDE SERPL-SCNC: 104 MMOL/L (ref 96–112)
CO2 SERPL-SCNC: 24 MMOL/L (ref 20–33)
COC COCAINE: NORMAL
CREAT SERPL-MCNC: 0.94 MG/DL (ref 0.5–1.4)
GFR SERPLBLD CREATININE-BSD FMLA CKD-EPI: 103 ML/MIN/1.73 M 2
GLOBULIN SER CALC-MCNC: 2.3 G/DL (ref 1.9–3.5)
GLUCOSE SERPL-MCNC: 81 MG/DL (ref 65–99)
INT CON NEG: NORMAL
INT CON POS: NORMAL
MET METHAMPHETAMINES: NORMAL
OPI OPIATES: NORMAL
PCP PHENCYCLIDINE: NORMAL
POC BREATHALIZER: 0 PERCENT (ref 0–0.01)
POC DRUG COMMENT 753798-OCCUPATIONAL HEALTH: NORMAL
POTASSIUM SERPL-SCNC: 4.1 MMOL/L (ref 3.6–5.5)
PROT SERPL-MCNC: 6.8 G/DL (ref 6–8.2)
SODIUM SERPL-SCNC: 140 MMOL/L (ref 135–145)
THC: NORMAL

## 2024-07-19 PROCEDURE — 94010 BREATHING CAPACITY TEST: CPT | Performed by: PREVENTIVE MEDICINE

## 2024-07-19 PROCEDURE — 92552 PURE TONE AUDIOMETRY AIR: CPT | Performed by: PREVENTIVE MEDICINE

## 2024-07-19 PROCEDURE — 80053 COMPREHEN METABOLIC PANEL: CPT | Performed by: PREVENTIVE MEDICINE

## 2024-07-19 PROCEDURE — 82075 ASSAY OF BREATH ETHANOL: CPT | Performed by: PREVENTIVE MEDICINE

## 2024-07-19 PROCEDURE — 71046 X-RAY EXAM CHEST 2 VIEWS: CPT | Mod: TC | Performed by: RADIOLOGY

## 2024-07-19 PROCEDURE — 80305 DRUG TEST PRSMV DIR OPT OBS: CPT | Performed by: PREVENTIVE MEDICINE

## 2024-07-19 PROCEDURE — 8915 PR COMPREHENSIVE PHYSICAL: Performed by: PREVENTIVE MEDICINE

## 2024-07-31 ENCOUNTER — HOSPITAL ENCOUNTER (OUTPATIENT)
Facility: MEDICAL CENTER | Age: 44
End: 2024-07-31
Attending: STUDENT IN AN ORGANIZED HEALTH CARE EDUCATION/TRAINING PROGRAM | Admitting: HOSPITALIST
Payer: MEDICAID

## 2024-07-31 ENCOUNTER — APPOINTMENT (OUTPATIENT)
Dept: RADIOLOGY | Facility: MEDICAL CENTER | Age: 44
End: 2024-07-31
Attending: STUDENT IN AN ORGANIZED HEALTH CARE EDUCATION/TRAINING PROGRAM
Payer: MEDICAID

## 2024-07-31 ENCOUNTER — APPOINTMENT (OUTPATIENT)
Dept: CARDIOLOGY | Facility: MEDICAL CENTER | Age: 44
End: 2024-07-31
Payer: MEDICAID

## 2024-07-31 VITALS
SYSTOLIC BLOOD PRESSURE: 113 MMHG | DIASTOLIC BLOOD PRESSURE: 76 MMHG | HEART RATE: 55 BPM | BODY MASS INDEX: 32.26 KG/M2 | OXYGEN SATURATION: 97 % | WEIGHT: 259.48 LBS | RESPIRATION RATE: 18 BRPM | TEMPERATURE: 98.5 F | HEIGHT: 75 IN

## 2024-07-31 DIAGNOSIS — I48.91 ATRIAL FIBRILLATION WITH RAPID VENTRICULAR RESPONSE (HCC): ICD-10-CM

## 2024-07-31 DIAGNOSIS — I48.91 ATRIAL FIBRILLATION, UNSPECIFIED TYPE (HCC): ICD-10-CM

## 2024-07-31 PROBLEM — E87.6 HYPOKALEMIA: Status: RESOLVED | Noted: 2023-08-10 | Resolved: 2024-07-31

## 2024-07-31 LAB
ALBUMIN SERPL BCP-MCNC: 4.5 G/DL (ref 3.2–4.9)
ALBUMIN/GLOB SERPL: 1.5 G/DL
ALP SERPL-CCNC: 129 U/L (ref 30–99)
ALT SERPL-CCNC: 30 U/L (ref 2–50)
AMPHET UR QL SCN: NEGATIVE
ANION GAP SERPL CALC-SCNC: 20 MMOL/L (ref 7–16)
AST SERPL-CCNC: 21 U/L (ref 12–45)
BARBITURATES UR QL SCN: NEGATIVE
BENZODIAZ UR QL SCN: POSITIVE
BILIRUB SERPL-MCNC: 1.5 MG/DL (ref 0.1–1.5)
BUN SERPL-MCNC: 10 MG/DL (ref 8–22)
BZE UR QL SCN: NEGATIVE
CALCIUM ALBUM COR SERPL-MCNC: 9.2 MG/DL (ref 8.5–10.5)
CALCIUM SERPL-MCNC: 9.6 MG/DL (ref 8.5–10.5)
CANNABINOIDS UR QL SCN: NEGATIVE
CHLORIDE SERPL-SCNC: 103 MMOL/L (ref 96–112)
CO2 SERPL-SCNC: 18 MMOL/L (ref 20–33)
CREAT SERPL-MCNC: 1.02 MG/DL (ref 0.5–1.4)
EKG IMPRESSION: NORMAL
ERYTHROCYTE [DISTWIDTH] IN BLOOD BY AUTOMATED COUNT: 43.4 FL (ref 35.9–50)
ETHANOL BLD-MCNC: <10.1 MG/DL
FENTANYL UR QL: NEGATIVE
GFR SERPLBLD CREATININE-BSD FMLA CKD-EPI: 93 ML/MIN/1.73 M 2
GLOBULIN SER CALC-MCNC: 3 G/DL (ref 1.9–3.5)
GLUCOSE BLD STRIP.AUTO-MCNC: 133 MG/DL (ref 65–99)
GLUCOSE SERPL-MCNC: 144 MG/DL (ref 65–99)
HCT VFR BLD AUTO: 49.8 % (ref 42–52)
HGB BLD-MCNC: 17.2 G/DL (ref 14–18)
LV EJECT FRACT  99904: 62
LV EJECT FRACT MOD 2C 99903: 70.31
LV EJECT FRACT MOD 4C 99902: 55.11
LV EJECT FRACT MOD BP 99901: 62.37
MAGNESIUM SERPL-MCNC: 2.4 MG/DL (ref 1.5–2.5)
MCH RBC QN AUTO: 31.2 PG (ref 27–33)
MCHC RBC AUTO-ENTMCNC: 34.5 G/DL (ref 32.3–36.5)
MCV RBC AUTO: 90.2 FL (ref 81.4–97.8)
METHADONE UR QL SCN: NEGATIVE
NT-PROBNP SERPL IA-MCNC: <36 PG/ML (ref 0–125)
OPIATES UR QL SCN: NEGATIVE
OXYCODONE UR QL SCN: NEGATIVE
PCP UR QL SCN: NEGATIVE
PLATELET # BLD AUTO: 261 K/UL (ref 164–446)
PMV BLD AUTO: 11.2 FL (ref 9–12.9)
POTASSIUM SERPL-SCNC: 3.1 MMOL/L (ref 3.6–5.5)
PROPOXYPH UR QL SCN: NEGATIVE
PROT SERPL-MCNC: 7.5 G/DL (ref 6–8.2)
RBC # BLD AUTO: 5.52 M/UL (ref 4.7–6.1)
SODIUM SERPL-SCNC: 141 MMOL/L (ref 135–145)
TROPONIN T SERPL-MCNC: 9 NG/L (ref 6–19)
TSH SERPL DL<=0.005 MIU/L-ACNC: 0.95 UIU/ML (ref 0.38–5.33)
WBC # BLD AUTO: 10 K/UL (ref 4.8–10.8)

## 2024-07-31 PROCEDURE — 83880 ASSAY OF NATRIURETIC PEPTIDE: CPT

## 2024-07-31 PROCEDURE — 96365 THER/PROPH/DIAG IV INF INIT: CPT

## 2024-07-31 PROCEDURE — 96375 TX/PRO/DX INJ NEW DRUG ADDON: CPT

## 2024-07-31 PROCEDURE — 71045 X-RAY EXAM CHEST 1 VIEW: CPT

## 2024-07-31 PROCEDURE — 92960 CARDIOVERSION ELECTRIC EXT: CPT

## 2024-07-31 PROCEDURE — 700101 HCHG RX REV CODE 250: Mod: UD | Performed by: STUDENT IN AN ORGANIZED HEALTH CARE EDUCATION/TRAINING PROGRAM

## 2024-07-31 PROCEDURE — 99285 EMERGENCY DEPT VISIT HI MDM: CPT

## 2024-07-31 PROCEDURE — 36415 COLL VENOUS BLD VENIPUNCTURE: CPT

## 2024-07-31 PROCEDURE — 700111 HCHG RX REV CODE 636 W/ 250 OVERRIDE (IP): Mod: UD | Performed by: STUDENT IN AN ORGANIZED HEALTH CARE EDUCATION/TRAINING PROGRAM

## 2024-07-31 PROCEDURE — 700105 HCHG RX REV CODE 258: Mod: UD | Performed by: STUDENT IN AN ORGANIZED HEALTH CARE EDUCATION/TRAINING PROGRAM

## 2024-07-31 PROCEDURE — 99236 HOSP IP/OBS SAME DATE HI 85: CPT | Performed by: HOSPITALIST

## 2024-07-31 PROCEDURE — 93005 ELECTROCARDIOGRAM TRACING: CPT | Performed by: STUDENT IN AN ORGANIZED HEALTH CARE EDUCATION/TRAINING PROGRAM

## 2024-07-31 PROCEDURE — 93306 TTE W/DOPPLER COMPLETE: CPT

## 2024-07-31 PROCEDURE — 83735 ASSAY OF MAGNESIUM: CPT

## 2024-07-31 PROCEDURE — G0378 HOSPITAL OBSERVATION PER HR: HCPCS

## 2024-07-31 PROCEDURE — 99244 OFF/OP CNSLTJ NEW/EST MOD 40: CPT | Performed by: INTERNAL MEDICINE

## 2024-07-31 PROCEDURE — 84484 ASSAY OF TROPONIN QUANT: CPT

## 2024-07-31 PROCEDURE — 700111 HCHG RX REV CODE 636 W/ 250 OVERRIDE (IP): Mod: UD

## 2024-07-31 PROCEDURE — 700102 HCHG RX REV CODE 250 W/ 637 OVERRIDE(OP): Mod: JZ,UD | Performed by: STUDENT IN AN ORGANIZED HEALTH CARE EDUCATION/TRAINING PROGRAM

## 2024-07-31 PROCEDURE — 93306 TTE W/DOPPLER COMPLETE: CPT | Mod: 26 | Performed by: INTERNAL MEDICINE

## 2024-07-31 PROCEDURE — 93005 ELECTROCARDIOGRAM TRACING: CPT

## 2024-07-31 PROCEDURE — A9270 NON-COVERED ITEM OR SERVICE: HCPCS | Mod: UD | Performed by: HOSPITALIST

## 2024-07-31 PROCEDURE — 84443 ASSAY THYROID STIM HORMONE: CPT

## 2024-07-31 PROCEDURE — 80307 DRUG TEST PRSMV CHEM ANLYZR: CPT

## 2024-07-31 PROCEDURE — A9270 NON-COVERED ITEM OR SERVICE: HCPCS | Mod: JZ,UD | Performed by: STUDENT IN AN ORGANIZED HEALTH CARE EDUCATION/TRAINING PROGRAM

## 2024-07-31 PROCEDURE — 700102 HCHG RX REV CODE 250 W/ 637 OVERRIDE(OP): Mod: UD | Performed by: HOSPITALIST

## 2024-07-31 PROCEDURE — 85027 COMPLETE CBC AUTOMATED: CPT

## 2024-07-31 PROCEDURE — 80053 COMPREHEN METABOLIC PANEL: CPT

## 2024-07-31 PROCEDURE — 82077 ASSAY SPEC XCP UR&BREATH IA: CPT

## 2024-07-31 PROCEDURE — 82962 GLUCOSE BLOOD TEST: CPT

## 2024-07-31 RX ORDER — PROMETHAZINE HYDROCHLORIDE 25 MG/1
12.5-25 SUPPOSITORY RECTAL EVERY 4 HOURS PRN
Status: DISCONTINUED | OUTPATIENT
Start: 2024-07-31 | End: 2024-08-01 | Stop reason: HOSPADM

## 2024-07-31 RX ORDER — ETOMIDATE 2 MG/ML
10 INJECTION INTRAVENOUS ONCE
Status: COMPLETED | OUTPATIENT
Start: 2024-07-31 | End: 2024-07-31

## 2024-07-31 RX ORDER — METOPROLOL SUCCINATE 100 MG/1
50 TABLET, EXTENDED RELEASE ORAL DAILY
Qty: 30 TABLET | Refills: 0 | Status: SHIPPED | OUTPATIENT
Start: 2024-07-31

## 2024-07-31 RX ORDER — PROMETHAZINE HYDROCHLORIDE 25 MG/1
12.5-25 TABLET ORAL EVERY 4 HOURS PRN
Status: DISCONTINUED | OUTPATIENT
Start: 2024-07-31 | End: 2024-08-01 | Stop reason: HOSPADM

## 2024-07-31 RX ORDER — DIAZEPAM 10 MG/2ML
10 INJECTION, SOLUTION INTRAMUSCULAR; INTRAVENOUS ONCE
Status: COMPLETED | OUTPATIENT
Start: 2024-07-31 | End: 2024-07-31

## 2024-07-31 RX ORDER — AMLODIPINE BESYLATE 5 MG/1
5 TABLET ORAL EVERY MORNING
COMMUNITY
Start: 2024-07-10

## 2024-07-31 RX ORDER — ONDANSETRON 2 MG/ML
4 INJECTION INTRAMUSCULAR; INTRAVENOUS EVERY 4 HOURS PRN
Status: DISCONTINUED | OUTPATIENT
Start: 2024-07-31 | End: 2024-08-01 | Stop reason: HOSPADM

## 2024-07-31 RX ORDER — HYDROCHLOROTHIAZIDE 12.5 MG/1
12.5 TABLET ORAL DAILY
COMMUNITY
Start: 2024-07-10

## 2024-07-31 RX ORDER — POTASSIUM CHLORIDE 1500 MG/1
40 TABLET, EXTENDED RELEASE ORAL ONCE
Status: COMPLETED | OUTPATIENT
Start: 2024-07-31 | End: 2024-07-31

## 2024-07-31 RX ORDER — MAGNESIUM SULFATE HEPTAHYDRATE 40 MG/ML
2 INJECTION, SOLUTION INTRAVENOUS ONCE
Status: COMPLETED | OUTPATIENT
Start: 2024-07-31 | End: 2024-07-31

## 2024-07-31 RX ORDER — MIDAZOLAM HYDROCHLORIDE 1 MG/ML
2 INJECTION INTRAMUSCULAR; INTRAVENOUS ONCE
Status: COMPLETED | OUTPATIENT
Start: 2024-07-31 | End: 2024-07-31

## 2024-07-31 RX ORDER — METOPROLOL SUCCINATE 25 MG/1
75 TABLET, EXTENDED RELEASE ORAL
Status: DISCONTINUED | OUTPATIENT
Start: 2024-08-01 | End: 2024-07-31

## 2024-07-31 RX ORDER — SODIUM CHLORIDE, SODIUM LACTATE, POTASSIUM CHLORIDE, CALCIUM CHLORIDE 600; 310; 30; 20 MG/100ML; MG/100ML; MG/100ML; MG/100ML
1000 INJECTION, SOLUTION INTRAVENOUS ONCE
Status: COMPLETED | OUTPATIENT
Start: 2024-07-31 | End: 2024-07-31

## 2024-07-31 RX ORDER — PROCHLORPERAZINE EDISYLATE 5 MG/ML
5-10 INJECTION INTRAMUSCULAR; INTRAVENOUS EVERY 4 HOURS PRN
Status: DISCONTINUED | OUTPATIENT
Start: 2024-07-31 | End: 2024-08-01 | Stop reason: HOSPADM

## 2024-07-31 RX ORDER — METOPROLOL SUCCINATE 25 MG/1
50 TABLET, EXTENDED RELEASE ORAL
Status: DISCONTINUED | OUTPATIENT
Start: 2024-08-01 | End: 2024-08-01 | Stop reason: HOSPADM

## 2024-07-31 RX ORDER — METOPROLOL TARTRATE 1 MG/ML
5 INJECTION, SOLUTION INTRAVENOUS
Status: COMPLETED | OUTPATIENT
Start: 2024-07-31 | End: 2024-07-31

## 2024-07-31 RX ORDER — MIDAZOLAM HYDROCHLORIDE 1 MG/ML
2 INJECTION INTRAMUSCULAR; INTRAVENOUS ONCE
Status: DISCONTINUED | OUTPATIENT
Start: 2024-07-31 | End: 2024-07-31

## 2024-07-31 RX ORDER — ACETAMINOPHEN 325 MG/1
650 TABLET ORAL EVERY 6 HOURS PRN
Status: DISCONTINUED | OUTPATIENT
Start: 2024-07-31 | End: 2024-08-01 | Stop reason: HOSPADM

## 2024-07-31 RX ORDER — ATORVASTATIN CALCIUM 20 MG/1
20 TABLET, FILM COATED ORAL EVERY MORNING
COMMUNITY
Start: 2024-06-24

## 2024-07-31 RX ORDER — ONDANSETRON 4 MG/1
4 TABLET, ORALLY DISINTEGRATING ORAL EVERY 4 HOURS PRN
Status: DISCONTINUED | OUTPATIENT
Start: 2024-07-31 | End: 2024-08-01 | Stop reason: HOSPADM

## 2024-07-31 RX ORDER — MIDAZOLAM HYDROCHLORIDE 1 MG/ML
INJECTION INTRAMUSCULAR; INTRAVENOUS
Status: COMPLETED
Start: 2024-07-31 | End: 2024-07-31

## 2024-07-31 RX ORDER — QUETIAPINE FUMARATE 100 MG/1
100 TABLET, FILM COATED ORAL DAILY
COMMUNITY
Start: 2024-07-03

## 2024-07-31 RX ORDER — METOPROLOL TARTRATE 1 MG/ML
INJECTION, SOLUTION INTRAVENOUS
Status: DISPENSED
Start: 2024-07-31 | End: 2024-07-31

## 2024-07-31 RX ORDER — METOPROLOL SUCCINATE 25 MG/1
25 TABLET, EXTENDED RELEASE ORAL ONCE
Status: COMPLETED | OUTPATIENT
Start: 2024-07-31 | End: 2024-07-31

## 2024-07-31 RX ADMIN — METOPROLOL TARTRATE 5 MG: 5 INJECTION INTRAVENOUS at 05:15

## 2024-07-31 RX ADMIN — APIXABAN 5 MG: 5 TABLET, FILM COATED ORAL at 17:38

## 2024-07-31 RX ADMIN — SODIUM CHLORIDE, POTASSIUM CHLORIDE, SODIUM LACTATE AND CALCIUM CHLORIDE 1000 ML: 600; 310; 30; 20 INJECTION, SOLUTION INTRAVENOUS at 05:06

## 2024-07-31 RX ADMIN — DIAZEPAM 10 MG: 5 INJECTION, SOLUTION INTRAMUSCULAR; INTRAVENOUS at 05:25

## 2024-07-31 RX ADMIN — MIDAZOLAM HYDROCHLORIDE 2 MG: 1 INJECTION, SOLUTION INTRAMUSCULAR; INTRAVENOUS at 05:02

## 2024-07-31 RX ADMIN — MAGNESIUM SULFATE HEPTAHYDRATE 2 G: 2 INJECTION, SOLUTION INTRAVENOUS at 05:30

## 2024-07-31 RX ADMIN — MIDAZOLAM HYDROCHLORIDE 2 MG: 1 INJECTION, SOLUTION INTRAMUSCULAR; INTRAVENOUS at 04:57

## 2024-07-31 RX ADMIN — METOPROLOL TARTRATE 5 MG: 5 INJECTION INTRAVENOUS at 04:55

## 2024-07-31 RX ADMIN — METOPROLOL SUCCINATE 25 MG: 25 TABLET, EXTENDED RELEASE ORAL at 09:10

## 2024-07-31 RX ADMIN — ETOMIDATE 10 MG: 2 INJECTION, SOLUTION INTRAVENOUS at 07:04

## 2024-07-31 RX ADMIN — METOPROLOL TARTRATE 5 MG: 5 INJECTION INTRAVENOUS at 05:01

## 2024-07-31 RX ADMIN — POTASSIUM CHLORIDE 40 MEQ: 1500 TABLET, EXTENDED RELEASE ORAL at 06:19

## 2024-07-31 RX ADMIN — APIXABAN 5 MG: 5 TABLET, FILM COATED ORAL at 09:10

## 2024-07-31 SDOH — ECONOMIC STABILITY: TRANSPORTATION INSECURITY
IN THE PAST 12 MONTHS, HAS LACK OF RELIABLE TRANSPORTATION KEPT YOU FROM MEDICAL APPOINTMENTS, MEETINGS, WORK OR FROM GETTING THINGS NEEDED FOR DAILY LIVING?: YES

## 2024-07-31 SDOH — ECONOMIC STABILITY: TRANSPORTATION INSECURITY
IN THE PAST 12 MONTHS, HAS THE LACK OF TRANSPORTATION KEPT YOU FROM MEDICAL APPOINTMENTS OR FROM GETTING MEDICATIONS?: YES

## 2024-07-31 ASSESSMENT — SOCIAL DETERMINANTS OF HEALTH (SDOH)
IN THE PAST 12 MONTHS, HAS THE ELECTRIC, GAS, OIL, OR WATER COMPANY THREATENED TO SHUT OFF SERVICE IN YOUR HOME?: NO
WITHIN THE LAST YEAR, HAVE YOU BEEN HUMILIATED OR EMOTIONALLY ABUSED IN OTHER WAYS BY YOUR PARTNER OR EX-PARTNER?: NO
WITHIN THE LAST YEAR, HAVE TO BEEN RAPED OR FORCED TO HAVE ANY KIND OF SEXUAL ACTIVITY BY YOUR PARTNER OR EX-PARTNER?: NO
WITHIN THE LAST YEAR, HAVE YOU BEEN KICKED, HIT, SLAPPED, OR OTHERWISE PHYSICALLY HURT BY YOUR PARTNER OR EX-PARTNER?: NO
WITHIN THE PAST 12 MONTHS, YOU WORRIED THAT YOUR FOOD WOULD RUN OUT BEFORE YOU GOT THE MONEY TO BUY MORE: SOMETIMES TRUE
WITHIN THE LAST YEAR, HAVE YOU BEEN AFRAID OF YOUR PARTNER OR EX-PARTNER?: NO
WITHIN THE PAST 12 MONTHS, THE FOOD YOU BOUGHT JUST DIDN'T LAST AND YOU DIDN'T HAVE MONEY TO GET MORE: SOMETIMES TRUE

## 2024-07-31 ASSESSMENT — CHA2DS2 SCORE
VASCULAR DISEASE: NO
HYPERTENSION: YES
SEX: MALE
CHF OR LEFT VENTRICULAR DYSFUNCTION: NO
AGE 65 TO 74: NO
DIABETES: NO
CHA2DS2 VASC SCORE: 1
AGE 75 OR GREATER: NO
PRIOR STROKE OR TIA OR THROMBOEMBOLISM: NO

## 2024-07-31 ASSESSMENT — ENCOUNTER SYMPTOMS
ABDOMINAL PAIN: 0
CONSTIPATION: 0
DIARRHEA: 0
WEAKNESS: 0
CHILLS: 0
FEVER: 0
DIZZINESS: 1
COUGH: 0
PALPITATIONS: 1
WHEEZING: 0
SHORTNESS OF BREATH: 0

## 2024-07-31 ASSESSMENT — LIFESTYLE VARIABLES
TOTAL SCORE: 0
TOTAL SCORE: 0
HAVE YOU EVER FELT YOU SHOULD CUT DOWN ON YOUR DRINKING: NO
HAVE PEOPLE ANNOYED YOU BY CRITICIZING YOUR DRINKING: NO
EVER FELT BAD OR GUILTY ABOUT YOUR DRINKING: NO
EVER HAD A DRINK FIRST THING IN THE MORNING TO STEADY YOUR NERVES TO GET RID OF A HANGOVER: NO
CONSUMPTION TOTAL: NEGATIVE
AVERAGE NUMBER OF DAYS PER WEEK YOU HAVE A DRINK CONTAINING ALCOHOL: 0
ON A TYPICAL DAY WHEN YOU DRINK ALCOHOL HOW MANY DRINKS DO YOU HAVE: 0
HOW MANY TIMES IN THE PAST YEAR HAVE YOU HAD 5 OR MORE DRINKS IN A DAY: 0
DOES PATIENT WANT TO STOP DRINKING: NO
ALCOHOL_USE: NO
TOTAL SCORE: 0

## 2024-07-31 ASSESSMENT — PATIENT HEALTH QUESTIONNAIRE - PHQ9
2. FEELING DOWN, DEPRESSED, IRRITABLE, OR HOPELESS: NOT AT ALL
SUM OF ALL RESPONSES TO PHQ9 QUESTIONS 1 AND 2: 0
1. LITTLE INTEREST OR PLEASURE IN DOING THINGS: NOT AT ALL

## 2024-07-31 ASSESSMENT — PAIN DESCRIPTION - PAIN TYPE: TYPE: ACUTE PAIN

## 2024-07-31 ASSESSMENT — FIBROSIS 4 INDEX
FIB4 SCORE: 0.79
FIB4 SCORE: 0.65

## 2024-07-31 NOTE — ED NOTES
Bedside report received from off going RN/tech: Castro RN, assumed care of patient.  POC discussed with patient. Call light within reach, all needs addressed at this time.       Fall risk interventions in place: Place fall risk sign on patient's door, Keep floor surfaces clean and dry, and Bed Alarm in use (all applicable per [place] Fall risk assessment)   Continuous monitoring: Cardiac Leads, Pulse Ox, or Blood Pressure  IVF/IV medications: Not Applicable   Oxygen: How many liters 2L  Bedside sitter: Not Applicable   Isolation: Not Applicable    Pt in CT at this time.

## 2024-07-31 NOTE — ED NOTES
Bedside report received from off going RN/tech: Castro RN, assumed care of patient.  POC discussed with patient. Call light within reach, all needs addressed at this time.       Fall risk interventions in place: Patient's personal possessions are with in their safe reach, Place fall risk sign on patient's door, Give patient urinal if applicable, and Keep floor surfaces clean and dry (all applicable per [place] Fall risk assessment)   Continuous monitoring: Cardiac Leads, Pulse Ox, or Blood Pressure  IVF/IV medications: Not Applicable   Oxygen: How many liters 5L  Bedside sitter: Not Applicable   Isolation: Not Applicable    NSR on monitor.

## 2024-07-31 NOTE — H&P
Hospital Medicine History & Physical Note    Date of Service  7/31/2024    Primary Care Physician  Gregory Hardin M.D.    Consultants  cardiology    Specialist Names: Dr. Mosley    Code Status  Full Code    Chief Complaint  Chief Complaint   Patient presents with    Palpitations     Woke up with chest discomfort and palpations. Patient states he felt very dizzy. He took blood pressure and was initially very hypotensive. Patient arrives tachycardic >200bpm.       History of Presenting Illness  Brannon Sharma is a 44 y.o. male who presented 7/31/2024 with palpitations.  Mr. Sharma has a past medical history of extensive methamphetamine abuse as well as decreased ejection fraction by echocardiogram on 8/21/2023 revealing an ejection fraction of 40 to 45% with grade 2 diastolic dysfunction.  He states he has been completely clean from methamphetamine and alcohol for the past 9 months and is living in a sober house.  About 6 months ago he had palpitations went to [facility] with saw a cardiologist who did a Zio patch and apparently after 2 weeks there was no arrhythmias.  He has been in his usual state of health and this morning woke up with palpitations.  He presented emergency room where he is in atrial fibrillation with rapid ventricular response rate up into the 200s.  He was given 3 doses of IV metoprolol to no avail and required cardioversion down to sinus rhythm.  He has been hypotensive.  Due to the prior episode of palpitations he had been on 100 mg of Toprol daily but his pulse was in the 50s therefore the dose was dropped to 50 mg daily which she has been taking and has not missed any doses.    I discussed the plan of care with Dr. Luis Antonio Mckeon.    Review of Systems  Review of Systems   Constitutional:  Negative for fever.   Respiratory:  Negative for shortness of breath.    Cardiovascular:  Positive for palpitations. Negative for chest pain.   All other systems reviewed and are  negative.      Past Medical History   has a past medical history of Alcohol-induced psychosis (HCC), Anxiety, Depression, Paranoid schizophrenia (HCC), Psychiatric disorder, Schizophrenia, paranoid (HCC), and Suicidal ideations.    Surgical History   has a past surgical history that includes other and nasal septal reconstruction.     Family History  He is adopted    Social History  He has been living in a sober house and has been clean from alcohol and meth for 9 months.  He does vape    Allergies  Allergies   Allergen Reactions    Cefaclor Anaphylaxis    Cephalosporins Anaphylaxis     > 5 years ago    Pcn [Penicillins] Anaphylaxis     > 30 years ago       Medications  Prior to Admission Medications   Prescriptions Last Dose Informant Patient Reported? Taking?   QUEtiapine (SEROQUEL) 100 MG Tab 7/30/2024 at AM Patient Yes Yes   Sig: Take 100 mg by mouth every day.   amLODIPine (NORVASC) 5 MG Tab 7/30/2024 at AM Patient Yes Yes   Sig: Take 5 mg by mouth every morning.   atorvastatin (LIPITOR) 20 MG Tab 7/30/2024 at AM Patient Yes Yes   Sig: Take 20 mg by mouth every morning.   hydroCHLOROthiazide 12.5 MG tablet 7/27/2024 at D/C Patient Yes Yes   Sig: Take 12.5 mg by mouth every day.   losartan (COZAAR) 50 MG Tab 7/30/2024 at AM Patient Yes Yes   Sig: Take 50 mg by mouth every day.   metoprolol SR (TOPROL XL) 100 MG TABLET SR 24 HR 7/30/2024 at AM Patient Yes Yes   Sig: Take 100 mg by mouth every day.      Facility-Administered Medications: None       Physical Exam  Temp:  [36.1 °C (96.9 °F)] 36.1 °C (96.9 °F)  Pulse:  [] 78  Resp:  [12-25] 12  BP: ()/(56-99) 119/68  SpO2:  [88 %-100 %] 99 %  Blood Pressure: 119/68   Temperature: 36.1 °C (96.9 °F)   Pulse: 78   Respiration: 12   Pulse Oximetry: 99 %       Physical Exam  Vitals and nursing note reviewed.   Constitutional:       General: He is not in acute distress.     Appearance: He is not toxic-appearing.   Cardiovascular:      Rate and Rhythm: Normal  rate and regular rhythm.      Heart sounds: No murmur heard.  Pulmonary:      Effort: Pulmonary effort is normal.      Breath sounds: Normal breath sounds.   Abdominal:      General: There is no distension.      Tenderness: There is no abdominal tenderness.   Musculoskeletal:      Right lower leg: No edema.      Left lower leg: No edema.   Neurological:      General: No focal deficit present.      Mental Status: He is alert and oriented to person, place, and time.   Psychiatric:         Mood and Affect: Mood normal.         Behavior: Behavior normal.         Laboratory:  Recent Labs     07/31/24  0500   WBC 10.0   RBC 5.52   HEMOGLOBIN 17.2   HEMATOCRIT 49.8   MCV 90.2   MCH 31.2   MCHC 34.5   RDW 43.4   PLATELETCT 261   MPV 11.2     Recent Labs     07/31/24  0500   SODIUM 141   POTASSIUM 3.1*   CHLORIDE 103   CO2 18*   GLUCOSE 144*   BUN 10   CREATININE 1.02   CALCIUM 9.6     Recent Labs     07/31/24  0500   ALTSGPT 30   ASTSGOT 21   ALKPHOSPHAT 129*   TBILIRUBIN 1.5   GLUCOSE 144*         Recent Labs     07/31/24  0500   NTPROBNP <36         Recent Labs     07/31/24  0500   TROPONINT 9       Imaging:  DX-CHEST-PORTABLE (1 VIEW)   Final Result         1.  No acute cardiopulmonary disease.      EC-ECHOCARDIOGRAM COMPLETE W/O CONT    (Results Pending)       EKG interpreted by me atrial fibrillation at a rate of 208    Assessment/Plan:  Justification for Admission Status  I anticipate this patient is appropriate for observation status at this time because monitor on telemetry check echocardiogram    Patient will need a Telemetry bed on MEDICAL service .  The need is secondary to atrial fibrillation rapid ventricular spots.    * Atrial fibrillation with rapid ventricular response (HCC)- (present on admission)  Assessment & Plan  New onset  Atrial fibrillation with rapid ventricular response despite 3 doses IV metoprolol for which she required cardioversion in the emergency room cardiology has consulted  He has a known  low ejection fraction and echocardiogram will be repeated  Start anticoagulation with Eliquis  He has been on Toprol 50 mg daily this will be increased to 75 mg daily  TSH is normal  Continuous telemetry monitoring  Optimize electrolytes as his potassium is low at 3.1 and will be replaced and will also check magnesium level    Cardiomyopathy (HCC)- (present on admission)  Assessment & Plan  Echocardiogram August 2023 revealed ejection fraction of 40-45% with grade 2 diastolic dysfunction  He had been on Toprol and Cozaar as an outpatient  His blood pressure is low status post cardioversion and echocardiogram will be repeated  Goal-directed medical therapy based on echocardiogram findings and his blood pressure tolerance    Hypokalemia- (present on admission)  Assessment & Plan  K is low at 3.1 and will be replaced  Check a magnesium level  BMP ordered for the morning        VTE prophylaxis: therapeutic anticoagulation with Eliquis 5 mg twice daily

## 2024-07-31 NOTE — CARE PLAN
Problem: Knowledge Deficit - Standard  Goal: Patient and family/care givers will demonstrate understanding of plan of care, disease process/condition, diagnostic tests and medications  Outcome: Progressing     Problem: Psychosocial  Goal: Patient's level of anxiety will decrease  Outcome: Progressing  Goal: Patient's ability to verbalize feelings about condition will improve  Outcome: Progressing  Goal: Patient's ability to re-evaluate and adapt role responsibilities will improve  Outcome: Progressing  Goal: Patient and family will demonstrate ability to cope with life altering diagnosis and/or procedure  Outcome: Progressing  Goal: Spiritual and cultural needs incorporated into hospitalization  Outcome: Progressing     Problem: Communication  Goal: The ability to communicate needs accurately and effectively will improve  Outcome: Progressing     Problem: Discharge Barriers/Planning  Goal: Patient's continuum of care needs are met  Outcome: Progressing     Problem: Hemodynamics  Goal: Patient's hemodynamics, fluid balance and neurologic status will be stable or improve  Outcome: Progressing     Problem: Respiratory  Goal: Patient will achieve/maintain optimum respiratory ventilation and gas exchange  Outcome: Progressing     Problem: Nutrition  Goal: Patient's nutritional and fluid intake will be adequate or improve  Outcome: Progressing  Goal: Enteral nutrition will be maintained or improve  Outcome: Progressing  Goal: Enteral nutrition will be maintained or improve  Outcome: Progressing     Problem: Urinary Elimination  Goal: Establish and maintain regular urinary output  Outcome: Progressing     Problem: Bowel Elimination  Goal: Establish and maintain regular bowel function  Outcome: Progressing     Problem: Gastrointestinal Irritability  Goal: Nausea and vomiting will be absent or improve  Outcome: Progressing  Goal: Diarrhea will be absent or improved  Outcome: Progressing     Problem: Mobility  Goal:  Patient's capacity to carry out activities will improve  Outcome: Progressing     Problem: Self Care  Goal: Patient will have the ability to perform ADLs independently or with assistance (bathe, groom, dress, toilet and feed)  Outcome: Progressing   The patient is Stable - Low risk of patient condition declining or worsening    Shift Goals  Clinical Goals: tele monitoring  Patient Goals: go home later today  Family Goals: n/a    Progress made toward(s) clinical / shift goals:  monitor heart rate and telemetry     Patient is not progressing towards the following goals:

## 2024-07-31 NOTE — ED NOTES
Pharmacy Medication Reconciliation      ~Medication reconciliation updated and complete per patient at bedside & patient home pharmacy   ~Allergies have been verified and updated   ~No oral ABX within the last 30 days  ~Patient home pharmacy :  [facility]       ~Anticoagulants (rivaroxaban, apixaban, edoxaban, dabigatran, warfarin, enoxaparin) taken in the last 14 days? No

## 2024-07-31 NOTE — PROGRESS NOTES
I was called to discuss this patients care with Dr. Salmon. We discussed AFRVR management.    Brannon Sharma is a 44 year old man with prior history of substance abuse (clean in the recent months per EPR) who presented with palpitations that started last night. The patient was given IV metoprolol and he remains in AFRVR, now with low BP. Recommend proceeding with cardioversion given low BP and patient reports symptom onset of last night. [id number] at least 2 (CHF and HTN) - Start eliquis 5mg twice daily. Increase metoprolol to 75mg daily.  Recommend repeating echocardiogram. If LVEF remains depressed, recommend starting GDMT (switch losartan to Entresto), initiat MRA and SGLT2i, and a close follow up in cardiology clinic.     Cardiology rounding team will formally evaluate the patient this am.     Electronically Signed by:    Martinez Mosley M.D.     7/31/2024    6:52 AM

## 2024-07-31 NOTE — ED NOTES
0704 Etomidate given  706 Sync cardio at 200J - conversion successful   707 ED tech at bedside to perform EKG

## 2024-07-31 NOTE — ASSESSMENT & PLAN NOTE
Echocardiogram August 2023 revealed ejection fraction of 40-45% with grade 2 diastolic dysfunction  He had been on Toprol and Cozaar as an outpatient  His blood pressure is low status post cardioversion and echocardiogram will be repeated  Goal-directed medical therapy based on echocardiogram findings and his blood pressure tolerance

## 2024-07-31 NOTE — ED TRIAGE NOTES
Chief Complaint   Patient presents with    Palpitations     Woke up with chest discomfort and palpations. Patient states he felt very dizzy. He took blood pressure and was initially very hypotensive. Patient arrives tachycardic >200bpm.     Hx of CHF and DM.  Took 50 metoprolol before arrival.

## 2024-07-31 NOTE — CONSULTS
Cardiology Initial Consultation    Date of Service  7/31/2024    Referring Physician  Wagner Singh M.D.    Reason for Consultation  Palpitation    History of Presenting Illness  Brannon Sharma is a 44 y.o. male with a past medical history of hypertension, alcohol use disorder and methamphetamine use disorder who presented 7/31/2024 with palpitation.    Around 4:30 AM this morning, patient fell dizziness when he tried to go restroom. He checked his blood pressure at home 90/40s. He checked his heart rate it was 150 and irregular.  His blood pressure jumped up to 170/100s.  He has been feeling heart is fluttering that concerns him and decided to come to the emergency room.  He had 1 episode of irregular heart rate about 6 months ago.  He has been following with [place] cardiologist.  Zio patch was unremarkable.  He recently titrated down metoprolol 100 mg to 50 mg due to slow heart rate of 40s.  Recently added amlodipine.  Has been exercising and trying to lose weight.  Lost 30 pounds past 6 months.  Has been sober from alcohol and methamphetamine for 6 months. Denies fever, chills, chest pain, shortness of breath, cough, abdominal pain, constipation, diarrhea, dizziness, no recent travel and long distance driving.  He currently lives at sober [facility] and worry about drug screen after getting benzodiazepines in the emergency room for anxiety.  He also stated that he had echocardiogram about a year ago which presented left ventricular ejection fraction of 40% but he stated that he was using alcohol and methamphetamine heavily.    In the emergency room, vital sign was remarkable for tachycardia of 205, hypertension of 140/99 but afebrile and saturating well at room air.  CBC was unremarkable.  CMP presented hypokalemia of 3.1 and chronic elevated alkaline phosphatase 129.  Alcohol was negative.  Screen was positive for benzodiazepine likely due to Versed he received at the emergency room for anxiety.  EKG  presented atrial fibrillation with rapid ventricular rate of 208 without ischemic changes.  Chest x-ray was unremarkable.  Troponin, proBNP and TSH were within normal range.  After cardioversion at the emergency room, repeated EKG presented sinus rhythm with a rate of 70s.  In emergency room, patient received 3 doses of IV metoprolol tartrate 5 mg, 1 dose of Versed, 1 L of fluid, 2 g of magnesium and 25 mg of metoprolol succinate.  Patient was consulted cardiology department for atrial fibrillation with rapid ventricular rate.    Review of Systems  Review of Systems   Constitutional:  Negative for chills and fever.   Respiratory:  Negative for cough, shortness of breath and wheezing.    Cardiovascular:  Positive for palpitations. Negative for chest pain.   Gastrointestinal:  Negative for abdominal pain, constipation and diarrhea.   Neurological:  Positive for dizziness. Negative for weakness.       Past Medical History   has a past medical history of Alcohol-induced psychosis (HCC), Anxiety, Depression, Paranoid schizophrenia (HCC), Psychiatric disorder, Schizophrenia, paranoid (HCC), and Suicidal ideations.    Surgical History   has a past surgical history that includes other and nasal septal reconstruction.    Family History  Family history is unknown by patient.    Social History  Patient currently vaping every day.reports that he has quit smoking. His smoking use included cigarettes. He has never used smokeless tobacco. He reports that he does not currently use alcohol. He reports that he does not currently use drugs after having used the following drugs: Methamphetamine, inhaled and Marijuana.  Currently lives in sober community with his roommate.  He works at [employer]    Medications  Prior to Admission Medications   Prescriptions Last Dose Informant Patient Reported? Taking?   QUEtiapine (SEROQUEL) 100 MG Tab 7/30/2024 at AM Patient Yes Yes   Sig: Take 100 mg by mouth every day.   amLODIPine (NORVASC) 5  MG Tab 7/30/2024 at AM Patient Yes Yes   Sig: Take 5 mg by mouth every morning.   atorvastatin (LIPITOR) 20 MG Tab 7/30/2024 at AM Patient Yes Yes   Sig: Take 20 mg by mouth every morning.   hydroCHLOROthiazide 12.5 MG tablet 7/27/2024 at D/C Patient Yes Yes   Sig: Take 12.5 mg by mouth every day.   losartan (COZAAR) 50 MG Tab 7/30/2024 at AM Patient Yes Yes   Sig: Take 50 mg by mouth every day.   metoprolol SR (TOPROL XL) 100 MG TABLET SR 24 HR 7/30/2024 at AM Patient Yes Yes   Sig: Take 100 mg by mouth every day.      Facility-Administered Medications: None       Allergies  Allergies   Allergen Reactions    Cefaclor Anaphylaxis    Cephalosporins Anaphylaxis     > 5 years ago    Pcn [Penicillins] Anaphylaxis     > 30 years ago       Vital signs in last 24 hours  Temp:  [36.1 °C (96.9 °F)-36.5 °C (97.7 °F)] 36.5 °C (97.7 °F)  Pulse:  [] 75  Resp:  [12-25] 18  BP: ()/(56-99) 128/67  SpO2:  [88 %-100 %] 95 %    Physical Exam  Physical Exam  Constitutional:       General: He is not in acute distress.     Appearance: Normal appearance. He is not toxic-appearing.   HENT:      Head: Normocephalic and atraumatic.      Mouth/Throat:      Mouth: Mucous membranes are moist.      Pharynx: Oropharynx is clear. No oropharyngeal exudate.   Eyes:      Extraocular Movements: Extraocular movements intact.      Pupils: Pupils are equal, round, and reactive to light.   Cardiovascular:      Rate and Rhythm: Normal rate and regular rhythm.      Heart sounds: No murmur heard.  Pulmonary:      Effort: Pulmonary effort is normal. No respiratory distress.      Breath sounds: Normal breath sounds. No wheezing.   Abdominal:      General: Abdomen is flat. There is no distension.      Palpations: Abdomen is soft.      Tenderness: There is no abdominal tenderness. There is no guarding.   Musculoskeletal:         General: Normal range of motion.      Cervical back: Normal range of motion and neck supple.      Right lower leg: No  edema.      Left lower leg: No edema.      Comments: No pitting edema bilateral lower extremity   Skin:     General: Skin is warm and dry.      Coloration: Skin is not jaundiced.   Neurological:      General: No focal deficit present.      Mental Status: He is alert and oriented to person, place, and time.   Psychiatric:         Mood and Affect: Mood normal.         Lab Review  Lab Results   Component Value Date/Time    WBC 10.0 07/31/2024 05:00 AM    RBC 5.52 07/31/2024 05:00 AM    HEMOGLOBIN 17.2 07/31/2024 05:00 AM    HEMATOCRIT 49.8 07/31/2024 05:00 AM    MCV 90.2 07/31/2024 05:00 AM    MCH 31.2 07/31/2024 05:00 AM    MCHC 34.5 07/31/2024 05:00 AM    MPV 11.2 07/31/2024 05:00 AM      Lab Results   Component Value Date/Time    SODIUM 141 07/31/2024 05:00 AM    POTASSIUM 3.1 (L) 07/31/2024 05:00 AM    CHLORIDE 103 07/31/2024 05:00 AM    CO2 18 (L) 07/31/2024 05:00 AM    GLUCOSE 144 (H) 07/31/2024 05:00 AM    BUN 10 07/31/2024 05:00 AM    CREATININE 1.02 07/31/2024 05:00 AM    BUNCREATRAT 11.1 08/30/2023 06:21 AM    GLOMRATE 107 07/27/2023 05:40 AM      Lab Results   Component Value Date/Time    ASTSGOT 21 07/31/2024 05:00 AM    ALTSGPT 30 07/31/2024 05:00 AM     Lab Results   Component Value Date/Time    CHOLSTRLTOT 114 08/28/2023 10:48 AM    LDL 53 08/28/2023 10:48 AM    HDL 19 (A) 08/28/2023 10:48 AM    TRIGLYCERIDE 211 (H) 08/28/2023 10:48 AM    TROPONINT 9 07/31/2024 05:00 AM       Recent Labs     07/31/24  0500   NTPROBNP <36       Cardiac Imaging and Procedures Review  EKG:  My personal interpretation of the EKG dated 7/31  5:42 AM = presented atrial fibrillation with rapid ventricular rate (208)  7:20 AM (after cardioversion) = sinus rhythm with heart rate of 70s without additional changes    Imaging  Chest X-Ray: No acute changes, unremarkable    Assessment/Plan  # Atrial fibrillation with rapid ventricular rate s/p cardioversion   Initial EKG presented atrial fibrillation with rapid ventricular rate  (208).  After cardioversion, EKG presented sinus rhythm of heart rate of 70s.  Telemetry presented sinus rhythm since cardioversion.  No sign of infection.  Drug screen was unremarkable other than benzodiazepine which is most likely positive from Versed that he received at the emergency room.  Likely, right atrial structure modification causing extra foci and electrical circuit that cause atrial fibrillation.  A good candidate for atrial fibrillation in the future.  [id number] score 2 (hypertension and history of congestive heart failure)  -Start apixaban 5 mg twice daily  -Continue home metoprolol 50 mg succinate daily  -Echocardiogram    #History of congestive heart failure  Echocardiogram in 8/20/2023 presented left ventricular ejection fraction of 40 to 45%, grade II diastolic dysfunction with mildly dilated left atrium and moderate mitral regurgitation.  At that time, patient was overdose of alcohol and methamphetamine.  Possibly, drug overdose causing heart strain, temporarily congestive heart failure.  Since patient has been sober for past 9 months, expecting improvement from repeating echocardiogram  -Echocardiogram    #Hypertension  Stated episode of hypotension and this morning.  Currently, his blood pressure has been in normal range after cardioversion  -Consider resuming home losartan and hydrochlorothiazide when patient is persistently become hypertensive    #Current smoker, smoking use disorder  -Encouraged smoking cessation    Thank you for allowing me to participate in the care of this patient.    I will continue to follow this patient    Please contact me with any questions.    Prince EFREN Sarah D.O.   Internal medicine resident PGY 2  Cardiologist, [facility]  [phone]

## 2024-07-31 NOTE — ED PROVIDER NOTES
ED Provider Note    CHIEF COMPLAINT  Chief Complaint   Patient presents with    Palpitations     Woke up with chest discomfort and palpations. Patient states he felt very dizzy. He took blood pressure and was initially very hypotensive. Patient arrives tachycardic >200bpm.       EXTERNAL RECORDS REVIEWED  Other discharge summary from 10/24/2023 patient medically cleared after intentional ingestion.    HPI/ROS  LIMITATION TO HISTORY     OUTSIDE HISTORIAN(S):      Brannon Sharma is a 44 y.o. male who presents with palpitations.  Patient says that he woke up around 3 this morning with onset of palpitations.  Patient says that when he went to bed last night he felt well.  Patient says he is living in a sober living house and not been using drugs or drinking for over 8 weeks.  Patient denies recent illness including fever, chills cough chest pain, shortness of breath, nausea or vomiting.      PAST MEDICAL HISTORY   has a past medical history of Alcohol-induced psychosis (HCC), Anxiety, Depression, Paranoid schizophrenia (HCC), Psychiatric disorder, Schizophrenia, paranoid (HCC), and Suicidal ideations.    SURGICAL HISTORY   has a past surgical history that includes other and nasal septal reconstruction.    FAMILY HISTORY  Family History   Family history unknown: Yes       SOCIAL HISTORY  Social History     Tobacco Use    Smoking status: Former     Current packs/day: 1.00     Types: Cigarettes    Smokeless tobacco: Never   Vaping Use    Vaping status: Every Day    Substances: Nicotine    Devices: Pre-filled or refillable cartridge   Substance and Sexual Activity    Alcohol use: Not Currently     Comment: 15-16 daily, but sober for 7 months    Drug use: Not Currently     Types: Inhaled, Marijuana     Comment: Meth daily, last use 10/18 per pt    Sexual activity: Yes     Partners: Female       CURRENT MEDICATIONS  Home Medications    **Home medications have not yet been reviewed for this encounter**       Audit from  "Redirected Encounters    **Home medications have not yet been reviewed for this encounter**         ALLERGIES  Allergies   Allergen Reactions    Ceclor [Cefaclor] Anaphylaxis    Cephalosporins Anaphylaxis     > 5 years ago    Pcn [Penicillins] Anaphylaxis     > 30 years ago       PHYSICAL EXAM  VITAL SIGNS: /68   Pulse 78   Temp 36.1 °C (96.9 °F) (Temporal)   Resp 12   Ht 1.905 m (6' 3\")   Wt 118 kg (260 lb)   SpO2 99%   BMI 32.50 kg/m²    Constitutional: Anxious, ill-appearing  HENT: No signs of trauma, Bilateral external ears normal, Nose normal.   Eyes: Pupils are equal and reactive, Conjunctiva normal, Non-icteric.   Neck: Normal range of motion, No tenderness, Supple, No stridor.   Cardiovascular: Tachycardic, irregular irregular rhythm  Thorax & Lungs: Normal breath sounds, No respiratory distress, No wheezing, No chest tenderness.   Abdomen: Bowel sounds normal, Soft, No tenderness, No masses, No pulsatile masses.   Skin: Warm, Dry, No erythema, No rash.   Back: No bony tenderness, No CVA tenderness.   Extremities: Intact distal pulses, No edema, No tenderness, No cyanosis  Musculoskeletal: Good range of motion in all major joints. No tenderness to palpation or major deformities noted.   Neurologic: Alert , Normal motor function, Normal sensory function, No focal deficits noted.       EKG/LABS  Labs Reviewed   COMP METABOLIC PANEL - Abnormal; Notable for the following components:       Result Value    Potassium 3.1 (*)     Co2 18 (*)     Anion Gap 20.0 (*)     Glucose 144 (*)     Alkaline Phosphatase 129 (*)     All other components within normal limits   CBC WITHOUT DIFFERENTIAL   TROPONIN   PROBRAIN NATRIURETIC PEPTIDE, NT   TSH WITH REFLEX TO FT4   DIAGNOSTIC ALCOHOL   ESTIMATED GFR   URINE DRUG SCREEN     Results for orders placed or performed during the hospital encounter of 07/31/24   EKG   Result Value Ref Range    Report       [facility] Emergency Dept.    Test Date:  " 2024  Pt Name:    OSITO BLACK               Department: ER  MRN:        [id number]                      Room:       BL 13  Gender:     Male                         Technician: [id number]  :        1980                   Requested By:CHALINO MCKEON  Order #:    [id number]                    Reading MD: Chalino Mckeon    Measurements  Intervals                                Axis  Rate:       208                          P:          0  CA:         0                            QRS:        -88  QRSD:       113                          T:          16  QT:         245  QTc:        457    Interpretive Statements    Interpreted by me: Atrial fibrillation, RVR  Electronically Signed On 2024 05:42:46 PDT by Chalino Mckeon     EKG   Result Value Ref Range    Report       [facility] Emergency Dept.    Test Date:  2024  Pt Name:    OSITO BLACK               Department: ER  MRN:        [id number]                      Room:       BL 13  Gender:     Male                         Technician: [id number]  :        1980                   Requested By:CHALINO MCKEON  Order #:    [id number]                    Reading MD: Chalino Mckeon    Measurements  Intervals                                Axis  Rate:       79                           P:          0  CA:         0                            QRS:        -69  QRSD:       124                          T:          24  QT:         396  QTc:        455    Interpretive Statements  Interpreted by me: Significant motion artifact, sinus rhythm 80  Electronically Signed On 2024 07:19:16 PDT by Chalino Mckeon     EKG   Result Value Ref Range    Report       [facility] Emergency Dept.    Test Date:  2024  Pt Name:    OSITO BLACK               Department: ER  MRN:        [id number]                      Room:       BL 13  Gender:     Male                         Technician: [id number]  :         1980                   Requested By:DAREN ZULUAGA  Order #:    [id number]                    Reading MD:    Measurements  Intervals                                Axis  Rate:       71                           P:          39  LA:         162                          QRS:        -18  QRSD:       112                          T:          33  QT:         388  QTc:        422    Interpretive Statements  Sinus rhythm  Incomplete right bundle branch block  Low voltage, extremity and precordial leads  Compared to ECG 07/31/2024 07:05:32  Incomplete right bundle-branch block now present  Low QRS voltage now present         I have independently interpreted this EKG    RADIOLOGY/PROCEDURES   I have independently interpreted the diagnostic imaging associated with this visit and am waiting the final reading from the radiologist.   My preliminary interpretation is as follows: No pulmonary edema    Radiologist interpretation:  DX-CHEST-PORTABLE (1 VIEW)   Final Result         1.  No acute cardiopulmonary disease.      EC-ECHOCARDIOGRAM COMPLETE W/O CONT    (Results Pending)       COURSE & MEDICAL DECISION MAKING    ASSESSMENT, COURSE AND PLAN  Care Narrative: On arrival was called to patient's room given severe tachycardia heart rate around 200.  Patient noted to have normal mentation and blood pressure.  ECG shows A-fib RVR.  Patient has clear history of onset of symptoms around 3 AM.  Given patient's stable hemodynamics initially attempted rate control with IV metoprolol.  Patient did have high level of anxiety was given multiple doses of benzodiazepines.  No clear precipitating cause including CHF, PE, infection or blood loss.  Plan to obtain blood work to assess for acute metabolic or hematologic derangements, elevation cardiac biomarkers.  Pain chest x-ray would not show signs of pulmonary edema or pneumonia.  Patient was sustained rate control around 110.  Patient did have transient mild hypotension.  Blood work  resulted with mild hypokalemia.  Patient given magnesium, potassium as well as a liter of fluid.  Patient noted to have persistent atrial fibrillation.  With patient's intermittent hypotension clear onset of symptoms just a few hours ago and no precipitating cause plan for synchronized cardioversion.  Spoke with Dr. Mosley from cardiology who agreed with plan for synchronized cardioversion and recommended admission for echocardiogram adjustment of medications as well as initiation of Eliquis 5 mg twice daily.  Patient consented for procedural sedation and synchronized cardioversion.  Cardioversion was performed successfully as noted below.  Post cardioversion patient reported resolution of palpitations had stable hemodynamics mentation.  Spoke with hospitalist regarding admission.    Conscious Sedation Procedure Note    Indication: cardioversion    Consent: I have discussed with the patient and/or the patient representative the indication, alternatives, and the possible risks and/or complications of the planned procedure and the anesthesia methods. The patient and/or patient representative appear to understand and agree to proceed.    Physician Involvement: The attending physician was present and supervising this procedure.    Pre-Sedation Documentation and Exam: I have personally completed a history, physical exam & review of systems for this patient (see notes).    Airway Assessment: normal    Prior History of Anesthesia Complications: none    ASA Classification: Class 2 - A normal healthy patient with mild systemic disease    Sedation/ Anesthesia Plan: intravenous sedation    Medications Used: etomidate intravenously    Monitoring and Safety: The patient was placed on a cardiac monitor and vital signs, pulse oximetry and level of consciousness were continuously evaluated throughout the procedure. The patient was closely monitored until recovery from the medications was complete and the patient had returned to  baseline status. Respiratory therapy was on standby at all times during the procedure.      (The following sections must be completed)  Post-Sedation Vital Signs: Vital signs were reviewed and were stable after the procedure (see flow sheet for vitals)            Intraservice Time: 10 (Minutes)    Post-Sedation Exam: Lungs: clear           Complications: none    I provided both the sedation and procedure, a nurse was present at the bedside for the entire procedure.     BEDSIDE CARDIOVERSION NOTE    PROCEDURE DATE: 07/31/24   PROCEDURE START TIME: 0706    PRIMARY PROCEDURALIST: Chalino Mckeon D.O.  ASSISTANT(S): None      INFORMED CONSENT: Informed Consent obtained and on the chart    UNIVERSAL PROTOCOL / SAFETY CHECKLIST  Sign in Communication: Completed    Time Out:  Team Confirms the Correct Patient, Correct Procedure, Correct Site and Site Marking, Correct Position (if applicable), Prep and Dry Time (if applicable).  Time:  0706    Affirmation of Time Out: YES      Sign Out Discussion: Completed    PROCEDURE: DIRECT CURRENT CARDIOVERSION  Indication: Atrial fibrillation  Priority: Emergent  Sedation: Etomidate    The patient was monitored and administered supplemental oxygen. Pacemaker equipment was at the bedside and ready to use. Gel pads were applied in the right anterior/left lateral configuration.. The patient underwent a single external synchronized biphasic countershock with a maximal energy of 200 J. The patient converted to sinus rhythm.    Patient tolerated procedure well.    Complications: None    No Specimens Collected Unless Noted Here    Estimated Blood Loss if > Minimal Noted Here      CRITICAL CARE  The very real possibilty of a deterioration of this patient's condition required the highest level of my preparedness for sudden, emergent intervention.  I provided critical care services, which included medication orders, frequent reevaluations of the patient's condition and response to  treatment, ordering and reviewing test results, and discussing the case with various consultants.  The critical care time associated with the care of the patient was 60 minutes. Review chart for interventions. This time is exclusive of any other billable procedures.                 ADDITIONAL PROBLEMS MANAGED      DISPOSITION AND DISCUSSIONS  I have discussed management of the patient with the following physicians and TANNER's: Cardiology, hospitalist    Discussion of management with other [facility] [place] or appropriate source(s): Pharmacy etomidate        FINAL DIAGNOSIS  1. Atrial fibrillation, unspecified type (HCC)         Electronically signed by: Chalino Mckeon D.O., 7/31/2024 4:49 AM

## 2024-07-31 NOTE — ASSESSMENT & PLAN NOTE
New onset  Atrial fibrillation with rapid ventricular response despite 3 doses IV metoprolol for which she required cardioversion in the emergency room cardiology has consulted  He has a known low ejection fraction and echocardiogram will be repeated  Start anticoagulation with Eliquis  He has been on Toprol 50 mg daily this will be increased to 75 mg daily  TSH is normal  Continuous telemetry monitoring  Optimize electrolytes as his potassium is low at 3.1 and will be replaced and will also check magnesium level

## 2024-08-01 NOTE — PROGRESS NOTES
Monitor summary: Per Monitor Room    Sinus  rhythm rate 64-82  none Ectopy  0.17/0.10/0.37    No monitor strip available

## 2024-08-01 NOTE — PROGRESS NOTES
Echo resulted, patient wondering if he can discharge. Discussed with Kaitlynn DONALD and Lisa Reeder DNP who are at bedside with patient. Per provider Jamshid, patient can discharge at this time.       Discussed discharge instructions with patient who verbalized understanding. Patient discharged ambulatory to home.

## 2024-08-01 NOTE — DISCHARGE INSTRUCTIONS
- continue metoprolol 50 mg twice a day  - Start Eliquis 5 mg twice a day  - resume all other home medications  - follow up with your cardiologist in the next 1-2 weeks for post hospital follow up  - follow up with PCP in the next 1-2 weeks or as soon as you can arrange appointment

## 2024-08-01 NOTE — DISCHARGE SUMMARY
{Hospitalist DC Note Director:[id number]}   starting GDMT (switch losartan to Entresto), initiate MRA and SGLT2i with close f/u to cardiology clinic if LVEF remains depressed. However, echocardiogram showed improvement in patient's cardiac function. Ejection fraction had been 40 to 45%, but is now 62%. In fact, echocardiogram shows normal LV size, wall thickness, systolic and diastolic function. Cardiology discussed pursuing ablation therapy/pulmonary vein isolation. The patient is interested in pursuing this, and referral for interventional cardiology. Cardiology has since determined that patients metoprolol 50 mg will be resumed. Patient will also continue on Eliquis 5 mg twice a day. He has been encouraged to cease use of tobacco.  The patient is extremely eager for discharge, says he is unable to stay any longer due to his job in the morning. He prefers to follow-up with his cardiologist, Dr Cordova, who he is seen prior. He will continue his medications as prescribed in addition of Eliquis. At this time, the patient remains in sinus rhythm. He is asymptomatic and hemodynamically stable. He understands discharge instructions and will follow-up with both cardiology and his primary care provider. He is, therefore, discharged in good and stable condition to home with close outpatient follow-up.      Discharge Date  7/31/2024    FOLLOW UP ITEMS POST DISCHARGE  F/u PCP in 1-2 weeks  F/u with your cardiologist in the next 1-2 weeks  Continue home medications as prescribed with the addition of Eliquis 5 mg twice a day    DISCHARGE DIAGNOSES  Paroxysmal atrial fibrillation  Chronic anticoagulation  Hypokalemia - treated  Benign essential hypertension   Schizophrenia  Anxiety and depression  Cardiomyopathy  RVR - resolved      FOLLOW UP  No future appointments.  Gregory Hardin M.D.  [address] [address]  [phone]    Follow up in 1 week(s)      [place]  Your cardiologist  Follow up in 1 week(s)        MEDICATIONS ON DISCHARGE     Medication List         START taking these medications        Instructions   apixaban 5mg Tabs  Commonly known as: Eliquis   Take 1 Tablet by mouth 2 times a day. Indications: Thromboembolism secondary to Atrial Fibrillation  Dose: 5 mg            CHANGE how you take these medications        Instructions   metoprolol  MG Tb24  What changed: how much to take  Commonly known as: Toprol XL   Take 0.5 Tablets by mouth every day.  Dose: 50 mg            ASK your doctor about these medications        Instructions   amLODIPine 5 MG Tabs  Commonly known as: Norvasc   Take 5 mg by mouth every morning.  Dose: 5 mg     atorvastatin 20 MG Tabs  Commonly known as: Lipitor   Take 20 mg by mouth every morning.  Dose: 20 mg     hydroCHLOROthiazide 12.5 MG tablet   Take 12.5 mg by mouth every day.  Dose: 12.5 mg     losartan 50 MG Tabs  Commonly known as: Cozaar   Take 50 mg by mouth every day.  Dose: 50 mg     QUEtiapine 100 MG Tabs  Commonly known as: SEROquel   Take 100 mg by mouth every day.  Dose: 100 mg              Allergies  Allergies   Allergen Reactions    Cefaclor Anaphylaxis    Cephalosporins Anaphylaxis     > 5 years ago    Pcn [Penicillins] Anaphylaxis     > 30 years ago       DIET  Cardiac    ACTIVITY  As tolerated.    CONSULTATIONS  Cardiology    PROCEDURES  Cardioversion    LABORATORY  Lab Results   Component Value Date    SODIUM 141 07/31/2024    POTASSIUM 3.1 (L) 07/31/2024    CHLORIDE 103 07/31/2024    CO2 18 (L) 07/31/2024    GLUCOSE 144 (H) 07/31/2024    BUN 10 07/31/2024    CREATININE 1.02 07/31/2024    GLOMRATE 107 07/27/2023        Lab Results   Component Value Date    WBC 10.0 07/31/2024    HEMOGLOBIN 17.2 07/31/2024    HEMATOCRIT 49.8 07/31/2024    PLATELETCT 261 07/31/2024        Total time of the discharge process exceeds 35 minues minutes.

## 2025-01-24 NOTE — ADDENDUM NOTE
Addended by: RUDDY HERNANDEZ on: 1/26/2018 03:06 PM     Modules accepted: Jordon, SmartSet     PRBC ordered No interventions at this time.

## 2025-02-09 ENCOUNTER — HOSPITAL ENCOUNTER (EMERGENCY)
Facility: MEDICAL CENTER | Age: 45
End: 2025-02-09
Attending: EMERGENCY MEDICINE

## 2025-02-09 VITALS
TEMPERATURE: 97.8 F | RESPIRATION RATE: 16 BRPM | SYSTOLIC BLOOD PRESSURE: 137 MMHG | HEART RATE: 81 BPM | BODY MASS INDEX: 27.35 KG/M2 | HEIGHT: 75 IN | WEIGHT: 220 LBS | DIASTOLIC BLOOD PRESSURE: 93 MMHG | OXYGEN SATURATION: 92 %

## 2025-02-09 DIAGNOSIS — F41.9 ANXIETY: ICD-10-CM

## 2025-02-09 DIAGNOSIS — Z86.79 HISTORY OF ATRIAL FIBRILLATION: ICD-10-CM

## 2025-02-09 DIAGNOSIS — R00.2 PALPITATIONS: ICD-10-CM

## 2025-02-09 PROCEDURE — A9270 NON-COVERED ITEM OR SERVICE: HCPCS | Mod: UD | Performed by: EMERGENCY MEDICINE

## 2025-02-09 PROCEDURE — 99283 EMERGENCY DEPT VISIT LOW MDM: CPT

## 2025-02-09 PROCEDURE — 700102 HCHG RX REV CODE 250 W/ 637 OVERRIDE(OP): Mod: UD | Performed by: EMERGENCY MEDICINE

## 2025-02-09 RX ORDER — HYDROXYZINE HYDROCHLORIDE 25 MG/1
50 TABLET, FILM COATED ORAL ONCE
Status: COMPLETED | OUTPATIENT
Start: 2025-02-09 | End: 2025-02-09

## 2025-02-09 RX ADMIN — HYDROXYZINE HYDROCHLORIDE 50 MG: 25 TABLET, FILM COATED ORAL at 01:04

## 2025-02-09 ASSESSMENT — PAIN DESCRIPTION - PAIN TYPE: TYPE: ACUTE PAIN

## 2025-02-09 ASSESSMENT — FIBROSIS 4 INDEX: FIB4 SCORE: 0.65

## 2025-02-09 NOTE — ED PROVIDER NOTES
ED Provider Note    CHIEF COMPLAINT  Chief Complaint   Patient presents with    Anxiety     Patient BIBA for anxiety with associated chest palpitations. Patient denies any chest pain/pressure, abdominal pain, nausea, dizziness or other complaints.       EXTERNAL RECORDS REVIEWED  Inpatient Notes patient was last hospitalized in July for A-fib with RVR has a history of anxiety depression schizophrenia    HPI/ROS  LIMITATION TO HISTORY   Select: : None  OUTSIDE HISTORIAN(S):  EMS brought in by ambulance for anxiety with palpitations.  Glucose was normal EKG was unremarkable    Brannon Sharma is a 44 y.o. male who presents to the emergency department stating that earlier this evening he believes he went into A-fib.  He started feeling his chest fluttering he had no chest pain no shortness of breath he took a flecainide which she has been prescribed and and the symptoms resolved.  However he has not had any fib issues for a while because he got an ablation and then it is made him really nervous which is why he is here.  He feels more anxious than anything and he is requesting something to bring his blood pressure down although he is not complaining of anything at this time headache vision changes chest pain shortness of breath    PAST MEDICAL HISTORY   has a past medical history of Alcohol-induced psychosis (HCC), Anxiety, Depression, Paranoid schizophrenia (HCC), Psychiatric disorder, Schizophrenia, paranoid (HCC), and Suicidal ideations.    SURGICAL HISTORY   has a past surgical history that includes other and nasal septal reconstruction.    FAMILY HISTORY  Family History   Family history unknown: Yes       SOCIAL HISTORY  Social History     Tobacco Use    Smoking status: Former     Current packs/day: 1.00     Types: Cigarettes    Smokeless tobacco: Never   Vaping Use    Vaping status: Every Day    Substances: Nicotine    Devices: Pre-filled or refillable cartridge   Substance and Sexual Activity    Alcohol  "use: Not Currently     Comment: 15-16 daily, but sober for 15 months    Drug use: Not Currently     Types: Inhaled, Marijuana     Comment: Meth daily, last use 10/18 per pt    Sexual activity: Yes     Partners: Female       CURRENT MEDICATIONS  Home Medications       Reviewed by Josh Burger R.N. (Registered Nurse) on 02/09/25 at 0028  Med List Status: Not Addressed     Medication Last Dose Status   amLODIPine (NORVASC) 5 MG Tab  Active   apixaban (ELIQUIS) 5mg Tab  Active   atorvastatin (LIPITOR) 20 MG Tab  Active   hydroCHLOROthiazide 12.5 MG tablet  Active   losartan (COZAAR) 50 MG Tab  Active   metoprolol SR (TOPROL XL) 100 MG TABLET SR 24 HR  Active   QUEtiapine (SEROQUEL) 100 MG Tab  Active                    ALLERGIES  Allergies   Allergen Reactions    Cefaclor Anaphylaxis    Cephalosporins Anaphylaxis     > 5 years ago    Pcn [Penicillins] Anaphylaxis     > 30 years ago       PHYSICAL EXAM  VITAL SIGNS: BP (!) 174/100   Pulse 91   Temp 36.6 °C (97.8 °F) (Oral)   Resp 18   Ht 1.905 m (6' 3\")   Wt 99.8 kg (220 lb)   SpO2 100%   BMI 27.50 kg/m²    Pulse OX: Pulse Oxygen level is within normal limits on room air  Constitutional: Alert and anxious appearing  HENT: Normocephalic, Atraumatic, MMM  Eyes: PERound. Conjunctiva normal, non-icteric.   Heart: Regular rate and rhythm, intact distal pulses   Lungs: Symmetrical movement, no resp distress   Abdomen: Non-tender, non-distended, normal bowel sounds  EXT/Back no edema  Skin: Warm, Dry, No erythema, No rash.   Neurologic: Alert and oriented, Grossly non-focal.         COURSE & MEDICAL DECISION MAKING    ASSESSMENT, COURSE AND PLAN  Care Narrative:     Patient is a 44-year-old male who presents to the emergency department with anxiety.  He is not complaining of chest pain or palpitations at this time twelve-lead with EMS was normal glucose was normal he has no complaint of chest pain no leg swelling he just feels anxious.  He does take hydroxyzine at " home he has a history of drug abuse but he has been clean and sober and does not want any type of narcotics or benzos.  He will be given some hydroxyzine will see if he calms down and his blood pressure comes down with it.  Otherwise I have low concern for any cardiac abnormality pulmonary embolism or ACS.    DISPOSITION AND DISCUSSIONS  1:35 AM  I reassessed the patient at the bedside he is resting comfortably feels significantly better after the hydroxyzine.  He feels comfortable going home he is got all of his medications at home will return to the ED for any new worsening issues will follow-up with his primary care provider as well as cardiology      I have discussed management of the patient with the following physicians and TANNER's:  none    Discussion of management with other [facility] or appropriate source(s): None     Escalation of care considered, and ultimately not performed:Laboratory analysis and diagnostic imaging    Barriers to care at this time, including but not limited to:  na .     Decision tools and prescription drugs considered including, but not limited to:  na .    The patient will return for new or worsening symptoms and is stable at the time of discharge.    The patient is referred to a primary physician for blood pressure management, diabetic screening, and for all other preventative health concerns.    DISPOSITION:  Patient will be discharged home in stable condition.    FOLLOW UP:  [facility], Emergency Dept  [address] [facility] [address] [phone]    If symptoms worsen      OUTPATIENT MEDICATIONS:  Discharge Medication List as of 2/9/2025  2:16 AM            FINAL DIAGNOSIS  1. Anxiety    2. Palpitations    3. History of atrial fibrillation         Electronically signed by: Kiera Ocampo M.D., 2/9/2025 12:32 AM

## 2025-02-09 NOTE — ED NOTES
Patient discharged home per ERP  Discharge teaching and education discussed with patient.  Patient verbalized understanding of discharge teaching and education. No other questions at this time.    VSS. Patient alert and oriented.  Patient's ride arranged by self  Able to ambulate off unit safely with steady gait.  All belongings with patient at time of discharge.

## 2025-02-09 NOTE — ED TRIAGE NOTES
"Chief Complaint   Patient presents with    Anxiety     Patient BIBA for anxiety with associated chest palpitations. Patient denies any chest pain/pressure, abdominal pain, nausea, dizziness or other complaints.     BP (!) 174/100   Pulse 91   Temp 36.6 °C (97.8 °F) (Oral)   Resp 18   Ht 1.905 m (6' 3\")   Wt 99.8 kg (220 lb)   SpO2 100%   "

## 2025-03-25 NOTE — ED NOTES
Assumed report and patient care from BHAVANA So. Patient is resting comfortably in bed with no signs of labored breathing or restlessness. Safety measures in place, call light within reach.      Called and relayed provider message below   They are going to [facility].  Thank you,  Bridget MAHONEY    [phone]

## 2025-05-05 ENCOUNTER — HOSPITAL ENCOUNTER (EMERGENCY)
Facility: MEDICAL CENTER | Age: 45
End: 2025-05-05
Attending: EMERGENCY MEDICINE
Payer: MEDICAID

## 2025-05-05 VITALS
TEMPERATURE: 98.6 F | HEART RATE: 103 BPM | SYSTOLIC BLOOD PRESSURE: 115 MMHG | WEIGHT: 220 LBS | OXYGEN SATURATION: 94 % | RESPIRATION RATE: 18 BRPM | HEIGHT: 75 IN | BODY MASS INDEX: 27.35 KG/M2 | DIASTOLIC BLOOD PRESSURE: 70 MMHG

## 2025-05-05 DIAGNOSIS — F10.920 ALCOHOLIC INTOXICATION WITHOUT COMPLICATION (HCC): ICD-10-CM

## 2025-05-05 PROCEDURE — 99284 EMERGENCY DEPT VISIT MOD MDM: CPT

## 2025-05-05 ASSESSMENT — FIBROSIS 4 INDEX: FIB4 SCORE: 1.03

## 2025-05-06 NOTE — ED TRIAGE NOTES
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 today and left because \"I was scared\"  HX of schizophrenic and has not taking his meds in 5 days.     "

## 2025-05-06 NOTE — ED NOTES
Breathalyzer .146  Requesting to use a phone to call his sponsor to take him to [facility].  ERP updated and to bedside.  Phone provided

## 2025-05-06 NOTE — ED PROVIDER NOTES
"ER Provider Note    Scribed for Osiel Omalley D.O. by Socorro Peraza. 5/5/2025  5:42 PM    Primary Care Provider: Gregory Hardin M.D. 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 male with a history of schizophrenia who presents to the Emergency Department via EMS for alcohol intoxication. The patient reports that he would like to go to [facility] for assistance with alcohol withdrawal. He states that he has been drinking for the past month with his last drink being 2 hours ago. He notes that he went to [facility] earlier today but left after getting scared of becoming sober. He explains that he was sober for 16 months about 1 month ago when he was apart of a program called \"[facility].\" He denies having any tremors, nausea, or withdrawal symptoms at this time. He adds that he has not been taking his schizophrenia medication for the last 5 days.    ROS as per HPI.    PAST MEDICAL HISTORY  Past Medical History:   Diagnosis Date    Alcohol-induced psychosis (HCC)     Anxiety     Depression     Paranoid schizophrenia (HCC)     Psychiatric disorder     panic attack    Schizophrenia, paranoid (HCC)     Suicidal ideations        SURGICAL HISTORY  Past Surgical History:   Procedure Laterality Date    NASAL SEPTAL RECONSTRUCTION      OTHER      deviated septum       FAMILY HISTORY  Family History   Family history unknown: Yes       SOCIAL HISTORY   reports that he has quit smoking. His smoking use included cigarettes. He has never used smokeless tobacco. He reports that he does not currently use alcohol. He reports that he does not currently use drugs after having used the following drugs: Inhaled and Marijuana.    CURRENT MEDICATIONS  Current Outpatient Medications   Medication Instructions    amLODIPine (NORVASC) " "5 mg, Oral, EVERY MORNING    apixaban (ELIQUIS) 5 mg, Oral, 2 TIMES DAILY    atorvastatin (LIPITOR) 20 mg, Oral, EVERY MORNING    hydroCHLOROthiazide 12.5 mg, Oral, DAILY    losartan (COZAAR) 50 mg, Oral, DAILY    metoprolol SR (TOPROL XL) 50 mg, Oral, DAILY    QUEtiapine (SEROQUEL) 100 mg, Oral, DAILY        ALLERGIES  Cefaclor, Cephalosporins, and Pcn [penicillins]    PHYSICAL EXAM  /70   Pulse (!) 103   Temp 37 °C (98.6 °F) (Temporal)   Resp 18   Ht 1.905 m (6' 3\")   Wt 99.8 kg (220 lb)   SpO2 94%   BMI 27.50 kg/m²     General: No acute distress.  HENT: Normocephalic, Mucus membranes are moist.   Chest: Lungs have even and unlabored respirations, Clear to auscultation.   Cardiovascular: Regular rate and regular rhythm, No peripheral cyanosis.  Abdomen: Non distended.  Neuro: Awake, Conversive, Able to relay recent events.  Psychiatric: Calm and cooperative.       INITIAL ASSESSMENT  Patient is not in active alcohol withdrawal. He has no tremors, nausea, or anxiety. He is requesting to go to [facility] for assistance with alcohol withdrawal.     ED Observation Status? No; Patient does not meet criteria for ED Observation.       COURSE & MEDICAL DECISION MAKING     COURSE AND PLAN  5:42 PM - Patient seen and examined at bedside. Discussed plan of care, including labs. Patient agrees to the plan of care. Ordered for POC breathalyzer to evaluate his symptoms.     6:00 PM - Patient's breathalyzer result was .146. He requested a phone to call his sponsor to take him to [facility].     6:10 PM - Nurse informed me that the patient eloped. I did not get to discuss disposition with the patient.     ED Summary: The patient is under the influence of alcohol.  He wants go to [facility], he is not in withdrawal.  He has breathalyzer shows elevated alcohol.  The patient left prior to any further evaluation      DISPOSITION AND DISCUSSIONS  I have discussed management of the patient with the " following physicians and TANNER's: None    Discussion of management with other QHP or appropriate source(s): None    Barriers to care at this time, including but not limited to: None     The patient eloped from the ED prior to discussing discharge    FINAL DIAGNOSIS  1. Alcoholic intoxication without complication (HCC)        Socorro CABA (Scribe), am scribing for, and in the presence of, Osiel Omalley D.O..    Electronically signed by: Socorro Peraza (Hernánibe), 5/5/2025    IOsiel D.O. personally performed the services described in this documentation, as scribed by Socorro Peraza in my presence, and it is both accurate and complete.     The note accurately reflects work and decisions made by me.  Osiel Omalley D.O.  5/5/2025  9:49 PM

## 2025-05-22 ENCOUNTER — HOSPITAL ENCOUNTER (EMERGENCY)
Facility: MEDICAL CENTER | Age: 45
End: 2025-05-22
Attending: EMERGENCY MEDICINE
Payer: MEDICAID

## 2025-05-22 VITALS
BODY MASS INDEX: 30.65 KG/M2 | HEART RATE: 82 BPM | RESPIRATION RATE: 18 BRPM | HEIGHT: 75 IN | SYSTOLIC BLOOD PRESSURE: 131 MMHG | TEMPERATURE: 98 F | OXYGEN SATURATION: 97 % | DIASTOLIC BLOOD PRESSURE: 84 MMHG | WEIGHT: 246.47 LBS

## 2025-05-22 DIAGNOSIS — F10.29 ALCOHOL DEPENDENCE WITH UNSPECIFIED ALCOHOL-INDUCED DISORDER (HCC): Primary | ICD-10-CM

## 2025-05-22 PROCEDURE — 99283 EMERGENCY DEPT VISIT LOW MDM: CPT

## 2025-05-22 RX ORDER — CHLORDIAZEPOXIDE HYDROCHLORIDE 25 MG/1
CAPSULE, GELATIN COATED ORAL
Qty: 14 CAPSULE | Refills: 0 | Status: SHIPPED | OUTPATIENT
Start: 2025-05-22 | End: 2025-05-26

## 2025-05-22 ASSESSMENT — FIBROSIS 4 INDEX: FIB4 SCORE: 1.03

## 2025-05-22 NOTE — ED NOTES
D/c pt home, rx given . Pt aware of f/u instructions , aware to return for any changes or concerns. No further questions upon d/c home from ed  Demar pass provided

## 2025-05-22 NOTE — ED PROVIDER NOTES
"ED PHYSICIAN NOTE    CHIEF COMPLAINT  No chief complaint on file.      EXTERNAL RECORDS REVIEWED  External ED Note patient was seen at [facility] intoxicated 5/5.  He eloped from the emergency department.    PDMP database.  Patient has received prescription for clonazepam but this prescription completed last week.  No active benzodiazepine prescription    HPI/ROS      Brannon Sharma is a 44 y.o. male who presents to the emergency department concerned about alcohol withdrawal.  He says he wants to stop drinking.  He did not drink yesterday and he got very shaky and his heart rate went got very high.  He drank again and last drink was about 2 hours ago.  Denies any significant withdrawal symptoms.  He reports that he has had success transiently with Librium.  He is in a program and he goes to .  He has a sponsor.  He says that he cannot go back to [facility] because he has been there too many times.    PAST MEDICAL HISTORY  Past Medical History[1]    SOCIAL HISTORY  Social History[2]    CURRENT MEDICATIONS  Home Medications    **Home medications have not yet been reviewed for this encounter**         ALLERGIES  Allergies[3]    PHYSICAL EXAM  VITAL SIGNS: BP (!) 133/91   Pulse 88   Temp 36.7 °C (98 °F) (Temporal)   Resp 20   Ht 1.905 m (6' 3\")   Wt 112 kg (246 lb 7.6 oz)   SpO2 95%   BMI 30.81 kg/m²    Constitutional: Awake and alert  HENT: Normal inspection  Eyes: Normal inspection  Neck: Grossly normal range of motion.  Cardiovascular: Normal heart rate, Normal rhythm.  Symmetric peripheral pulses.   Thorax & Lungs: No respiratory distress, No wheezing, No rales, No rhonchi, No chest tenderness.   Abdomen: Bowel sounds normal, soft, non-distended, nontender, no mass  Skin: No rash.  Back: No tenderness, No CVA tenderness.       DIAGNOSTIC STUDIES / PROCEDURES  LABS/EKG      COURSE & MEDICAL DECISION MAKING    INITIAL ASSESSMENT, COURSE AND PLAN  Case narrative: Patient " presents with requesting assistance with alcohol detox.  He has no active withdrawal symptoms.  His vital signs are stable.  He has had severe symptoms with cessation.  I will give him a prescription for Librium.  He will be referred to [facility].  Stressed the importance of social support network and AA.  He will return to the ER for any concerning alcohol withdrawal symptoms.      DISPOSITION AND DISCUSSIONS    Prescription drugs considered and/or prescribed:     Prescribed   New Prescriptions    CHLORDIAZEPOXIDE (LIBRIUM) 25 MG CAP    Take 2 Capsules by mouth 4 times a day for 1 day, THEN 1 Capsule 3 times a day for 1 day, THEN 1 Capsule 2 times a day for 1 day, THEN 1 Capsule every day for 1 day.     Follow up  [facility]  [address]  [place] [address]  [phone]          FINAL IMPRESSION  1.  Call addiction    This dictation was created using voice recognition software. The accuracy of the dictation is limited to the abilities of the software. I expect there may be some errors of grammar and possibly content. The nursing notes were reviewed and certain aspects of this information were incorporated into this note.    Electronically signed by: Gregory Wren M.D., 5/22/2025           [1]   Past Medical History:  Diagnosis Date    Alcohol-induced psychosis (HCC)     Anxiety     Depression     Paranoid schizophrenia (HCC)     Psychiatric disorder     panic attack    Schizophrenia, paranoid (HCC)     Suicidal ideations    [2]   Social History  Tobacco Use    Smoking status: Former     Current packs/day: 1.00     Types: Cigarettes    Smokeless tobacco: Never   Vaping Use    Vaping status: Every Day    Substances: Nicotine    Devices: Pre-filled or refillable cartridge   Substance Use Topics    Alcohol use: Not Currently     Comment: 15-16 daily, but sober for 15 months    Drug use: Not Currently     Types: Inhaled, Marijuana     Comment: Meth daily, last use 10/18 per pt   [3]    Allergies  Allergen Reactions    Cefaclor Anaphylaxis    Cephalosporins Anaphylaxis     > 5 years ago    Pcn [Penicillins] Anaphylaxis     > 30 years ago

## 2025-05-22 NOTE — ED NOTES
Pt states he is in for alcohol detox, pt states he has a history of Afib, and that his heart rate climbs when he doesn't drink, states his last drink was 2 hours was 2 tall cans states in the past 24 hr he totaled 16 tall cans and a handle of vodka.

## 2025-05-30 ENCOUNTER — HOSPITAL ENCOUNTER (EMERGENCY)
Facility: MEDICAL CENTER | Age: 45
End: 2025-05-31
Attending: STUDENT IN AN ORGANIZED HEALTH CARE EDUCATION/TRAINING PROGRAM
Payer: MEDICAID

## 2025-05-30 DIAGNOSIS — F10.920 ALCOHOLIC INTOXICATION WITHOUT COMPLICATION (HCC): Primary | ICD-10-CM

## 2025-05-30 PROCEDURE — 302970 POC BREATHALIZER: Performed by: STUDENT IN AN ORGANIZED HEALTH CARE EDUCATION/TRAINING PROGRAM

## 2025-05-30 PROCEDURE — 99285 EMERGENCY DEPT VISIT HI MDM: CPT

## 2025-05-30 PROCEDURE — 36415 COLL VENOUS BLD VENIPUNCTURE: CPT

## 2025-05-30 PROCEDURE — 302970 POC BREATHALIZER

## 2025-05-30 ASSESSMENT — FIBROSIS 4 INDEX: FIB4 SCORE: 0.58

## 2025-05-31 VITALS
OXYGEN SATURATION: 96 % | RESPIRATION RATE: 15 BRPM | HEIGHT: 75 IN | SYSTOLIC BLOOD PRESSURE: 132 MMHG | TEMPERATURE: 98 F | HEART RATE: 104 BPM | DIASTOLIC BLOOD PRESSURE: 84 MMHG | BODY MASS INDEX: 30.59 KG/M2 | WEIGHT: 246 LBS

## 2025-05-31 LAB
ALBUMIN SERPL BCP-MCNC: 4.2 G/DL (ref 3.2–4.9)
ALBUMIN/GLOB SERPL: 1.4 G/DL
ALP SERPL-CCNC: 119 U/L (ref 30–99)
ALT SERPL-CCNC: 66 U/L (ref 2–50)
AMPHET UR QL SCN: NEGATIVE
ANION GAP SERPL CALC-SCNC: 16 MMOL/L (ref 7–16)
AST SERPL-CCNC: 78 U/L (ref 12–45)
BARBITURATES UR QL SCN: NEGATIVE
BASOPHILS # BLD AUTO: 1.1 % (ref 0–1.8)
BASOPHILS # BLD: 0.08 K/UL (ref 0–0.12)
BENZODIAZ UR QL SCN: POSITIVE
BILIRUB SERPL-MCNC: 0.8 MG/DL (ref 0.1–1.5)
BUN SERPL-MCNC: 12 MG/DL (ref 8–22)
BZE UR QL SCN: NEGATIVE
CALCIUM ALBUM COR SERPL-MCNC: 8.1 MG/DL (ref 8.5–10.5)
CALCIUM SERPL-MCNC: 8.3 MG/DL (ref 8.5–10.5)
CANNABINOIDS UR QL SCN: NEGATIVE
CHLORIDE SERPL-SCNC: 104 MMOL/L (ref 96–112)
CO2 SERPL-SCNC: 17 MMOL/L (ref 20–33)
CREAT SERPL-MCNC: 1.13 MG/DL (ref 0.5–1.4)
EOSINOPHIL # BLD AUTO: 0.18 K/UL (ref 0–0.51)
EOSINOPHIL NFR BLD: 2.5 % (ref 0–6.9)
ERYTHROCYTE [DISTWIDTH] IN BLOOD BY AUTOMATED COUNT: 46.8 FL (ref 35.9–50)
FENTANYL UR QL: NEGATIVE
GFR SERPLBLD CREATININE-BSD FMLA CKD-EPI: 82 ML/MIN/1.73 M 2
GLOBULIN SER CALC-MCNC: 3.1 G/DL (ref 1.9–3.5)
GLUCOSE SERPL-MCNC: 136 MG/DL (ref 65–99)
HCT VFR BLD AUTO: 46.4 % (ref 42–52)
HGB BLD-MCNC: 16.2 G/DL (ref 14–18)
IMM GRANULOCYTES # BLD AUTO: 0.02 K/UL (ref 0–0.11)
IMM GRANULOCYTES NFR BLD AUTO: 0.3 % (ref 0–0.9)
LYMPHOCYTES # BLD AUTO: 2.83 K/UL (ref 1–4.8)
LYMPHOCYTES NFR BLD: 38.7 % (ref 22–41)
MCH RBC QN AUTO: 31.6 PG (ref 27–33)
MCHC RBC AUTO-ENTMCNC: 34.9 G/DL (ref 32.3–36.5)
MCV RBC AUTO: 90.4 FL (ref 81.4–97.8)
METHADONE UR QL SCN: NEGATIVE
MONOCYTES # BLD AUTO: 0.48 K/UL (ref 0–0.85)
MONOCYTES NFR BLD AUTO: 6.6 % (ref 0–13.4)
NEUTROPHILS # BLD AUTO: 3.73 K/UL (ref 1.82–7.42)
NEUTROPHILS NFR BLD: 50.8 % (ref 44–72)
NRBC # BLD AUTO: 0 K/UL
NRBC BLD-RTO: 0 /100 WBC (ref 0–0.2)
OPIATES UR QL SCN: NEGATIVE
OXYCODONE UR QL SCN: NEGATIVE
PCP UR QL SCN: NEGATIVE
PLATELET # BLD AUTO: 216 K/UL (ref 164–446)
PMV BLD AUTO: 9.5 FL (ref 9–12.9)
POC BREATHALIZER: 0.21 PERCENT (ref 0–0.01)
POC BREATHALIZER: 0.28 PERCENT (ref 0–0.01)
POTASSIUM SERPL-SCNC: 3.5 MMOL/L (ref 3.6–5.5)
PROPOXYPH UR QL SCN: NEGATIVE
PROT SERPL-MCNC: 7.3 G/DL (ref 6–8.2)
RBC # BLD AUTO: 5.13 M/UL (ref 4.7–6.1)
SODIUM SERPL-SCNC: 137 MMOL/L (ref 135–145)
WBC # BLD AUTO: 7.3 K/UL (ref 4.8–10.8)

## 2025-05-31 PROCEDURE — 36415 COLL VENOUS BLD VENIPUNCTURE: CPT

## 2025-05-31 PROCEDURE — 80307 DRUG TEST PRSMV CHEM ANLYZR: CPT

## 2025-05-31 PROCEDURE — 96375 TX/PRO/DX INJ NEW DRUG ADDON: CPT

## 2025-05-31 PROCEDURE — 700111 HCHG RX REV CODE 636 W/ 250 OVERRIDE (IP): Mod: JZ | Performed by: STUDENT IN AN ORGANIZED HEALTH CARE EDUCATION/TRAINING PROGRAM

## 2025-05-31 PROCEDURE — 96374 THER/PROPH/DIAG INJ IV PUSH: CPT

## 2025-05-31 PROCEDURE — 85025 COMPLETE CBC W/AUTO DIFF WBC: CPT

## 2025-05-31 PROCEDURE — 80053 COMPREHEN METABOLIC PANEL: CPT

## 2025-05-31 PROCEDURE — 90791 PSYCH DIAGNOSTIC EVALUATION: CPT

## 2025-05-31 PROCEDURE — 700111 HCHG RX REV CODE 636 W/ 250 OVERRIDE (IP): Mod: UD | Performed by: EMERGENCY MEDICINE

## 2025-05-31 PROCEDURE — 700102 HCHG RX REV CODE 250 W/ 637 OVERRIDE(OP): Performed by: STUDENT IN AN ORGANIZED HEALTH CARE EDUCATION/TRAINING PROGRAM

## 2025-05-31 PROCEDURE — 700105 HCHG RX REV CODE 258: Performed by: STUDENT IN AN ORGANIZED HEALTH CARE EDUCATION/TRAINING PROGRAM

## 2025-05-31 PROCEDURE — 700111 HCHG RX REV CODE 636 W/ 250 OVERRIDE (IP): Mod: JZ,UD | Performed by: STUDENT IN AN ORGANIZED HEALTH CARE EDUCATION/TRAINING PROGRAM

## 2025-05-31 PROCEDURE — 302970 POC BREATHALIZER: Performed by: STUDENT IN AN ORGANIZED HEALTH CARE EDUCATION/TRAINING PROGRAM

## 2025-05-31 PROCEDURE — A9270 NON-COVERED ITEM OR SERVICE: HCPCS | Performed by: STUDENT IN AN ORGANIZED HEALTH CARE EDUCATION/TRAINING PROGRAM

## 2025-05-31 RX ORDER — DIAZEPAM 10 MG/2ML
5 INJECTION, SOLUTION INTRAMUSCULAR; INTRAVENOUS ONCE
Status: COMPLETED | OUTPATIENT
Start: 2025-05-31 | End: 2025-05-31

## 2025-05-31 RX ORDER — THIAMINE HYDROCHLORIDE 100 MG/ML
100 INJECTION, SOLUTION INTRAMUSCULAR; INTRAVENOUS ONCE
Status: COMPLETED | OUTPATIENT
Start: 2025-05-31 | End: 2025-05-31

## 2025-05-31 RX ORDER — SODIUM CHLORIDE 9 MG/ML
1000 INJECTION, SOLUTION INTRAVENOUS ONCE
Status: COMPLETED | OUTPATIENT
Start: 2025-05-31 | End: 2025-05-31

## 2025-05-31 RX ORDER — PHENOBARBITAL SODIUM 130 MG/ML
260 INJECTION, SOLUTION INTRAMUSCULAR; INTRAVENOUS ONCE
Status: COMPLETED | OUTPATIENT
Start: 2025-05-31 | End: 2025-05-31

## 2025-05-31 RX ORDER — FOLIC ACID 1 MG/1
1 TABLET ORAL ONCE
Status: COMPLETED | OUTPATIENT
Start: 2025-05-31 | End: 2025-05-31

## 2025-05-31 RX ADMIN — DIAZEPAM 5 MG: 10 INJECTION, SOLUTION INTRAMUSCULAR; INTRAVENOUS at 08:52

## 2025-05-31 RX ADMIN — FOLIC ACID 1 MG: 1 TABLET ORAL at 00:53

## 2025-05-31 RX ADMIN — SODIUM CHLORIDE 1000 ML: 9 INJECTION, SOLUTION INTRAVENOUS at 03:15

## 2025-05-31 RX ADMIN — THIAMINE HYDROCHLORIDE 100 MG: 100 INJECTION, SOLUTION INTRAMUSCULAR; INTRAVENOUS at 01:08

## 2025-05-31 RX ADMIN — SODIUM CHLORIDE 1000 ML: 9 INJECTION, SOLUTION INTRAVENOUS at 01:03

## 2025-05-31 RX ADMIN — PHENOBARBITAL SODIUM 260 MG: 130 INJECTION INTRAMUSCULAR; INTRAVENOUS at 04:57

## 2025-05-31 NOTE — ED TRIAGE NOTES
Chief Complaint   Patient presents with    Alcohol Intoxication     Pt admitted drinking alcohol tonight    Pt is saying, everything is noisy right now    Suicidal Ideation     Pt said he is having thoughts of killing himself by taking all his pills    Denied HI       Vitals:    05/30/25 2349   BP: (!) 160/89   Pulse: (!) 147   Resp: 18   Temp: 36.7 °C (98 °F)   SpO2: 95%

## 2025-05-31 NOTE — DISCHARGE SUMMARY
"  ED Observation Discharge Summary    Patient:Brannon Sharma  Patient : 1980  Patient MRN: [id number]  Patient PCP: Gregory Hardin M.D. (Inactive)    Admit Date: 2025  Discharge Date and Time: 25 6:43 AM  Discharge Diagnosis: Alcohol withdrawal, passive suicidal ideation  Discharge Attending: Rubén Castro M.D.  Discharge Service: ED Observation    ED Course  Brannon is a 44 y.o. male who was evaluated at [facility] for alcohol intoxication, alcohol withdrawal, and passive suicidal ideation.  He was medically cleared by his initial provider.  He drinks a handle of hard liquor a day, last intake was 8:00 PM last night.  He received a dose of phenobarbital overnight for withdrawal symptoms.  He also received Valium for persistent agitation and withdrawal symptoms.  He was reevaluated at bedside and reports need for assistance with alcohol withdrawal.  He has a history of withdrawal seizures.  Patient does not currently meet criteria for legal hold but will benefit from transport to inpatient detox facility.  He was seen by the alert team who concurs.  There are beds at [facility] and he was sent via [facility].    Discharge Exam:  BP (!) 151/78   Pulse (!) 108   Temp 36.7 °C (98 °F) (Temporal)   Resp 19   Ht 1.905 m (6' 3\")   Wt 112 kg (246 lb)   SpO2 94%   BMI 30.75 kg/m² .    Constitutional: Awake and alert. Nontoxic  HENT:  Grossly normal.  Tongue fasciculations.  Eyes: Grossly normal  Neck: Normal range of motion  Cardiovascular: Normal heart rate   Thorax & Lungs: No respiratory distress  Skin:  No pathologic rash.   Extremities: Well perfused.  Tremulous  Psychiatric: Anxious.    Labs  Results for orders placed or performed during the hospital encounter of 25   POC BREATHALIZER    Collection Time: 25 12:14 AM   Result Value Ref Range    POC Breathalizer 0.280 (A) 0.00 - 0.01 Percent   Urine Drug Screen    Collection Time: 25 12:22 AM "   Result Value Ref Range    Amphetamines Urine Negative Negative    Barbiturates Negative Negative    Benzodiazepines Positive (A) Negative    Cocaine Metabolite Negative Negative    Fentanyl, Urine Negative Negative    Methadone Negative Negative    Opiates Negative Negative    Oxycodone Negative Negative    Phencyclidine -Pcp Negative Negative    Propoxyphene Negative Negative    Cannabinoid Metab Negative Negative   CBC WITH DIFFERENTIAL    Collection Time: 05/31/25 12:54 AM   Result Value Ref Range    WBC 7.3 4.8 - 10.8 K/uL    RBC 5.13 4.70 - 6.10 M/uL    Hemoglobin 16.2 14.0 - 18.0 g/dL    Hematocrit 46.4 42.0 - 52.0 %    MCV 90.4 81.4 - 97.8 fL    MCH 31.6 27.0 - 33.0 pg    MCHC 34.9 32.3 - 36.5 g/dL    RDW 46.8 35.9 - 50.0 fL    Platelet Count 216 164 - 446 K/uL    MPV 9.5 9.0 - 12.9 fL    Neutrophils-Polys 50.80 44.00 - 72.00 %    Lymphocytes 38.70 22.00 - 41.00 %    Monocytes 6.60 0.00 - 13.40 %    Eosinophils 2.50 0.00 - 6.90 %    Basophils 1.10 0.00 - 1.80 %    Immature Granulocytes 0.30 0.00 - 0.90 %    Nucleated RBC 0.00 0.00 - 0.20 /100 WBC    Neutrophils (Absolute) 3.73 1.82 - 7.42 K/uL    Lymphs (Absolute) 2.83 1.00 - 4.80 K/uL    Monos (Absolute) 0.48 0.00 - 0.85 K/uL    Eos (Absolute) 0.18 0.00 - 0.51 K/uL    Baso (Absolute) 0.08 0.00 - 0.12 K/uL    Immature Granulocytes (abs) 0.02 0.00 - 0.11 K/uL    NRBC (Absolute) 0.00 K/uL   COMP METABOLIC PANEL    Collection Time: 05/31/25 12:54 AM   Result Value Ref Range    Sodium 137 135 - 145 mmol/L    Potassium 3.5 (L) 3.6 - 5.5 mmol/L    Chloride 104 96 - 112 mmol/L    Co2 17 (L) 20 - 33 mmol/L    Anion Gap 16.0 7.0 - 16.0    Glucose 136 (H) 65 - 99 mg/dL    Bun 12 8 - 22 mg/dL    Creatinine 1.13 0.50 - 1.40 mg/dL    Calcium 8.3 (L) 8.5 - 10.5 mg/dL    Correct Calcium 8.1 (L) 8.5 - 10.5 mg/dL    AST(SGOT) 78 (H) 12 - 45 U/L    ALT(SGPT) 66 (H) 2 - 50 U/L    Alkaline Phosphatase 119 (H) 30 - 99 U/L    Total Bilirubin 0.8 0.1 - 1.5 mg/dL    Albumin 4.2  3.2 - 4.9 g/dL    Total Protein 7.3 6.0 - 8.2 g/dL    Globulin 3.1 1.9 - 3.5 g/dL    A-G Ratio 1.4 g/dL   ESTIMATED GFR    Collection Time: 05/31/25 12:54 AM   Result Value Ref Range    GFR (CKD-EPI) 82 >60 mL/min/1.73 m 2   POC BREATHALIZER    Collection Time: 05/31/25  3:24 AM   Result Value Ref Range    POC Breathalizer 0.215 (A) 0.00 - 0.01 Percent       Radiology  No orders to display       Medications:   New Prescriptions    No medications on file       My final assessment includes review of medical records, bedside reevaluation, discussion with the alert team, medication management, preparation of the medical record  Upon Reevaluation, the patient's condition has: not improved; and will be escalated to hospitalization.    Patient discharged from ED Observation status at 10:48 AM (Time) 5/31/2025 (Date).     Total time spent on this ED Observation discharge encounter is > 30 Minutes    Electronically signed by: Rubén Castro M.D., 5/31/2025 6:43 AM

## 2025-05-31 NOTE — ED NOTES
"Pt discharged home with resources, MTM ride established. IV discontinued and gauze placed, pt in possession of belongings. Pt provided discharge education and information pertaining to medications and follow up appointments. Pt received copy of discharge instructions and verbalized understanding. /84   Pulse (!) 104   Temp 36.7 °C (98 °F) (Temporal)   Resp 15   Ht 1.905 m (6' 3\")   Wt 112 kg (246 lb)   SpO2 96%   BMI 30.75 kg/m²     "
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
## 2025-05-31 NOTE — ED PROVIDER NOTES
"ED Provider Note    CHIEF COMPLAINT  Chief Complaint   Patient presents with    Alcohol Intoxication     Pt admitted drinking alcohol tonight    Pt is saying, everything is noisy right now    Suicidal Ideation     Pt said he is having thoughts of killing himself by taking all his pills    Denied HI       EXTERNAL RECORDS REVIEWED  Multiple internal and external emergency department notes from here in [facility] presenting for similar presentations    HPI/ROS  LIMITATION TO HISTORY   Select: Alcohol intoxication  OUTSIDE HISTORIAN(S):  None    Brannon Sharma is a 44 y.o. male with past medical history of schizoaffective disorder, alcohol use disorder presenting to the emergency department for alcohol intoxication and suicidal ideations.  Patient says that he \"just cannot do it anymore\", says that he cannot escape the voices in his head, says that the \"sound like a radio that will not turn off\".  Says that he drinks alcohol in order to try and suppress the voices but he says he just cannot handle it anymore and he wants kill himself.  Says that he has \"a bunch of pills at his home\" which he would use to overdose.  Says that he has overdosed once prior.  He does not have any weapons in his household.      PAST MEDICAL HISTORY   has a past medical history of Alcohol-induced psychosis (HCC), Anxiety, Depression, Paranoid schizophrenia (HCC), Psychiatric disorder, Schizophrenia, paranoid (HCC), and Suicidal ideations.    SURGICAL HISTORY   has a past surgical history that includes other and nasal septal reconstruction.    FAMILY HISTORY  Family History   Family history unknown: Yes       SOCIAL HISTORY  Social History     Tobacco Use    Smoking status: Former     Current packs/day: 1.00     Types: Cigarettes    Smokeless tobacco: Never   Vaping Use    Vaping status: Every Day    Substances: Nicotine    Devices: Pre-filled or refillable cartridge   Substance and Sexual Activity    Alcohol use: Not Currently     " "Comment: 15-16 daily, but sober for 15 months    Drug use: Not Currently     Types: Inhaled, Marijuana     Comment: Meth daily, last use 10/18 per pt    Sexual activity: Yes     Partners: Female       CURRENT MEDICATIONS  Home Medications       Reviewed by Rolo Chandler R.N. (Registered Nurse) on 05/30/25 at 2354  Med List Status: Partial     Medication Last Dose Status   amLODIPine (NORVASC) 5 MG Tab  Active   apixaban (ELIQUIS) 5mg Tab  Active   atorvastatin (LIPITOR) 20 MG Tab  Active   hydroCHLOROthiazide 12.5 MG tablet  Active   losartan (COZAAR) 50 MG Tab  Active   metoprolol SR (TOPROL XL) 100 MG TABLET SR 24 HR  Active   QUEtiapine (SEROQUEL) 100 MG Tab  Active                    ALLERGIES  Allergies[1]    PHYSICAL EXAM  VITAL SIGNS: BP (!) 160/89   Pulse (!) 147   Temp 36.7 °C (98 °F) (Temporal)   Resp 18   Ht 1.905 m (6' 3\")   Wt 112 kg (246 lb)   SpO2 95%   BMI 30.75 kg/m²    General: Intoxicated, slurring his speech, smells strongly of alcohol.  Not in acute distress.  Neuro: oriented x 3, moving all extremities.   HEENT:   - Head: Normocephalic, atraumatic no cephalohematoma.  - Eyes: PERRL  - Ears/Nose: normal external nose and ears  - Mouth: moist mucosal membranes no tongue fasciculations.  Resp: clear to auscultation, no increased work of breathing  CV: Tachycardic, regular rhythm  Abd: Soft, non-tender, non-distended  Extremities: No peripheral edema no tremors  Psych: Endorsing ongoing auditory hallucinations as well as suicidal ideations no HI.  Patient does not seem to respond to internal stimuli in the emergency department.  He is extremely inebriated.        DIAGNOSTIC STUDIES / PROCEDURES    LABS and ECG  Results for orders placed or performed during the hospital encounter of 05/30/25   POC BREATHALIZER    Collection Time: 05/31/25 12:14 AM   Result Value Ref Range    POC Breathalizer 0.280 (A) 0.00 - 0.01 Percent       RADIOLOGY  I have independently interpreted the diagnostic " imaging associated with this visit and am waiting the final reading from the radiologist.   My preliminary interpretation is as follows:   -   Radiologist interpretation:   No orders to display           MEDICAL DECISION MAKING      ED COURSE AND PLAN    This is a 44-year-old well-known to this emergency department presenting for alcohol intoxication, hallucinations, suicidal ideations.  He is not responding to internal stimuli.  He is endorses ongoing auditory hallucinations.   Patient was hypertensive and tachycardic on arrival to the emergency department but afebrile.  I suspect that his tachycardia is related to his marked alcohol intoxication.  He denies any  sympathomimetic use this evening.  I ordered basic labs to check electrolytes and gave him a liter of IV fluids, IV thiamine and oral folic acid.  At this time he is not exhibiting any signs of alcohol withdrawal     We will continue to monitor him closely throughout the evening.  Once he is clinically sober, he can be evaluated by behavioral health who can reevaluate him for his suicidal ideations.            ED OBS: Yes; I am placing the patient in to an observation status due to a diagnostic uncertainty as well as therapeutic intensity. Patient placed in observation status at 00:13 AM, 5/31/2025.     Observation plan is as follows: Allow patient to no metabolize in the emergency department, once clinically sober, he can be evaluated by alert team.        Procedures:      ----------------------------------------------------------------------------------  DISCUSSIONS    I have discussed management of the patient with the following physicians and TANNER's:      Discussion of management with other Q or appropriate source(s):     Escalation of care considered, and ultimately not performed:     Barriers to care at this time, including but not limited to: History of schizoaffective disorder, history of alcohol use disorder.  Limited financial  capacity.    Decision tools and prescription drugs considered including, but not limited to:     FINAL IMPRESSION    1. Alcoholic intoxication without complication (HCC)        New Prescriptions    No medications on file       No follow-up provider specified.      DISPOSITION    Observation status pending metabolization of alcohol and reevaluation by behavioral health team.    This chart was dictated using an electronic voice recognition software. The chart has been reviewed and edited but there is still possibility for dictation errors due to limitation of software.    Brad De Los Santos,  5/31/2025          [1]   Allergies  Allergen Reactions    Cefaclor Anaphylaxis    Cephalosporins Anaphylaxis     > 5 years ago    Pcn [Penicillins] Anaphylaxis     > 30 years ago

## 2025-05-31 NOTE — ED NOTES
"PT pacing in room.  C/O \"feeling like I will have a panic attack.\"  RN notified ERP, practiced calming measures with pt.  Pt remains in view of sitter at all times.    "

## 2025-05-31 NOTE — ED NOTES
Breathalyzer = .081.    Pt ambulated to BR with steady gait.  Pt remains in view of sitter at all times.

## 2025-05-31 NOTE — ED NOTES
"Rounded with pt and Sitter.  Pt is Ox3, uncertain of date.  Reoriented to date and time.  PT is speaking with forced tones, pressured voice.  He reports \"My brain is feeling scrambled.\"  When asked about SI, he says, \"I continue to feel like killing myself.\"  Pt reports he was recently discharged from the [facility] in [place] and has been drinking heavily since.    Pt remains in view of sitter of at all times.  Safety checklist updated.  "

## 2025-05-31 NOTE — ED NOTES
Report from BHAVANA Mujica. Pt evaluated by Jesse Amador, pt will not be placed on legal hold. Pt to be discharged with resources. ERP at bedside.